# Patient Record
Sex: FEMALE | Race: BLACK OR AFRICAN AMERICAN | Employment: PART TIME | URBAN - METROPOLITAN AREA
[De-identification: names, ages, dates, MRNs, and addresses within clinical notes are randomized per-mention and may not be internally consistent; named-entity substitution may affect disease eponyms.]

---

## 2017-01-16 DIAGNOSIS — K21.9 GASTROESOPHAGEAL REFLUX DISEASE, ESOPHAGITIS PRESENCE NOT SPECIFIED: ICD-10-CM

## 2017-01-16 RX ORDER — OMEPRAZOLE 40 MG/1
40 CAPSULE, DELAYED RELEASE ORAL DAILY
Qty: 90 CAP | Refills: 1 | Status: SHIPPED | OUTPATIENT
Start: 2017-01-16 | End: 2017-07-10 | Stop reason: SDUPTHER

## 2017-01-16 NOTE — TELEPHONE ENCOUNTER
[facility] [id number] [facility] for omeprazole 40mg for pt. Refill generated and sent to provider for review.

## 2017-01-20 DIAGNOSIS — I10 BENIGN ESSENTIAL HYPERTENSION: ICD-10-CM

## 2017-01-21 RX ORDER — RAMIPRIL 10 MG/1
20 CAPSULE ORAL DAILY
Qty: 180 CAP | Refills: 1 | Status: SHIPPED | OUTPATIENT
Start: 2017-01-21 | End: 2017-07-10 | Stop reason: SDUPTHER

## 2017-01-22 RX ORDER — PREDNISONE 5 MG/1
TABLET ORAL
Qty: 120 TAB | Refills: 5 | Status: SHIPPED | OUTPATIENT
Start: 2017-01-22 | End: 2017-05-16

## 2017-03-07 ENCOUNTER — OFFICE VISIT (OUTPATIENT)
Dept: RHEUMATOLOGY | Age: 62
End: 2017-03-07

## 2017-03-07 VITALS
DIASTOLIC BLOOD PRESSURE: 70 MMHG | HEIGHT: 66 IN | OXYGEN SATURATION: 98 % | TEMPERATURE: 98.1 F | SYSTOLIC BLOOD PRESSURE: 138 MMHG | RESPIRATION RATE: 16 BRPM | BODY MASS INDEX: 29.02 KG/M2 | WEIGHT: 180.6 LBS | HEART RATE: 82 BPM

## 2017-03-07 DIAGNOSIS — Z79.60 LONG-TERM USE OF IMMUNOSUPPRESSANT MEDICATION: ICD-10-CM

## 2017-03-07 DIAGNOSIS — R20.2 NUMBNESS AND TINGLING IN BOTH HANDS: ICD-10-CM

## 2017-03-07 DIAGNOSIS — M85.80 OSTEOPENIA: ICD-10-CM

## 2017-03-07 DIAGNOSIS — E11.9 TYPE 2 DIABETES MELLITUS WITHOUT COMPLICATION, WITHOUT LONG-TERM CURRENT USE OF INSULIN (HCC): ICD-10-CM

## 2017-03-07 DIAGNOSIS — M31.6 TEMPORAL ARTERITIS (HCC): Primary | ICD-10-CM

## 2017-03-07 DIAGNOSIS — Z79.52 LONG TERM (CURRENT) USE OF SYSTEMIC STEROIDS: ICD-10-CM

## 2017-03-07 DIAGNOSIS — R20.0 NUMBNESS AND TINGLING IN BOTH HANDS: ICD-10-CM

## 2017-03-07 DIAGNOSIS — M06.4 INFLAMMATORY POLYARTHRITIS (HCC): ICD-10-CM

## 2017-03-07 RX ORDER — METHOTREXATE 2.5 MG/1
TABLET ORAL
Qty: 24 TAB | Refills: 2 | Status: SHIPPED | OUTPATIENT
Start: 2017-03-07 | End: 2017-05-16

## 2017-03-07 RX ORDER — IBUPROFEN 400 MG/1
TABLET ORAL
COMMUNITY
End: 2017-05-16

## 2017-03-07 RX ORDER — FOLIC ACID 1 MG/1
1 TABLET ORAL DAILY
Qty: 90 TAB | Refills: 3 | Status: SHIPPED | OUTPATIENT
Start: 2017-03-07 | End: 2017-10-09 | Stop reason: SDUPTHER

## 2017-03-07 NOTE — PATIENT INSTRUCTIONS
Labs today and in 4 weeks    Methotrexate 4 tabs weekly for 2 weeks, then 6 tabs once weekly if tolerated    Folic acid 1 mg daily    In 2 weeks, prednisone to 17.5 mg daily if tolerated (call 3 weeks after for instructions)       Patient Education  METHOTREXATE - ORAL   IMPORTANT NOTE: The following information is intended to supplement, not substitute for, the expertise and judgment of your physician, pharmacist or other healthcare professional. It should not be construed to indicate that use of the drug is safe, appropriate, or effective for you. Consult your healthcare professional before using this drug. METHOTREXATE - ORAL  (meth-oh-TREX-ate)   COMMON BRAND NAME(S): Rheumatrex   WARNING: Methotrexate has rarely caused serious (sometimes fatal) side effects. Therefore, this medication should be used only to treat cancer or severe cases of psoriasis or rheumatoid arthritis. Methotrexate has caused birth defects and fetal death. Women must avoid becoming pregnant while taking this medication. Pregnant women who have psoriasis or rheumatoid arthritis must not use methotrexate. (See also Precautions)   If you have kidney problems or excess body water (ascites, pleural effusion), you must be closely monitored and your dose may be adjusted or stopped by your doctor. Methotrexate (usually at high dosages) has rarely caused severe (sometimes fatal) bone marrow suppression (decreasing your body's ability to fight infections) and stomach/intestinal disease (e.g., bleeding) when used at the same time as non-steroidal anti-inflammatory drugs (NSAIDs such as indomethacin, ketoprofen). Therefore, NSAIDs should not be used with high-dose methotrexate. Caution is advised if you also take aspirin. NSAIDs/aspirin may be used with low-dose methotrexate such as for the treatment of rheumatoid arthritis if directed by your doctor.  If you are using low-dose aspirin ( mg per day) for heart attack or stroke prevention, continue to take it unless directed otherwise. Consult your doctor regarding safe use of these drugs (e.g., close monitoring by your doctor, maintaining stable doses of NSAIDs). In rare instances, this drug may also cause liver problems when it is used for long periods of time. If you are using methotrexate long-term, a liver biopsy is usually recommended. Methotrexate use has rarely resulted in serious (sometimes fatal) lung problems, lung infections (Pneumocystis carinii pneumonia), skin reactions, diarrhea and mouth sores (ulcerative stomatitis). (See also Side Effects.)   Lumps (tumors/abnormal growths) may very infrequently appear during methotrexate use. If this occurs, the drug must be stopped and treatment may be needed. Consult your doctor immediately if new lumps/growths occur. When used to treat tumors, methotrexate sometimes causes side effects due to the rapid destruction of cancer cells (tumor lysis syndrome). Tell your doctor immediately if you experience symptoms such as irregular heartbeat or muscle weakness. Although rare, this medication when used with radiation treatment may increase the risk of tissue and bone damage. Discuss the risks and benefits of your treatment plan with your doctor. USES:  Methotrexate is used to treat certain types of cancer or to control severe psoriasis or rheumatoid arthritis. This medication works by interfering with cell growth and by suppressing the immune system. Early treatment of rheumatoid arthritis with more aggressive therapy such as methotrexate helps to reduce further joint damage and to preserve joint function. OTHER USES:  This medication has also been used to treat other disorders such as lupus and psoriatic arthritis. HOW TO USE:  This is a potent medication. The dose and how often you take this drug is based on your medical condition and response to therapy.  There are many different dosing schedules for this medicine (especially for cancer treatment). Therefore, it is very important that you follow your doctor's instructions carefully. Take it by mouth exactly as directed. For managing psoriasis or rheumatoid arthritis, take this medication exactly as directed, usually once a week. It may take up to several months of continued use before the full benefit of this drug takes effect. Do not increase your dose or take this medication more often without your doctor's approval. Your condition will not improve any faster and the risk of serious side effects may be increased. Unless your doctor instructs you otherwise, drink plenty of fluids while taking this medication. This helps your kidneys to remove the drug from your body and minimize some of the side effects. This medication may come with a Patient Information Leaflet. Read it carefully. Ask your doctor or pharmacist any questions that you may have about this medicine. SIDE EFFECTS:  See also Warning section. Nausea, vomiting, stomach pain, drowsiness or dizziness may occur. If any of these effects persist or worsen, notify your doctor or pharmacist promptly. Tell your doctor immediately if any of these serious side effects occur: mouth sores, diarrhea, fever, fatigue, persistent sore throat, unusual bleeding or bruising, black stools, skin rash, enlarged glands/lymph nodes, dark urine, bone pain, unusual pain and discoloration of the skin. Tell your doctor immediately if any of these unlikely but serious side effects occur: yellowing eyes/skin, change in the amount of urine, chest pain, dry cough, trouble breathing, calf pain/swelling, vision changes, irregular heartbeat, muscle weakness, mental/mood changes, seizures. Temporary hair loss may occur. Normal hair growth should return after treatment has ended. A serious allergic reaction to this drug is unlikely, but seek immediate medical attention if it occurs.  Symptoms of a serious allergic reaction include: rash, itching, swelling, severe dizziness, trouble breathing. If you notice other effects not listed above, contact your doctor or pharmacist.   Dominic Jamison:  Before taking methotrexate, tell your doctor or pharmacist if you are allergic to it; or if you have any other allergies. This medication should not be used if you have certain medical conditions. Before using this medicine, consult your doctor or pharmacist if you have: liver disease, severe kidney disease, severe lung disease (e.g., pulmonary fibrosis), alcohol use, suppressed immune system, blood cell/bone marrow disorders. Before using this medication, tell your doctor or pharmacist your medical history, especially of: stomach/intestinal diseases (e.g., peptic ulcer, ulcerative colitis), kidney disease, any active infection (including chickenpox or recent exposure to it), folic acid deficiency. This drug may make you dizzy or drowsy; use caution engaging in activities requiring alertness such as driving or using machinery. Avoid alcoholic beverages. This medication may make you more sensitive to the sun. Avoid prolonged sun exposure, tanning booths or sunlamps. Use a sunscreen and wear protective clothing when outdoors. Do not have immunizations/vaccinations without the consent of your doctor and avoid contact with people who have recently received oral polio vaccine. Use caution with sharp objects like safety razors or nail cutters and avoid activities such as contact sports to lower the chance of getting cut, bruised, or injured. Wash your hands well to prevent the spread of infections. Methotrexate must not be used during pregnancy. It may cause fetal harm. If you become pregnant or think you may be pregnant, inform your doctor immediately. Pregnancy must be avoided, therefore males and females must use reliable form(s) of birth control during and for at least 3 months following the end of methotrexate treatment.    Methotrexate passes into breast milk. Do not breast-feed while using this drug due to the risk for fetal harm. DRUG INTERACTIONS:  See also Warnings about non-steroidal anti-inflammatory drugs (NSAIDs) and aspirin use. This drug should not be used with the following medications because very serious interactions may occur: acitretin, asparaginase, live vaccines, pyrimethamine. If you are currently using any of these medications, tell your doctor or pharmacist before starting methotrexate. Before using this medication, tell your doctor or pharmacist of all prescription and nonprescription products you may use, especially of: other cancer treatments potentially toxic to the kidney (e.g., cisplatin), digoxin, leflunomide, other drugs potentially toxic to the liver (e.g., azathioprine, sulfasalazine, retinoids such as isotretinoin), penicillins, phenytoin, probenecid, procarbazine, sulfa medications, theophylline. Certain antibiotics (e.g., chloramphenicol, sulfa, tetracyclines) may interfere with methotrexate blood tests (increase methotrexate blood levels due to protein-binding displacement). Make sure laboratory personnel and your doctors know you are using any of these types of antibiotics. Do not start or stop any medicine without doctor or pharmacist approval.   OVERDOSE:  If overdose is suspected, contact your local poison control center or emergency room immediately.  residents can call the [place] 1997 hotline at 1-447.593.9703. [place] residents should call their local poison control center directly. Symptoms of overdose may include severe nausea and vomiting, and bloody stools. NOTES:  Do not share this medication with others. Laboratory and/or medical tests (e.g., CBC, liver and kidney function tests) should be performed periodically to monitor your progress or check for side effects. Consult your doctor for more details. MISSED DOSE:  It is important to use each dose at the scheduled time.  If you miss a dose, contact your doctor or pharmacist immediately to establish a new dosing schedule. Do not double the dose to catch up. STORAGE:  Store this medication at room temperature between 59-86 degrees F (15-30 degrees C) away from light and moisture. Do not store in the bathroom. Keep all medicines away from children and pets. MEDICAL ALERT: Your condition can cause complications in a medical emergency. For enrollment information call MedicAlert at [phone] [facility], [facility].), or [phone] [facility]).

## 2017-03-07 NOTE — PROGRESS NOTES
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 HPI Patient presents for follow up of temporal arteritis. \"I've not been feeling very well. \" A week after lowering to 15 mg daily prednisone, she felt poorly (pain in the shoulders, feet, and knees wth parasthesias of the hands). She increased prednisone to 20 mg daily with improvement. She has some pain in the right shoulder and right foot. She had left temporal headaches (perhaps every other day, improved on prednisone 20 mg). She has no current tongue or jaw claudication (noted during flare but improved). Vision is fine. She has mild parasthesias of the right hand currently. She has AM stiffness of 20-30 minutes. She has no current joint swelling. She has been taking Actonel 150 mg once monthly. She is taking vitamin D3 2000 units daily. She is getting calcium through healthy diet. Blood sugars were \"wacky\" during the flare but are improved recently (fasting 110 range). Current Outpatient Prescriptions   Medication Sig Dispense Refill    ibuprofen (MOTRIN) 400 mg tablet Take  by mouth every six (6) hours as needed for Pain.  predniSONE (DELTASONE) 5 mg tablet 17.5 mg once daily or as directed. (Patient taking differently: 20 mg daily. 17.5 mg once daily or as directed.) 120 Tab 5    ramipril (ALTACE) 10 mg capsule Take 2 Caps by mouth daily. 180 Cap 1    omeprazole (PRILOSEC) 40 mg capsule Take 1 Cap by mouth daily. 90 Cap 1    SITagliptin (JANUVIA) 100 mg tablet Take 1 Tab by mouth daily. 90 Tab 1    atorvastatin (LIPITOR) 20 mg tablet Take 1 Tab by mouth daily. 90 Tab 3    DULoxetine (CYMBALTA) 60 mg capsule Take 1 Cap by mouth daily. 90 Cap 1    hydroCHLOROthiazide (HYDRODIURIL) 25 mg tablet TAKE 1 TABLET BY MOUTH EVERY DAY 90 Tab 2    risedronate (ACTONEL) 150 mg tablet Take 1 Tab by mouth every thirty (30) days. 1 Tab 11    metFORMIN ER (GLUCOPHAGE XR) 500 mg tablet Take 2 Tabs by mouth two (2) times a day.   11    estradiol (ESTRACE) 1 mg tablet Take 1 Tab by mouth daily. 90 Tab 3    omega-3 fatty acids-vitamin e (FISH OIL) 1,000 mg cap Take 1 Cap by mouth two (2) times a day.  Cholecalciferol, Vitamin D3, (VITAMIN D3) 2,000 unit cap Take 1 Cap by mouth daily.  multivitamin (ONE A DAY) tablet Take 1 Tab by mouth daily.  acetaminophen (TYLENOL) 325 mg tablet Take 2 Tabs by mouth every four (4) hours as needed for Fever (Temperature greater than 100.4 degrees Fahrenheit if taking PO). 15 Tab 0     Allergies   Allergen Reactions    Elfego Flavor Hives    Alendronate Nausea and Vomiting    Morphine Other (comments)     Extreme epigastric pain    Valium [Diazepam] Nausea and Vomiting    Versed [Midazolam] Nausea and Vomiting          Review of Systems   Constitutional: Negative for fever and weight loss. Eyes: Negative for blurred vision. Respiratory: Negative for cough. Gastrointestinal: Negative for abdominal pain. Skin: Negative for rash. Physical Exam   Vitals reviewed. Constitutional: She is oriented to person, place, and time. She appears well-developed and well-nourished. No distress.    HENT:    Mouth/Throat: Oropharynx is clear and moist. No oropharyngeal exudate. Dentition unremarkable; no areas exposed jaw bone    Eyes: Conjunctivae are normal.    Neck: Neck supple. Mildly reduced and painful left lateral rotation  Cardiovascular: Regular rhythm.     -right temporal artery non tender and with normal pulsation. Left temporal artery s/p biopsy without tenderness in the region  -no edema  Pulmonary/Chest: Effort normal and breath sounds normal. No respiratory distress.    Abdominal: Soft. She exhibits no distension. There is no tenderness. No organomegaly. Musculoskeletal:   -Peripheral joints FROM aside from right knee flexion to 100 degrees. Painful ROM right shoulder  -Full peripheral joint examination reveals synovitis right 1-3 MTPS and left 1-2 mtps. Tenderness right shoulder.  Small and cool effusion right knee. Lymphadenopathy:     She has no cervical adenopathy. Neurological: She is alert and oriented to person, place, and time. She exhibits normal muscle tone.   -gait normal  Skin: Skin is warm and dry. No acute rash noted. Psychiatric: She has a normal mood and affect. Judgment normal.    Lab Results   Component Value Date/Time    WBC 8.7 12/06/2016 01:43 PM    HGB 11.6 12/06/2016 01:43 PM    HCT 35.7 12/06/2016 01:43 PM    PLATELET 087 11/02/1268 01:43 PM    MCV 87 12/06/2016 01:43 PM     Lab Results   Component Value Date/Time    Sodium 140 12/06/2016 01:43 PM    Potassium 4.2 12/06/2016 01:43 PM    Chloride 96 12/06/2016 01:43 PM    CO2 27 12/06/2016 01:43 PM    Anion gap 10 04/30/2016 03:25 AM    Glucose 123 12/06/2016 01:43 PM    BUN 23 12/06/2016 01:43 PM    Creatinine 0.76 12/06/2016 01:43 PM    BUN/Creatinine ratio 30 12/06/2016 01:43 PM    GFR est AA 97 12/06/2016 01:43 PM    GFR est non-AA 84 12/06/2016 01:43 PM    Calcium 9.7 12/06/2016 01:43 PM    Bilirubin, total 0.2 12/06/2016 01:43 PM    AST (SGOT) 19 12/06/2016 01:43 PM    Alk. phosphatase 47 12/06/2016 01:43 PM    Protein, total 7.0 12/06/2016 01:43 PM    Albumin 4.8 12/06/2016 01:43 PM    Globulin 4.1 04/29/2016 08:45 AM    A-G Ratio 2.2 12/06/2016 01:43 PM    ALT (SGPT) 17 12/06/2016 01:43 PM     Lab Results   Component Value Date/Time    Sed rate (ESR) 6 12/06/2016 01:43 PM     CRP 0.46 mg/dL    25-D 72    ASSESSMENT and PLAN    ICD-10-CM ICD-9-CM    1. Temporal arteritis [facility]): shoulder girdle pain and fatigue initially. Temporal headaches. Sudden left sided visual loss in late April 2016. ESR was 116. Temporal artery biopsy negative. Symptoms improved with prednisone 55-60 mg daily. Flare with taper to 15 mg daily but better at 20 mg daily. M31.6 446. 5 C REACTIVE PROTEIN, QT  Prednisone 20 mg daily, tapering to 17.5 mg daily in 2-3 weeks if tolerated  -off label methotrexate 10 mg once weekly.  If tolerated, increase to 15 mg weekly in 2 weeks      SED RATE (ESR)   2. Inflammatory polyarthritis ([address]): flare of GCA recently with peripheral symptoms (may e related but could also portend an additional inflammatory arthritis). M06.4 714.9 ANTINUCLEAR ANTIBODIES, IFA  See above      CYCLIC CITRUL PEPTIDE AB, IGG      RHEUMATOID FACTOR, QL      C REACTIVE PROTEIN, QT      SED RATE (ESR)   3. Numbness and tingling in both hands: clinical carpal tunnel symptoms noted since last visit (improved with prednisone dose increase but still present). R20.2 782.0 Carpal tunnel braces at bedtime. Call if not improving   4. Long term (current) use of systemic steroids Z79.52 V58.65 See above and below  She notes a colonoscopy is likely in the near future. Review prednisone dosing with anesthesia team prior to procedure. 5. Long-term use of immunosuppressant medication Z79.899 V58.69 HEP B SURFACE AG      HEPATITIS B CORE AB, TOTAL      HEPATITIS B SURF AB QUANT      HEPATITIS C AB      METABOLIC PANEL, COMPREHENSIVE      CBC WITH AUTOMATED DIFF  We discussed the rationale for using methotrexate, as well as the potential adverse effects associated with methotrexate (hematologic, pulmonary, infectious, and hepatic, e.g.). An informational handout was provided. We discussed that the time to clinical improvement from methorexate will likely be between 2-6 weeks. We will adjust the weekly dose depending upon the clinical response during that time frame. I have prescribed folic acid 1 mg p.o. once daily. CBC and CMP will be monitored at least every 12 weeks (next in 4 weeks). I have recommended a yearly flu vaccine and a pneumovax (if not previously administered). I stressed the importance of abstinence from alcohol. 6. Type 2 diabetes mellitus without complication, without long-term current use of insulin (HCC) E11.9 250.00 HEMOGLOBIN A1C W/O EAG   7. Osteopenia: DXA femoral neck T-score -1.3. She is tolerating Actonel 150 mg monthly. M85.80 733.90 Risedronate 150 mg monthly  Calcium 1200 mg daily total  -Vitamin D3 2000 units daily

## 2017-03-09 LAB
ALBUMIN SERPL-MCNC: 4.3 G/DL (ref 3.6–4.8)
ALBUMIN/GLOB SERPL: 1.7 {RATIO} (ref 1.1–2.5)
ALP SERPL-CCNC: 46 IU/L (ref 39–117)
ALT SERPL-CCNC: 19 IU/L (ref 0–32)
ANA HOMOGEN TITR SER: NORMAL {TITER}
ANA SPECKLED TITR SER: NORMAL {TITER}
ANA TITR SER IF: POSITIVE {TITER}
AST SERPL-CCNC: 17 IU/L (ref 0–40)
BASOPHILS # BLD AUTO: 0 X10E3/UL (ref 0–0.2)
BASOPHILS NFR BLD AUTO: 0 %
BILIRUB SERPL-MCNC: 0.2 MG/DL (ref 0–1.2)
BUN SERPL-MCNC: 26 MG/DL (ref 8–27)
BUN/CREAT SERPL: 32 (ref 11–26)
CALCIUM SERPL-MCNC: 9.5 MG/DL (ref 8.7–10.3)
CCP IGA+IGG SERPL IA-ACNC: 5 UNITS (ref 0–19)
CHLORIDE SERPL-SCNC: 98 MMOL/L (ref 96–106)
CO2 SERPL-SCNC: 23 MMOL/L (ref 18–29)
CREAT SERPL-MCNC: 0.81 MG/DL (ref 0.57–1)
CRP SERPL-MCNC: 8.3 MG/L (ref 0–4.9)
EOSINOPHIL # BLD AUTO: 0 X10E3/UL (ref 0–0.4)
EOSINOPHIL NFR BLD AUTO: 0 %
ERYTHROCYTE [DISTWIDTH] IN BLOOD BY AUTOMATED COUNT: 15.7 % (ref 12.3–15.4)
ERYTHROCYTE [SEDIMENTATION RATE] IN BLOOD BY WESTERGREN METHOD: 8 MM/HR (ref 0–40)
GLOBULIN SER CALC-MCNC: 2.6 G/DL (ref 1.5–4.5)
GLUCOSE SERPL-MCNC: 141 MG/DL (ref 65–99)
HBA1C MFR BLD: 6.5 % (ref 4.8–5.6)
HBV CORE AB SERPL QL IA: NEGATIVE
HBV SURFACE AB SER-ACNC: 5 MIU/ML
HBV SURFACE AG SERPL QL IA: NEGATIVE
HCT VFR BLD AUTO: 33.9 % (ref 34–46.6)
HCV AB S/CO SERPL IA: 0.1 S/CO RATIO (ref 0–0.9)
HGB BLD-MCNC: 11 G/DL (ref 11.1–15.9)
IMM GRANULOCYTES # BLD: 0 X10E3/UL (ref 0–0.1)
IMM GRANULOCYTES NFR BLD: 0 %
LYMPHOCYTES # BLD AUTO: 1.3 X10E3/UL (ref 0.7–3.1)
LYMPHOCYTES NFR BLD AUTO: 19 %
Lab: NORMAL
MCH RBC QN AUTO: 27 PG (ref 26.6–33)
MCHC RBC AUTO-ENTMCNC: 32.4 G/DL (ref 31.5–35.7)
MCV RBC AUTO: 83 FL (ref 79–97)
MONOCYTES # BLD AUTO: 0.2 X10E3/UL (ref 0.1–0.9)
MONOCYTES NFR BLD AUTO: 4 %
NEUTROPHILS # BLD AUTO: 5.4 X10E3/UL (ref 1.4–7)
NEUTROPHILS NFR BLD AUTO: 77 %
PLATELET # BLD AUTO: 476 X10E3/UL (ref 150–379)
POTASSIUM SERPL-SCNC: 4.3 MMOL/L (ref 3.5–5.2)
PROT SERPL-MCNC: 6.9 G/DL (ref 6–8.5)
RBC # BLD AUTO: 4.08 X10E6/UL (ref 3.77–5.28)
RHEUMATOID FACT SERPL-ACNC: <10 IU/ML (ref 0–13.9)
SODIUM SERPL-SCNC: 142 MMOL/L (ref 134–144)
WBC # BLD AUTO: 6.9 X10E3/UL (ref 3.4–10.8)

## 2017-03-10 ENCOUNTER — OFFICE VISIT (OUTPATIENT)
Dept: INTERNAL MEDICINE CLINIC | Age: 62
End: 2017-03-10

## 2017-03-10 VITALS
HEIGHT: 66 IN | OXYGEN SATURATION: 98 % | SYSTOLIC BLOOD PRESSURE: 136 MMHG | WEIGHT: 179.2 LBS | RESPIRATION RATE: 20 BRPM | DIASTOLIC BLOOD PRESSURE: 74 MMHG | HEART RATE: 72 BPM | BODY MASS INDEX: 28.8 KG/M2 | TEMPERATURE: 97.8 F

## 2017-03-10 DIAGNOSIS — M35.3 PMR (POLYMYALGIA RHEUMATICA) (HCC): ICD-10-CM

## 2017-03-10 DIAGNOSIS — E11.9 TYPE 2 DIABETES MELLITUS WITHOUT COMPLICATION, WITHOUT LONG-TERM CURRENT USE OF INSULIN (HCC): Primary | ICD-10-CM

## 2017-03-10 DIAGNOSIS — M31.6 TEMPORAL ARTERITIS (HCC): ICD-10-CM

## 2017-03-10 NOTE — PROGRESS NOTES
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 She had lab work done this week by Dr. Eusebio Reis (see below). C/o worsening polyarthralgia, paresthesias in hands and feet, edema in knee and feet since in mid February. Recent labs showed +BLANCA. Diagnosed with PMR. MTX and folic acid started 4/8/55. Prednisone increased to 20 mg daily. Arthralgias gradually improving. She retired 12/31/16. She had been working out that the gym regularly until her joint pain started. She has been very conscious of her diet. Fasting glucose 100-110. Two 2 hr post-prandial glucose 140-150. Her BP has been running 120-130/70's. Reports adherence with all medications. Denies hypoglycemia, polyuria, nausea/vomiting, bowel changes, chest pain, dyspnea, lightheadedness. She will get mammogram in April. She plans to have a colonoscopy this year - consultation scheduled at the end of this month. Current Outpatient Prescriptions   Medication Sig Dispense Refill    ibuprofen (MOTRIN) 400 mg tablet Take  by mouth every six (6) hours as needed for Pain.  methotrexate (RHEUMATREX) 2.5 mg tablet 10 mg once weekly. If tolerated, increase to 15 mg weekly in 2 weeks 24 Tab 2    folic acid (FOLVITE) 1 mg tablet Take 1 Tab by mouth daily. 90 Tab 3    predniSONE (DELTASONE) 5 mg tablet 17.5 mg once daily or as directed. (Patient taking differently: 20 mg daily. 17.5 mg once daily or as directed.) 120 Tab 5    ramipril (ALTACE) 10 mg capsule Take 2 Caps by mouth daily. 180 Cap 1    omeprazole (PRILOSEC) 40 mg capsule Take 1 Cap by mouth daily. 90 Cap 1    SITagliptin (JANUVIA) 100 mg tablet Take 1 Tab by mouth daily. 90 Tab 1    atorvastatin (LIPITOR) 20 mg tablet Take 1 Tab by mouth daily. 90 Tab 3    DULoxetine (CYMBALTA) 60 mg capsule Take 1 Cap by mouth daily.  90 Cap 1    hydroCHLOROthiazide (HYDRODIURIL) 25 mg tablet TAKE 1 TABLET BY MOUTH EVERY DAY 90 Tab 2    risedronate (ACTONEL) 150 mg tablet Take 1 Tab by mouth every thirty (30) days. 1 Tab 11    acetaminophen (TYLENOL) 325 mg tablet Take 2 Tabs by mouth every four (4) hours as needed for Fever (Temperature greater than 100.4 degrees Fahrenheit if taking PO). 15 Tab 0    metFORMIN ER (GLUCOPHAGE XR) 500 mg tablet Take 2 Tabs by mouth two (2) times a day. 11    estradiol (ESTRACE) 1 mg tablet Take 1 Tab by mouth daily. 90 Tab 3    omega-3 fatty acids-vitamin e (FISH OIL) 1,000 mg cap Take 1 Cap by mouth two (2) times a day.  Cholecalciferol, Vitamin D3, (VITAMIN D3) 2,000 unit cap Take 1 Cap by mouth daily.  multivitamin (ONE A DAY) tablet Take 1 Tab by mouth daily. Allergies   Allergen Reactions    Elfego Flavor Hives    Alendronate Nausea and Vomiting    Morphine Other (comments)     Extreme epigastric pain    Valium [Diazepam] Nausea and Vomiting    Versed [Midazolam] Nausea and Vomiting     Past Medical History:   Diagnosis Date    DDD (degenerative disc disease), cervical     Degenerative arthritis of right knee 2/2014    Dr. Sanabria Old    Diabetes mellitus, type 2 ([facility] [address] 12/2014    Fibromyalgia     Gastric nodule     gastric nodules on endoscopy 3/26/12    GERD (gastroesophageal reflux disease)     Hyperlipidemia LDL goal < 100     Hypertension     Osteopenia 05/2016    Actonel started    PUD (peptic ulcer disease)     Pulmonary emboli ([facility] [address] 2006    s/p hysterectomy    Temporal arteritis ([facility] [address] 4/29/16    Dr. Tisha Mccann Thoracic outlet syndrome     left, Dr. Sabas Price Thrombocytopenia [facility]) 10/2015    mild    Vitamin D deficiency         Review of Systems    Pertinent items are noted in HPI.      Objective:     Visit Vitals    /74 (BP 1 Location: Left arm, BP Patient Position: Sitting)    Pulse 72    Temp 97.8 °F (36.6 °C) (Oral)    Resp 20    Ht 5' 6\" (1.676 m)    Wt 179 lb 3.2 oz (81.3 kg)    SpO2 98%    BMI 28.92 kg/m2     General appearance: alert, cooperative, no distress, appears stated age, very pleasant overweight female. Neck: supple, symmetrical, trachea midline, no adenopathy, thyroid: not enlarged, symmetric, no tenderness/mass/nodules and no carotid bruit  Lungs: clear to auscultation bilaterally  Heart: regular rate and rhythm, S1, S2 normal, no murmur, click, rub or gallop  Abdomen: soft, non-tender. No masses,  no organomegaly  Extremities: synovitis and mild tenderness of 1-2 MTP joints bilat, pedal pulses 2+ bilat  Neurologic: Grossly normal  Psych: appropriate mood and affect. Behavior is normal. Judgment and thought content normal.    Results for orders placed or performed in visit on 03/07/17   ANTINUCLEAR ANTIBODIES, IFA   Result Value Ref Range    Antinuclear Abs, IFA Positive (A)    CYCLIC CITRUL PEPTIDE AB, IGG   Result Value Ref Range    CCP Antibodies IgG/IgA 5 0 - 19 units   RHEUMATOID FACTOR, QL   Result Value Ref Range    Rheumatoid factor <10.0 0.0 - 13.9 IU/mL   HEP B SURFACE AG   Result Value Ref Range    Hep B surface Ag screen Negative Negative   HEPATITIS B CORE AB, TOTAL   Result Value Ref Range    Hep B Core Ab, total Negative Negative   HEPATITIS B SURF AB QUANT   Result Value Ref Range    Hepatitis B surf Ab, QT 5.0 (L) Immunity>9.9 mIU/mL   HEPATITIS C AB   Result Value Ref Range    Hep C Virus Ab 0.1 0.0 - 0.9 s/co ratio   METABOLIC PANEL, COMPREHENSIVE   Result Value Ref Range    Glucose 141 (H) 65 - 99 mg/dL    BUN 26 8 - 27 mg/dL    Creatinine 0.81 0.57 - 1.00 mg/dL    GFR est non-AA 78 >59 mL/min/1.73    GFR est AA 89 >59 mL/min/1.73    BUN/Creatinine ratio 32 (H) 11 - 26    Sodium 142 134 - 144 mmol/L    Potassium 4.3 3.5 - 5.2 mmol/L    Chloride 98 96 - 106 mmol/L    CO2 23 18 - 29 mmol/L    Calcium 9.5 8.7 - 10.3 mg/dL    Protein, total 6.9 6.0 - 8.5 g/dL    Albumin 4.3 3.6 - 4.8 g/dL    GLOBULIN, TOTAL 2.6 1.5 - 4.5 g/dL    A-G Ratio 1.7 1.1 - 2.5    Bilirubin, total 0.2 0.0 - 1.2 mg/dL    Alk.  phosphatase 46 39 - 117 IU/L    AST (SGOT) 17 0 - 40 IU/L    ALT (SGPT) 19 0 - 32 IU/L   CBC WITH AUTOMATED DIFF   Result Value Ref Range    WBC 6.9 3.4 - 10.8 x10E3/uL    RBC 4.08 3.77 - 5.28 x10E6/uL    HGB 11.0 (L) 11.1 - 15.9 g/dL    HCT 33.9 (L) 34.0 - 46.6 %    MCV 83 79 - 97 fL    MCH 27.0 26.6 - 33.0 pg    MCHC 32.4 31.5 - 35.7 g/dL    RDW 15.7 (H) 12.3 - 15.4 %    PLATELET 007 (H) 096 - 379 x10E3/uL    NEUTROPHILS 77 %    Lymphocytes 19 %    MONOCYTES 4 %    EOSINOPHILS 0 %    BASOPHILS 0 %    ABS. NEUTROPHILS 5.4 1.4 - 7.0 x10E3/uL    Abs Lymphocytes 1.3 0.7 - 3.1 x10E3/uL    ABS. MONOCYTES 0.2 0.1 - 0.9 x10E3/uL    ABS. EOSINOPHILS 0.0 0.0 - 0.4 x10E3/uL    ABS. BASOPHILS 0.0 0.0 - 0.2 x10E3/uL    IMMATURE GRANULOCYTES 0 %    ABS. IMM. GRANS. 0.0 0.0 - 0.1 x10E3/uL   C REACTIVE PROTEIN, QT   Result Value Ref Range    C-Reactive Protein, Qt 8.3 (H) 0.0 - 4.9 mg/L   SED RATE (ESR)   Result Value Ref Range    Sed rate (ESR) 8 0 - 40 mm/hr   HEMOGLOBIN A1C W/O EAG   Result Value Ref Range    Hemoglobin A1c 6.5 (H) 4.8 - 5.6 %   ANTINUCLEAR AB PATTERNS   Result Value Ref Range    Homogeneous pattern 1:80     Speckled Pattern 1:80     NOTE Comment      Assessment/Plan:   Type 2 diabetes mellitus without complication, without long-term current use of insulin ([facility]) - well controlled. Continue close monitoring diet and glucose. Restart exercise as tolerated. Temporal arteritis ([facility] [address] - Reviewed and summarized Dr. Anahi Hook records. PMR (polymyalgia rheumatica) ([facility])    Follow-up Disposition:  Return in about 4 months (around 7/10/2017) for re-establish care with Dr. Lazarus Estrada. Advised her to call back or return to office if symptoms worsen/change/persist.  Discussed expected course/resolution/complications of diagnosis in detail with patient. Medication risks/benefits/costs/interactions/alternatives discussed with patient. She was given an after visit summary which includes diagnoses, current medications, & vitals.   She expressed understanding with the diagnosis and plan.     Moon Lutz PA-C

## 2017-03-10 NOTE — PROGRESS NOTES
Chief Complaint   Patient presents with    Hypertension     pt here for follow up    Diabetes     pt here for follow up    Shoulder Pain     pt c/o bilateral shoulder pain    Foot Pain     pt c/o bilateral foot pain    Knee Pain     pt c/o right knee pain

## 2017-03-10 NOTE — MR AVS SNAPSHOT
Visit Information Date & Time Provider Department Dept. Phone Encounter #  
 3/10/2017 12:20 PM MELANIE [facility] [phone] Follow-up Instructions Return in about 4 months (around 7/10/2017) for re-establish care with Dr. Shruti Escamilla. Your Appointments 4/18/2017  2:30 PM  
ESTABLISHED PATIENT with Crissy Gray MD  
Arthritis and [address] [place] [address] Road) Appt Note: 6 week fu  
 [address] [facility] [address]  
[phone]  
  
   
 [address] [phone] Upcoming Health Maintenance Date Due  
 BREAST CANCER SCRN MAMMOGRAM 4/3/2017 COLONOSCOPY 6/1/2017* HEMOGLOBIN A1C Q6M 9/7/2017 FOOT EXAM Q1 11/11/2017 MICROALBUMIN Q1 12/6/2017 LIPID PANEL Q1 12/6/2017 EYE EXAM RETINAL OR DILATED Q1 12/16/2017 PAP AKA CERVICAL CYTOLOGY 12/20/2019 DTaP/Tdap/Td series (2 - Td) 1/1/2021 *Topic was postponed. The date shown is not the original due date. Allergies as of 3/10/2017  Review Complete On: 3/10/2017 By: Vishal Jackson LPN Severity Noted Reaction Type Reactions Elfego Flavor Medium 07/22/2012   Systemic Hives Alendronate  07/06/2016    Nausea and Vomiting Morphine  07/22/2012    Other (comments) Extreme epigastric pain Valium [Diazepam]  03/07/2017    Nausea and Vomiting Versed [Midazolam]  10/16/2013    Nausea and Vomiting Current Immunizations  Reviewed on 3/7/2017 Name Date Influenza Vaccine 10/5/2016, 9/28/2015, 10/1/2013 Pneumococcal Conjugate (PCV-13) 10/6/2016 Tdap 1/1/2011 Zoster Vaccine, Live 11/1/2015 Not reviewed this visit You Were Diagnosed With   
  
 Codes Comments Type 2 diabetes mellitus without complication, without long-term current use of insulin (HCC)    -  Primary ICD-10-CM: E11.9 ICD-9-CM: 250.00 Temporal arteritis (HCC)     ICD-10-CM: M31.6 ICD-9-CM: 446.5 PMR (polymyalgia rheumatica) ([facility])     ICD-10-CM: M35.3 ICD-9-CM: 705 Vitals BP Pulse Temp Resp Height(growth percentile) Weight(growth percentile) 136/74 (BP 1 Location: Left arm, BP Patient Position: Sitting) 72 97.8 °F (36.6 °C) (Oral) 20 5' 6\" (1.676 m) 179 lb 3.2 oz (81.3 kg) SpO2 BMI OB Status Smoking Status 98% 28.92 kg/m2 Hysterectomy Never Smoker Vitals History BMI and BSA Data Body Mass Index Body Surface Area  
 28.92 kg/m 2 1.95 m 2 Preferred Pharmacy Pharmacy Name Phone [id number] [place], [address] [place] [address]) [phone] Your Updated Medication List  
  
   
This list is accurate as of: 3/10/17  1:15 PM.  Always use your most recent med list.  
  
  
  
  
 acetaminophen 325 mg tablet Commonly known as:  TYLENOL Take 2 Tabs by mouth every four (4) hours as needed for Fever (Temperature greater than 100.4 degrees Fahrenheit if taking PO). atorvastatin 20 mg tablet Commonly known as:  LIPITOR Take 1 Tab by mouth daily. DULoxetine 60 mg capsule Commonly known as:  CYMBALTA Take 1 Cap by mouth daily. estradiol 1 mg tablet Commonly known as:  ESTRACE Take 1 Tab by mouth daily. FISH OIL 1,000 mg Cap Generic drug:  omega-3 fatty acids-vitamin e Take 1 Cap by mouth two (2) times a day. folic acid 1 mg tablet Commonly known as:  Google Take 1 Tab by mouth daily. hydroCHLOROthiazide 25 mg tablet Commonly known as:  HYDRODIURIL  
TAKE 1 TABLET BY MOUTH EVERY DAY  
  
 ibuprofen 400 mg tablet Commonly known as:  MOTRIN Take  by mouth every six (6) hours as needed for Pain.  
  
 metFORMIN  mg tablet Commonly known as:  GLUCOPHAGE XR Take 2 Tabs by mouth two (2) times a day. methotrexate 2.5 mg tablet Commonly known as:  Dinesh James 10 mg once weekly. If tolerated, increase to 15 mg weekly in 2 weeks multivitamin tablet Commonly known as:  ONE A DAY Take 1 Tab by mouth daily. omeprazole 40 mg capsule Commonly known as:  PRILOSEC Take 1 Cap by mouth daily. predniSONE 5 mg tablet Commonly known as:  DELTASONE  
17.5 mg once daily or as directed. ramipril 10 mg capsule Commonly known as:  ALTACE Take 2 Caps by mouth daily. risedronate 150 mg tablet Commonly known as:  ACTONEL Take 1 Tab by mouth every thirty (30) days. SITagliptin 100 mg tablet Commonly known as:  [place] Take 1 Tab by mouth daily. VITAMIN D3 2,000 unit Cap capsule Generic drug:  Cholecalciferol (Vitamin D3) Take 1 Cap by mouth daily. Follow-up Instructions Return in about 4 months (around 7/10/2017) for re-establish care with Dr. Lazarus Estrada. Introducing [facility] & HEALTH SERVICES! Dear Ebony Salomon: Thank you for requesting a [employer] account. Our records indicate that you already have an active [employer] account. You can access your account anytime at [email or web address] Did you know that you can access your hospital and ER discharge instructions at any time in [employer]? You can also review all of your test results from your hospital stay or ER visit. Additional Information If you have questions, please visit the Frequently Asked Questions section of the [employer] website at [email or web address] Remember, [employer] is NOT to be used for urgent needs. For medical emergencies, dial 911. Now available from your iPhone and Android! Please provide this summary of care documentation to your next provider. Your primary care clinician is listed as Liberty Sanchez. If you have any questions after today's visit, please call [phone].

## 2017-04-06 ENCOUNTER — DOCUMENTATION ONLY (OUTPATIENT)
Dept: RHEUMATOLOGY | Age: 62
End: 2017-04-06

## 2017-04-06 DIAGNOSIS — Z79.899 ENCOUNTER FOR LONG-TERM (CURRENT) USE OF OTHER MEDICATIONS: Primary | ICD-10-CM

## 2017-04-07 LAB
ALBUMIN SERPL-MCNC: 4.3 G/DL (ref 3.6–4.8)
ALBUMIN/GLOB SERPL: 1.8 {RATIO} (ref 1.2–2.2)
ALP SERPL-CCNC: 48 IU/L (ref 39–117)
ALT SERPL-CCNC: 26 IU/L (ref 0–32)
AST SERPL-CCNC: 23 IU/L (ref 0–40)
BASOPHILS # BLD AUTO: 0 X10E3/UL (ref 0–0.2)
BASOPHILS NFR BLD AUTO: 0 %
BILIRUB SERPL-MCNC: <0.2 MG/DL (ref 0–1.2)
BUN SERPL-MCNC: 23 MG/DL (ref 8–27)
BUN/CREAT SERPL: 30 (ref 12–28)
CALCIUM SERPL-MCNC: 9.6 MG/DL (ref 8.7–10.3)
CHLORIDE SERPL-SCNC: 97 MMOL/L (ref 96–106)
CO2 SERPL-SCNC: 27 MMOL/L (ref 18–29)
CREAT SERPL-MCNC: 0.77 MG/DL (ref 0.57–1)
EOSINOPHIL # BLD AUTO: 0.1 X10E3/UL (ref 0–0.4)
EOSINOPHIL NFR BLD AUTO: 1 %
ERYTHROCYTE [DISTWIDTH] IN BLOOD BY AUTOMATED COUNT: 16.5 % (ref 12.3–15.4)
GLOBULIN SER CALC-MCNC: 2.4 G/DL (ref 1.5–4.5)
GLUCOSE SERPL-MCNC: 105 MG/DL (ref 65–99)
HCT VFR BLD AUTO: 34.9 % (ref 34–46.6)
HGB BLD-MCNC: 11.1 G/DL (ref 11.1–15.9)
IMM GRANULOCYTES # BLD: 0.1 X10E3/UL (ref 0–0.1)
IMM GRANULOCYTES NFR BLD: 1 %
LYMPHOCYTES # BLD AUTO: 3.7 X10E3/UL (ref 0.7–3.1)
LYMPHOCYTES NFR BLD AUTO: 46 %
MCH RBC QN AUTO: 26.7 PG (ref 26.6–33)
MCHC RBC AUTO-ENTMCNC: 31.8 G/DL (ref 31.5–35.7)
MCV RBC AUTO: 84 FL (ref 79–97)
MONOCYTES # BLD AUTO: 0.6 X10E3/UL (ref 0.1–0.9)
MONOCYTES NFR BLD AUTO: 7 %
NEUTROPHILS # BLD AUTO: 3.7 X10E3/UL (ref 1.4–7)
NEUTROPHILS NFR BLD AUTO: 45 %
PLATELET # BLD AUTO: 466 X10E3/UL (ref 150–379)
POTASSIUM SERPL-SCNC: 3.9 MMOL/L (ref 3.5–5.2)
PROT SERPL-MCNC: 6.7 G/DL (ref 6–8.5)
RBC # BLD AUTO: 4.15 X10E6/UL (ref 3.77–5.28)
SODIUM SERPL-SCNC: 142 MMOL/L (ref 134–144)
WBC # BLD AUTO: 8.1 X10E3/UL (ref 3.4–10.8)

## 2017-04-08 LAB
CRP SERPL-MCNC: 4.3 MG/L (ref 0–4.9)
SPECIMEN STATUS REPORT, ROLRST: NORMAL

## 2017-04-18 ENCOUNTER — OFFICE VISIT (OUTPATIENT)
Dept: RHEUMATOLOGY | Age: 62
End: 2017-04-18

## 2017-04-18 VITALS
OXYGEN SATURATION: 98 % | HEART RATE: 95 BPM | DIASTOLIC BLOOD PRESSURE: 82 MMHG | RESPIRATION RATE: 16 BRPM | TEMPERATURE: 98.5 F | HEIGHT: 66 IN | SYSTOLIC BLOOD PRESSURE: 142 MMHG | WEIGHT: 183 LBS | BODY MASS INDEX: 29.41 KG/M2

## 2017-04-18 DIAGNOSIS — Z79.52 CURRENT CHRONIC USE OF SYSTEMIC STEROIDS: ICD-10-CM

## 2017-04-18 DIAGNOSIS — Z79.899 LONG-TERM USE OF HIGH-RISK MEDICATION: ICD-10-CM

## 2017-04-18 DIAGNOSIS — M06.4 INFLAMMATORY POLYARTHRITIS (HCC): ICD-10-CM

## 2017-04-18 DIAGNOSIS — M31.6 TEMPORAL ARTERITIS (HCC): Primary | ICD-10-CM

## 2017-04-18 DIAGNOSIS — M85.80 OSTEOPENIA, UNSPECIFIED LOCATION: ICD-10-CM

## 2017-04-18 DIAGNOSIS — R76.8 ANA POSITIVE: ICD-10-CM

## 2017-04-18 RX ORDER — ONDANSETRON 4 MG/1
TABLET, FILM COATED ORAL
Refills: 0 | COMMUNITY
Start: 2017-03-20 | End: 2017-09-14 | Stop reason: SDUPTHER

## 2017-04-18 NOTE — PATIENT INSTRUCTIONS
Labs in about 4 weeks    Prednisone, lower by 2.5 mg alternating day's dosing every 2-4 weeks.  Stop at 10 mg daily dose

## 2017-04-18 NOTE — MR AVS SNAPSHOT
Visit Information Date & Time Provider Department Dept. Phone Encounter #  
 4/18/2017  2:30 PM Jeff Segura MD [facility] [phone] Follow-up Instructions Return in about 2 months (around 6/18/2017). Your Appointments 7/10/2017  2:15 PM  
ROUTINE CARE with MD Susan [facility] [address] [facility]) Appt Note: 4m fu for re-establish care with Dr. Pretty Castle, [address] [place] [address]  
[address], [address] [phone] Upcoming Health Maintenance Date Due COLONOSCOPY 6/1/2017* BREAST CANCER SCRN MAMMOGRAM 3/1/2018* HEMOGLOBIN A1C Q6M 9/7/2017 FOOT EXAM Q1 11/11/2017 MICROALBUMIN Q1 12/6/2017 LIPID PANEL Q1 12/6/2017 EYE EXAM RETINAL OR DILATED Q1 12/16/2017 PAP AKA CERVICAL CYTOLOGY 12/20/2019 DTaP/Tdap/Td series (2 - Td) 1/1/2021 *Topic was postponed. The date shown is not the original due date. Allergies as of 4/18/2017  Review Complete On: 4/18/2017 By: Jeff Segura MD  
  
 Severity Noted Reaction Type Reactions Elfego Flavor Medium 07/22/2012   Systemic Hives Alendronate  07/06/2016    Nausea and Vomiting Morphine  07/22/2012    Other (comments) Extreme epigastric pain Valium [Diazepam]  03/07/2017    Nausea and Vomiting Versed [Midazolam]  10/16/2013    Nausea and Vomiting Current Immunizations  Reviewed on 4/18/2017 Name Date Influenza Vaccine 10/5/2016, 9/28/2015, 10/1/2013 Pneumococcal Conjugate (PCV-13) 10/6/2016 Tdap 1/1/2011 Zoster Vaccine, Live 11/1/2015 Reviewed by Analy Jimenez LPN on 8/95/9176 at  2:19 PM  
 Reviewed by Analy Jimenez LPN on 2/14/5029 at  2:19 PM  
 Reviewed by Analy Jimenez LPN on 8/83/6282 at  2:23 PM  
 Reviewed by Analy Jimenez LPN on 6/62/8745 at  2:27 PM  
You Were Diagnosed With   
  
 Codes Comments Temporal arteritis (HCC)    -  Primary ICD-10-CM: M31.6 ICD-9-CM: 446.5 Current chronic use of systemic steroids     ICD-10-CM: Z79.52 
ICD-9-CM: V58.65 Long-term use of high-risk medication     ICD-10-CM: Z79.899 ICD-9-CM: V58.69 Inflammatory polyarthritis ([address]     ICD-10-CM: M06.4 ICD-9-CM: 714.9 Osteopenia, unspecified location     ICD-10-CM: M85.80 ICD-9-CM: 733.90 BLANCA positive     ICD-10-CM: R76.8 ICD-9-CM: 795.79 Vitals BP Pulse Temp Resp Height(growth percentile) Weight(growth percentile) 142/82 (BP 1 Location: Right arm, BP Patient Position: Sitting) 95 98.5 °F (36.9 °C) (Oral) 16 5' 6\" (1.676 m) 183 lb (83 kg) SpO2 BMI OB Status Smoking Status 98% 29.54 kg/m2 Hysterectomy Never Smoker Vitals History BMI and BSA Data Body Mass Index Body Surface Area  
 29.54 kg/m 2 1.97 m 2 Preferred Pharmacy Pharmacy Name Phone [id number] [address] [place] [address]) [phone] Your Updated Medication List  
  
   
This list is accurate as of: 4/18/17  3:08 PM.  Always use your most recent med list.  
  
  
  
  
 acetaminophen 325 mg tablet Commonly known as:  TYLENOL Take 2 Tabs by mouth every four (4) hours as needed for Fever (Temperature greater than 100.4 degrees Fahrenheit if taking PO). atorvastatin 20 mg tablet Commonly known as:  LIPITOR Take 1 Tab by mouth daily. DULoxetine 60 mg capsule Commonly known as:  CYMBALTA Take 1 Cap by mouth daily. estradiol 1 mg tablet Commonly known as:  ESTRACE Take 1 Tab by mouth daily. FISH OIL 1,000 mg Cap Generic drug:  omega-3 fatty acids-vitamin e Take 1 Cap by mouth two (2) times a day. folic acid 1 mg tablet Commonly known as:  Google Take 1 Tab by mouth daily. hydroCHLOROthiazide 25 mg tablet Commonly known as:  HYDRODIURIL  
TAKE 1 TABLET BY MOUTH EVERY DAY  
  
 ibuprofen 400 mg tablet Commonly known as:  MOTRIN Take  by mouth every six (6) hours as needed for Pain.  
  
 metFORMIN  mg tablet Commonly known as:  GLUCOPHAGE XR Take 2 Tabs by mouth two (2) times a day. methotrexate 2.5 mg tablet Commonly known as:  [place] Posey 10 mg once weekly. If tolerated, increase to 15 mg weekly in 2 weeks  
  
 multivitamin tablet Commonly known as:  ONE A DAY Take 1 Tab by mouth daily. omeprazole 40 mg capsule Commonly known as:  PRILOSEC Take 1 Cap by mouth daily. ondansetron hcl 4 mg tablet Commonly known as:  ZOFRAN  
TAKE 1 TABLET BY MOUTH AS DIRECTED BEFORE COLONOSCOPY predniSONE 5 mg tablet Commonly known as:  DELTASONE  
17.5 mg once daily or as directed. ramipril 10 mg capsule Commonly known as:  ALTACE Take 2 Caps by mouth daily. risedronate 150 mg tablet Commonly known as:  ACTONEL Take 1 Tab by mouth every thirty (30) days. SITagliptin 100 mg tablet Commonly known as:  [employer] [place] Take 1 Tab by mouth daily. VITAMIN D3 2,000 unit Cap capsule Generic drug:  Cholecalciferol (Vitamin D3) Take 1 Cap by mouth daily. Follow-up Instructions Return in about 2 months (around 6/18/2017). To-Do List   
 05/15/2017 Lab:  ANTI-DSDNA ANTIBODY BY Mac Bland   
  
 05/15/2017 Lab:  C REACTIVE PROTEIN, QT   
  
 05/15/2017 Lab:  CBC WITH AUTOMATED DIFF   
  
 05/15/2017 Lab:  COMPLEMENT, C3   
  
 05/15/2017 Lab:  COMPLEMENT, C4   
  
 05/15/2017 Lab:  METABOLIC PANEL, COMPREHENSIVE   
  
 05/15/2017 Lab:  SED RATE (ESR)   
  
 05/15/2017 Lab:  [facility] ANTIBODIES   
  
 05/15/2017 Lab:  URINALYSIS W/ RFLX MICROSCOPIC Patient Instructions Labs in about 4 weeks Prednisone, lower by 2.5 mg alternating day's dosing every 2-4 weeks. Stop at 10 mg daily dose Introducing [facility] & HEALTH SERVICES! Dear Hamzah Mcgrath: Thank you for requesting a [employer] account. Our records indicate that you already have an active [employer] account. You can access your account anytime at [email or web address] Did you know that you can access your hospital and ER discharge instructions at any time in [employer]? You can also review all of your test results from your hospital stay or ER visit. Additional Information If you have questions, please visit the Frequently Asked Questions section of the [employer] website at [email or web address] Remember, [employer] is NOT to be used for urgent needs. For medical emergencies, dial 911. Now available from your iPhone and Android! Please provide this summary of care documentation to your next provider. Your primary care clinician is listed as Vanetta Dakins. If you have any questions after today's visit, please call [phone].

## 2017-04-18 NOTE — PROGRESS NOTES
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 HPI Patient presents for follow up of temporal arteritis. Since last visit, she has been taking methotrexate 15 mg weekly and tolerating this. Symptoms have been much improved over the past week or so. She has mild stiffness of the right knee. She has not had temporal headaches or tongue or jaw claudication over the past couple of weeks. Vision is fine. She has minimal parasthesias of the right hand. She has AM stiffness of 30-45 minutes. She has no current joint swelling. She has noted some facial erythema recently, perhaps worse in the sun. Prednisone dose is now 17.5 mg daily, alternating with 15 mg daily (20 mg daily at last visit). She has been taking Actonel 150 mg once monthly. She is taking vitamin D3 2000 units daily. She is getting calcium through healthy diet. Current Outpatient Prescriptions   Medication Sig Dispense Refill    methotrexate (RHEUMATREX) 2.5 mg tablet 10 mg once weekly. If tolerated, increase to 15 mg weekly in 2 weeks 24 Tab 2    folic acid (FOLVITE) 1 mg tablet Take 1 Tab by mouth daily. 90 Tab 3    predniSONE (DELTASONE) 5 mg tablet 17.5 mg once daily or as directed. (Patient taking differently: 15 mg daily. 17.5 mg once daily or as directed.) 120 Tab 5    ramipril (ALTACE) 10 mg capsule Take 2 Caps by mouth daily. 180 Cap 1    omeprazole (PRILOSEC) 40 mg capsule Take 1 Cap by mouth daily. 90 Cap 1    SITagliptin (JANUVIA) 100 mg tablet Take 1 Tab by mouth daily. 90 Tab 1    atorvastatin (LIPITOR) 20 mg tablet Take 1 Tab by mouth daily. 90 Tab 3    DULoxetine (CYMBALTA) 60 mg capsule Take 1 Cap by mouth daily. 90 Cap 1    hydroCHLOROthiazide (HYDRODIURIL) 25 mg tablet TAKE 1 TABLET BY MOUTH EVERY DAY 90 Tab 2    risedronate (ACTONEL) 150 mg tablet Take 1 Tab by mouth every thirty (30) days.  1 Tab 11    acetaminophen (TYLENOL) 325 mg tablet Take 2 Tabs by mouth every four (4) hours as needed for Fever (Temperature greater than 100.4 degrees Fahrenheit if taking PO). 15 Tab 0    metFORMIN ER (GLUCOPHAGE XR) 500 mg tablet Take 2 Tabs by mouth two (2) times a day. 11    estradiol (ESTRACE) 1 mg tablet Take 1 Tab by mouth daily. 90 Tab 3    omega-3 fatty acids-vitamin e (FISH OIL) 1,000 mg cap Take 1 Cap by mouth two (2) times a day.  Cholecalciferol, Vitamin D3, (VITAMIN D3) 2,000 unit cap Take 1 Cap by mouth daily.  multivitamin (ONE A DAY) tablet Take 1 Tab by mouth daily.  ondansetron hcl (ZOFRAN) 4 mg tablet TAKE 1 TABLET BY MOUTH AS DIRECTED BEFORE COLONOSCOPY  0    ibuprofen (MOTRIN) 400 mg tablet Take  by mouth every six (6) hours as needed for Pain. Allergies   Allergen Reactions    [place] Flavor Hives    Alendronate Nausea and Vomiting    Morphine Other (comments)     Extreme epigastric pain    Valium [Diazepam] Nausea and Vomiting    Versed [Midazolam] Nausea and Vomiting       Review of Systems   Constitutional: Negative for fever. Eyes: Negative for blurred vision. Respiratory: Negative for cough. Cardiovascular: Negative for leg swelling. Gastrointestinal: Negative for abdominal pain. Musculoskeletal: Positive for joint pain. Skin: Negative for rash. Physical Exam   Vitals reviewed. Constitutional: She is oriented to person, place, and time. She appears well-developed and well-nourished. No distress.    HENT:    Mouth/Throat: Oropharynx is clear and moist. No oropharyngeal exudate. Dentition unremarkable; no areas exposed jaw bone    Eyes: Conjunctivae are normal.    Neck: Neck supple. FROM  Cardiovascular: Regular rhythm.     -right temporal artery non tender and with normal pulsation. Left temporal artery s/p biopsy without tenderness in the region  -no edema  Pulmonary/Chest: Effort normal and breath sounds normal. No respiratory distress.    Abdominal: Soft. She exhibits no distension. There is no tenderness. No organomegaly.    Musculoskeletal: -Peripheral joints FROM aside from right knee flexion to 90 degrees. -Full peripheral joint examination reveals no synovitis or joint tenderness Small and cool effusion right knee. Lymphadenopathy:     She has no cervical adenopathy. Neurological: She is alert and oriented to person, place, and time. She exhibits normal muscle tone.   -gait normal  Skin: Skin is warm and dry. No acute rash noted. Mild frontal alopecia  Psychiatric: She has a normal mood and affect. Judgment normal.    Lab Results   Component Value Date/Time    Hep B surface Ag screen Negative 03/07/2017 01:56 PM    Hep B Core Ab, total Negative 03/07/2017 01:56 PM    HCV Ab <0.1 10/05/2015 11:28 AM    Hep C Virus Ab 0.1 03/07/2017 01:56 PM     Lab Results   Component Value Date/Time    WBC 8.1 04/06/2017 08:45 AM    Hemoglobin (POC) 13.9 07/25/2014 11:52 PM    HGB 11.1 04/06/2017 08:45 AM    Hematocrit (POC) 41 07/25/2014 11:52 PM    HCT 34.9 04/06/2017 08:45 AM    PLATELET 164 32/07/0797 08:45 AM    MCV 84 04/06/2017 08:45 AM     Lab Results   Component Value Date/Time    Sodium 142 04/06/2017 08:45 AM    Potassium 3.9 04/06/2017 08:45 AM    Chloride 97 04/06/2017 08:45 AM    CO2 27 04/06/2017 08:45 AM    Anion gap 10 04/30/2016 03:25 AM    Glucose 105 04/06/2017 08:45 AM    BUN 23 04/06/2017 08:45 AM    Creatinine 0.77 04/06/2017 08:45 AM    BUN/Creatinine ratio 30 04/06/2017 08:45 AM    GFR est AA 95 04/06/2017 08:45 AM    GFR est non-AA 82 04/06/2017 08:45 AM    Calcium 9.6 04/06/2017 08:45 AM    Bilirubin, total <0.2 04/06/2017 08:45 AM    AST (SGOT) 23 04/06/2017 08:45 AM    Alk.  phosphatase 48 04/06/2017 08:45 AM    Protein, total 6.7 04/06/2017 08:45 AM    Albumin 4.3 04/06/2017 08:45 AM    Globulin 4.1 04/29/2016 08:45 AM    A-G Ratio 1.8 04/06/2017 08:45 AM    ALT (SGPT) 26 04/06/2017 08:45 AM     Lab Results   Component Value Date/Time    Sed rate (ESR) 8 03/07/2017 01:56 PM     CRP 0.83 mg/dL    BLANCA 1:80 speckled; RF and CCP negative  MHAQ  ZERO  ASSESSMENT and PLAN    ICD-10-CM ICD-9-CM    1. Temporal arteritis [facility]): shoulder girdle pain and fatigue initially. Temporal headaches. Sudden left sided visual loss in late April 2016. ESR was 116. Temporal artery biopsy negative. Symptoms improved with prednisone 55-60 mg daily. She has been taking off-label methotrexate 15 mg weekly to assist with prednisone taper (difficult after approximately 20 mg daily). She has been able to taper to 15 mg daily, alternating with 17.5 mg daily. M31.6 446. 5 C REACTIVE PROTEIN, QT  Taper prednisone by 2.5 mg alternating days dosing every 2-4 weeks if tolerated. At 10 mg dose, lower in 1 mg increments.   -continue methotrexate 15 mg weekly      SED RATE (ESR)   2. Current chronic use of systemic steroids Z79.52 V58.65 See below  Monitor blood sugars with PCP   3. Long-term use of high-risk medication C37.730 T94.87 METABOLIC PANEL, COMPREHENSIVE      CBC WITH AUTOMATED DIFF next month   4. Inflammatory polyarthritis ([address]): some peripheral synovitis noted at last visit, now improved. RF and CCP negative. BLANCA weakly positive (see below). M06.4 714.9 -treatment as noted above   5. Osteopenia: DXA femoral neck T-score -1.3. She is overall tolerating Actonel 150 mg monthly. M85.80 733.90 -Actonel 150 mg monthly  -calcium 1200 mg daily total  -vitamin D3 0422-9740 units daily  -DXA later in the year   6. 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 Some facial erythema (perhaps sun sensitive) recently. Would not diagnosis with SLE as yet.   R76.8 795.79 COMPLEMENT, C3  -caution in the sun discussed      COMPLEMENT, C4      SMITH ANTIBODIES      ANTI-DSDNA ANTIBODY BY EMELYN      URINALYSIS W/ RFLX MICROSCOPIC

## 2017-05-16 RX ORDER — METHOTREXATE 2.5 MG/1
15 TABLET ORAL
COMMUNITY
End: 2017-05-22 | Stop reason: SDUPTHER

## 2017-05-16 RX ORDER — PREDNISONE 5 MG/1
12.5 TABLET ORAL DAILY
COMMUNITY
End: 2017-07-05 | Stop reason: SDUPTHER

## 2017-05-17 ENCOUNTER — ANESTHESIA EVENT (OUTPATIENT)
Dept: ENDOSCOPY | Age: 62
End: 2017-05-17
Payer: COMMERCIAL

## 2017-05-17 ENCOUNTER — HOSPITAL ENCOUNTER (OUTPATIENT)
Age: 62
Setting detail: OUTPATIENT SURGERY
Discharge: HOME | End: 2017-05-17
Attending: INTERNAL MEDICINE | Admitting: INTERNAL MEDICINE
Payer: COMMERCIAL

## 2017-05-17 ENCOUNTER — ANESTHESIA (OUTPATIENT)
Dept: ENDOSCOPY | Age: 62
End: 2017-05-17
Payer: COMMERCIAL

## 2017-05-17 VITALS
OXYGEN SATURATION: 99 % | DIASTOLIC BLOOD PRESSURE: 72 MMHG | TEMPERATURE: 98.1 F | RESPIRATION RATE: 16 BRPM | HEART RATE: 78 BPM | SYSTOLIC BLOOD PRESSURE: 136 MMHG

## 2017-05-17 LAB
GLUCOSE BLD STRIP.AUTO-MCNC: 135 MG/DL (ref 65–100)
SERVICE CMNT-IMP: ABNORMAL

## 2017-05-17 PROCEDURE — 74011000250 HC RX REV CODE- 250

## 2017-05-17 PROCEDURE — 74011250636 HC RX REV CODE- 250/636

## 2017-05-17 PROCEDURE — 77030027957 HC TBNG IRR ENDOGTR BUSS -B: Performed by: INTERNAL MEDICINE

## 2017-05-17 PROCEDURE — 76060000031 HC ANESTHESIA FIRST 0.5 HR: Performed by: INTERNAL MEDICINE

## 2017-05-17 PROCEDURE — 76040000019: Performed by: INTERNAL MEDICINE

## 2017-05-17 PROCEDURE — 88305 TISSUE EXAM BY PATHOLOGIST: CPT | Performed by: INTERNAL MEDICINE

## 2017-05-17 PROCEDURE — 77030009426 HC FCPS BIOP ENDOSC BSC -B: Performed by: INTERNAL MEDICINE

## 2017-05-17 PROCEDURE — 82962 GLUCOSE BLOOD TEST: CPT

## 2017-05-17 RX ORDER — SODIUM CHLORIDE 9 MG/ML
150 INJECTION, SOLUTION INTRAVENOUS CONTINUOUS
Status: DISPENSED | OUTPATIENT
Start: 2017-05-17 | End: 2017-05-17

## 2017-05-17 RX ORDER — PROPOFOL 10 MG/ML
INJECTION, EMULSION INTRAVENOUS AS NEEDED
Status: DISCONTINUED | OUTPATIENT
Start: 2017-05-17 | End: 2017-05-17 | Stop reason: HOSPADM

## 2017-05-17 RX ORDER — ATROPINE SULFATE 0.1 MG/ML
0.5 INJECTION INTRAVENOUS
Status: ACTIVE | OUTPATIENT
Start: 2017-05-17 | End: 2017-05-17

## 2017-05-17 RX ORDER — DEXTROMETHORPHAN/PSEUDOEPHED 2.5-7.5/.8
1.2 DROPS ORAL
Status: DISCONTINUED | OUTPATIENT
Start: 2017-05-17 | End: 2017-05-18 | Stop reason: HOSPADM

## 2017-05-17 RX ORDER — SODIUM CHLORIDE 9 MG/ML
INJECTION, SOLUTION INTRAVENOUS
Status: DISCONTINUED | OUTPATIENT
Start: 2017-05-17 | End: 2017-05-17 | Stop reason: HOSPADM

## 2017-05-17 RX ORDER — EPINEPHRINE 0.1 MG/ML
1 INJECTION INTRACARDIAC; INTRAVENOUS
Status: ACTIVE | OUTPATIENT
Start: 2017-05-17 | End: 2017-05-17

## 2017-05-17 RX ORDER — SODIUM CHLORIDE 0.9 % (FLUSH) 0.9 %
5-10 SYRINGE (ML) INJECTION EVERY 8 HOURS
Status: ACTIVE | OUTPATIENT
Start: 2017-05-17 | End: 2017-05-17

## 2017-05-17 RX ORDER — SODIUM CHLORIDE 0.9 % (FLUSH) 0.9 %
5-10 SYRINGE (ML) INJECTION AS NEEDED
Status: ACTIVE | OUTPATIENT
Start: 2017-05-17 | End: 2017-05-17

## 2017-05-17 RX ORDER — LIDOCAINE HYDROCHLORIDE 20 MG/ML
INJECTION, SOLUTION EPIDURAL; INFILTRATION; INTRACAUDAL; PERINEURAL AS NEEDED
Status: DISCONTINUED | OUTPATIENT
Start: 2017-05-17 | End: 2017-05-17 | Stop reason: HOSPADM

## 2017-05-17 RX ORDER — MIDAZOLAM HYDROCHLORIDE 1 MG/ML
.25-5 INJECTION, SOLUTION INTRAMUSCULAR; INTRAVENOUS
Status: ACTIVE | OUTPATIENT
Start: 2017-05-17 | End: 2017-05-17

## 2017-05-17 RX ADMIN — PROPOFOL 50 MG: 10 INJECTION, EMULSION INTRAVENOUS at 12:41

## 2017-05-17 RX ADMIN — PROPOFOL 50 MG: 10 INJECTION, EMULSION INTRAVENOUS at 12:51

## 2017-05-17 RX ADMIN — LIDOCAINE HYDROCHLORIDE 60 MG: 20 INJECTION, SOLUTION EPIDURAL; INFILTRATION; INTRACAUDAL; PERINEURAL at 12:39

## 2017-05-17 RX ADMIN — PROPOFOL 50 MG: 10 INJECTION, EMULSION INTRAVENOUS at 12:40

## 2017-05-17 RX ADMIN — PROPOFOL 50 MG: 10 INJECTION, EMULSION INTRAVENOUS at 12:49

## 2017-05-17 RX ADMIN — PROPOFOL 50 MG: 10 INJECTION, EMULSION INTRAVENOUS at 12:45

## 2017-05-17 RX ADMIN — LIDOCAINE HYDROCHLORIDE 20 MG: 20 INJECTION, SOLUTION EPIDURAL; INFILTRATION; INTRACAUDAL; PERINEURAL at 12:46

## 2017-05-17 RX ADMIN — LIDOCAINE HYDROCHLORIDE 20 MG: 20 INJECTION, SOLUTION EPIDURAL; INFILTRATION; INTRACAUDAL; PERINEURAL at 12:48

## 2017-05-17 RX ADMIN — PROPOFOL 50 MG: 10 INJECTION, EMULSION INTRAVENOUS at 12:43

## 2017-05-17 RX ADMIN — SODIUM CHLORIDE: 9 INJECTION, SOLUTION INTRAVENOUS at 12:29

## 2017-05-17 NOTE — ROUTINE PROCESS
Ashley Sahni MD  1955  [id number]    Situation:  Verbal report received from: Omega Carbajal RN  Procedure: Procedure(s):  COLONOSCOPY  ENDOSCOPIC POLYPECTOMY    Background:    Preoperative diagnosis: SCREENING FOR COLON  Postoperative diagnosis: 1. colon polyps  2. diverticulosis  3. internal hemorrhoids    :  Dr. Jazzy Saldivar  Assistant(s): Endoscopy Technician-1: Sree Haque  Endoscopy RN-1: Yuridia Bragg RN    Specimens:   ID Type Source Tests Collected by Time Destination   1 : descending colon polyp Preservative   Shasta Balbuena MD 5/17/2017 1251 Pathology   2 : sigmoid colon polyp Preservative   Shasta Balbuena MD 5/17/2017 1253 Pathology   3 : rectal polyp Preservative   Shasta Balbuena MD 5/17/2017 1254 Pathology     H. Pylori  no    Assessment:  Intra-procedure medications   Anesthesia gave intra-procedure sedation and medications, see anesthesia flow sheet yes    Intravenous fluids: NS@ KVO     Vital signs stable     Abdominal assessment: round and soft     Recommendation:  Discharge patient per MD order.   Family  Permission to share finding with family or friend yes

## 2017-05-17 NOTE — ANESTHESIA PREPROCEDURE EVALUATION
Anesthetic History   No history of anesthetic complications            Review of Systems / Medical History  Patient summary reviewed, nursing notes reviewed and pertinent labs reviewed    Pulmonary  Within defined limits          Asthma        Neuro/Psych   Within defined limits           Cardiovascular  Within defined limits  Hypertension                   GI/Hepatic/Renal  Within defined limits              Endo/Other  Within defined limits  Diabetes         Other Findings              Physical Exam    Airway  Mallampati: II  TM Distance: > 6 cm  Neck ROM: normal range of motion   Mouth opening: Normal     Cardiovascular  Regular rate and rhythm,  S1 and S2 normal,  no murmur, click, rub, or gallop             Dental  No notable dental hx       Pulmonary  Breath sounds clear to auscultation               Abdominal  GI exam deferred       Other Findings            Anesthetic Plan    ASA: 2  Anesthesia type: MAC          Induction: Intravenous  Anesthetic plan and risks discussed with: Patient

## 2017-05-17 NOTE — PROCEDURES
Lola Addison [address] M.D.  Deb Adhikari, [address] [facility]  [phone]               Colonoscopy Procedure Note    NAME: Shakeel Vazquez MD  :  1955  MRN:  [id number]    Indications:   Screening colonoscopy     : Belinda Paula MD    Referring Provider:  MELANIE Cruz    Medicines:  MAC anesthesia      Procedure Details:  After informed consent was obtained with all risks and benefits of the procedure explained and preprocedure exam completed, the patient was placed in the left lateral decubitus position. Universal protocol for patient identification was performed and documented in the nursing notes. Throughout the procedure, the patient's blood pressure was monitored at least every five minutes; pulse, and oxygen saturations were monitored continuously. All vital signs were documented in the nursing notes. A digital rectal exam was performed and was normal.  The Olympus videocolonoscope  was inserted in the rectum and carefully advanced to the cecum, which was identified by the ileocecal valve and appendiceal orifice. The colonoscope was slowly withdrawn with careful evaluation between folds. Retroflexion in the rectum was performed; findings and interventions are described below. Procedure start time, extent reached time/cecum time, and procedure end time are documented in the nursing notes. The quality of preparation was excellent.        Findings:   Rectum: Grade small internal hemorrhoid(s); diminutive polyp s/p jumbo cold forceps polypectomy  Sigmoid:     - Diverticulum; diminutive polyp s/p jumbo cold forceps polypectomy  Descending Colon: diminutive polyp s/p jumbo cold forceps polypectomy  Transverse Colon: normal  Ascending Colon: normal  Cecum: normal    Interventions:    3 complete polypectomy were performed using cold biopsy forceps and the polyps were  retrieved    Specimens:     ID Type Source Tests Collected by Time Destination   1 S[place] Peterson descending colon polyp Preservative   Lauren Kaur MD 5/17/2017 1251 Pathology   2 : sigmoid colon polyp Preservative   Lauren Kuar MD 5/17/2017 1253 Pathology   3 : rectal polyp Preservative   Lauren Kaur MD 5/17/2017 1254 Pathology       EBL:  None. Complications:   No immediate complications     Impression:  -See post-procedure diagnoses. Recommendations:   -Repeat colonoscopy in 3-5 years based on pathology. If < 10 years, reason:  above average risk patient    Resume normal medication(s). Signed by:  Lauren Kaur MD          5/17/2017  12:58 PM

## 2017-05-17 NOTE — IP AVS SNAPSHOT
[address] 
[phone] Patient: Shakeel Vazquez MD 
MRN: [id number] VUN:4/23/3374 You are allergic to the following Allergen Reactions [place] Flavor Hives Alendronate Nausea and Vomiting Morphine Other (comments) Extreme epigastric pain Valium (Diazepam) Nausea and Vomiting Versed (Midazolam) Nausea and Vomiting Recent Documentation OB Status Smoking Status Hysterectomy Never Smoker Emergency Contacts Name Discharge Info Relation Home Work Mobile Neymar Walker DISCHARGE CAREGIVER [3] Spouse [3] [phone] About your hospitalization You were admitted on:  May 17, 2017 You last received care in the:  [facility] ENDOSCOPY You were discharged on:  May 17, 2017 Unit phone number:  [phone] Why you were hospitalized Your primary diagnosis was:  Not on File Providers Seen During Your Hospitalizations Provider Role Specialty Primary office phone Belinda Paula MD Attending Provider Gastroenterology [phone] Your Primary Care Physician (PCP) Primary Care Physician Office Phone Office Fax AVINASH KEARNEY ** None ** ** None ** Follow-up Information None Current Discharge Medication List  
  
CONTINUE these medications which have NOT CHANGED Dose & Instructions Dispensing Information Comments Morning Noon Evening Bedtime  
 atorvastatin 20 mg tablet Commonly known as:  LIPITOR Your last dose was: Your next dose is:    
   
   
 Dose:  20 mg Take 1 Tab by mouth daily. Quantity:  90 Tab Refills:  3 DULoxetine 60 mg capsule Commonly known as:  CYMBALTA Your last dose was: Your next dose is:    
   
   
 Dose:  60 mg Take 1 Cap by mouth daily. Quantity:  90 Cap Refills:  1 estradiol 1 mg tablet Commonly known as:  ESTRACE Your last dose was: Your next dose is:    
   
   
 Dose:  1 mg Take 1 Tab by mouth daily. Quantity:  90 Tab Refills:  3 FISH OIL 1,000 mg Cap Generic drug:  omega-3 fatty acids-vitamin e Your last dose was: Your next dose is:    
   
   
 Dose:  1 Cap Take 1 Cap by mouth two (2) times a day. Refills:  0  
     
   
   
   
  
 folic acid 1 mg tablet Commonly known as:  Google Your last dose was: Your next dose is:    
   
   
 Dose:  1 mg Take 1 Tab by mouth daily. Quantity:  90 Tab Refills:  3  
     
   
   
   
  
 hydroCHLOROthiazide 25 mg tablet Commonly known as:  HYDRODIURIL Your last dose was: Your next dose is: TAKE 1 TABLET BY MOUTH EVERY DAY Quantity:  90 Tab Refills:  2  
     
   
   
   
  
 metFORMIN  mg tablet Commonly known as:  GLUCOPHAGE XR Your last dose was: Your next dose is:    
   
   
 Dose:  2 Tab Take 2 Tabs by mouth two (2) times a day. Refills:  11  
     
   
   
   
  
 methotrexate 2.5 mg tablet Commonly known as:  Shellia Millet Your last dose was: Your next dose is:    
   
   
 Dose:  15 mg Take 15 mg by mouth every Tuesday. Refills:  0  
     
   
   
   
  
 multivitamin tablet Commonly known as:  ONE A DAY Your last dose was: Your next dose is:    
   
   
 Dose:  1 Tab Take 1 Tab by mouth daily. Refills:  0  
     
   
   
   
  
 omeprazole 40 mg capsule Commonly known as:  PRILOSEC Your last dose was: Your next dose is:    
   
   
 Dose:  40 mg Take 1 Cap by mouth daily. Quantity:  90 Cap Refills:  1  
     
   
   
   
  
 ondansetron hcl 4 mg tablet Commonly known as:  Reny Roulette Your last dose was: Your next dose is: TAKE 1 TABLET BY MOUTH AS DIRECTED BEFORE COLONOSCOPY Refills:  0 predniSONE 5 mg tablet Commonly known as:  Bin Grief Your last dose was: Your next dose is:    
   
   
 Dose:  12.5 mg Take 12.5 mg by mouth daily. Refills:  0  
     
   
   
   
  
 ramipril 10 mg capsule Commonly known as:  ALTACE Your last dose was: Your next dose is:    
   
   
 Dose:  20 mg Take 2 Caps by mouth daily. Quantity:  180 Cap Refills:  1  
     
   
   
   
  
 risedronate 150 mg tablet Commonly known as:  ACTONEL Your last dose was: Your next dose is:    
   
   
 Dose:  150 mg Take 1 Tab by mouth every thirty (30) days. Quantity:  1 Tab Refills:  11 SITagliptin 100 mg tablet Commonly known as:  Laverna Pap Your last dose was: Your next dose is:    
   
   
 Dose:  100 mg Take 1 Tab by mouth daily. Quantity:  90 Tab Refills:  1 VITAMIN D3 2,000 unit Cap capsule Generic drug:  Cholecalciferol (Vitamin D3) Your last dose was: Your next dose is:    
   
   
 Dose:  1 Cap Take 1 Cap by mouth daily. Refills:  0 Discharge Instructions [address] Renate Mazariegos. Martinez Sams M.D. 
[address] [place] [address], [address] [place] [address] 
[phone] COLONOSCOPY DISCHARGE INSTRUCTIONS Luis Billy MD 
[id number] 
1955 DISCOMFORT: 
Redness at IV site- apply warm compress to area; if redness or soreness persist- contact your physician There may be a slight amount of blood passed from the rectum Gaseous discomfort- walking, belching will help relieve any discomfort You may not operate a vehicle for 12 hours You may not engage in an occupation involving machinery or appliances for the  rest of today You may not drink alcoholic beverages for at least 12 hours Avoid making any critical decisions for at least 24 hours DIET: 
 You may resume your normal diet, but some patients find that heavy or large  meals may lead to indigestion or vomiting. I suggest a light meal as first food  intake. ACTIVITY: 
You may resume your normal daily activities. It is recommended that you spend the remainder of the day resting - avoid any strenuous activity. CALL JENN Brown ANY SIGN OF: Increasing pain, nausea, vomiting Abdominal distension (swelling) Significant bleeding (oral or rectal) Fever Pain in chest area Shortness of breath Additional Instructions: 
 Call Dr. Chace Parekh if any questions or problems at [phone] You should receive the biopsy results by phone or mail within 3 weeks, if not, call  my office for the results Should have a repeat colonoscopy in 3-5 years based on pathology. Colon with 3 small polyps removed, sigmoid diverticulum, and small internal hemorrhoids. Discharge Orders None [facility] [place] [facility] SERVICES! Dear Milla Jean: Thank you for requesting a [employer] account. Our records indicate that you already have an active [employer] account. You can access your account anytime at [email or web address] Did you know that you can access your hospital and ER discharge instructions at any time in [employer]? You can also review all of your test results from your hospital stay or ER visit. Additional Information If you have questions, please visit the Frequently Asked Questions section of the [employer] website at [email or web address] Remember, [employer] is NOT to be used for urgent needs. For medical emergencies, dial 911. Now available from your iPhone and Android! General Information Please provide this summary of care documentation to your next provider. Patient Signature:  ____________________________________________________________  Date:  ____________________________________________________________  
  
Lauro Has    
    
 Provider Signature:  ____________________________________________________________ Date:  ____________________________________________________________

## 2017-05-17 NOTE — ANESTHESIA POSTPROCEDURE EVALUATION
Post-Anesthesia Evaluation and Assessment    Patient: Elsa Wyman MD MRN: [id number]  SSN: [id number]    YOB: 1955  Age: 59 y.o. Sex: female       Cardiovascular Function/Vital Signs  Visit Vitals    /79    Pulse 80    Temp 36.7 °C (98.1 °F)    Resp 16    SpO2 97%       Patient is status post MAC anesthesia for Procedure(s):  COLONOSCOPY  ENDOSCOPIC POLYPECTOMY. Nausea/Vomiting: None    Postoperative hydration reviewed and adequate. Pain:  Pain Scale 1: Numeric (0 - 10) (05/17/17 1320)  Pain Intensity 1: 0 (05/17/17 1320)   Managed    Neurological Status: At baseline    Mental Status and Level of Consciousness: Arousable    Pulmonary Status:   O2 Device: Room air (05/17/17 1320)   Adequate oxygenation and airway patent    Complications related to anesthesia: None    Post-anesthesia assessment completed.  No concerns    Signed By: Toya Herrmann MD     May 17, 2017

## 2017-05-17 NOTE — DISCHARGE INSTRUCTIONS
Lola Ordoñez [address] Jazzy Saldivar M.D.  [address]  [phone]          . Huong Bauer MD  [id number]  1955    DISCOMFORT:  Redness at IV site- apply warm compress to area; if redness or soreness persist- contact your physician  There may be a slight amount of blood passed from the rectum  Gaseous discomfort- walking, belching will help relieve any discomfort  You may not operate a vehicle for 12 hours  You may not engage in an occupation involving machinery or appliances for the  rest of today  You may not drink alcoholic beverages for at least 12 hours  Avoid making any critical decisions for at least 24 hours    DIET:   You may resume your normal diet, but some patients find that heavy or large  meals may lead to indigestion or vomiting. I suggest a light meal as first food  intake. ACTIVITY:  You may resume your normal daily activities. It is recommended that you spend the remainder of the day resting - avoid any strenuous activity. CALL M.D. IF ANY SIGN OF:   Increasing pain, nausea, vomiting  Abdominal distension (swelling)  Significant bleeding (oral or rectal)  Fever   Pain in chest area  Shortness of breath    Additional Instructions:   Call Dr. Jazzy Saldivar if any questions or problems at [phone]   You should receive the biopsy results by phone or mail within 3 weeks, if not, call  my office for the results      Should have a repeat colonoscopy in 3-5 years based on pathology. Colon with 3 small polyps removed, sigmoid diverticulum, and small internal hemorrhoids.

## 2017-05-17 NOTE — H&P
[address] [place] [address], [address] [place] [address]          Pre-procedure History and Physical       NAME:  Katya Hardin MD   :   1955   MRN:   [id number]     CHIEF COMPLAINT/HPI: See procedure note    PMH:  Past Medical History:   Diagnosis Date    Adverse effect of anesthesia     severe N/V with versed and narcotics/severe abdominal pain with morphine    Asthma     childhood    Autoimmune disease ([facility]     temporal arteritis/polyarthritis rheumatica    Chronic pain     shoulders/knees    DDD (degenerative disc disease), cervical     Degenerative arthritis of right knee 2014    Dr. Evita Masterson    Diabetes mellitus, type 2 ([facility] [address] 2014    Fibromyalgia     Gastric nodule     gastric nodules on endoscopy 3/26/12    GERD (gastroesophageal reflux disease)     Hyperlipidemia LDL goal < 100     Hypertension     Osteopenia 2016    Actonel started    PUD (peptic ulcer disease)     Pulmonary emboli ([facility] [address] 2006    s/p hysterectomy    Temporal arteritis ([facility] [address] 16    Dr. Sonny Dance Thoracic outlet syndrome     left, Dr. Shameka Canales Thrombocytopenia ([facility] [address] 10/2015    mild    Vitamin D deficiency        PSH:  Past Surgical History:   Procedure Laterality Date    HX BREAST BIOPSY      several breast biopsies (benign)    HX BUNIONECTOMY      bilateral    HX  SECTION      x3    HX COLONOSCOPY  2003    normal, f/u in 10 yrs    HX DILATION AND CURETTAGE      [id number], several D&C's    HX ENDOSCOPY      x3, h/o gastric polyp removal 3/2012    HX GI      cholecystectomy    HX HEART CATHETERIZATION      x2, most recent 2012 was normal    HX KNEE ARTHROSCOPY Right     HX OTHER SURGICAL      lipoma removal    HX OTHER SURGICAL  16    L temporal artery bx (Dr. Gina Banuelos)    HX AZAEL AND BSO  2006    secondary to endometrial hyperplasia with atypical changes    HX TONSILLECTOMY      T & A       Allergies:   Allergies   Allergen Reactions    Elfego Flavor Hives    Alendronate Nausea and Vomiting    Morphine Other (comments)     Extreme epigastric pain    Valium [Diazepam] Nausea and Vomiting    Versed [Midazolam] Nausea and Vomiting       Home Medications:  Prior to Admission Medications   Prescriptions Last Dose Informant Patient Reported? Taking? Cholecalciferol, Vitamin D3, (VITAMIN D3) 2,000 unit cap 5/16/2017 at Unknown time  Yes Yes   Sig: Take 1 Cap by mouth daily. DULoxetine (CYMBALTA) 60 mg capsule 5/16/2017 at Unknown time  No Yes   Sig: Take 1 Cap by mouth daily. SITagliptin (JANUVIA) 100 mg tablet 5/16/2017 at Unknown time  No Yes   Sig: Take 1 Tab by mouth daily. atorvastatin (LIPITOR) 20 mg tablet 5/16/2017 at Unknown time  No Yes   Sig: Take 1 Tab by mouth daily. estradiol (ESTRACE) 1 mg tablet 5/16/2017 at Unknown time  No Yes   Sig: Take 1 Tab by mouth daily. folic acid (FOLVITE) 1 mg tablet   No Yes   Sig: Take 1 Tab by mouth daily. hydroCHLOROthiazide (HYDRODIURIL) 25 mg tablet 5/17/2017 at Unknown time  No Yes   Sig: TAKE 1 TABLET BY MOUTH EVERY DAY   metFORMIN ER (GLUCOPHAGE XR) 500 mg tablet 5/16/2017 at Unknown time  Yes Yes   Sig: Take 2 Tabs by mouth two (2) times a day. methotrexate (RHEUMATREX) 2.5 mg tablet   Yes Yes   Sig: Take 15 mg by mouth every Tuesday. multivitamin (ONE A DAY) tablet 5/16/2017 at Unknown time  Yes Yes   Sig: Take 1 Tab by mouth daily. omega-3 fatty acids-vitamin e (FISH OIL) 1,000 mg cap 5/16/2017 at Unknown time  Yes Yes   Sig: Take 1 Cap by mouth two (2) times a day. omeprazole (PRILOSEC) 40 mg capsule 5/16/2017 at Unknown time  No Yes   Sig: Take 1 Cap by mouth daily. ondansetron hcl (ZOFRAN) 4 mg tablet Not Taking at Unknown time  Yes No   Sig: TAKE 1 TABLET BY MOUTH AS DIRECTED BEFORE COLONOSCOPY   predniSONE (DELTASONE) 5 mg tablet 5/16/2017 at Unknown time  Yes Yes   Sig: Take 12.5 mg by mouth daily.    ramipril (ALTACE) 10 mg capsule 5/17/2017 at Unknown time  No Yes   Sig: Take 2 Caps by mouth daily. risedronate (ACTONEL) 150 mg tablet 2017 at Unknown time  No Yes   Sig: Take 1 Tab by mouth every thirty (30) days.       Facility-Administered Medications: None       Hospital Medications:  Current Facility-Administered Medications   Medication Dose Route Frequency    0.9% sodium chloride infusion  150 mL/hr IntraVENous CONTINUOUS    sodium chloride (NS) flush 5-10 mL  5-10 mL IntraVENous Q8H    sodium chloride (NS) flush 5-10 mL  5-10 mL IntraVENous PRN    midazolam (VERSED) injection 0.25-5 mg  0.25-5 mg IntraVENous Multiple    simethicone (MYLICON) 25XU/4.9XO oral drops 80 mg  1.2 mL Oral Multiple    atropine injection 0.5 mg  0.5 mg IntraVENous ONCE PRN    EPINEPHrine (ADRENALIN) 0.1 mg/mL syringe 1 mg  1 mg Endoscopically ONCE PRN       Family History:  Family History   Problem Relation Age of Onset    Heart Disease Mother     Diabetes Mother     Cancer Mother      breast    Thyroid Disease Mother      hashimotos    Hypertension Mother     Breast Cancer Mother 39    Osteoporosis Mother     Heart Surgery Mother      aortic valve replacement    Cancer Father      gastric    Heart Disease Father     Hypertension Father     Diabetes Paternal Grandmother     Heart Disease Brother     Thyroid Disease Sister      hashimotos    Other Sister      bipolar    Other Sister      depression    Other Brother      mental illness    Breast Cancer Maternal Aunt       from breast cancer    Breast Cancer Maternal Aunt 39     and recurred at 80    Osteoporosis Other      maternal aunts    Psychiatric Disorder Son      bipolar    Psychiatric Disorder Daughter      depression    Psychiatric Disorder Daughter      depression       Social History:  Social History   Substance Use Topics    Smoking status: Never Smoker    Smokeless tobacco: Never Used    Alcohol use No         PHYSICAL EXAM PRIOR TO SEDATION:  General: Alert, in no acute distress    Lungs: CTA bilaterally  Heart:  Normal S1, S2    Abdomen: Soft, Non distended, Non tender. Normoactive bowel sounds. Assessment:   Stable for sedation administration.   Date of last colonoscopy: 10 yrs, Polyps  No    Plan:   · Endoscopic procedure with sedation     Signed By: Suellen Barbosa MD     5/17/2017  12:28 PM

## 2017-05-22 NOTE — TELEPHONE ENCOUNTER
Left message for patient to return phone call. Reason for call:  R'cvd auto fax refill for her Methotrexate. Need to confirm that she needs refill. Documentation in [facility] stated that another DrHéctor ordered her Methotrexate on 5/16/17.

## 2017-05-23 RX ORDER — METHOTREXATE 2.5 MG/1
15 TABLET ORAL
Qty: 30 TAB | Refills: 3 | Status: SHIPPED | OUTPATIENT
Start: 2017-05-23 | End: 2017-10-16 | Stop reason: SDUPTHER

## 2017-06-12 RX ORDER — PREDNISONE 1 MG/1
TABLET ORAL
Qty: 120 TAB | Refills: 5 | Status: SHIPPED | OUTPATIENT
Start: 2017-06-12 | End: 2017-07-05 | Stop reason: SDUPTHER

## 2017-06-14 ENCOUNTER — TELEPHONE (OUTPATIENT)
Dept: RHEUMATOLOGY | Age: 62
End: 2017-06-14

## 2017-06-14 NOTE — TELEPHONE ENCOUNTER
Called and spoke with Angely Gonzalez has been resulted. However, it has not shown up on [facility] system yet. The secondary lab will be faxing results over momentarily.

## 2017-06-15 ENCOUNTER — TELEPHONE (OUTPATIENT)
Dept: RHEUMATOLOGY | Age: 62
End: 2017-06-15

## 2017-06-15 LAB
ALBUMIN SERPL-MCNC: 4.2 G/DL (ref 3.6–4.8)
ALBUMIN/GLOB SERPL: 1.6 {RATIO} (ref 1.2–2.2)
ALP SERPL-CCNC: 48 IU/L (ref 39–117)
ALT SERPL-CCNC: 16 IU/L (ref 0–32)
APPEARANCE UR: ABNORMAL
AST SERPL-CCNC: 21 IU/L (ref 0–40)
BASOPHILS # BLD AUTO: 0 X10E3/UL (ref 0–0.2)
BASOPHILS NFR BLD AUTO: 0 %
BILIRUB SERPL-MCNC: <0.2 MG/DL (ref 0–1.2)
BILIRUB UR QL STRIP: NEGATIVE
BUN SERPL-MCNC: 18 MG/DL (ref 8–27)
BUN/CREAT SERPL: 23 (ref 12–28)
C3 SERPL-MCNC: 161 MG/DL (ref 82–167)
C4 SERPL-MCNC: 45 MG/DL (ref 14–44)
CALCIUM SERPL-MCNC: 9.5 MG/DL (ref 8.7–10.3)
CHLORIDE SERPL-SCNC: 95 MMOL/L (ref 96–106)
CO2 SERPL-SCNC: 21 MMOL/L (ref 18–29)
COLOR UR: YELLOW
CREAT SERPL-MCNC: 0.79 MG/DL (ref 0.57–1)
CRP SERPL-MCNC: 5.4 MG/L (ref 0–4.9)
DSDNA AB SER FARR-ACNC: 4.7 IU/ML
ENA SM AB SER-ACNC: <0.2 AI (ref 0–0.9)
EOSINOPHIL # BLD AUTO: 0 X10E3/UL (ref 0–0.4)
EOSINOPHIL NFR BLD AUTO: 0 %
ERYTHROCYTE [DISTWIDTH] IN BLOOD BY AUTOMATED COUNT: 16.9 % (ref 12.3–15.4)
ERYTHROCYTE [SEDIMENTATION RATE] IN BLOOD BY WESTERGREN METHOD: 18 MM/HR (ref 0–40)
GLOBULIN SER CALC-MCNC: 2.6 G/DL (ref 1.5–4.5)
GLUCOSE SERPL-MCNC: 168 MG/DL (ref 65–99)
GLUCOSE UR QL: NEGATIVE
HCT VFR BLD AUTO: 33 % (ref 34–46.6)
HGB BLD-MCNC: 10.4 G/DL (ref 11.1–15.9)
HGB UR QL STRIP: NEGATIVE
IMM GRANULOCYTES # BLD: 0.1 X10E3/UL (ref 0–0.1)
IMM GRANULOCYTES NFR BLD: 1 %
KETONES UR QL STRIP: NEGATIVE
LEUKOCYTE ESTERASE UR QL STRIP: NEGATIVE
LYMPHOCYTES # BLD AUTO: 1.3 X10E3/UL (ref 0.7–3.1)
LYMPHOCYTES NFR BLD AUTO: 17 %
MCH RBC QN AUTO: 26.2 PG (ref 26.6–33)
MCHC RBC AUTO-ENTMCNC: 31.5 G/DL (ref 31.5–35.7)
MCV RBC AUTO: 83 FL (ref 79–97)
MICRO URNS: ABNORMAL
MONOCYTES # BLD AUTO: 0.4 X10E3/UL (ref 0.1–0.9)
MONOCYTES NFR BLD AUTO: 5 %
NEUTROPHILS # BLD AUTO: 5.9 X10E3/UL (ref 1.4–7)
NEUTROPHILS NFR BLD AUTO: 77 %
NITRITE UR QL STRIP: NEGATIVE
PH UR STRIP: 6 [PH] (ref 5–7.5)
PLATELET # BLD AUTO: 500 X10E3/UL (ref 150–379)
POTASSIUM SERPL-SCNC: 4.3 MMOL/L (ref 3.5–5.2)
PROT SERPL-MCNC: 6.8 G/DL (ref 6–8.5)
PROT UR QL STRIP: NEGATIVE
RBC # BLD AUTO: 3.97 X10E6/UL (ref 3.77–5.28)
SODIUM SERPL-SCNC: 138 MMOL/L (ref 134–144)
SP GR UR: 1.01 (ref 1–1.03)
UROBILINOGEN UR STRIP-MCNC: 0.2 MG/DL (ref 0.2–1)
WBC # BLD AUTO: 7.6 X10E3/UL (ref 3.4–10.8)

## 2017-06-15 NOTE — TELEPHONE ENCOUNTER
Called and spoke with [facility] . Stated they are attempting to expedite Lab results for pt's ANTI-DSDNA ANTIBODY. They are hoping to have said results faxed over by tomorrow 6/15/17.

## 2017-06-30 DIAGNOSIS — Z79.899 ENCOUNTER FOR LONG-TERM (CURRENT) USE OF MEDICATIONS: ICD-10-CM

## 2017-07-02 RX ORDER — DULOXETIN HYDROCHLORIDE 60 MG/1
60 CAPSULE, DELAYED RELEASE ORAL DAILY
Qty: 90 CAP | Refills: 1 | Status: SHIPPED | OUTPATIENT
Start: 2017-07-02 | End: 2017-12-26 | Stop reason: SDUPTHER

## 2017-07-05 RX ORDER — PREDNISONE 5 MG/1
10 TABLET ORAL DAILY
Qty: 60 TAB | Refills: 0 | Status: SHIPPED | OUTPATIENT
Start: 2017-07-05 | End: 2020-03-12

## 2017-07-06 NOTE — TELEPHONE ENCOUNTER
Called and informed the pt (Dr. Patrica Hernandez) that her Medication has been refilled. Pt has appt on 7/13 with Dr. Sheri Araiza.

## 2017-07-10 ENCOUNTER — OFFICE VISIT (OUTPATIENT)
Dept: INTERNAL MEDICINE CLINIC | Age: 62
End: 2017-07-10

## 2017-07-10 VITALS
BODY MASS INDEX: 28.72 KG/M2 | DIASTOLIC BLOOD PRESSURE: 56 MMHG | SYSTOLIC BLOOD PRESSURE: 104 MMHG | OXYGEN SATURATION: 98 % | WEIGHT: 183 LBS | HEIGHT: 67 IN | HEART RATE: 71 BPM | RESPIRATION RATE: 18 BRPM | TEMPERATURE: 98.8 F

## 2017-07-10 DIAGNOSIS — D64.9 ANEMIA, UNSPECIFIED TYPE: Primary | ICD-10-CM

## 2017-07-10 DIAGNOSIS — E11.9 TYPE 2 DIABETES MELLITUS WITHOUT COMPLICATION, WITHOUT LONG-TERM CURRENT USE OF INSULIN (HCC): ICD-10-CM

## 2017-07-10 DIAGNOSIS — K21.9 GASTROESOPHAGEAL REFLUX DISEASE, ESOPHAGITIS PRESENCE NOT SPECIFIED: ICD-10-CM

## 2017-07-10 DIAGNOSIS — I10 BENIGN ESSENTIAL HYPERTENSION: ICD-10-CM

## 2017-07-10 RX ORDER — OMEPRAZOLE 40 MG/1
40 CAPSULE, DELAYED RELEASE ORAL DAILY
Qty: 90 CAP | Refills: 3 | Status: SHIPPED | OUTPATIENT
Start: 2017-07-10 | End: 2017-07-14

## 2017-07-10 RX ORDER — HYDROCHLOROTHIAZIDE 25 MG/1
TABLET ORAL
Qty: 90 TAB | Refills: 3 | Status: SHIPPED | OUTPATIENT
Start: 2017-07-10 | End: 2018-06-07 | Stop reason: SDUPTHER

## 2017-07-10 RX ORDER — RAMIPRIL 10 MG/1
20 CAPSULE ORAL DAILY
Qty: 180 CAP | Refills: 3 | Status: SHIPPED | OUTPATIENT
Start: 2017-07-10 | End: 2018-09-17 | Stop reason: SDUPTHER

## 2017-07-10 NOTE — PROGRESS NOTES
Chief Complaint   Patient presents with   Jesenia Rojas [facility] Care     1. Have you been to the ER, urgent care clinic since your last visit? No  Hospitalized since your last visit? No    2. Have you seen or consulted any other health care providers outside of the [address] [place] [address] since your last visit? Yes, colonoscopy  Include any pap smears or colon screening.  No

## 2017-07-10 NOTE — PROGRESS NOTES
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 She  had her medical care:    ADM    She reports the following history and medical concerns:      MTX- temporal arteritis and PMR- flare up in January. 14 months. Dr. Susan Castro. Sudden vision left eye. Started this past March. Recovered in 48 hrs. Thoracic outlet syndrome- obstruction left subclavian. BLANCA positive. Not lupus. Intermittent pain. DM type 2.  2-3 years. Last a1c was 6.5. Blood sugars improving. Prefers [place] Venkat and [place]. Cymbalta- for chronic pain. 60 mg. HTN- no cough with that.  hctz. prilosec- takes it daily. Gastric ulcers-   Colonoscopy May 2017. cholesterol- lipitor 20 mg. No heart issues. Has had 2 cardiac caths- 3-4 years ago. Actonel- OP on DXA. Mother and all of her 6 sisters have severe OP. HRT- GYN estradiol once a day. Low Vitamin d  - take 2000 D3 once a day        Assessment and Plan        1. Anemia, unspecified type  Likely ACD. Will check for iron deficiency. - CBC WITH AUTOMATED DIFF  - METABOLIC PANEL, COMPREHENSIVE  - TSH REFLEX TO T4  - FERRITIN  - IRON PROFILE  - RETICULOCYTE COUNT  - C REACTIVE PROTEIN, QT    2. Type 2 diabetes mellitus without complication, without long-term current use of insulin (HCC)  Recheck a1c. Should improve on lower dose of prednisone. Ok to stop metformin ( less liver stress- already on MTX). Stay on januvia  - HEMOGLOBIN A1C WITH EAG    3. Gastroesophageal reflux disease, esophagitis presence not specified  Pt aware of long term effects of PPI. Possible risk for more infections. - omeprazole (PRILOSEC) 40 mg capsule; Take 1 Cap by mouth daily. Dispense: 90 Cap; Refill: 3    4. Benign essential hypertension  Tolerating medication. Denies dizziness that is positional, SOB, or chest pain. Understands the importance of compliance to reduce risk of future heart failure.    Agreed to call if any of above symptoms develop and  stay on current regimen of    - ramipril (ALTACE) 10 mg capsule; Take 2 Caps by mouth daily. Dispense: 180 Cap;  Refill: 3                Visit Vitals    /56 (BP 1 Location: Left arm, BP Patient Position: Sitting)    Pulse 71    Temp 98.8 °F (37.1 °C) (Oral)    Resp 18    Ht 5' 6.5\" (1.689 m)    Wt 183 lb (83 kg)    SpO2 98%    BMI 29.09 kg/m2       Historical Data    Past Medical History:   Diagnosis Date    Adverse effect of anesthesia     severe N/V with versed and narcotics/severe abdominal pain with morphine    Asthma     childhood    Autoimmune disease ([facility] [address]     temporal arteritis/polyarthritis rheumatica    Chronic pain     shoulders/knees    DDD (degenerative disc disease), cervical     Degenerative arthritis of right knee 2014    Dr. Ruby Madison    Diabetes mellitus, type 2 ([facility] [address] 2014    Fibromyalgia     Gastric nodule     gastric nodules on endoscopy 3/26/12    GERD (gastroesophageal reflux disease)     Hyperlipidemia LDL goal < 100     Hypertension     Osteopenia 2016    Actonel started    PUD (peptic ulcer disease)     Pulmonary emboli ([address]     s/p hysterectomy    Temporal arteritis ([facility] [address] 16    Dr. Cathy Kaminski Thoracic outlet syndrome     left, Dr. Nina Montiel Thrombocytopenia ([facility] [address] 10/2015    mild    Vitamin D deficiency        Past Surgical History:   Procedure Laterality Date    COLONOSCOPY N/A 2017    COL[facility] ENDOSCOPY    HX BREAST BIOPSY      several breast biopsies (benign)    HX BUNIONECTOMY      bilateral    HX  SECTION      x3    HX COLONOSCOPY      normal, f/u in 10 yrs    HX DILATION AND CURETTAGE      [id number], several D&C's    HX ENDOSCOPY      x3, h/o gastric polyp removal 3/2012    HX GI      cholecystectomy    HX HEART CATHETERIZATION      x2, most recent 2012 was normal    HX KNEE ARTHROSCOPY Right     HX OTHER SURGICAL      lipoma removal    HX OTHER SURGICAL  5/11/16    L temporal artery bx (Dr. Demetrius España)    HX AZAEL AND BSO  2006    secondary to endometrial hyperplasia with atypical changes    HX TONSILLECTOMY      T & A       Outpatient Encounter Prescriptions as of 7/10/2017   Medication Sig Dispense Refill    hydroCHLOROthiazide (HYDRODIURIL) 25 mg tablet TAKE 1 TABLET BY MOUTH EVERY DAY 90 Tab 3    omeprazole (PRILOSEC) 40 mg capsule Take 1 Cap by mouth daily. 90 Cap 3    ramipril (ALTACE) 10 mg capsule Take 2 Caps by mouth daily. 180 Cap 3    predniSONE (DELTASONE) 5 mg tablet Take 2 Tabs by mouth daily for 30 days. 60 Tab 0    DULoxetine (CYMBALTA) 60 mg capsule Take 1 Cap by mouth daily. 90 Cap 1    methotrexate (RHEUMATREX) 2.5 mg tablet Take 6 Tabs by mouth every Tuesday. 30 Tab 3    SITagliptin (JANUVIA) 100 mg tablet Take 1 Tab by mouth daily. 90 Tab 1    ondansetron hcl (ZOFRAN) 4 mg tablet TAKE 1 TABLET BY MOUTH AS DIRECTED BEFORE COLONOSCOPY  0    folic acid (FOLVITE) 1 mg tablet Take 1 Tab by mouth daily. 90 Tab 3    atorvastatin (LIPITOR) 20 mg tablet Take 1 Tab by mouth daily. 90 Tab 3    risedronate (ACTONEL) 150 mg tablet Take 1 Tab by mouth every thirty (30) days. 1 Tab 11    metFORMIN ER (GLUCOPHAGE XR) 500 mg tablet Take 2 Tabs by mouth two (2) times a day. 11    estradiol (ESTRACE) 1 mg tablet Take 1 Tab by mouth daily. 90 Tab 3    omega-3 fatty acids-vitamin e (FISH OIL) 1,000 mg cap Take 1 Cap by mouth two (2) times a day.  Cholecalciferol, Vitamin D3, (VITAMIN D3) 2,000 unit cap Take 1 Cap by mouth daily.  multivitamin (ONE A DAY) tablet Take 1 Tab by mouth daily.  [DISCONTINUED] ramipril (ALTACE) 10 mg capsule Take 2 Caps by mouth daily. 180 Cap 1    [DISCONTINUED] omeprazole (PRILOSEC) 40 mg capsule Take 1 Cap by mouth daily.  90 Cap 1    [DISCONTINUED] hydroCHLOROthiazide (HYDRODIURIL) 25 mg tablet TAKE 1 TABLET BY MOUTH EVERY DAY 90 Tab 2     No facility-administered encounter medications on file as of 7/10/2017. Allergies   Allergen Reactions    [place] Flavor Hives    Alendronate Nausea and Vomiting    Morphine Other (comments)     Extreme epigastric pain    Valium [Diazepam] Nausea and Vomiting    Versed [Midazolam] Nausea and Vomiting        Social History     Social History    Marital status:      Spouse name: N/A    Number of children: N/A    Years of education: N/A     Occupational History    Not on file. Social History Main Topics    Smoking status: Never Smoker    Smokeless tobacco: Never Used    Alcohol use No    Drug use: No    Sexual activity: Yes     Partners: Male     Other Topics Concern    Not on file     Social History Narrative        family history includes Breast Cancer in her maternal aunt; Breast Cancer (age of onset: 39) in her maternal aunt and mother; Cancer in her father and mother; Diabetes in her mother and paternal grandmother; Heart Disease in her brother, father, and mother; Heart Surgery in her mother; Hypertension in her father and mother; Osteoporosis in her mother and another family member; Other in her brother, sister, and sister; Psychiatric Disorder in her daughter, daughter, and son; Thyroid Disease in her mother and sister. Review of Systems   Constitutional: Negative for weight loss. Eyes: Negative for blurred vision. Respiratory: Negative for shortness of breath. Cardiovascular: Negative for chest pain. Gastrointestinal: Negative for abdominal pain. Genitourinary: Negative for dysuria and frequency. Skin: Negative for rash. Neurological: Negative for dizziness, focal weakness, weakness and headaches. Endo/Heme/Allergies: Negative for environmental allergies. Does not bruise/bleed easily. Physical Exam   Constitutional: She appears well-developed and well-nourished. She is active. Non-toxic appearance. She does not have a sickly appearance. She does not appear ill. No distress.    Eyes: Conjunctivae are normal. Right eye exhibits no discharge. Cardiovascular: Normal rate, regular rhythm, S1 normal, S2 normal, normal heart sounds and normal pulses. Exam reveals no gallop and no friction rub. Pulmonary/Chest: Effort normal and breath sounds normal. No respiratory distress. Abdominal: Soft. Bowel sounds are normal.   Musculoskeletal: She exhibits no edema or deformity. Neurological: She is alert. Skin: Skin is warm and dry. No rash noted. No pallor. Psychiatric: She has a normal mood and affect. Her behavior is normal.   Vitals reviewed. Ortho Exam       Orders Placed This Encounter    CBC WITH AUTOMATED DIFF    METABOLIC PANEL, COMPREHENSIVE    TSH REFLEX TO T4    FERRITIN    IRON PROFILE    HEMOGLOBIN A1C WITH EAG    RETICULOCYTE COUNT    C REACTIVE PROTEIN, QT    hydroCHLOROthiazide (HYDRODIURIL) 25 mg tablet     Sig: TAKE 1 TABLET BY MOUTH EVERY DAY     Dispense:  90 Tab     Refill:  3    omeprazole (PRILOSEC) 40 mg capsule     Sig: Take 1 Cap by mouth daily. Dispense:  90 Cap     Refill:  3    ramipril (ALTACE) 10 mg capsule     Sig: Take 2 Caps by mouth daily. Dispense:  180 Cap     Refill:  3        I have reviewed the patient's medical history in detail and updated the computerized patient record. We had a prolonged discussion about these complex clinical issues and went over the various important aspects to consider. All questions were answered. Advised her to call back or return to office if symptoms do not improve, change in nature, or persist.    She was given an after visit summary or informed of [employer] [facility] which includes patient instructions, diagnoses, current medications, & vitals. She expressed understanding with the diagnosis and plan.

## 2017-07-11 LAB
ALBUMIN SERPL-MCNC: 4.4 G/DL (ref 3.6–4.8)
ALBUMIN/GLOB SERPL: 1.7 {RATIO} (ref 1.2–2.2)
ALP SERPL-CCNC: 61 IU/L (ref 39–117)
ALT SERPL-CCNC: 17 IU/L (ref 0–32)
AST SERPL-CCNC: 21 IU/L (ref 0–40)
BASOPHILS # BLD AUTO: 0 X10E3/UL (ref 0–0.2)
BASOPHILS NFR BLD AUTO: 0 %
BILIRUB SERPL-MCNC: <0.2 MG/DL (ref 0–1.2)
BUN SERPL-MCNC: 18 MG/DL (ref 8–27)
BUN/CREAT SERPL: 25 (ref 12–28)
CALCIUM SERPL-MCNC: 10 MG/DL (ref 8.7–10.3)
CHLORIDE SERPL-SCNC: 97 MMOL/L (ref 96–106)
CO2 SERPL-SCNC: 28 MMOL/L (ref 18–29)
CREAT SERPL-MCNC: 0.71 MG/DL (ref 0.57–1)
CRP SERPL-MCNC: 7 MG/L (ref 0–4.9)
EOSINOPHIL # BLD AUTO: 0 X10E3/UL (ref 0–0.4)
EOSINOPHIL NFR BLD AUTO: 0 %
ERYTHROCYTE [DISTWIDTH] IN BLOOD BY AUTOMATED COUNT: 17.8 % (ref 12.3–15.4)
EST. AVERAGE GLUCOSE BLD GHB EST-MCNC: 140 MG/DL
FERRITIN SERPL-MCNC: 10 NG/ML (ref 15–150)
GLOBULIN SER CALC-MCNC: 2.6 G/DL (ref 1.5–4.5)
GLUCOSE SERPL-MCNC: 95 MG/DL (ref 65–99)
HBA1C MFR BLD: 6.5 % (ref 4.8–5.6)
HCT VFR BLD AUTO: 32.7 % (ref 34–46.6)
HGB BLD-MCNC: 10.2 G/DL (ref 11.1–15.9)
IMM GRANULOCYTES # BLD: 0 X10E3/UL (ref 0–0.1)
IMM GRANULOCYTES NFR BLD: 0 %
IRON SATN MFR SERPL: 8 % (ref 15–55)
IRON SERPL-MCNC: 34 UG/DL (ref 27–139)
LYMPHOCYTES # BLD AUTO: 1.7 X10E3/UL (ref 0.7–3.1)
LYMPHOCYTES NFR BLD AUTO: 25 %
MCH RBC QN AUTO: 25.8 PG (ref 26.6–33)
MCHC RBC AUTO-ENTMCNC: 31.2 G/DL (ref 31.5–35.7)
MCV RBC AUTO: 83 FL (ref 79–97)
MONOCYTES # BLD AUTO: 0.6 X10E3/UL (ref 0.1–0.9)
MONOCYTES NFR BLD AUTO: 9 %
NEUTROPHILS # BLD AUTO: 4.7 X10E3/UL (ref 1.4–7)
NEUTROPHILS NFR BLD AUTO: 66 %
PLATELET # BLD AUTO: 523 X10E3/UL (ref 150–379)
POTASSIUM SERPL-SCNC: 4.7 MMOL/L (ref 3.5–5.2)
PROT SERPL-MCNC: 7 G/DL (ref 6–8.5)
RBC # BLD AUTO: 3.96 X10E6/UL (ref 3.77–5.28)
RETICS/RBC NFR AUTO: 2.1 % (ref 0.6–2.6)
SODIUM SERPL-SCNC: 140 MMOL/L (ref 134–144)
TIBC SERPL-MCNC: 452 UG/DL (ref 250–450)
TSH SERPL DL<=0.005 MIU/L-ACNC: 1.23 UIU/ML (ref 0.45–4.5)
UIBC SERPL-MCNC: 418 UG/DL (ref 118–369)
WBC # BLD AUTO: 7.1 X10E3/UL (ref 3.4–10.8)

## 2017-07-11 NOTE — PROGRESS NOTES
Your iron levels were indeed very low. Have you noticed any blood in your stools? When was your last colonoscopy? Your a1c was good at 6.5 (same) but I think we can still just try the [place] and [place] on its own. We need to have you take iron (I like the Fusion plus iron because it can be taken with food) but also figure out why you have low iron (? Hidden blood loss). Perhaps we should send you some stool cards?   Message sent to patient via [employer]:  July 11, 2017

## 2017-07-13 ENCOUNTER — OFFICE VISIT (OUTPATIENT)
Dept: RHEUMATOLOGY | Age: 62
End: 2017-07-13

## 2017-07-13 VITALS
WEIGHT: 184 LBS | SYSTOLIC BLOOD PRESSURE: 125 MMHG | RESPIRATION RATE: 16 BRPM | TEMPERATURE: 99 F | DIASTOLIC BLOOD PRESSURE: 68 MMHG | HEART RATE: 94 BPM | OXYGEN SATURATION: 99 % | BODY MASS INDEX: 28.88 KG/M2 | HEIGHT: 67 IN

## 2017-07-13 DIAGNOSIS — Z79.899 LONG-TERM USE OF HIGH-RISK MEDICATION: ICD-10-CM

## 2017-07-13 DIAGNOSIS — R10.13 EPIGASTRIC PAIN: ICD-10-CM

## 2017-07-13 DIAGNOSIS — M85.80 OSTEOPENIA, UNSPECIFIED LOCATION: ICD-10-CM

## 2017-07-13 DIAGNOSIS — M19.90 INFLAMMATORY ARTHRITIS: ICD-10-CM

## 2017-07-13 DIAGNOSIS — Z79.52 CURRENT CHRONIC USE OF SYSTEMIC STEROIDS: ICD-10-CM

## 2017-07-13 DIAGNOSIS — D50.9 IRON DEFICIENCY ANEMIA, UNSPECIFIED IRON DEFICIENCY ANEMIA TYPE: ICD-10-CM

## 2017-07-13 DIAGNOSIS — M31.6 TEMPORAL ARTERITIS (HCC): Primary | ICD-10-CM

## 2017-07-13 DIAGNOSIS — R76.8 ANA POSITIVE: ICD-10-CM

## 2017-07-13 NOTE — PROGRESS NOTES
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 HPI Patient presents for follow up of temporal arteritis. She has noted increased fatigue over the past 1-2 months. She feels some dyspnea with exertion (no chest pain). Fatigue increases further the 1-2 days after taking methotrexate. She is taking folic acid 1 mg daily. Of note, she just began oral iron for anemia. She had a recent colonoscopy. Of note, she is having more epigastric discomfort recently (no melena or BRBPR). She has not had temporal headaches or tongue or jaw claudication. Vision is fine (\"exam in May was perfect\"). She has some pain in the left shoulder and right knee, mainly noted in the mornings. She has AM stiffness of 30-40 minutes. She has right knee swelling. She has not had a recent facial rash. Prednisone dose is now 8 mg daily (17.5 mg daily, alternating with 15 mg daily at last visit). She continues to take methotrexate 15 mg weekly. She has been taking Actonel 150 mg once monthly. She is taking vitamin D3 2000 units daily. She is getting calcium through healthy diet. Current Outpatient Prescriptions   Medication Sig Dispense Refill    iron fum,ps-FA-vit B,C#18-Lact (FUSION PLUS) 130 mg iron -1,250 mcg cap Take 130 mg by mouth daily. 30 Cap 6    hydroCHLOROthiazide (HYDRODIURIL) 25 mg tablet TAKE 1 TABLET BY MOUTH EVERY DAY 90 Tab 3    omeprazole (PRILOSEC) 40 mg capsule Take 1 Cap by mouth daily. 90 Cap 3    ramipril (ALTACE) 10 mg capsule Take 2 Caps by mouth daily. 180 Cap 3    predniSONE (DELTASONE) 5 mg tablet Take 2 Tabs by mouth daily for 30 days. 60 Tab 0    DULoxetine (CYMBALTA) 60 mg capsule Take 1 Cap by mouth daily. 90 Cap 1    methotrexate (RHEUMATREX) 2.5 mg tablet Take 6 Tabs by mouth every Tuesday. 30 Tab 3    SITagliptin (JANUVIA) 100 mg tablet Take 1 Tab by mouth daily.  90 Tab 1    ondansetron hcl (ZOFRAN) 4 mg tablet TAKE 1 TABLET BY MOUTH AS DIRECTED BEFORE COLONOSCOPY  0    folic acid (FOLVITE) 1 mg tablet Take 1 Tab by mouth daily. 90 Tab 3    atorvastatin (LIPITOR) 20 mg tablet Take 1 Tab by mouth daily. 90 Tab 3    risedronate (ACTONEL) 150 mg tablet Take 1 Tab by mouth every thirty (30) days. 1 Tab 11    estradiol (ESTRACE) 1 mg tablet Take 1 Tab by mouth daily. 90 Tab 3    omega-3 fatty acids-vitamin e (FISH OIL) 1,000 mg cap Take 1 Cap by mouth two (2) times a day.  Cholecalciferol, Vitamin D3, (VITAMIN D3) 2,000 unit cap Take 1 Cap by mouth daily.  multivitamin (ONE A DAY) tablet Take 1 Tab by mouth daily.  metFORMIN ER (GLUCOPHAGE XR) 500 mg tablet Take 2 Tabs by mouth two (2) times a day. 11     Allergies   Allergen Reactions    Elfego Flavor Hives    Alendronate Nausea and Vomiting    Morphine Other (comments)     Extreme epigastric pain    Valium [Diazepam] Nausea and Vomiting    Versed [Midazolam] Nausea and Vomiting       Review of Systems   Constitutional: Negative for fever and weight loss. Eyes: Negative for blurred vision. Respiratory: Negative for cough. Cardiovascular: Negative for leg swelling. Gastrointestinal: Positive for abdominal pain. Negative for nausea. Skin: Negative for rash. Physical Exam   Vitals reviewed. Constitutional: She is oriented to person, place, and time. She appears well-developed and well-nourished. No distress.    HENT:    Mouth/Throat: Oropharynx is clear and moist. No oropharyngeal exudate. Dentition unremarkable; no areas exposed jaw bone    Eyes: Conjunctivae are normal.    Neck: Neck supple. FROM  Cardiovascular: Regular rhythm.     -no edema  Pulmonary/Chest: Effort normal and breath sounds normal. No respiratory distress.    Abdominal: Soft. She exhibits no distension. There is epigastric tenderness without rebound. No organomegaly. Musculoskeletal:   -Peripheral joints FROM aside from right knee flexion to 100 degrees.     -Full peripheral joint examination reveals no synovitis or joint tenderness Small and cool effusion right knee. Lymphadenopathy:     She has no cervical adenopathy. Neurological: She is alert and oriented to person, place, and time. She exhibits normal muscle tone.   -gait normal  Skin: Skin is warm and dry. No acute rash noted. Mild frontal alopecia  Psychiatric: She has a normal mood and affect. Judgment normal.    Lab Results   Component Value Date/Time    WBC 7.1 07/10/2017 03:23 PM    Hemoglobin (POC) 13.9 07/25/2014 11:52 PM    HGB 10.2 07/10/2017 03:23 PM    Hematocrit (POC) 41 07/25/2014 11:52 PM    HCT 32.7 07/10/2017 03:23 PM    PLATELET 191 80/30/7304 03:23 PM    MCV 83 07/10/2017 03:23 PM     Lab Results   Component Value Date/Time    Iron 34 07/10/2017 03:23 PM    TIBC 452 07/10/2017 03:23 PM    Iron % saturation 8 07/10/2017 03:23 PM    Ferritin 10 07/10/2017 03:23 PM     Lab Results   Component Value Date/Time    Sodium 140 07/10/2017 03:23 PM    Potassium 4.7 07/10/2017 03:23 PM    Chloride 97 07/10/2017 03:23 PM    CO2 28 07/10/2017 03:23 PM    Anion gap 10 04/30/2016 03:25 AM    Glucose 95 07/10/2017 03:23 PM    BUN 18 07/10/2017 03:23 PM    Creatinine 0.71 07/10/2017 03:23 PM    BUN/Creatinine ratio 25 07/10/2017 03:23 PM    GFR est  07/10/2017 03:23 PM    GFR est non-AA 91 07/10/2017 03:23 PM    Calcium 10.0 07/10/2017 03:23 PM    Bilirubin, total <0.2 07/10/2017 03:23 PM    AST (SGOT) 21 07/10/2017 03:23 PM    Alk. phosphatase 61 07/10/2017 03:23 PM    Protein, total 7.0 07/10/2017 03:23 PM    Albumin 4.4 07/10/2017 03:23 PM    Globulin 4.1 04/29/2016 08:45 AM    A-G Ratio 1.7 07/10/2017 03:23 PM    ALT (SGPT) 17 07/10/2017 03:23 PM     Lab Results   Component Value Date/Time    Sed rate (ESR) 18 05/16/2017 01:34 PM     CRP 0.7 mg/dL    Anti-Smith and DsDNA negative    ASSESSMENT and PLAN    ICD-10-CM ICD-9-CM    1. Temporal arteritis [facility]): shoulder girdle pain and fatigue initially. Temporal headaches. Sudden left sided visual loss in late April 2016.  ESR was 116. Temporal artery biopsy negative. Symptoms improved with prednisone 55-60 mg daily. She has been taking off-label methotrexate 15 mg weekly to assist with prednisone taper (difficult after approximately 20 mg daily). She has been able to taper to 8 mg daily. M31.6 446. 5 -taper prednisone by 1 mg every 3 weeks if tolerated  -methotrexate 15 mg weekly   2. Osteopenia, unspecified location: DXA femoral neck T-score -1.3. She is overall tolerating Actonel 150 mg monthly. M85.80 733.90 -Actonel 150 mg monthly (stop if any dyspepsia with this or dysphagia)  -calcium 1200 mg daily total  -vitamin D3 5933-5146 units daily   3. Current chronic use of systemic steroids Z79.52 V58.65 See above  -manage diabetes with Dr. Ira Menendez   4. Iron deficiency anemia, unspecified iron deficiency anemia type: declining over the past year. Just began iron therapy. Had colonoscopy. Worsening epigastric pain with history of PUD (on PPI). Suspect fatigue and dyspnea related to this. D50.9 280.9 Iron therapy per Dr. Ira Menendez  Discuss dyspnea with Dr. Ira Menendez (ER if increasing dyspnea or any chest pain)  GI as noted below   5. 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 Delgado and DsDNA negative. No sign of SLE at present R76.8 795.79    6. Inflammatory arthritis: some peripheral synovitis noted earlier in the year, now improved. RF and CCP negative. BLANCA weakly positive (specific serology negative). M19.90 714.9 Care as noted above   7. Long-term use of high-risk medication Z79.899 V58.69 CBC and CMP every 2 months  As fatigue is noted to increase after methotrexate, take folic acid 2 mg daily on Mondays-Wednesdays (1 mg daily otherwise)   8.  Epigastric pain: see above R10.13 789.06 She will call Dr. Cat Sales given worsening anemia

## 2017-07-13 NOTE — MR AVS SNAPSHOT
Visit Information Date & Time Provider Department Dept. Phone Encounter #  
 7/13/2017  4:00 PM Ronne Nyhan, MD [facility] [id number] Follow-up Instructions Return in about 2 months (around 9/13/2017). Your Appointments 10/12/2017  1:45 PM  
Any with MD Magali Monsivais [facility]) Appt Note: 3m fu  
 [address], [place] [address] [address] [place] [address]  
[address], [place] [address] [phone] Upcoming Health Maintenance Date Due  
 BREAST CANCER SCRN MAMMOGRAM 3/1/2018* INFLUENZA AGE 9 TO ADULT 8/1/2017 FOOT EXAM Q1 11/11/2017 MICROALBUMIN Q1 12/6/2017 LIPID PANEL Q1 12/6/2017 HEMOGLOBIN A1C Q6M 1/10/2018 EYE EXAM RETINAL OR DILATED Q1 5/26/2018 PAP AKA CERVICAL CYTOLOGY 12/20/2019 DTaP/Tdap/Td series (2 - Td) 1/1/2021 COLONOSCOPY 5/17/2027 *Topic was postponed. The date shown is not the original due date. Allergies as of 7/13/2017  Review Complete On: 7/13/2017 By: Ronne Nyhan, MD  
  
 Severity Noted Reaction Type Reactions Elfego Flavor Medium 07/22/2012   Systemic Hives Alendronate  07/06/2016    Nausea and Vomiting Morphine  07/22/2012    Other (comments) Extreme epigastric pain Valium [Diazepam]  03/07/2017    Nausea and Vomiting Versed [Midazolam]  10/16/2013    Nausea and Vomiting Current Immunizations  Reviewed on 4/18/2017 Name Date Influenza Vaccine 10/5/2016, 9/28/2015, 10/1/2013 Pneumococcal Conjugate (PCV-13) 10/6/2016 Tdap 1/1/2011 Zoster Vaccine, Live 11/1/2015 Not reviewed this visit You Were Diagnosed With   
  
 Codes Comments Temporal arteritis (HCC)    -  Primary ICD-10-CM: M31.6 ICD-9-CM: 446.5 Osteopenia, unspecified location     ICD-10-CM: M85.80 ICD-9-CM: 733.90  Current chronic use of systemic steroids     ICD-10-CM: Z79.52 
 ICD-9-CM: V58.65 Iron deficiency anemia, unspecified iron deficiency anemia type     ICD-10-CM: D50.9 ICD-9-CM: 280.9 BLANCA positive     ICD-10-CM: R76.8 ICD-9-CM: 795.79 Inflammatory arthritis     ICD-10-CM: M19.90 ICD-9-CM: 714.9 Long-term use of high-risk medication     ICD-10-CM: Z79.899 ICD-9-CM: V58.69 Epigastric pain     ICD-10-CM: R10.13 ICD-9-CM: 789.06 Vitals BP Pulse Temp Resp Height(growth percentile) Weight(growth percentile) 125/68 (BP 1 Location: Right arm, BP Patient Position: Sitting) 94 99 °F (37.2 °C) (Oral) 16 5' 6.5\" (1.689 m) 184 lb (83.5 kg) SpO2 BMI OB Status Smoking Status 99% 29.25 kg/m2 Hysterectomy Never Smoker Vitals History BMI and BSA Data Body Mass Index Body Surface Area  
 29.25 kg/m 2 1.98 m 2 Preferred Pharmacy [facility] Phone [id number] [address] [place] [address]) [phone] Your Updated Medication List  
  
   
This list is accurate as of: 7/13/17  4:41 PM.  Always use your most recent med list.  
  
  
  
  
 atorvastatin 20 mg tablet Commonly known as:  LIPITOR Take 1 Tab by mouth daily. DULoxetine 60 mg capsule Commonly known as:  CYMBALTA Take 1 Cap by mouth daily. estradiol 1 mg tablet Commonly known as:  ESTRACE Take 1 Tab by mouth daily. FISH OIL 1,000 mg Cap Generic drug:  omega-3 fatty acids-vitamin e Take 1 Cap by mouth two (2) times a day. folic acid 1 mg tablet Commonly known as:  Google Take 1 Tab by mouth daily. hydroCHLOROthiazide 25 mg tablet Commonly known as:  HYDRODIURIL  
TAKE 1 TABLET BY MOUTH EVERY DAY  
  
 iron fum,ps-FA-vit B,C#18-Lact 130 mg iron -1,250 mcg Cap Commonly known as:  FUSION PLUS Take 130 mg by mouth daily. metFORMIN  mg tablet Commonly known as:  GLUCOPHAGE XR Take 2 Tabs by mouth two (2) times a day. methotrexate 2.5 mg tablet Commonly known as:  Davion Beaulieu Take 6 Tabs by mouth every Tuesday. multivitamin tablet Commonly known as:  ONE A DAY Take 1 Tab by mouth daily. omeprazole 40 mg capsule Commonly known as:  PRILOSEC Take 1 Cap by mouth daily. ondansetron hcl 4 mg tablet Commonly known as:  ZOFRAN  
TAKE 1 TABLET BY MOUTH AS DIRECTED BEFORE COLONOSCOPY predniSONE 5 mg tablet Commonly known as:  [place] Smaller Take 2 Tabs by mouth daily for 30 days. ramipril 10 mg capsule Commonly known as:  ALTACE Take 2 Caps by mouth daily. risedronate 150 mg tablet Commonly known as:  ACTONEL Take 1 Tab by mouth every thirty (30) days. SITagliptin 100 mg tablet Commonly known as:  Ky Peasant Take 1 Tab by mouth daily. VITAMIN D3 2,000 unit Cap capsule Generic drug:  Cholecalciferol (Vitamin D3) Take 1 Cap by mouth daily. Follow-up Instructions Return in about 2 months (around 9/13/2017). Patient Instructions Folic acid 2 mg daily Mondays through Wednesdays (1 mg daily otherwise) Call Dr. Elfego Atkins Lower prednisone 1 mg every 3 weeks if tolerated Review shortness of breath with Dr. Hasmukh Mckeon Introducing [facility] [place] [facility]! Dear Stefanie Granados: Thank you for requesting a [employer] account. Our records indicate that you already have an active [employer] account. You can access your account anytime at [email or web address] Did you know that you can access your hospital and ER discharge instructions at any time in [employer]? You can also review all of your test results from your hospital stay or ER visit. Additional Information If you have questions, please visit the Frequently Asked Questions section of the [employer] website at [email or web address] Remember, [employer] is NOT to be used for urgent needs. For medical emergencies, dial 911. Now available from your iPhone and Android! Please provide this summary of care documentation to your next provider. Your primary care clinician is listed as Cathleen Funes. If you have any questions after today's visit, please call [phone].

## 2017-07-13 NOTE — PROGRESS NOTES
Patient is here for f/u 2-3 months. She is having joint pain in shoulder and abdominal pain.     Visit Vitals    /68 (BP 1 Location: Right arm, BP Patient Position: Sitting)    Pulse 94    Temp 99 °F (37.2 °C) (Oral)    Resp 16    Ht 5' 6.5\" (1.689 m)    Wt 184 lb (83.5 kg)    SpO2 99%    BMI 29.25 kg/m2

## 2017-07-13 NOTE — PATIENT INSTRUCTIONS
Folic acid 2 mg daily Mondays through Wednesdays (1 mg daily otherwise)    Call Dr. Fleet Ahumada    Lower prednisone 1 mg every 3 weeks if tolerated    Review shortness of breath with Dr. Jaime Dorman

## 2017-07-14 ENCOUNTER — APPOINTMENT (OUTPATIENT)
Dept: CT IMAGING | Age: 62
End: 2017-07-14
Attending: EMERGENCY MEDICINE
Payer: COMMERCIAL

## 2017-07-14 ENCOUNTER — TELEPHONE (OUTPATIENT)
Dept: INTERNAL MEDICINE CLINIC | Age: 62
End: 2017-07-14

## 2017-07-14 ENCOUNTER — HOSPITAL ENCOUNTER (EMERGENCY)
Age: 62
Discharge: HOME | End: 2017-07-14
Attending: EMERGENCY MEDICINE | Admitting: EMERGENCY MEDICINE
Payer: COMMERCIAL

## 2017-07-14 VITALS
DIASTOLIC BLOOD PRESSURE: 55 MMHG | BODY MASS INDEX: 28.9 KG/M2 | HEIGHT: 67 IN | WEIGHT: 184.13 LBS | TEMPERATURE: 98.4 F | HEART RATE: 73 BPM | OXYGEN SATURATION: 96 % | RESPIRATION RATE: 14 BRPM | SYSTOLIC BLOOD PRESSURE: 102 MMHG

## 2017-07-14 DIAGNOSIS — R10.13 ABDOMINAL PAIN, EPIGASTRIC: Primary | ICD-10-CM

## 2017-07-14 LAB
ALBUMIN SERPL BCP-MCNC: 3.4 G/DL (ref 3.5–5)
ALBUMIN/GLOB SERPL: 0.9 {RATIO} (ref 1.1–2.2)
ALP SERPL-CCNC: 67 U/L (ref 45–117)
ALT SERPL-CCNC: 31 U/L (ref 12–78)
ANION GAP BLD CALC-SCNC: 8 MMOL/L (ref 5–15)
APPEARANCE UR: CLEAR
AST SERPL W P-5'-P-CCNC: 22 U/L (ref 15–37)
ATRIAL RATE: 76 BPM
BACTERIA URNS QL MICRO: NEGATIVE /HPF
BASOPHILS # BLD AUTO: 0 K/UL (ref 0–0.1)
BASOPHILS # BLD: 0 % (ref 0–1)
BILIRUB SERPL-MCNC: 0.2 MG/DL (ref 0.2–1)
BILIRUB UR QL: NEGATIVE
BUN SERPL-MCNC: 19 MG/DL (ref 6–20)
BUN/CREAT SERPL: 22 (ref 12–20)
CALCIUM SERPL-MCNC: 8 MG/DL (ref 8.5–10.1)
CALCULATED P AXIS, ECG09: 39 DEGREES
CALCULATED R AXIS, ECG10: 38 DEGREES
CALCULATED T AXIS, ECG11: 22 DEGREES
CHLORIDE SERPL-SCNC: 105 MMOL/L (ref 97–108)
CO2 SERPL-SCNC: 28 MMOL/L (ref 21–32)
COLOR UR: ABNORMAL
CREAT SERPL-MCNC: 0.87 MG/DL (ref 0.55–1.02)
DIAGNOSIS, 93000: NORMAL
DIFFERENTIAL METHOD BLD: ABNORMAL
EOSINOPHIL # BLD: 0.1 K/UL (ref 0–0.4)
EOSINOPHIL NFR BLD: 1 % (ref 0–7)
EPITH CASTS URNS QL MICRO: ABNORMAL /LPF
ERYTHROCYTE [DISTWIDTH] IN BLOOD BY AUTOMATED COUNT: 18.1 % (ref 11.5–14.5)
GLOBULIN SER CALC-MCNC: 3.9 G/DL (ref 2–4)
GLUCOSE SERPL-MCNC: 119 MG/DL (ref 65–100)
GLUCOSE UR STRIP.AUTO-MCNC: NEGATIVE MG/DL
HCT VFR BLD AUTO: 31.3 % (ref 35–47)
HGB BLD-MCNC: 10 G/DL (ref 11.5–16)
HGB UR QL STRIP: NEGATIVE
HYALINE CASTS URNS QL MICRO: ABNORMAL /LPF (ref 0–5)
KETONES UR QL STRIP.AUTO: NEGATIVE MG/DL
LEUKOCYTE ESTERASE UR QL STRIP.AUTO: NEGATIVE
LIPASE SERPL-CCNC: 322 U/L (ref 73–393)
LYMPHOCYTES # BLD AUTO: 51 % (ref 12–49)
LYMPHOCYTES # BLD: 2.7 K/UL (ref 0.8–3.5)
MCH RBC QN AUTO: 25.5 PG (ref 26–34)
MCHC RBC AUTO-ENTMCNC: 31.9 G/DL (ref 30–36.5)
MCV RBC AUTO: 79.8 FL (ref 80–99)
MONOCYTES # BLD: 0.6 K/UL (ref 0–1)
MONOCYTES NFR BLD AUTO: 10 % (ref 5–13)
NEUTS SEG # BLD: 2.1 K/UL (ref 1.8–8)
NEUTS SEG NFR BLD AUTO: 38 % (ref 32–75)
NITRITE UR QL STRIP.AUTO: NEGATIVE
P-R INTERVAL, ECG05: 114 MS
PH UR STRIP: 7 [PH] (ref 5–8)
PLATELET # BLD AUTO: 488 K/UL (ref 150–400)
POTASSIUM SERPL-SCNC: 3.7 MMOL/L (ref 3.5–5.1)
PROT SERPL-MCNC: 7.3 G/DL (ref 6.4–8.2)
PROT UR STRIP-MCNC: NEGATIVE MG/DL
Q-T INTERVAL, ECG07: 390 MS
QRS DURATION, ECG06: 108 MS
QTC CALCULATION (BEZET), ECG08: 438 MS
RBC # BLD AUTO: 3.92 M/UL (ref 3.8–5.2)
RBC #/AREA URNS HPF: ABNORMAL /HPF (ref 0–5)
RBC MORPH BLD: ABNORMAL
SODIUM SERPL-SCNC: 141 MMOL/L (ref 136–145)
SP GR UR REFRACTOMETRY: 1.03 (ref 1–1.03)
TROPONIN I SERPL-MCNC: <0.04 NG/ML
UROBILINOGEN UR QL STRIP.AUTO: 0.2 EU/DL (ref 0.2–1)
VENTRICULAR RATE, ECG03: 76 BPM
WBC # BLD AUTO: 5.5 K/UL (ref 3.6–11)
WBC URNS QL MICRO: ABNORMAL /HPF (ref 0–4)

## 2017-07-14 PROCEDURE — 74011000250 HC RX REV CODE- 250: Performed by: EMERGENCY MEDICINE

## 2017-07-14 PROCEDURE — 81001 URINALYSIS AUTO W/SCOPE: CPT | Performed by: EMERGENCY MEDICINE

## 2017-07-14 PROCEDURE — 36415 COLL VENOUS BLD VENIPUNCTURE: CPT | Performed by: EMERGENCY MEDICINE

## 2017-07-14 PROCEDURE — 83690 ASSAY OF LIPASE: CPT | Performed by: EMERGENCY MEDICINE

## 2017-07-14 PROCEDURE — 96374 THER/PROPH/DIAG INJ IV PUSH: CPT

## 2017-07-14 PROCEDURE — 84484 ASSAY OF TROPONIN QUANT: CPT | Performed by: EMERGENCY MEDICINE

## 2017-07-14 PROCEDURE — 74177 CT ABD & PELVIS W/CONTRAST: CPT

## 2017-07-14 PROCEDURE — 74011250637 HC RX REV CODE- 250/637: Performed by: EMERGENCY MEDICINE

## 2017-07-14 PROCEDURE — 80053 COMPREHEN METABOLIC PANEL: CPT | Performed by: EMERGENCY MEDICINE

## 2017-07-14 PROCEDURE — 96361 HYDRATE IV INFUSION ADD-ON: CPT

## 2017-07-14 PROCEDURE — 74011636320 HC RX REV CODE- 636/320: Performed by: EMERGENCY MEDICINE

## 2017-07-14 PROCEDURE — 99285 EMERGENCY DEPT VISIT HI MDM: CPT

## 2017-07-14 PROCEDURE — 74011250636 HC RX REV CODE- 250/636: Performed by: EMERGENCY MEDICINE

## 2017-07-14 PROCEDURE — 85025 COMPLETE CBC W/AUTO DIFF WBC: CPT | Performed by: EMERGENCY MEDICINE

## 2017-07-14 PROCEDURE — 74011000258 HC RX REV CODE- 258: Performed by: EMERGENCY MEDICINE

## 2017-07-14 PROCEDURE — 96375 TX/PRO/DX INJ NEW DRUG ADDON: CPT

## 2017-07-14 PROCEDURE — 93005 ELECTROCARDIOGRAM TRACING: CPT

## 2017-07-14 RX ORDER — HYDROMORPHONE HYDROCHLORIDE 1 MG/ML
1 INJECTION, SOLUTION INTRAMUSCULAR; INTRAVENOUS; SUBCUTANEOUS
Status: COMPLETED | OUTPATIENT
Start: 2017-07-14 | End: 2017-07-14

## 2017-07-14 RX ORDER — SODIUM CHLORIDE 0.9 % (FLUSH) 0.9 %
10 SYRINGE (ML) INJECTION
Status: COMPLETED | OUTPATIENT
Start: 2017-07-14 | End: 2017-07-14

## 2017-07-14 RX ORDER — PANTOPRAZOLE SODIUM 40 MG/1
40 TABLET, DELAYED RELEASE ORAL 2 TIMES DAILY
Qty: 20 TAB | Refills: 0 | Status: SHIPPED | OUTPATIENT
Start: 2017-07-14 | End: 2017-07-24

## 2017-07-14 RX ORDER — ONDANSETRON 2 MG/ML
4 INJECTION INTRAMUSCULAR; INTRAVENOUS
Status: COMPLETED | OUTPATIENT
Start: 2017-07-14 | End: 2017-07-14

## 2017-07-14 RX ORDER — SUCRALFATE 1 G/10ML
1 SUSPENSION ORAL 4 TIMES DAILY
Qty: 400 ML | Refills: 0 | Status: SHIPPED | OUTPATIENT
Start: 2017-07-14 | End: 2017-07-24

## 2017-07-14 RX ORDER — ONDANSETRON 4 MG/1
4 TABLET, ORALLY DISINTEGRATING ORAL
Qty: 8 TAB | Refills: 0 | Status: SHIPPED | OUTPATIENT
Start: 2017-07-14 | End: 2018-07-19 | Stop reason: ALTCHOICE

## 2017-07-14 RX ADMIN — LIDOCAINE HYDROCHLORIDE 40 ML: 20 SOLUTION ORAL; TOPICAL at 14:30

## 2017-07-14 RX ADMIN — IOPAMIDOL 100 ML: 755 INJECTION, SOLUTION INTRAVENOUS at 14:14

## 2017-07-14 RX ADMIN — HYDROMORPHONE HYDROCHLORIDE 1 MG: 1 INJECTION, SOLUTION INTRAMUSCULAR; INTRAVENOUS; SUBCUTANEOUS at 12:50

## 2017-07-14 RX ADMIN — SODIUM CHLORIDE 1000 ML: 900 INJECTION, SOLUTION INTRAVENOUS at 12:50

## 2017-07-14 RX ADMIN — ONDANSETRON 4 MG: 2 INJECTION INTRAMUSCULAR; INTRAVENOUS at 12:50

## 2017-07-14 RX ADMIN — Medication 10 ML: at 14:14

## 2017-07-14 RX ADMIN — SODIUM CHLORIDE 100 ML: 900 INJECTION, SOLUTION INTRAVENOUS at 14:14

## 2017-07-14 NOTE — DISCHARGE INSTRUCTIONS
Abdominal Pain: Care Instructions  Your Care Instructions    Abdominal pain has many possible causes. Some aren't serious and get better on their own in a few days. Others need more testing and treatment. If your pain continues or gets worse, you need to be rechecked and may need more tests to find out what is wrong. You may need surgery to correct the problem. Don't ignore new symptoms, such as fever, nausea and vomiting, urination problems, pain that gets worse, and dizziness. These may be signs of a more serious problem. Your doctor may have recommended a follow-up visit in the next 8 to 12 hours. If you are not getting better, you may need more tests or treatment. The doctor has checked you carefully, but problems can develop later. If you notice any problems or new symptoms, get medical treatment right away. Follow-up care is a key part of your treatment and safety. Be sure to make and go to all appointments, and call your doctor if you are having problems. It's also a good idea to know your test results and keep a list of the medicines you take. How can you care for yourself at home? · Rest until you feel better. · To prevent dehydration, drink plenty of fluids, enough so that your urine is light yellow or clear like water. Choose water and other caffeine-free clear liquids until you feel better. If you have kidney, heart, or liver disease and have to limit fluids, talk with your doctor before you increase the amount of fluids you drink. · If your stomach is upset, eat mild foods, such as rice, dry toast or crackers, bananas, and applesauce. Try eating several small meals instead of two or three large ones. · Wait until 48 hours after all symptoms have gone away before you have spicy foods, alcohol, and drinks that contain caffeine. · Do not eat foods that are high in fat. · Avoid anti-inflammatory medicines such as aspirin, ibuprofen (Advil, Motrin), and naproxen (Aleve).  These can cause stomach upset. Talk to your doctor if you take daily aspirin for another health problem. When should you call for help? Call 911 anytime you think you may need emergency care. For example, call if:  · You passed out (lost consciousness). · You pass maroon or very bloody stools. · You vomit blood or what looks like coffee grounds. · You have new, severe belly pain. Call your doctor now or seek immediate medical care if:  · Your pain gets worse, especially if it becomes focused in one area of your belly. · You have a new or higher fever. · Your stools are black and look like tar, or they have streaks of blood. · You have unexpected vaginal bleeding. · You have symptoms of a urinary tract infection. These may include:  ¨ Pain when you urinate. ¨ Urinating more often than usual.  ¨ Blood in your urine. · You are dizzy or lightheaded, or you feel like you may faint. Watch closely for changes in your health, and be sure to contact your doctor if:  · You are not getting better after 1 day (24 hours). Where can you learn more? Go to http://erasmo-cristiane.info/. Enter J209 in the search box to learn more about \"Abdominal Pain: Care Instructions. \"  Current as of: March 20, 2017  Content Version: 11.3  © 8076-3580 [employer]. Care instructions adapted under license by [employer] (which disclaims liability or warranty for this information). If you have questions about a medical condition or this instruction, always ask your healthcare professional. [place] [address] any warranty or liability for your use of this information. We hope that we have addressed all of your medical concerns. The examination and treatment you received in the Emergency Department were for an emergent problem and were not intended as complete care. It is important that you follow up with your healthcare provider(s) for ongoing care.  If your symptoms worsen or do not improve as expected, and you are unable to reach your usual health care provider(s), you should return to the Emergency Department. Today's healthcare is undergoing tremendous change, and patient satisfaction surveys are one of the many tools to assess the quality of medical care. You may receive a survey from the [employer] organization regarding your experience in the Emergency Department. I hope that your experience has been completely positive, particularly the medical care that I provided. As such, please participate in the survey; anything less than excellent does not meet my expectations or intentions. [facility] and [address] [place] [address] participate in nationally recognized quality of care measures. If your blood pressure is greater than 120/80, as reported below, we urge that you seek medical care to address the potential of high blood pressure, commonly known as hypertension. Hypertension can be hereditary or can be caused by certain medical conditions, pain, stress, or \"white coat syndrome. \"       Please make an appointment with your health care provider(s) for follow up of your Emergency Department visit. VITALS:   Patient Vitals for the past 8 hrs:   Temp Pulse Resp BP SpO2   07/14/17 1445 98.4 °F (36.9 °C) - - - -   07/14/17 1345 - 74 15 122/56 91 %   07/14/17 1204 98.2 °F (36.8 °C) 81 20 149/75 99 %          Thank you for allowing us to provide you with medical care today. We realize that you have many choices for your emergency care needs. Please choose us in the future for any continued health care needs.       Francine Bangura MD    [address] [facility].   Office: [phone]            Recent Results (from the past 24 hour(s))   EKG, 12 LEAD, INITIAL    Collection Time: 07/14/17 12:13 PM   Result Value Ref Range    Ventricular Rate 76 BPM    Atrial Rate 76 BPM    P-R Interval 114 ms    QRS Duration 108 ms Q-T Interval 390 ms    QTC Calculation (Bezet) 438 ms    Calculated P Axis 39 degrees    Calculated R Axis 38 degrees    Calculated T Axis 22 degrees    Diagnosis       Normal sinus rhythm  When compared with ECG of 29-APR-2016 08:43,  No significant change was found     CBC WITH AUTOMATED DIFF    Collection Time: 07/14/17 12:16 PM   Result Value Ref Range    WBC 5.5 3.6 - 11.0 K/uL    RBC 3.92 3.80 - 5.20 M/uL    HGB 10.0 (L) 11.5 - 16.0 g/dL    HCT 31.3 (L) 35.0 - 47.0 %    MCV 79.8 (L) 80.0 - 99.0 FL    MCH 25.5 (L) 26.0 - 34.0 PG    MCHC 31.9 30.0 - 36.5 g/dL    RDW 18.1 (H) 11.5 - 14.5 %    PLATELET 398 (H) 476 - 400 K/uL    NEUTROPHILS 38 32 - 75 %    LYMPHOCYTES 51 (H) 12 - 49 %    MONOCYTES 10 5 - 13 %    EOSINOPHILS 1 0 - 7 %    BASOPHILS 0 0 - 1 %    ABS. NEUTROPHILS 2.1 1.8 - 8.0 K/UL    ABS. LYMPHOCYTES 2.7 0.8 - 3.5 K/UL    ABS. MONOCYTES 0.6 0.0 - 1.0 K/UL    ABS. EOSINOPHILS 0.1 0.0 - 0.4 K/UL    ABS. BASOPHILS 0.0 0.0 - 0.1 K/UL    DF SMEAR SCANNED      RBC COMMENTS ANISOCYTOSIS  1+        RBC COMMENTS MICROCYTOSIS  1+        RBC COMMENTS POLYCHROMASIA  PRESENT        RBC COMMENTS OVALOCYTES  PRESENT       METABOLIC PANEL, COMPREHENSIVE    Collection Time: 07/14/17 12:16 PM   Result Value Ref Range    Sodium 141 136 - 145 mmol/L    Potassium 3.7 3.5 - 5.1 mmol/L    Chloride 105 97 - 108 mmol/L    CO2 28 21 - 32 mmol/L    Anion gap 8 5 - 15 mmol/L    Glucose 119 (H) 65 - 100 mg/dL    BUN 19 6 - 20 MG/DL    Creatinine 0.87 0.55 - 1.02 MG/DL    BUN/Creatinine ratio 22 (H) 12 - 20      GFR est AA >60 >60 ml/min/1.73m2    GFR est non-AA >60 >60 ml/min/1.73m2    Calcium 8.0 (L) 8.5 - 10.1 MG/DL    Bilirubin, total 0.2 0.2 - 1.0 MG/DL    ALT (SGPT) 31 12 - 78 U/L    AST (SGOT) 22 15 - 37 U/L    Alk.  phosphatase 67 45 - 117 U/L    Protein, total 7.3 6.4 - 8.2 g/dL    Albumin 3.4 (L) 3.5 - 5.0 g/dL    Globulin 3.9 2.0 - 4.0 g/dL    A-G Ratio 0.9 (L) 1.1 - 2.2     TROPONIN I    Collection Time: 07/14/17 12:16 PM   Result Value Ref Range    Troponin-I, Qt. <0.04 <0.05 ng/mL   LIPASE    Collection Time: 07/14/17 12:16 PM   Result Value Ref Range    Lipase 322 73 - 393 U/L       Ct Abd Pelv W Cont    Result Date: 7/14/2017  EXAM:  CT ABD PELV W CONT INDICATION: Epigastric abdominal pain and back pain for 3 days. Cholecystectomy, hysterectomy. Normal white blood cell count. Normal liver enzymes and lipase. COMPARISON: CT abdomen/pelvis on 7/22/2012. CONTRAST:  100 mL of Isovue-370. TECHNIQUE: Following the uneventful intravenous administration of 100 cc Isovue-370, thin axial images were obtained through the abdomen and pelvis. Coronal and sagittal reconstructions were generated. Oral contrast was not administered. CT dose reduction was achieved through use of a standardized protocol tailored for this examination and automatic exposure control for dose modulation. FINDINGS: LUNG BASES: Mild dependent atelectasis. No pneumonia. INCIDENTALLY IMAGED HEART AND MEDIASTINUM: Normal cardiac size. No pericardial effusion. Concentric mural thickening of the esophagus is increased. LIVER: Smooth surface. No mass. Normal size. GALLBLADDER: Surgically resected. Biliary dilatation is unchanged and within normal limits. SPLEEN: No mass. PANCREAS: No mass or ductal dilatation. ADRENALS: Unremarkable. KIDNEYS: No mass, calculus, or hydronephrosis. STOMACH: Unremarkable. SMALL BOWEL: No dilatation or wall thickening. COLON: No dilatation or wall thickening. APPENDIX: Unremarkable. PERITONEUM: No ascites or pneumoperitoneum. RETROPERITONEUM: No lymphadenopathy or aortic aneurysm. REPRODUCTIVE ORGANS: Uterus is been resected. Vagina contains gas. URINARY BLADDER: No mass or calculus. BONES: No destructive bone lesion. ADDITIONAL COMMENTS: Broad-based umbilical hernia contains fat. IMPRESSION: 1. Evidence of esophagitis in the proper clinical setting. 2. Otherwise, no acute process on CT.

## 2017-07-14 NOTE — ED TRIAGE NOTES
Triage note: Patient is coming in with epigastric pain, chest pain and back pain for the past 3 days. + sob. Patient was sent in by PCP.

## 2017-07-14 NOTE — ED NOTES
Patient states that her pain is much better since getting the GI cocktail. Resting with family at the bedside.  Vital signs stable

## 2017-07-14 NOTE — TELEPHONE ENCOUNTER
Patient called the office to report that she has had mid abdominal tenderness and chest pain on the right side x 2 days. I attempted to get further detail but she reports that she bagin to moan loudly. She reports that the pain is extreme and she wants to go to the emergency room. I advised her that she needs to go to the emergency room if she is feeling that badly.  She is going to [address] [place] [address]

## 2017-07-14 NOTE — ED PROVIDER NOTES
HPI Comments: 59yo F with pmh sig for polymyalgia rheumatica, temporal arteritis, PUD, PE following hysterectomy who p/w epigastric pain for 3 days. Pain radiates from epigastrium to R side and occasionally up to R shoulder. Pain comes and goes. No exacerbating or allevaiting factors. Not related to eating. Pt had gallbladder removed several years ago.  + nausea with no vomiting. Denies fever, diarrhea. Took ibuprofen with some improvement in pain. Pt adds that she has known PUD, is currently on very slow prednisone taper and also routinely takes motrin for pain. Denies blood in stool or melena. Patient is a 59 y.o. female presenting with epigastric pain. The history is provided by the patient and a relative. Epigastric Pain    Pertinent negatives include no fever, no dysuria, no arthralgias and no chest pain.         Past Medical History:   Diagnosis Date    Adverse effect of anesthesia     severe N/V with versed and narcotics/severe abdominal pain with morphine    Asthma     childhood    Autoimmune disease ([facility]     temporal arteritis/polyarthritis rheumatica    Chronic pain     shoulders/knees    DDD (degenerative disc disease), cervical     Degenerative arthritis of right knee 2/2014    Dr. Triplett Median    Diabetes mellitus, type 2 ([address] 12/2014    Fibromyalgia     Gastric nodule     gastric nodules on endoscopy 3/26/12    GERD (gastroesophageal reflux disease)     Hyperlipidemia LDL goal < 100     Hypertension     Osteopenia 05/2016    Actonel started    PUD (peptic ulcer disease)     Pulmonary emboli ([address] 2006    s/p hysterectomy    Temporal arteritis ([address] 4/29/16    Dr. Clarence Shine Thoracic outlet syndrome     left, Dr. Aisha Ty Thrombocytopenia ([facility] 10/2015    mild    Vitamin D deficiency        Past Surgical History:   Procedure Laterality Date    COLONOSCOPY N/A 5/17/2017    COLONOSCOPY performed by Collin Collins MD at [place] [address]      several breast biopsies (benign)    HX BUNIONECTOMY      bilateral    HX  SECTION      x3    HX COLONOSCOPY      normal, f/u in 10 yrs    HX DILATION AND CURETTAGE      [id number], several D&C's    HX ENDOSCOPY      x3, h/o gastric polyp removal 3/2012    HX GI      cholecystectomy    HX HEART CATHETERIZATION      x2, most recent 2012 was normal    HX KNEE ARTHROSCOPY Right     HX OTHER SURGICAL      lipoma removal    HX OTHER SURGICAL  16    L temporal artery bx (Dr. Lili Louise)    HX AZAEL AND BSO      secondary to endometrial hyperplasia with atypical changes    HX TONSILLECTOMY      T & A         Family History:   Problem Relation Age of Onset    Heart Disease Mother     Diabetes Mother    Kinnie Mode Mother      breast    Thyroid Disease Mother      hashimotos    Hypertension Mother     Breast Cancer Mother 39    Osteoporosis Mother     Heart Surgery Mother      aortic valve replacement    Cancer Father      gastric    Heart Disease Father     Hypertension Father     Diabetes Paternal Grandmother     Heart Disease Brother     Thyroid Disease Sister      hashimotos    Other Sister      bipolar    Other Sister      depression    Other Brother      mental illness    Breast Cancer Maternal Aunt       from breast cancer    Breast Cancer Maternal Aunt 39     and recurred at 80    Osteoporosis Other      maternal aunts    Psychiatric Disorder Son      bipolar    Psychiatric Disorder Daughter      depression    Psychiatric Disorder Daughter      depression       Social History     Social History    Marital status:      Spouse name: N/A    Number of children: N/A    Years of education: N/A     Occupational History    Not on file.      Social History Main Topics    Smoking status: Never Smoker    Smokeless tobacco: Never Used    Alcohol use No    Drug use: No    Sexual activity: Yes     Partners: Male     Other Topics Concern    Not on file     Social History Narrative         ALLERGIES: [place] flavor; Alendronate; Morphine; Valium [diazepam]; and Versed [midazolam]    Review of Systems   Constitutional: Negative for fever. HENT: Negative for facial swelling. Eyes: Negative for visual disturbance. Respiratory: Negative for chest tightness. Cardiovascular: Negative for chest pain. Gastrointestinal: Positive for abdominal pain. Genitourinary: Negative for dysuria. Musculoskeletal: Negative for arthralgias. Skin: Negative for rash. Neurological: Negative for dizziness. Hematological: Negative for adenopathy. Psychiatric/Behavioral: Negative for suicidal ideas. Vitals:    07/14/17 1203 07/14/17 1204 07/14/17 1345   BP:  149/75 122/56   Pulse:  81 74   Resp:  20 15   Temp:  98.2 °F (36.8 °C)    SpO2:  99% 91%   Weight: 83.5 kg (184 lb 2 oz) 83.5 kg (184 lb 2 oz)    Height:  5' 7\" (1.702 m)             Physical Exam   Constitutional: She is oriented to person, place, and time. She appears well-developed and well-nourished. No distress. HENT:   Head: Normocephalic and atraumatic. Mouth/Throat: Oropharynx is clear and moist.   Eyes: Pupils are equal, round, and reactive to light. No scleral icterus. Neck: Normal range of motion. Neck supple. No thyromegaly present. Cardiovascular: Normal rate, regular rhythm, normal heart sounds and intact distal pulses. No murmur heard. Pulmonary/Chest: Effort normal and breath sounds normal. No respiratory distress. Abdominal: Soft. Bowel sounds are normal. She exhibits no distension. There is no tenderness (epigastric pain with palp. No guarding or rebound. ). Musculoskeletal: Normal range of motion. She exhibits no edema. Neurological: She is alert and oriented to person, place, and time. Skin: Skin is warm and dry. No rash noted. She is not diaphoretic. Nursing note and vitals reviewed.        MDM  Number of Diagnoses or Management Options  Diagnosis management comments: A:  34QF F with pmh sig for htn, polyarthritis rheumatica, temporal arteritis, fibromyalgia, PUD who p/w epigastric pain for 3 days. DDx:  PUD/gasritis, pancreatitis, bowel obstruction, pleural effusion, hiatal hernia    P:  Labs  ivf  Zofran/dilaudid  GI cocktail  Ct a/p        ED Course       Procedures    ED EKG interpretation:  Rhythm: normal sinus rhythm. Rate (approx.): 76. Axis: normal.  ST segment:  No concerning ST elevations or depressions. This EKG was interpreted by Cal Phelps MD,ED Provider. Lab Results Significant For:  CBC:  oat=702  CMP:  unremarkable  Trop:  unremarkable  Lipase:  322    CT Results (most recent):    Results from Hospital Encounter encounter on 07/14/17   CT ABD PELV W CONT   Narrative EXAM:  CT ABD PELV W CONT    INDICATION: Epigastric abdominal pain and back pain for 3 days. Cholecystectomy,  hysterectomy. Normal white blood cell count. Normal liver enzymes and lipase. COMPARISON: CT abdomen/pelvis on 7/22/2012. CONTRAST:  100 mL of Isovue-370. TECHNIQUE:   Following the uneventful intravenous administration of 100 cc Isovue-370, thin  axial images were obtained through the abdomen and pelvis. Coronal and sagittal  reconstructions were generated. Oral contrast was not administered. CT dose  reduction was achieved through use of a standardized protocol tailored for this  examination and automatic exposure control for dose modulation. FINDINGS:   LUNG BASES: Mild dependent atelectasis. No pneumonia. INCIDENTALLY IMAGED HEART AND MEDIASTINUM: Normal cardiac size. No pericardial  effusion. Concentric mural thickening of the esophagus is increased. LIVER: Smooth surface. No mass. Normal size. GALLBLADDER: Surgically resected. Biliary dilatation is unchanged and within  normal limits. SPLEEN: No mass. PANCREAS: No mass or ductal dilatation. ADRENALS: Unremarkable. KIDNEYS: No mass, calculus, or hydronephrosis. STOMACH: Unremarkable.   SMALL BOWEL: No dilatation or wall thickening. COLON: No dilatation or wall thickening. APPENDIX: Unremarkable. PERITONEUM: No ascites or pneumoperitoneum. RETROPERITONEUM: No lymphadenopathy or aortic aneurysm. REPRODUCTIVE ORGANS: Uterus is been resected. Vagina contains gas. URINARY BLADDER: No mass or calculus. BONES: No destructive bone lesion. ADDITIONAL COMMENTS: Broad-based umbilical hernia contains fat. Impression IMPRESSION:    1. Evidence of esophagitis in the proper clinical setting. 2. Otherwise, no acute process on CT. Symptoms much improved following GI cocktail. VS stable. I think pain most likely related to gastritis/PUD. Pt with multiple risk factors for this:  Long-term prednisone use, NSAIDs, methrotrexate. Will treat with protonix and carafate. F/u with GI. [employer] message sent to Dr. Elfego Atkins.

## 2017-07-17 ENCOUNTER — PATIENT OUTREACH (OUTPATIENT)
Dept: OTHER | Age: 62
End: 2017-07-17

## 2017-07-17 NOTE — PROGRESS NOTES
Patient on report as with discharge from ED visit 07/14 for abd pain. Initial attempt to contact patient for transitions of care. Left discreet message on voicemail with this CM contact information. Will attempt to contact again. Next attempt:  7/17       Prescriptions   Medication Sig Dispense Start Date End Date Auth. Provider   pantoprazole (PROTONIX) 40 mg tablet Take 1 Tab by mouth two (2) times a day for 10 days. 20 Tab 7/14/2017 7/24/2017 Anna Castle MD   sucralfate (CARAFATE) 100 mg/mL suspension Take 10 mL by mouth four (4) times daily for 10 days. 400 mL 7/14/2017 7/24/2017 Anna Castle MD   ondansetron (ZOFRAN ODT) 4 mg disintegrating tablet Take 1 Tab by mouth every eight (8) hours as needed for Nausea.  8 Tab 7/14/2017  Anna Castle MD   Follow-up Information   Follow up With Details Comments Mathew Salmeron MD Schedule an appointment as soon as possible for a visit  [address] [place] [address]      Leander Burleson MD Schedule an appointment as soon as possible for a visit  [address] [place] [address]    [address] [place] [address]

## 2017-07-18 ENCOUNTER — PATIENT OUTREACH (OUTPATIENT)
Dept: OTHER | Age: 62
End: 2017-07-18

## 2017-07-25 ENCOUNTER — ANESTHESIA EVENT (OUTPATIENT)
Dept: ENDOSCOPY | Age: 62
End: 2017-07-25
Payer: COMMERCIAL

## 2017-07-25 ENCOUNTER — ANESTHESIA (OUTPATIENT)
Dept: ENDOSCOPY | Age: 62
End: 2017-07-25
Payer: COMMERCIAL

## 2017-07-25 ENCOUNTER — HOSPITAL ENCOUNTER (OUTPATIENT)
Age: 62
Setting detail: OUTPATIENT SURGERY
Discharge: HOME | End: 2017-07-25
Attending: INTERNAL MEDICINE | Admitting: INTERNAL MEDICINE
Payer: COMMERCIAL

## 2017-07-25 VITALS
DIASTOLIC BLOOD PRESSURE: 72 MMHG | RESPIRATION RATE: 14 BRPM | BODY MASS INDEX: 28.25 KG/M2 | TEMPERATURE: 98.1 F | OXYGEN SATURATION: 96 % | HEART RATE: 94 BPM | HEIGHT: 67 IN | WEIGHT: 180 LBS | SYSTOLIC BLOOD PRESSURE: 142 MMHG

## 2017-07-25 PROCEDURE — 88342 IMHCHEM/IMCYTCHM 1ST ANTB: CPT | Performed by: INTERNAL MEDICINE

## 2017-07-25 PROCEDURE — 76040000019: Performed by: INTERNAL MEDICINE

## 2017-07-25 PROCEDURE — 74011000250 HC RX REV CODE- 250

## 2017-07-25 PROCEDURE — 77030009426 HC FCPS BIOP ENDOSC BSC -B: Performed by: INTERNAL MEDICINE

## 2017-07-25 PROCEDURE — 88305 TISSUE EXAM BY PATHOLOGIST: CPT | Performed by: INTERNAL MEDICINE

## 2017-07-25 PROCEDURE — 76060000031 HC ANESTHESIA FIRST 0.5 HR: Performed by: INTERNAL MEDICINE

## 2017-07-25 PROCEDURE — 74011250636 HC RX REV CODE- 250/636

## 2017-07-25 RX ORDER — SODIUM CHLORIDE 9 MG/ML
INJECTION, SOLUTION INTRAVENOUS
Status: DISCONTINUED | OUTPATIENT
Start: 2017-07-25 | End: 2017-07-25 | Stop reason: HOSPADM

## 2017-07-25 RX ORDER — PROPOFOL 10 MG/ML
INJECTION, EMULSION INTRAVENOUS AS NEEDED
Status: DISCONTINUED | OUTPATIENT
Start: 2017-07-25 | End: 2017-07-25 | Stop reason: HOSPADM

## 2017-07-25 RX ORDER — SODIUM CHLORIDE 9 MG/ML
150 INJECTION, SOLUTION INTRAVENOUS CONTINUOUS
Status: DISCONTINUED | OUTPATIENT
Start: 2017-07-25 | End: 2017-07-25 | Stop reason: HOSPADM

## 2017-07-25 RX ORDER — ATROPINE SULFATE 0.1 MG/ML
0.5 INJECTION INTRAVENOUS
Status: DISCONTINUED | OUTPATIENT
Start: 2017-07-25 | End: 2017-07-25 | Stop reason: HOSPADM

## 2017-07-25 RX ORDER — EPINEPHRINE 0.1 MG/ML
1 INJECTION INTRACARDIAC; INTRAVENOUS
Status: DISCONTINUED | OUTPATIENT
Start: 2017-07-25 | End: 2017-07-25 | Stop reason: HOSPADM

## 2017-07-25 RX ORDER — SODIUM CHLORIDE 0.9 % (FLUSH) 0.9 %
5-10 SYRINGE (ML) INJECTION EVERY 8 HOURS
Status: DISCONTINUED | OUTPATIENT
Start: 2017-07-25 | End: 2017-07-25 | Stop reason: HOSPADM

## 2017-07-25 RX ORDER — DEXTROMETHORPHAN/PSEUDOEPHED 2.5-7.5/.8
1.2 DROPS ORAL
Status: DISCONTINUED | OUTPATIENT
Start: 2017-07-25 | End: 2017-07-25 | Stop reason: HOSPADM

## 2017-07-25 RX ORDER — LIDOCAINE HYDROCHLORIDE 20 MG/ML
INJECTION, SOLUTION EPIDURAL; INFILTRATION; INTRACAUDAL; PERINEURAL AS NEEDED
Status: DISCONTINUED | OUTPATIENT
Start: 2017-07-25 | End: 2017-07-25 | Stop reason: HOSPADM

## 2017-07-25 RX ORDER — SODIUM CHLORIDE 0.9 % (FLUSH) 0.9 %
5-10 SYRINGE (ML) INJECTION AS NEEDED
Status: DISCONTINUED | OUTPATIENT
Start: 2017-07-25 | End: 2017-07-25 | Stop reason: HOSPADM

## 2017-07-25 RX ORDER — MIDAZOLAM HYDROCHLORIDE 1 MG/ML
.25-5 INJECTION, SOLUTION INTRAMUSCULAR; INTRAVENOUS
Status: DISCONTINUED | OUTPATIENT
Start: 2017-07-25 | End: 2017-07-25 | Stop reason: HOSPADM

## 2017-07-25 RX ADMIN — PROPOFOL 50 MG: 10 INJECTION, EMULSION INTRAVENOUS at 08:53

## 2017-07-25 RX ADMIN — PROPOFOL 50 MG: 10 INJECTION, EMULSION INTRAVENOUS at 08:48

## 2017-07-25 RX ADMIN — SODIUM CHLORIDE: 9 INJECTION, SOLUTION INTRAVENOUS at 08:45

## 2017-07-25 RX ADMIN — LIDOCAINE HYDROCHLORIDE 100 MG: 20 INJECTION, SOLUTION EPIDURAL; INFILTRATION; INTRACAUDAL; PERINEURAL at 08:48

## 2017-07-25 NOTE — DISCHARGE INSTRUCTIONS
[address] [place] [address] Michael Peter M.D.  [address] [facility]  [phone]          Hui Richards MD  [id number]  1955    DISCOMFORT:  Sore throat- throat lozenges or warm salt water gargle  Redness at IV site- apply warm compress to area; if redness or soreness persist- contact your physician  Gaseous discomfort- walking, belching will help relieve any discomfort  You may not operate a vehicle for 12 hours  You may not engage in an occupation involving machinery or appliances for the  rest of today  You may not drink alcoholic beverages for at least 12 hours  Avoid making any critical decisions for at least 24 hours    DIET:   You may resume your normal diet, but some patients find that heavy or large  meals may lead to indigestion or vomiting. I suggest a light meal as first food  intake. ACTIVITY:  You may resume your normal daily activities. It is recommended that you spend the remainder of the day resting - avoid any strenuous activity. CALL M.D. IF ANY SIGN OF:   Increasing pain, nausea, vomiting  Abdominal distension (swelling)  Significant bleeding (oral or rectal)  Fever   Pain in chest area  Shortness of breath    Additional Instructions:   Call Dr. Michael Peter if any questions or problems at [phone]  You should receive the biopsy results by phone or mail within 3 weeks, if not, call my office for the results. EGD with normal esophagus. Gastric polyps and gastric erythema. Biopsies taken throughout the procedure. If negative for celiac disease, will schedule for capsule study.

## 2017-07-25 NOTE — ANESTHESIA PREPROCEDURE EVALUATION
Anesthetic History   No history of anesthetic complications            Review of Systems / Medical History  Patient summary reviewed, nursing notes reviewed and pertinent labs reviewed    Pulmonary            Asthma        Neuro/Psych         Psychiatric history     Cardiovascular    Hypertension                   GI/Hepatic/Renal     GERD      PUD    Comments: C/o epigastric pains  Hx polyps Endo/Other    Diabetes    Arthritis     Other Findings            Physical Exam    Airway  Mallampati: I  TM Distance: > 6 cm  Neck ROM: normal range of motion   Mouth opening: Normal     Cardiovascular    Rhythm: regular  Rate: normal         Dental  No notable dental hx       Pulmonary  Breath sounds clear to auscultation               Abdominal         Other Findings            Anesthetic Plan    ASA: 2  Anesthesia type: MAC          Induction: Intravenous  Anesthetic plan and risks discussed with: Patient

## 2017-07-25 NOTE — IP AVS SNAPSHOT
[address] 
[phone] Patient: Ana María Park MD 
MRN: [id number] You are allergic to the following Allergen Reactions [place] Flavor Hives Alendronate Nausea and Vomiting Morphine Other (comments) Extreme epigastric pain Valium (Diazepam) Nausea and Vomiting Versed (Midazolam) Nausea and Vomiting Recent Documentation Height Weight Breastfeeding? BMI OB Status Smoking Status 1.702 m 81.6 kg No 28.19 kg/m2 Hysterectomy Never Smoker Emergency Contacts Name Discharge Info Relation Home Work Mobile Neymar Walker DISCHARGE CAREGIVER [3] Spouse [3] [phone] About your hospitalization You were admitted on:  2017 [facility] You were discharged on:  2017 Unit phone number:  [phone] Why you were hospitalized Your primary diagnosis was:  Not on File Providers Seen During Your Hospitalizations Provider Role Specialty Primary office phone Brando Peña MD Attending Provider Gastroenterology [phone] Your Primary Care Physician (PCP) Primary Care Physician Office Phone Office Fax Marcelline Lundborg [phone] Follow-up Information None Current Discharge Medication List  
  
CONTINUE these medications which have NOT CHANGED Dose & Instructions Dispensing Information Comments Morning Noon Evening Bedtime  
 atorvastatin 20 mg tablet Commonly known as:  LIPITOR Your last dose was: Your next dose is:    
   
   
 Dose:  20 mg Take 1 Tab by mouth daily. Quantity:  90 Tab Refills:  3 DULoxetine 60 mg capsule Commonly known as:  CYMBALTA Your last dose was: Your next dose is:    
   
   
 Dose:  60 mg Take 1 Cap by mouth daily. Quantity:  90 Cap Refills:  1  
     
   
   
   
  
 estradiol 1 mg tablet Commonly known as:  ESTRACE Your last dose was: Your next dose is:    
   
   
 Dose:  1 mg Take 1 Tab by mouth daily. Quantity:  90 Tab Refills:  3 FISH OIL 1,000 mg Cap Generic drug:  omega-3 fatty acids-vitamin e Your last dose was: Your next dose is:    
   
   
 Dose:  1 Cap Take 1 Cap by mouth two (2) times a day. Refills:  0  
     
   
   
   
  
 folic acid 1 mg tablet Commonly known as:  Lacy Tiffin Your last dose was: Your next dose is:    
   
   
 Dose:  1 mg Take 1 Tab by mouth daily. Quantity:  90 Tab Refills:  3  
     
   
   
   
  
 hydroCHLOROthiazide 25 mg tablet Commonly known as:  HYDRODIURIL Your last dose was: Your next dose is: TAKE 1 TABLET BY MOUTH EVERY DAY Quantity:  90 Tab Refills:  3  
     
   
   
   
  
 iron fum,ps-FA-vit B,C#18-Lact 130 mg iron -1,250 mcg Cap Commonly known as:  FUSION PLUS Your last dose was: Your next dose is:    
   
   
 Dose:  130 mg Take 130 mg by mouth daily. Quantity:  30 Cap Refills:  6 Substitution permissible  
    
   
   
   
  
 metFORMIN  mg tablet Commonly known as:  GLUCOPHAGE XR Your last dose was: Your next dose is:    
   
   
 Dose:  2 Tab Take 2 Tabs by mouth two (2) times a day. Refills:  11  
     
   
   
   
  
 methotrexate 2.5 mg tablet Commonly known as:  Estefania Nahum Your last dose was: Your next dose is:    
   
   
 Dose:  15 mg Take 6 Tabs by mouth every Tuesday. Quantity:  30 Tab Refills:  3  
     
   
   
   
  
 multivitamin tablet Commonly known as:  ONE A DAY Your last dose was: Your next dose is:    
   
   
 Dose:  1 Tab Take 1 Tab by mouth daily. Refills:  0  
     
   
   
   
  
 ondansetron 4 mg disintegrating tablet Commonly known as:  ZOFRAN ODT Your last dose was: Your next dose is:    
   
   
 Dose:  4 mg Take 1 Tab by mouth every eight (8) hours as needed for Nausea. Quantity:  8 Tab Refills:  0  
     
   
   
   
  
 ondansetron hcl 4 mg tablet Commonly known as:  Beronica Wallace Your last dose was: Your next dose is: TAKE 1 TABLET BY MOUTH AS DIRECTED BEFORE COLONOSCOPY Refills:  0  
     
   
   
   
  
 predniSONE 5 mg tablet Commonly known as:  Nadya Ledezma Your last dose was: Your next dose is:    
   
   
 Dose:  10 mg Take 2 Tabs by mouth daily for 30 days. Quantity:  60 Tab Refills:  0  
     
   
   
   
  
 * ramipril 10 mg capsule Commonly known as:  ALTACE Your last dose was: Your next dose is:    
   
   
 Dose:  20 mg Take 2 Caps by mouth daily. Quantity:  180 Cap Refills:  3  
     
   
   
   
  
 * ramipril 10 mg capsule Commonly known as:  ALTACE Your last dose was: Your next dose is: TAKE 2 CAPS BY MOUTH DAILY. Quantity:  180 Cap Refills:  12  
     
   
   
   
  
 risedronate 150 mg tablet Commonly known as:  ACTONEL Your last dose was: Your next dose is:    
   
   
 Dose:  150 mg Take 1 Tab by mouth every thirty (30) days. Quantity:  1 Tab Refills:  11 SITagliptin 100 mg tablet Commonly known as:  Cielo Rivera Your last dose was: Your next dose is:    
   
   
 Dose:  100 mg Take 1 Tab by mouth daily. Quantity:  90 Tab Refills:  1 VITAMIN D3 2,000 unit Cap capsule Generic drug:  Cholecalciferol (Vitamin D3) Your last dose was: Your next dose is:    
   
   
 Dose:  1 Cap Take 1 Cap by mouth daily. Refills:  0  
     
   
   
   
  
 * Notice: This list has 2 medication(s) that are the same as other medications prescribed for you.  Read the directions carefully, and ask your doctor or other care provider to review them with you. ASK your doctor about these medications Dose & Instructions Dispensing Information Comments Morning Noon Evening Bedtime  
 pantoprazole 40 mg tablet Commonly known as:  PROTONIX Ask about: Should I take this medication? Your last dose was: Your next dose is:    
   
   
 Dose:  40 mg Take 1 Tab by mouth two (2) times a day for 10 days. Quantity:  20 Tab Refills:  0  
     
   
   
   
  
 sucralfate 100 mg/mL suspension Commonly known as:  Samul Se Ask about: Should I take this medication? Your last dose was: Your next dose is:    
   
   
 Dose:  1 g Take 10 mL by mouth four (4) times daily for 10 days. Quantity:  400 mL Refills:  0 Discharge Instructions [address] Johanna Guard. Isabell Jimenez M.D. 
[address] [place] [address] [address] [facility] 
[phone] EGD DISCHARGE INSTRUCTIONS Rosas Michaud MD 
[id number] 
1955 DISCOMFORT: 
Sore throat- throat lozenges or warm salt water gargle Redness at IV site- apply warm compress to area; if redness or soreness persist- contact your physician Gaseous discomfort- walking, belching will help relieve any discomfort You may not operate a vehicle for 12 hours You may not engage in an occupation involving machinery or appliances for the  rest of today You may not drink alcoholic beverages for at least 12 hours Avoid making any critical decisions for at least 24 hours DIET: 
 You may resume your normal diet, but some patients find that heavy or large  meals may lead to indigestion or vomiting. I suggest a light meal as first food  intake. ACTIVITY: 
You may resume your normal daily activities. It is recommended that you spend the remainder of the day resting - avoid any strenuous activity. CALL JENN Ybarra ANY SIGN OF: Increasing pain, nausea, vomiting Abdominal distension (swelling) Significant bleeding (oral or rectal) Fever Pain in chest area Shortness of breath Additional Instructions: 
 Call Dr. Luiz Morley if any questions or problems at [phone] You should receive the biopsy results by phone or mail within 3 weeks, if not, call my office for the results. EGD with normal esophagus. Gastric polyps and gastric erythema. Biopsies taken throughout the procedure. If negative for celiac disease, will schedule for capsule study. Discharge Orders None Introducing [facility]! Dear Jose D Medina: Thank you for requesting a Boxer account. Our records indicate that you already have an active Boxer account. You can access your account anytime at [email or web address] Did you know that you can access your hospital and ER discharge instructions at any time in Boxer? You can also review all of your test results from your hospital stay or ER visit. Additional Information If you have questions, please visit the Frequently Asked Questions section of the Boxer website at [email or web address] Remember, Boxer is NOT to be used for urgent needs. For medical emergencies, dial 911. Now available from your iPhone and Android! General Information Please provide this summary of care documentation to your next provider. Patient Signature:  ____________________________________________________________ Date:  ____________________________________________________________  
  
Ronaldo Pelayo Provider Signature:  ____________________________________________________________ Date:  ____________________________________________________________

## 2017-07-25 NOTE — ANESTHESIA POSTPROCEDURE EVALUATION
Post-Anesthesia Evaluation and Assessment    Patient: Mahnaz Sands MD MRN: [id number]  SSN: [id number]    YOB: 1955  Age: 59 y.o. Sex: female       Cardiovascular Function/Vital Signs  Visit Vitals    /72    Pulse 94    Temp 36.7 °C (98.1 °F)    Resp 14    Ht 5' 7\" (1.702 m)    Wt 81.6 kg (180 lb)    SpO2 96%    Breastfeeding No    BMI 28.19 kg/m2       Patient is status post MAC anesthesia for Procedure(s):  ESOPHAGOGASTRODUODENOSCOPY (EGD)  ESOPHAGOGASTRODUODENAL (EGD) BIOPSY. Nausea/Vomiting: None    Postoperative hydration reviewed and adequate. Pain:  Pain Scale 1: Numeric (0 - 10) (07/25/17 0908)  Pain Intensity 1: 0 (07/25/17 0908)   Managed    Neurological Status: At baseline    Mental Status and Level of Consciousness: Arousable    Pulmonary Status:   O2 Device: Room air (07/25/17 0908)   Adequate oxygenation and airway patent    Complications related to anesthesia: None    Post-anesthesia assessment completed.  No concerns    Signed By: Alejandro Bronson MD     July 25, 2017

## 2017-07-25 NOTE — PROGRESS NOTES
Anesthesia reports 100mg Propofol, 0mg Lidocaine and 300mL NS given during procedure. Received report from anesthesia staff on vital signs and status of patient.

## 2017-07-25 NOTE — PROCEDURES
[facility] 912 Reid Olson M.D.  174 [facility], [address] [place] [address]  [phone]               Esophagogastroduodenoscopy (EGD) Procedure Note    NAME: Jody Bates MD  :  1955  MRN:  [id number]    Indications:  Abdominal pain, epigastric; GERD, abnormal CT scan of the esophagus     : Afsaneh Carey MD    Referring Provider:  Saida Eric MD    Medicine:  [facility] anesthesia      Procedure Details:  After informed consent was obtained with all risks and benefits of the procedure explained and preprocedure exam completed, the patient was placed in the left lateral decubitus position. Universal protocol for patient identification was performed and documented in the nursing notes. Throughout the procedure, the patient's blood pressure was monitored at least every five minutes; pulse, and oxygen saturations were monitored continuously. All vital signs were documented in the nursing notes. The endoscope was inserted into the mouth and advanced under direct vision to second portion of the duodenum. A careful inspection was made as the gastroscope was withdrawn, including a retroflexed view of the proximal stomach; findings and interventions are described below.       Findings:   Esophagus: normal  Stomach: small sliding hiatal hernia, multiple gastric polyps with greatest diameter of 9 mm in the body and fundus s/p biopsies, erythematous polypoid appearance in a linear pattern in the antrum s/p biopsies (not classical for GAVE)  Duodenum: normal s/p biopsies for celiac disease    Interventions:    biopsy of stomach and duodenum    Specimens:     ID Type Source Tests Collected by Time Destination   1 : duodenum bx Preservative Duodenum  Afsaneh Carey MD 2017 1633 Pathology   2 : antrum of the stomach bx Preservative   Afsaneh Carey MD 2017 9695 Pathology   3 : gastric polyps bx Preservative   Afsaneh Carey MD 2017 0265 Pathology        EBL: None Complications:     No immediate complications        Impression:  -As above. Recommendations:  -Await pathology. Continue PPI. If biopsies negative for celiac disease, will proceed with M2A capsule endoscopy for iron def anemia. Signed by:  Ruth Wisdom MD         7/25/2017 8:59 AM

## 2017-07-25 NOTE — ROUTINE PROCESS
Rosas Michaud MD  1955  [id number]    Situation:  Verbal report received from: Shannon Mukherjee  Procedure: Procedure(s):  ESOPHAGOGASTRODUODENOSCOPY (EGD)  ESOPHAGOGASTRODUODENAL (EGD) BIOPSY    Background:    Preoperative diagnosis: EPIGASTRIC PAIN/ABNORMAL CT SCAN  Postoperative diagnosis: Hiatal hernia; gastric polyps; gastritis    :  Dr. Isabell Jimenez  Assistant(s): Endoscopy Technician-1: Lanita Leyden Endoscopy RN-1: Niecy Rae RN    Specimens:   ID Type Source Tests Collected by Time Destination   1 : duodenum bx Preservative Duodenum  Sawyer Dunn MD 7/25/2017 9997 Pathology   2 : antrum of the stomach bx Catalinaative   Sawyer Dunn MD 7/25/2017 7986 Pathology   3 : gastric polyps bx Presnelsyative   Sawyer Dunn MD 7/25/2017 3815 Pathology     H. Pylori  no    Assessment:  Intra-procedure medications     Anesthesia gave intra-procedure sedation and medications, see anesthesia flow sheet yes    Intravenous fluids: NS@ KVO     Vital signs stable     Abdominal assessment: round and semi-soft    Recommendation:  Discharge patient per MD order.   Family or Friend Son  Permission to share finding with family or friend yes

## 2017-07-25 NOTE — H&P
[address], [address] [place] [address]          Pre-procedure History and Physical       NAME:  Pola Lucero MD   :   1955   MRN:   [id number]     CHIEF COMPLAINT/HPI: See procedure note    PMH:  Past Medical History:   Diagnosis Date    Adverse effect of anesthesia     severe N/V with versed and narcotics/severe abdominal pain with morphine    Asthma     childhood    Autoimmune disease ([facility]     temporal arteritis/polyarthritis rheumatica    Chronic pain     shoulders/knees    DDD (degenerative disc disease), cervical     Degenerative arthritis of right knee 2014    Dr. Chris Carey    Diabetes mellitus, type 2 ([facility] 2014    Fibromyalgia     Gastric nodule     gastric nodules on endoscopy 3/26/12    GERD (gastroesophageal reflux disease)     Hyperlipidemia LDL goal < 100     Hypertension     Osteopenia 2016    Actonel started    PUD (peptic ulcer disease)     Pulmonary emboli ([facility]     s/p hysterectomy    Temporal arteritis ([facility] 16    Dr. David Gonzalez Thoracic outlet syndrome     left, Dr. Vicki Coffey Thrombocytopenia ([facility] 10/2015    mild    Vitamin D deficiency        PSH:  Past Surgical History:   Procedure Laterality Date    COLONOSCOPY N/A 2017    COLONOSCOPY performed by Andrea [facility]no [facility] at [facility] END[facility]COPY    HX BREAST BIOPSY      several breast biopsies (benign)    HX BUNIONECTOMY      bilateral    HX  SECTION      x3    HX COLONOSCOPY      normal, f/u in 10 yrs    HX DILATION AND CURETTAGE      [id number], several D&C's    HX ENDOSCOPY      x3, h/o gastric polyp removal 3/2012    HX GI      cholecystectomy    HX HEART CATHETERIZATION      x2, most recent 2012 was normal    HX KNEE ARTHROSCOPY Right     HX OTHER SURGICAL      lipoma removal    HX OTHER SURGICAL  16    L temporal artery bx (Dr. Tricia Curiel)    HX AZAEL AND BSO      secondary to endometrial hyperplasia with atypical changes    HX TONSILLECTOMY      T & A       Allergies: Allergies   Allergen Reactions    [place] Flavor Hives    Alendronate Nausea and Vomiting    Morphine Other (comments)     Extreme epigastric pain    Valium [Diazepam] Nausea and Vomiting    Versed [Midazolam] Nausea and Vomiting       Home Medications:  Prior to Admission Medications   Prescriptions Last Dose Informant Patient Reported? Taking? Cholecalciferol, Vitamin D3, (VITAMIN D3) 2,000 unit cap   Yes No   Sig: Take 1 Cap by mouth daily. DULoxetine (CYMBALTA) 60 mg capsule 7/24/2017 at Unknown time  No Yes   Sig: Take 1 Cap by mouth daily. SITagliptin (JANUVIA) 100 mg tablet 7/24/2017 at Unknown time  No Yes   Sig: Take 1 Tab by mouth daily. atorvastatin (LIPITOR) 20 mg tablet 7/24/2017 at Unknown time  No Yes   Sig: Take 1 Tab by mouth daily. estradiol (ESTRACE) 1 mg tablet 7/24/2017 at Unknown time  No Yes   Sig: Take 1 Tab by mouth daily. folic acid (FOLVITE) 1 mg tablet 7/24/2017 at Unknown time  No Yes   Sig: Take 1 Tab by mouth daily. hydroCHLOROthiazide (HYDRODIURIL) 25 mg tablet 7/24/2017 at Unknown time  No Yes   Sig: TAKE 1 TABLET BY MOUTH EVERY DAY   iron fum,ps-FA-vit B,C#18-Lact (FUSION PLUS) 130 mg iron -1,250 mcg cap   No No   Sig: Take 130 mg by mouth daily. metFORMIN ER (GLUCOPHAGE XR) 500 mg tablet Unknown at Unknown time  Yes No   Sig: Take 2 Tabs by mouth two (2) times a day. methotrexate (RHEUMATREX) 2.5 mg tablet 7/18/2017  No No   Sig: Take 6 Tabs by mouth every Tuesday. multivitamin (ONE A DAY) tablet 7/24/2017 at Unknown time  Yes Yes   Sig: Take 1 Tab by mouth daily. omega-3 fatty acids-vitamin e (FISH OIL) 1,000 mg cap 7/24/2017 at Unknown time  Yes Yes   Sig: Take 1 Cap by mouth two (2) times a day. ondansetron (ZOFRAN ODT) 4 mg disintegrating tablet 7/18/2017 at Unknown time  No Yes   Sig: Take 1 Tab by mouth every eight (8) hours as needed for Nausea.    ondansetron hcl (ZOFRAN) 4 mg tablet 7/18/2017 at Unknown time  Yes Yes   Sig: TAKE 1 TABLET BY MOUTH AS DIRECTED BEFORE COLONOSCOPY   pantoprazole (PROTONIX) 40 mg tablet   No No   Sig: Take 1 Tab by mouth two (2) times a day for 10 days. predniSONE (DELTASONE) 5 mg tablet 7/24/2017 at Unknown time  No Yes   Sig: Take 2 Tabs by mouth daily for 30 days. ramipril (ALTACE) 10 mg capsule Unknown at Unknown time  No No   Sig: Take 2 Caps by mouth daily. ramipril (ALTACE) 10 mg capsule 6/24/2017 at Unknown time  No Yes   Sig: TAKE 2 CAPS BY MOUTH DAILY. risedronate (ACTONEL) 150 mg tablet 7/24/2017 at Unknown time  No Yes   Sig: Take 1 Tab by mouth every thirty (30) days. sucralfate (CARAFATE) 100 mg/mL suspension   No No   Sig: Take 10 mL by mouth four (4) times daily for 10 days.       Facility-Administered Medications: None       Hospital Medications:  Current Facility-Administered Medications   Medication Dose Route Frequency    0.9% sodium chloride infusion  150 mL/hr IntraVENous CONTINUOUS    sodium chloride (NS) flush 5-10 mL  5-10 mL IntraVENous Q8H    sodium chloride (NS) flush 5-10 mL  5-10 mL IntraVENous PRN    midazolam (VERSED) injection 0.25-5 mg  0.25-5 mg IntraVENous Multiple    simethicone (MYLICON) 48JV/9.7ZA oral drops 80 mg  1.2 mL Oral Multiple    atropine injection 0.5 mg  0.5 mg IntraVENous ONCE PRN    EPINEPHrine (ADRENALIN) 0.1 mg/mL syringe 1 mg  1 mg Endoscopically ONCE PRN       Family History:  Family History   Problem Relation Age of Onset    Heart Disease Mother     Diabetes Mother     Cancer Mother      breast    Thyroid Disease Mother      hashimotos    Hypertension Mother     Breast Cancer Mother 39    Osteoporosis Mother     Heart Surgery Mother      aortic valve replacement    Cancer Father      gastric    Heart Disease Father     Hypertension Father     Diabetes Paternal Grandmother     Heart Disease Brother     Thyroid Disease Sister      hashimotos    Other Sister      bipolar    Other Sister      depression  Other Brother      mental illness    Breast Cancer Maternal Aunt       from breast cancer    Breast Cancer Maternal Aunt 39     and recurred at 80    Osteoporosis Other      maternal aunts    Psychiatric Disorder Son      bipolar    Psychiatric Disorder Daughter      depression    Psychiatric Disorder Daughter      depression       Social History:  Social History   Substance Use Topics    Smoking status: Never Smoker    Smokeless tobacco: Never Used    Alcohol use No         PHYSICAL EXAM PRIOR TO SEDATION:  General: Alert, in no acute distress    Lungs:            CTA bilaterally  Heart:  Normal S1, S2    Abdomen: Soft, Non distended, Non tender. Normoactive bowel sounds. Assessment:   Stable for sedation administration.     Plan:   · Endoscopic procedure with sedation     Signed By: Paula Kwan MD     2017  8:48 AM

## 2017-07-26 RX ORDER — RISEDRONATE SODIUM 150 MG/1
150 TABLET, FILM COATED ORAL
Qty: 1 TAB | Refills: 11 | Status: SHIPPED | OUTPATIENT
Start: 2017-07-26 | End: 2017-12-20 | Stop reason: SDUPTHER

## 2017-08-03 ENCOUNTER — HOSPITAL ENCOUNTER (OUTPATIENT)
Dept: MAMMOGRAPHY | Age: 62
Discharge: HOME | End: 2017-08-03
Attending: INTERNAL MEDICINE
Payer: COMMERCIAL

## 2017-08-03 DIAGNOSIS — Z12.31 VISIT FOR SCREENING MAMMOGRAM: ICD-10-CM

## 2017-08-03 PROCEDURE — 77067 SCR MAMMO BI INCL CAD: CPT

## 2017-08-04 ENCOUNTER — TELEPHONE (OUTPATIENT)
Dept: RHEUMATOLOGY | Age: 62
End: 2017-08-04

## 2017-08-04 NOTE — TELEPHONE ENCOUNTER
----- Message from Saroj Arboleda sent at 8/2/2017 12:51 PM EDT -----  Regarding: Dr. Alejo Schumacher  Patient would like a call back regarding scheduling an appt.  Contact is [phone]

## 2017-08-25 ENCOUNTER — PATIENT OUTREACH (OUTPATIENT)
Dept: OTHER | Age: 62
End: 2017-08-25

## 2017-08-25 NOTE — PROGRESS NOTES
Resolving current episode (Transitions of care complete). No further ED/UC or hospital admissions within 30 days post discharge. Patient attended follow-up appointments as directed. No outreach from patient to [address] [place] [address]. Final contact patient for transitions of care. * Dr. Socrates Alston declined further CM services with previous call.             Chart Review:    EGD    * * *FINAL PATHOLOGIC DIAGNOSIS* * *     1.  Duodenum, biopsy       Mildly increased villous lymphocytes       See comment    2.  Stomach, antrum, biopsy       Mild chronic superficial gastritis       H. pylori stain pending    3.  Gastric polyps, biopsies       Fundic gland polyp, two fragments

## 2017-09-14 ENCOUNTER — OFFICE VISIT (OUTPATIENT)
Dept: RHEUMATOLOGY | Age: 62
End: 2017-09-14

## 2017-09-14 VITALS
WEIGHT: 187 LBS | DIASTOLIC BLOOD PRESSURE: 81 MMHG | RESPIRATION RATE: 18 BRPM | OXYGEN SATURATION: 96 % | BODY MASS INDEX: 29.35 KG/M2 | HEIGHT: 67 IN | TEMPERATURE: 98.2 F | HEART RATE: 72 BPM | SYSTOLIC BLOOD PRESSURE: 126 MMHG

## 2017-09-14 DIAGNOSIS — M85.88 OSTEOPENIA OF OTHER SITE: ICD-10-CM

## 2017-09-14 DIAGNOSIS — D50.9 IRON DEFICIENCY ANEMIA, UNSPECIFIED IRON DEFICIENCY ANEMIA TYPE: ICD-10-CM

## 2017-09-14 DIAGNOSIS — Z79.899 LONG-TERM USE OF HIGH-RISK MEDICATION: ICD-10-CM

## 2017-09-14 DIAGNOSIS — Z79.52 CURRENT CHRONIC USE OF SYSTEMIC STEROIDS: ICD-10-CM

## 2017-09-14 DIAGNOSIS — M06.4 INFLAMMATORY POLYARTHRITIS (HCC): ICD-10-CM

## 2017-09-14 DIAGNOSIS — R76.8 ANA POSITIVE: ICD-10-CM

## 2017-09-14 DIAGNOSIS — M31.6 TEMPORAL ARTERITIS (HCC): Primary | ICD-10-CM

## 2017-09-14 RX ORDER — PREDNISONE 1 MG/1
TABLET ORAL
Refills: 5 | COMMUNITY
Start: 2017-08-13 | End: 2017-09-14 | Stop reason: DRUGHIGH

## 2017-09-14 RX ORDER — OMEPRAZOLE 40 MG/1
CAPSULE, DELAYED RELEASE ORAL
Refills: 3 | COMMUNITY
Start: 2017-07-10 | End: 2019-04-15 | Stop reason: SDUPTHER

## 2017-09-14 RX ORDER — SUCRALFATE 1 G/10ML
SUSPENSION ORAL
Refills: 2 | COMMUNITY
Start: 2017-08-24 | End: 2018-07-19 | Stop reason: ALTCHOICE

## 2017-09-14 RX ORDER — PREDNISONE 1 MG/1
TABLET ORAL
Qty: 150 TAB | Refills: 3 | Status: SHIPPED | OUTPATIENT
Start: 2017-09-14 | End: 2018-01-22 | Stop reason: SDUPTHER

## 2017-09-14 NOTE — MR AVS SNAPSHOT
Visit Information Date & Time Provider Department Dept. Phone Encounter #  
 9/14/2017  1:30 PM Ronny Richmond MD [id number] [id number] Follow-up Instructions Return in about 3 months (around 12/14/2017). Your Appointments 10/12/2017  1:45 PM  
Any with MD Magali Barajas [facility]) Appt Note: 3m fu  
 [address], [address] [place] [address]  
[address], [address] [id number] Upcoming Health Maintenance Date Due INFLUENZA AGE 9 TO ADULT 8/1/2017 FOOT EXAM Q1 11/11/2017 MICROALBUMIN Q1 12/6/2017 LIPID PANEL Q1 12/6/2017 HEMOGLOBIN A1C Q6M 1/10/2018 EYE EXAM RETINAL OR DILATED Q1 5/26/2018 BREAST CANCER SCRN MAMMOGRAM 8/3/2019 PAP AKA CERVICAL CYTOLOGY 12/20/2019 DTaP/Tdap/Td series (2 - Td) 1/1/2021 COLONOSCOPY 5/17/2027 Allergies as of 9/14/2017  Review Complete On: 9/14/2017 By: David Ash RN Severity Noted Reaction Type Reactions [place] Flavor Medium 07/22/2012   Systemic Hives Alendronate  07/06/2016    Nausea and Vomiting Morphine  07/22/2012    Other (comments) Extreme epigastric pain Valium [Diazepam]  03/07/2017    Nausea and Vomiting Versed [Midazolam]  10/16/2013    Nausea and Vomiting Current Immunizations  Reviewed on 9/14/2017 Name Date Influenza Vaccine 10/5/2016, 9/28/2015, 10/1/2013 Pneumococcal Conjugate (PCV-13) 10/6/2016 Tdap 1/1/2011 Zoster Vaccine, Live 11/1/2015 Reviewed by Ronny Richmond MD on 9/14/2017 at  1:46 PM  
You Were Diagnosed With   
  
 Codes Comments Temporal arteritis (HCC)    -  Primary ICD-10-CM: M31.6 ICD-9-CM: 446.5 Iron deficiency anemia, unspecified iron deficiency anemia type     ICD-10-CM: D50.9 ICD-9-CM: 280.9  Current chronic use of systemic steroids     ICD-10-CM: Z79.52 
 ICD-9-CM: V58.65 BLANCA positive     ICD-10-CM: R76.8 ICD-9-CM: 795.79 Inflammatory polyarthritis ([facility]     ICD-10-CM: M06.4 ICD-9-CM: 714.9 Long-term use of high-risk medication     ICD-10-CM: Z79.899 ICD-9-CM: V58.69 Osteopenia of other site     ICD-10-CM: M85.88 Vitals BP Pulse Temp Resp Height(growth percentile) Weight(growth percentile) 126/81 (BP 1 Location: Left arm, BP Patient Position: Sitting) 72 98.2 °F (36.8 °C) (Oral) 18 5' 7\" (1.702 m) 187 lb (84.8 kg) SpO2 BMI OB Status Smoking Status 96% 29.29 kg/m2 Hysterectomy Never Smoker Vitals History BMI and BSA Data Body Mass Index Body Surface Area  
 29.29 kg/m 2 2 m 2 Preferred Pharmacy Pharmacy Name Phone [id number] [address] [place] [address]) [phone] Your Updated Medication List  
  
   
This list is accurate as of: 9/14/17  2:06 PM.  Always use your most recent med list.  
  
  
  
  
 atorvastatin 20 mg tablet Commonly known as:  LIPITOR Take 1 Tab by mouth daily. CARAFATE 100 mg/mL suspension Generic drug:  sucralfate TAKE 10 MLS 3 TIMES A DAY DULoxetine 60 mg capsule Commonly known as:  CYMBALTA Take 1 Cap by mouth daily. estradiol 1 mg tablet Commonly known as:  ESTRACE Take 1 Tab by mouth daily. FISH OIL 1,000 mg Cap Generic drug:  omega-3 fatty acids-vitamin e Take 1 Cap by mouth two (2) times a day. folic acid 1 mg tablet Commonly known as:  Google Take 1 Tab by mouth daily. hydroCHLOROthiazide 25 mg tablet Commonly known as:  HYDRODIURIL  
TAKE 1 TABLET BY MOUTH EVERY DAY  
  
 iron fum,ps-FA-vit B,C#18-Lact 130 mg iron -1,250 mcg Cap Commonly known as:  FUSION PLUS Take 130 mg by mouth daily. metFORMIN  mg tablet Commonly known as:  GLUCOPHAGE XR Take 2 Tabs by mouth two (2) times a day. methotrexate 2.5 mg tablet Commonly known as:  Misha Joymer Take 6 Tabs by mouth every Tuesday. multivitamin tablet Commonly known as:  ONE A DAY Take 1 Tab by mouth daily. omeprazole 40 mg capsule Commonly known as:  PRILOSEC  
TAKE 1 CAP BY MOUTH DAILY. ondansetron 4 mg disintegrating tablet Commonly known as:  ZOFRAN ODT Take 1 Tab by mouth every eight (8) hours as needed for Nausea. predniSONE 1 mg tablet Commonly known as:  DELTASONE  
5 mg once daily. Taper as directed  
  
 ramipril 10 mg capsule Commonly known as:  ALTACE Take 2 Caps by mouth daily. risedronate 150 mg tablet Commonly known as:  ACTONEL Take 1 Tab by mouth every thirty (30) days. SITagliptin 100 mg tablet Commonly known as:  Yuniel Anger Take 1 Tab by mouth daily. VITAMIN D3 2,000 unit Cap capsule Generic drug:  Cholecalciferol (Vitamin D3) Take 1 Cap by mouth daily. Prescriptions Sent to Pharmacy Refills  
 predniSONE (DELTASONE) 1 mg tablet 3 Si mg once daily. Taper as directed Class: Normal  
 Pharmacy: [facility]/pharmacy #[id number]- [address], [address] ([place] [address]) Ph #: [phone] We Performed the Following C REACTIVE PROTEIN, QT [14384 CPT(R)] CBC WITH AUTOMATED DIFF [53371 CPT(R)] METABOLIC PANEL, COMPREHENSIVE [48985 CPT(R)] SED RATE (ESR) [id number] CPT(R)] Follow-up Instructions Return in about 3 months (around 2017). To-Do List   
 2017 Imaging:  DEXA BONE DENSITY STUDY AXIAL Patient Instructions Prednisone: taper by 1 mg alternating's day's dosing every 2 weeks if tolerated Introducing [facility]! Dear Vicki Cuevas: Thank you for requesting a Loop App account. Our records indicate that you already have an active Loop App account. You can access your account anytime at [email or web address]://[email or web address] ([employer] Did you know that you can access your hospital and ER discharge instructions at any time in [employer]? You can also review all of your test results from your hospital stay or ER visit. Additional Information If you have questions, please visit the Frequently Asked Questions section of the [employer] website at [email or web address] Remember, [employer] is NOT to be used for urgent needs. For medical emergencies, dial 911. Now available from your iPhone and Android! Please provide this summary of care documentation to your next provider. Your primary care clinician is listed as Angella Aparicio. If you have any questions after today's visit, please call [phone].

## 2017-09-14 NOTE — PROGRESS NOTES
Chief Complaint   Patient presents with    Other     temporal arteritis     Osteopenia     Loi Figueroa RN

## 2017-09-14 NOTE — PROGRESS NOTES
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 HPI Patient presents for follow up of temporal arteritis. She notes doing well. She has not had temporal headaches or tongue or jaw claudication. Vision is \"good. \" She has \"mild\" pain in the left shoulder and right knee, mainly noted in the mornings or during the night time. She has AM stiffness of 30 minutes. She has no joint swelling. She has not had a recent facial rash. Prednisone dose is now 5 mg daily (8 mg daily at last visit). She continues to take methotrexate 15 mg weekly. She has been taking Actonel 150 mg once monthly. She is taking vitamin D3 2000 units daily. She is getting calcium through healthy diet (3-5 servings calcium rich food daily). She was seen in the ER after last visit for abdominal pain. She had a GI evaluation, which showed gastritis. She is taking oral iron daily. Current Outpatient Prescriptions   Medication Sig Dispense Refill    omeprazole (PRILOSEC) 40 mg capsule TAKE 1 CAP BY MOUTH DAILY. 3    CARAFATE 100 mg/mL suspension TAKE 10 MLS 3 TIMES A DAY  2    predniSONE (DELTASONE) 1 mg tablet 5 mg once daily. Taper as directed 150 Tab 3    risedronate (ACTONEL) 150 mg tablet Take 1 Tab by mouth every thirty (30) days. 1 Tab 11    iron fum,ps-FA-vit B,C#18-Lact (FUSION PLUS) 130 mg iron -1,250 mcg cap Take 130 mg by mouth daily. 30 Cap 6    hydroCHLOROthiazide (HYDRODIURIL) 25 mg tablet TAKE 1 TABLET BY MOUTH EVERY DAY 90 Tab 3    ramipril (ALTACE) 10 mg capsule Take 2 Caps by mouth daily. 180 Cap 3    DULoxetine (CYMBALTA) 60 mg capsule Take 1 Cap by mouth daily. 90 Cap 1    methotrexate (RHEUMATREX) 2.5 mg tablet Take 6 Tabs by mouth every Tuesday. 30 Tab 3    SITagliptin (JANUVIA) 100 mg tablet Take 1 Tab by mouth daily. 90 Tab 1    folic acid (FOLVITE) 1 mg tablet Take 1 Tab by mouth daily. 90 Tab 3    atorvastatin (LIPITOR) 20 mg tablet Take 1 Tab by mouth daily.  90 Tab 3    estradiol (ESTRACE) 1 mg tablet Take 1 Tab by mouth daily. 90 Tab 3    omega-3 fatty acids-vitamin e (FISH OIL) 1,000 mg cap Take 1 Cap by mouth two (2) times a day.  Cholecalciferol, Vitamin D3, (VITAMIN D3) 2,000 unit cap Take 1 Cap by mouth daily.  multivitamin (ONE A DAY) tablet Take 1 Tab by mouth daily.  ondansetron (ZOFRAN ODT) 4 mg disintegrating tablet Take 1 Tab by mouth every eight (8) hours as needed for Nausea. 8 Tab 0    metFORMIN ER (GLUCOPHAGE XR) 500 mg tablet Take 2 Tabs by mouth two (2) times a day. 11     Allergies   Allergen Reactions    [place] Flavor Hives    Alendronate Nausea and Vomiting    Morphine Other (comments)     Extreme epigastric pain    Valium [Diazepam] Nausea and Vomiting    Versed [Midazolam] Nausea and Vomiting       Review of Systems   Constitutional: Negative for fever. Eyes: Negative for blurred vision. Cardiovascular: Negative for leg swelling. Gastrointestinal: Positive for abdominal pain. Skin: Negative for rash. Physical Exam   Vitals reviewed. Constitutional: She is oriented to person, place, and time. She appears well-developed and well-nourished. No distress.    HENT:    Mouth/Throat: Oropharynx is clear and moist. No oropharyngeal exudate. Dentition unremarkable; no areas exposed jaw bone    Eyes: Conjunctivae are normal.    Neck: Neck supple. FROM  Cardiovascular: Regular rhythm.     -no edema  -no temporal artery region tenderness  Pulmonary/Chest: Effort normal and breath sounds normal. No respiratory distress.    Abdominal: Soft. She exhibits no distension. There is no tenderness    No organomegaly. Musculoskeletal:   -Peripheral joints FROM aside from right knee flexion to 90 degrees.    -Full peripheral joint examination reveals no synovitis or joint tenderness Small and cool effusion right knee. Lymphadenopathy:     She has no cervical adenopathy. Neurological: She is alert and oriented to person, place, and time.  She exhibits normal muscle tone.   -gait normal  Skin: Skin is warm and dry. No acute rash noted. Mild frontal alopecia  Psychiatric: She has a normal mood and affect. Judgment normal.      Lab Results   Component Value Date/Time    WBC 5.5 07/14/2017 12:16 PM    Hemoglobin (POC) 13.9 07/25/2014 11:52 PM    HGB 10.0 07/14/2017 12:16 PM    Hematocrit (POC) 41 07/25/2014 11:52 PM    HCT 31.3 07/14/2017 12:16 PM    PLATELET 497 43/07/1360 12:16 PM    MCV 79.8 07/14/2017 12:16 PM     Lab Results   Component Value Date/Time    Sodium 141 07/14/2017 12:16 PM    Potassium 3.7 07/14/2017 12:16 PM    Chloride 105 07/14/2017 12:16 PM    CO2 28 07/14/2017 12:16 PM    Anion gap 8 07/14/2017 12:16 PM    Glucose 119 07/14/2017 12:16 PM    BUN 19 07/14/2017 12:16 PM    Creatinine 0.87 07/14/2017 12:16 PM    BUN/Creatinine ratio 22 07/14/2017 12:16 PM    GFR est AA >60 07/14/2017 12:16 PM    GFR est non-AA >60 07/14/2017 12:16 PM    Calcium 8.0 07/14/2017 12:16 PM    Bilirubin, total 0.2 07/14/2017 12:16 PM    AST (SGOT) 22 07/14/2017 12:16 PM    Alk. phosphatase 67 07/14/2017 12:16 PM    Protein, total 7.3 07/14/2017 12:16 PM    Albumin 3.4 07/14/2017 12:16 PM    Globulin 3.9 07/14/2017 12:16 PM    A-G Ratio 0.9 07/14/2017 12:16 PM    ALT (SGPT) 31 07/14/2017 12:16 PM     Lab Results   Component Value Date/Time    Iron 34 07/10/2017 03:23 PM    TIBC 452 07/10/2017 03:23 PM    Iron % saturation 8 07/10/2017 03:23 PM    Ferritin 10 07/10/2017 03:23 PM     Lab Results   Component Value Date/Time    Sed rate (ESR) 18 05/16/2017 01:34 PM         ASSESSMENT and PLAN    ICD-10-CM ICD-9-CM    1. Temporal arteritis ([address]): fatigue initially. Temporal headaches. Sudden left sided visual loss in late April 2016. ESR was 116. Temporal artery biopsy negative. Symptoms improved with prednisone 55-60 mg daily. She has been taking off-label methotrexate 15 mg weekly to assist with prednisone taper (difficult after approximately 20 mg daily).  She has been able to taper to 5 mg daily. M31.6 446. 5 C REACTIVE PROTEIN, QT  -taper prednisone by 1 mg alternating day's dosing every 2 weeks if tolerated  -methotrexate 15 mg weekly      SED RATE (ESR)   2. Iron deficiency anemia, unspecified iron deficiency anemia type: recent GI evaluation with gastritis. On oral iron. D50.9 280.9 CBC WITH AUTOMATED DIFF   3. Current chronic use of systemic steroids Z79.52 V58.65 DEXA BONE DENSITY STUDY AXIAL  See below  Monitor blood sugars with Dr. Royal West  Cautious taper over next few months given prolonged use at high dosing. 4. BLANCA positive:  1:80 speckled/ homogenous. Delgado and DsDNA negative. No sign of SLE at present R76.8 795.79     5. Inflammatory polyarthritis ([address]): some peripheral synovitis noted earlier in the year, now improved. RF and CCP negative. BLANCA weakly positive (specific serology negative). M06.4 714.9 Plan as noted   6. Long-term use of high-risk medication A86.573 K85.84 METABOLIC PANEL, COMPREHENSIVE      CBC WITH AUTOMATED DIFF  Flu shot in Autumn   7. Osteopenia of other site: DXA femoral neck T-score -1.3.  She is overall tolerating Actonel 150 mg monthly (no dysphagia or current dyspepsia) M85.88  DEXA BONE DENSITY STUDY AXIAL  Calcium 1200 mg daily total  Vitamin D3 2000 units daily  Risedronate 150 mg monthly if tolerated (consider stopping if continuing prednisone taper and BMD stable to improved)

## 2017-09-15 ENCOUNTER — TELEPHONE (OUTPATIENT)
Dept: RHEUMATOLOGY | Age: 62
End: 2017-09-15

## 2017-09-15 LAB
ALBUMIN SERPL-MCNC: 4.4 G/DL (ref 3.6–4.8)
ALBUMIN/GLOB SERPL: 1.7 {RATIO} (ref 1.2–2.2)
ALP SERPL-CCNC: 66 IU/L (ref 39–117)
ALT SERPL-CCNC: 20 IU/L (ref 0–32)
AST SERPL-CCNC: 21 IU/L (ref 0–40)
BASOPHILS # BLD AUTO: 0 X10E3/UL (ref 0–0.2)
BASOPHILS NFR BLD AUTO: 0 %
BILIRUB SERPL-MCNC: <0.2 MG/DL (ref 0–1.2)
BUN SERPL-MCNC: 20 MG/DL (ref 8–27)
BUN/CREAT SERPL: 28 (ref 12–28)
CALCIUM SERPL-MCNC: 9.8 MG/DL (ref 8.7–10.3)
CHLORIDE SERPL-SCNC: 97 MMOL/L (ref 96–106)
CO2 SERPL-SCNC: 26 MMOL/L (ref 18–29)
CREAT SERPL-MCNC: 0.72 MG/DL (ref 0.57–1)
CRP SERPL-MCNC: 9.6 MG/L (ref 0–4.9)
EOSINOPHIL # BLD AUTO: 0 X10E3/UL (ref 0–0.4)
EOSINOPHIL NFR BLD AUTO: 0 %
ERYTHROCYTE [DISTWIDTH] IN BLOOD BY AUTOMATED COUNT: 21.6 % (ref 12.3–15.4)
ERYTHROCYTE [SEDIMENTATION RATE] IN BLOOD BY WESTERGREN METHOD: 7 MM/HR (ref 0–40)
GLOBULIN SER CALC-MCNC: 2.6 G/DL (ref 1.5–4.5)
GLUCOSE SERPL-MCNC: 116 MG/DL (ref 65–99)
HCT VFR BLD AUTO: 40.8 % (ref 34–46.6)
HGB BLD-MCNC: 12.9 G/DL (ref 11.1–15.9)
IMM GRANULOCYTES # BLD: 0 X10E3/UL (ref 0–0.1)
IMM GRANULOCYTES NFR BLD: 1 %
LYMPHOCYTES # BLD AUTO: 1.5 X10E3/UL (ref 0.7–3.1)
LYMPHOCYTES NFR BLD AUTO: 26 %
MCH RBC QN AUTO: 27.9 PG (ref 26.6–33)
MCHC RBC AUTO-ENTMCNC: 31.6 G/DL (ref 31.5–35.7)
MCV RBC AUTO: 88 FL (ref 79–97)
MONOCYTES # BLD AUTO: 0.5 X10E3/UL (ref 0.1–0.9)
MONOCYTES NFR BLD AUTO: 8 %
NEUTROPHILS # BLD AUTO: 3.7 X10E3/UL (ref 1.4–7)
NEUTROPHILS NFR BLD AUTO: 65 %
PLATELET # BLD AUTO: 414 X10E3/UL (ref 150–379)
POTASSIUM SERPL-SCNC: 4.8 MMOL/L (ref 3.5–5.2)
PROT SERPL-MCNC: 7 G/DL (ref 6–8.5)
RBC # BLD AUTO: 4.63 X10E6/UL (ref 3.77–5.28)
SODIUM SERPL-SCNC: 140 MMOL/L (ref 134–144)
WBC # BLD AUTO: 5.8 X10E3/UL (ref 3.4–10.8)

## 2017-09-15 NOTE — TELEPHONE ENCOUNTER
Left patient a VM to R/S her 12/15/2017 appointment at 11:00am due to Dr. Sheri Araiza will be out of the office on this day.

## 2017-09-21 ENCOUNTER — HOSPITAL ENCOUNTER (OUTPATIENT)
Dept: MAMMOGRAPHY | Age: 62
Discharge: HOME | End: 2017-09-21
Attending: INTERNAL MEDICINE
Payer: COMMERCIAL

## 2017-09-21 DIAGNOSIS — Z79.52 CURRENT CHRONIC USE OF SYSTEMIC STEROIDS: ICD-10-CM

## 2017-09-21 DIAGNOSIS — M85.88 OSTEOPENIA OF OTHER SITE: ICD-10-CM

## 2017-09-21 PROCEDURE — 77080 DXA BONE DENSITY AXIAL: CPT

## 2017-10-09 DIAGNOSIS — E53.8 FOLIC ACID DEFICIENCY: Primary | ICD-10-CM

## 2017-10-09 RX ORDER — FOLIC ACID 1 MG/1
1 TABLET ORAL DAILY
Qty: 90 TAB | Refills: 3 | Status: SHIPPED | OUTPATIENT
Start: 2017-10-09 | End: 2017-11-28 | Stop reason: SDUPTHER

## 2017-10-12 ENCOUNTER — OFFICE VISIT (OUTPATIENT)
Dept: INTERNAL MEDICINE CLINIC | Age: 62
End: 2017-10-12

## 2017-10-12 VITALS
HEART RATE: 86 BPM | WEIGHT: 189.4 LBS | HEIGHT: 67 IN | SYSTOLIC BLOOD PRESSURE: 124 MMHG | BODY MASS INDEX: 29.73 KG/M2 | TEMPERATURE: 99.1 F | OXYGEN SATURATION: 98 % | DIASTOLIC BLOOD PRESSURE: 78 MMHG

## 2017-10-12 DIAGNOSIS — I10 BENIGN ESSENTIAL HYPERTENSION: ICD-10-CM

## 2017-10-12 DIAGNOSIS — K58.9 SPASM OF BOWEL: Primary | ICD-10-CM

## 2017-10-12 DIAGNOSIS — E55.9 VITAMIN D DEFICIENCY: ICD-10-CM

## 2017-10-12 DIAGNOSIS — E78.00 HIGH CHOLESTEROL: ICD-10-CM

## 2017-10-12 DIAGNOSIS — R73.01 IFG (IMPAIRED FASTING GLUCOSE): ICD-10-CM

## 2017-10-12 LAB — HBA1C MFR BLD HPLC: 6 %

## 2017-10-12 RX ORDER — HYOSCYAMINE SULFATE 0.12 MG/1
0.12 TABLET, ORALLY DISINTEGRATING ORAL
Qty: 20 TAB | Refills: 0 | Status: SHIPPED | OUTPATIENT
Start: 2017-10-12 | End: 2018-07-19 | Stop reason: ALTCHOICE

## 2017-10-12 NOTE — PROGRESS NOTES
Chief Complaint   Patient presents with    Anemia     3 month follow up     Diabetes        1. Have you been to the ER, urgent care clinic since your last visit? Yes, [facility] [place]  Hospitalized since your last visit? No    2. Have you seen or consulted any other health care providers outside of the [address] [place] [address] since your last visit? Yes, GI specialist  Include any pap smears or colon screening.  No     Patient received flu shot at [facility] pharmacy

## 2017-10-12 NOTE — PROGRESS NOTES
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 She was last seen by me on 7/10/2017. She reports the following:    Had abdo pain. Pain is gone. Was on carafate. Gastritis EGD. Capsule negative. Dr. Nubia Roque. May be associated with food. Fingerstick today Hga1c (discussed its value with patient):  6.0  Doing well off metformin. Ok to stop iron. Hg stable and recheck Hg in 2 months    See if we can reduce lipitor 20 mg.  Recheck chol levels next visit but stay on 20 mg for now. Hypertension    Patient has a history of HTN. The patient is taking hypertensive medications compliantly without side effects. Denies chest pain, dyspnea, edema, or symptoms of TIA's. BP Readings from Last 3 Encounters:   10/12/17 124/78   09/14/17 126/81   07/25/17 099/05          Complications for HTN are : none         Assessment and Plan        1. Spasm of bowel  Use as needed for cramping like spasms.   - hyoscyamine sulfate (CYSTOSPAZ) 0.125 mg tablet; Take 1 Tab by mouth every four (4) hours as needed. Dispense: 20 Tab; Refill: 0    2. IFG (impaired fasting glucose)  a1c very good off metformin. She wants to keep off it for now. Stay on januvia. - AMB POC HEMOGLOBIN A1C    3. High cholesterol  The nature of cardiac risk has been fully discussed with this patient. I have made her aware of her LDL target goal given her cardiovascular risk analysis. I have discussed the appropriate diet. The need for lifelong compliance in order to reduce risk is stressed. A regular exercise program is recommended to help achieve and maintain normal body weight, fitness and improve lipid balance. The risks and benefits of medications were discussed. Advised to have Omega 3 Fish Oil 1000 mg as a supplement. Last cholesterol labs reviewed with patient. Patient made aware to get liver checked every 6 months.   Continue with  lipitor 20 mg.  - LIPID PANEL; Future    4. Vitamin D deficiency  Patient compliant with Vit D. Emphasized importance of taking with food and not too much because it can be toxic to our body. Therefore, it should be checked regularly. Levels ordered. - VITAMIN D, 25 HYDROXY; Future    5. Benign essential hypertension  Tolerating medication. Denies dizziness that is positional, SOB, or chest pain. Understands the importance of compliance to reduce risk of future heart failure.    Agreed to call if any of above symptoms develop and  stay on current regimen of  Hctz, ramipril            Visit Vitals    /78 (BP 1 Location: Left arm, BP Patient Position: Sitting)    Pulse 86    Temp 99.1 °F (37.3 °C) (Oral)    Ht 5' 7\" (1.702 m)    Wt 189 lb 6.4 oz (85.9 kg)    SpO2 98%    BMI 29.66 kg/m2       Historical Data    Past Medical History:   Diagnosis Date    Adverse effect of anesthesia     severe N/V with versed and narcotics/severe abdominal pain with morphine    Asthma     childhood    Autoimmune disease ([facility]     temporal arteritis/polyarthritis rheumatica    Chronic pain     shoulders/knees    DDD (degenerative disc disease), cervical     Degenerative arthritis of right knee 2/2014    Dr. Ta Clark    Diabetes mellitus, type 2 ([address] 12/2014    Fibromyalgia     Gastric nodule     gastric nodules on endoscopy 3/26/12    GERD (gastroesophageal reflux disease)     Hyperlipidemia LDL goal < 100     Hypertension     Osteopenia 05/2016    Actonel started    PUD (peptic ulcer disease)     Pulmonary emboli ([address] 2006    s/p hysterectomy    Temporal arteritis ([address] 4/29/16    Dr. Radha Cano Thoracic outlet syndrome     left, Dr. Monzon Elian Thrombocytopenia ([address] 10/2015    mild    Vitamin D deficiency        Past Surgical History:   Procedure Laterality Date    COLONOSCOPY N/A 5/17/2017    COLONOSCOPY performed by Guillermo Aviles MD at [facility]      several breast biopsies (benign)  HX BREAST BIOPSY Bilateral , , [id number]    6-7 on R, 2 on L+all benign    HX BUNIONECTOMY      bilateral    HX  SECTION      x3    HX COLONOSCOPY      normal, f/u in 10 yrs    HX DILATION AND CURETTAGE      [id number], several D&C's    HX ENDOSCOPY      x3, h/o gastric polyp removal 3/2012    HX GI      cholecystectomy    HX HEART CATHETERIZATION      x2, most recent 2012 was normal    HX KNEE ARTHROSCOPY Right     HX OTHER SURGICAL      lipoma removal    HX OTHER SURGICAL  16    L temporal artery bx (Dr. Elle Santiago)    HX AZAEL AND BSO      secondary to endometrial hyperplasia with atypical changes    HX TONSILLECTOMY      T & A       Outpatient Encounter Prescriptions as of 10/12/2017   Medication Sig Dispense Refill    hyoscyamine sulfate (CYSTOSPAZ) 0.125 mg tablet Take 1 Tab by mouth every four (4) hours as needed. 20 Tab 0    folic acid (FOLVITE) 1 mg tablet Take 1 Tab by mouth daily. 90 Tab 3    omeprazole (PRILOSEC) 40 mg capsule TAKE 1 CAP BY MOUTH DAILY. 3    CARAFATE 100 mg/mL suspension TAKE 10 MLS 3 TIMES A DAY  2    predniSONE (DELTASONE) 1 mg tablet 5 mg once daily. Taper as directed 150 Tab 3    risedronate (ACTONEL) 150 mg tablet Take 1 Tab by mouth every thirty (30) days. 1 Tab 11    ondansetron (ZOFRAN ODT) 4 mg disintegrating tablet Take 1 Tab by mouth every eight (8) hours as needed for Nausea. 8 Tab 0    iron fum,ps-FA-vit B,C#18-Lact (FUSION PLUS) 130 mg iron -1,250 mcg cap Take 130 mg by mouth daily. 30 Cap 6    hydroCHLOROthiazide (HYDRODIURIL) 25 mg tablet TAKE 1 TABLET BY MOUTH EVERY DAY 90 Tab 3    ramipril (ALTACE) 10 mg capsule Take 2 Caps by mouth daily. 180 Cap 3    DULoxetine (CYMBALTA) 60 mg capsule Take 1 Cap by mouth daily. 90 Cap 1    methotrexate (RHEUMATREX) 2.5 mg tablet Take 6 Tabs by mouth every Tuesday. 30 Tab 3    SITagliptin (JANUVIA) 100 mg tablet Take 1 Tab by mouth daily.  90 Tab 1    atorvastatin (LIPITOR) 20 mg tablet Take 1 Tab by mouth daily. 90 Tab 3    estradiol (ESTRACE) 1 mg tablet Take 1 Tab by mouth daily. 90 Tab 3    omega-3 fatty acids-vitamin e (FISH OIL) 1,000 mg cap Take 1 Cap by mouth two (2) times a day.  Cholecalciferol, Vitamin D3, (VITAMIN D3) 2,000 unit cap Take 1 Cap by mouth daily.  multivitamin (ONE A DAY) tablet Take 1 Tab by mouth daily.  [DISCONTINUED] metFORMIN ER (GLUCOPHAGE XR) 500 mg tablet Take 2 Tabs by mouth two (2) times a day. 11     No facility-administered encounter medications on file as of 10/12/2017. Allergies   Allergen Reactions    [place] Flavor Hives    Alendronate Nausea and Vomiting    Morphine Other (comments)     Extreme epigastric pain    Valium [Diazepam] Nausea and Vomiting    Versed [Midazolam] Nausea and Vomiting        Social History     Social History    Marital status:      Spouse name: N/A    Number of children: N/A    Years of education: N/A     Occupational History    Not on file. Social History Main Topics    Smoking status: Never Smoker    Smokeless tobacco: Never Used    Alcohol use No    Drug use: No    Sexual activity: Yes     Partners: Male     Other Topics Concern    Not on file     Social History Narrative        family history includes Breast Cancer in her maternal aunt; Breast Cancer (age of onset: 39) in her maternal aunt and mother; Cancer in her father and mother; Diabetes in her mother and paternal grandmother; Heart Disease in her brother, father, and mother; Heart Surgery in her mother; Hypertension in her father and mother; Osteoporosis in her mother and another family member; Other in her brother, sister, and sister; Psychiatric Disorder in her daughter, daughter, and son; Thyroid Disease in her mother and sister. Review of Systems   Constitutional: Negative for weight loss. Eyes: Negative for blurred vision. Respiratory: Negative for shortness of breath.     Cardiovascular: Negative for chest pain. Gastrointestinal: Positive for abdominal pain. Negative for blood in stool, constipation, diarrhea, melena, nausea and vomiting. Genitourinary: Negative for dysuria and frequency. Musculoskeletal: Negative for myalgias. Skin: Negative for rash. Neurological: Negative for dizziness, focal weakness, weakness and headaches. Endo/Heme/Allergies: Negative for environmental allergies. Does not bruise/bleed easily. Physical Exam   Constitutional: She is oriented to person, place, and time. She appears well-developed and well-nourished. She is active. Non-toxic appearance. She does not have a sickly appearance. She does not appear ill. No distress. Eyes: Conjunctivae are normal. Right eye exhibits no discharge. Cardiovascular: Normal rate, regular rhythm, S1 normal, S2 normal, normal heart sounds and normal pulses. Exam reveals no gallop and no friction rub. Pulmonary/Chest: Effort normal and breath sounds normal. No respiratory distress. She has no wheezes. She has no rales. Abdominal: Soft. Bowel sounds are normal.   Musculoskeletal: She exhibits no edema or deformity. Neurological: She is alert and oriented to person, place, and time. Skin: Skin is warm and dry. No rash noted. No pallor. Psychiatric: She has a normal mood and affect. Her behavior is normal.   Vitals reviewed. Ortho Exam      Orders Placed This Encounter    LIPID PANEL     Standing Status:   Future     Standing Expiration Date:   4/12/2018    VITAMIN D, 25 HYDROXY     Standing Status:   Future     Standing Expiration Date:   4/12/2018    AMB POC HEMOGLOBIN A1C    hyoscyamine sulfate (CYSTOSPAZ) 0.125 mg tablet     Sig: Take 1 Tab by mouth every four (4) hours as needed. Dispense:  20 Tab     Refill:  0        I have reviewed the patient's medical history in detail and updated the computerized patient record.      We had a prolonged discussion about these complex clinical issues and went over the various important aspects to consider. All questions were answered. Advised her to call back or return to office if symptoms do not improve, change in nature, or persist.    She was given an after visit summary or informed of [facility] which includes patient instructions, diagnoses, current medications, & vitals. She expressed understanding with the diagnosis and plan.

## 2017-10-16 DIAGNOSIS — M06.9 RHEUMATOID ARTHRITIS, INVOLVING UNSPECIFIED SITE, UNSPECIFIED RHEUMATOID FACTOR PRESENCE: Primary | ICD-10-CM

## 2017-10-16 RX ORDER — METHOTREXATE 2.5 MG/1
15 TABLET ORAL
Qty: 30 TAB | Refills: 3 | Status: SHIPPED | OUTPATIENT
Start: 2017-10-17 | End: 2017-12-20 | Stop reason: ALTCHOICE

## 2017-10-17 ENCOUNTER — TELEPHONE (OUTPATIENT)
Dept: INTERNAL MEDICINE CLINIC | Age: 62
End: 2017-10-17

## 2017-10-17 DIAGNOSIS — E11.9 TYPE 2 DIABETES MELLITUS WITHOUT COMPLICATION, WITHOUT LONG-TERM CURRENT USE OF INSULIN (HCC): Primary | ICD-10-CM

## 2017-10-17 NOTE — TELEPHONE ENCOUNTER
Lola Barroso MD 1955    Pt is due for a diabetic foot exam and would like a referral to a Podiatrist. Please send Physician name and information to patient via [facility] [phone].

## 2017-10-17 NOTE — TELEPHONE ENCOUNTER
Vicki larson send referral to podiatry     [phone] [facility] and [place] [address]            Phone:  Fax: Melanie Del Toro [phone]

## 2017-11-02 ENCOUNTER — TELEPHONE (OUTPATIENT)
Dept: RHEUMATOLOGY | Age: 62
End: 2017-11-02

## 2017-11-02 RX ORDER — PREDNISONE 5 MG/1
15 TABLET ORAL DAILY
Qty: 45 TAB | Refills: 1 | Status: SHIPPED | OUTPATIENT
Start: 2017-11-02 | End: 2017-12-12 | Stop reason: SDUPTHER

## 2017-11-02 NOTE — TELEPHONE ENCOUNTER
Spoke with patient states she is being treated for  Temporal Arteritis, and is on Prednisone 7 mg daily, and Methotrexate 6 tabs every Tuesday. Patient states her headache has been worse over the last 4-5 days bilaterally, but worse on the right side, and her right jaw hurts. She is also having bilateral shoulder pain. She states the pain in her head is causing her to not be able to sleep. Patient wants to know what to do now? She states she not having any vision problems at present. Patient states she will stay with our practice.

## 2017-11-24 NOTE — TELEPHONE ENCOUNTER
Requested Prescriptions     Pending Prescriptions Disp Refills    atorvastatin (LIPITOR) 20 mg tablet 90 Tab 3     Sig: Take 1 Tab by mouth daily.      07/10/2017  04/12/2018    90 day supply with three additional refills

## 2017-11-27 RX ORDER — ATORVASTATIN CALCIUM 20 MG/1
TABLET, FILM COATED ORAL
Qty: 90 TAB | Refills: 3 | Status: SHIPPED | OUTPATIENT
Start: 2017-11-27 | End: 2018-04-12 | Stop reason: SDUPTHER

## 2017-11-27 RX ORDER — ATORVASTATIN CALCIUM 20 MG/1
20 TABLET, FILM COATED ORAL DAILY
Qty: 90 TAB | Refills: 3 | Status: SHIPPED | OUTPATIENT
Start: 2017-11-27 | End: 2018-10-30 | Stop reason: SDUPTHER

## 2017-11-28 DIAGNOSIS — E53.8 FOLIC ACID DEFICIENCY: ICD-10-CM

## 2017-11-28 RX ORDER — FOLIC ACID 1 MG/1
1 TABLET ORAL DAILY
Qty: 90 TAB | Refills: 3 | Status: SHIPPED | OUTPATIENT
Start: 2017-11-28 | End: 2017-12-20 | Stop reason: ALTCHOICE

## 2017-11-28 NOTE — TELEPHONE ENCOUNTER
From: Evelia Fortune MD  To: Pilar Preciado MD  Sent: 11/28/2017 11:40 AM EST  Subject: Medication Renewal Request    Original authorizing provider: MD Evelia Villegas would like a refill of the following medications:  folic acid (FOLVITE) 1 mg tablet Pilar Preciado MD]    Preferred pharmacy: [place] [id number], [id number] ([address] [address]    Comment:

## 2017-12-12 RX ORDER — PREDNISONE 5 MG/1
15 TABLET ORAL DAILY
Qty: 45 TAB | Refills: 1 | Status: SHIPPED | OUTPATIENT
Start: 2017-12-12 | End: 2018-01-24 | Stop reason: SDUPTHER

## 2017-12-15 NOTE — TELEPHONE ENCOUNTER
DISCHARGE PLANNING     Discharge to home or other facility with appropriate resources Progressing        DISCHARGE PLANNING - CARE MANAGEMENT     Discharge to post-acute care or home with appropriate resources Progressing        INFECTION - ADULT     Absence or prevention of progression during hospitalization Progressing        Knowledge Deficit     Patient/family/caregiver demonstrates understanding of disease process, treatment plan, medications, and discharge instructions Progressing        PAIN - ADULT     Verbalizes/displays adequate comfort level or baseline comfort level Progressing        Potential for Falls     Patient will remain free of falls Progressing        Prexisting or High Potential for Compromised Skin Integrity     Skin integrity is maintained or improved Progressing        RESPIRATORY - ADULT     Achieves optimal ventilation and oxygenation Progressing        SAFETY ADULT     Maintain or return to baseline ADL function Progressing     Maintain or return mobility status to optimal level Progressing     Patient will remain free of falls Progressing Spoke with Dr. Pat Dyer regarding patient symptoms in previous note from today, and Dr. Pat Dyer states to tell the patient to increase Prednisone to 15 mg for 2 weeks, and then start tapering by 2.5 mg every 2 weeks, and call with update next week. Also patient told to come in for new patient appointment on 12/20/2017 at 2:00. Will mail appointment reminder to the patient.

## 2017-12-20 ENCOUNTER — OFFICE VISIT (OUTPATIENT)
Dept: RHEUMATOLOGY | Age: 62
End: 2017-12-20

## 2017-12-20 VITALS
HEART RATE: 80 BPM | DIASTOLIC BLOOD PRESSURE: 83 MMHG | BODY MASS INDEX: 30.7 KG/M2 | SYSTOLIC BLOOD PRESSURE: 163 MMHG | RESPIRATION RATE: 16 BRPM | WEIGHT: 191 LBS | TEMPERATURE: 98.3 F | HEIGHT: 66 IN | OXYGEN SATURATION: 99 %

## 2017-12-20 DIAGNOSIS — M31.6 TEMPORAL ARTERITIS (HCC): ICD-10-CM

## 2017-12-20 DIAGNOSIS — M31.6 GCA (GIANT CELL ARTERITIS) (HCC): ICD-10-CM

## 2017-12-20 DIAGNOSIS — M35.3 PMR (POLYMYALGIA RHEUMATICA) (HCC): Primary | ICD-10-CM

## 2017-12-20 RX ORDER — FOLIC ACID 1 MG/1
2 TABLET ORAL DAILY
Qty: 60 TAB | Refills: 11 | Status: SHIPPED | OUTPATIENT
Start: 2017-12-20 | End: 2020-03-12

## 2017-12-20 RX ORDER — CALCIUM CARB/VITAMIN D3/VIT K1 500-100-40
4 TABLET,CHEWABLE ORAL
Qty: 4 SYRINGE | Refills: 6 | Status: SHIPPED | OUTPATIENT
Start: 2017-12-20 | End: 2021-02-05

## 2017-12-20 RX ORDER — RISEDRONATE SODIUM 150 MG/1
150 TABLET, FILM COATED ORAL
Qty: 1 TAB | Refills: 11 | Status: SHIPPED | OUTPATIENT
Start: 2017-12-20 | End: 2018-09-18 | Stop reason: SDUPTHER

## 2017-12-20 RX ORDER — METHOTREXATE 25 MG/ML
25 INJECTION, SOLUTION INTRA-ARTERIAL; INTRAMUSCULAR; INTRAVENOUS
Qty: 10 ML | Refills: 3 | Status: SHIPPED | OUTPATIENT
Start: 2017-12-20 | End: 2020-03-12

## 2017-12-20 NOTE — PROGRESS NOTES
CHIEF COMPLAINT  The patient was seen for rheumatology consultation for evaluation of joint pain (previous Dr. Rosalie Suarez patient). HISTORY OF PRESENT ILLNESS  This is a 57 y.o.  female. Today, the patient complains of pain in the joints. Location: shoulder, knee  Severity:  3 on a scale of 0-10  Timing: all day   Duration:  2 years  Modifying factors: GCA/PMR   Context/Associated signs and symptoms: The patient has a history of GCA/PMR beginning in April 2016 with sudden loss of vision of her left eye, headaches, ESR (120). She was admitted to the hospital and her symptoms resolved with solumedrol. She was placed on a prednisone taper starting from 60 mg daily. She mentions two flares. The first flare was in January 2017 on prednisone 20 mg daily where symptoms progressed to include jaw pain, fatigue, blurry vision, and pain of feet, hands, and shoulders. She was placed on methotrexate 15 mg weekly and mentions some nausea as an adverse effect. Her second flare was in September 2017 on prednisone 7 mg daily. This episode resolved once she increased to prednisone 15 mg daily. She continues with methotrexate 15 mg weekly and prednisone 15 mg daily. She mentions skin rashes over her neck and face, positive BLANCA, negative lupus workup, and AEs to steroids. She has a history of IBS and herpes.      RHEUMATOLOGY REVIEW OF SYSTEMS   Positives as per HPI  Negatives as follows:  Davonte Hinojosa:  Denies unexplained persistent fevers, weight change  HEAD/EYES:   Denies eye redness, dry eyes  ENT:    Denies oral/nasal ulcers, recurrent sinus infections, dry mouth  RESPIRATORY:  No pleuritic pain, history of pleural effusions, hemoptysis, exertional dyspnea  CARDIOVASCULAR:  Denies chest pain, history of pericardial effusions  GASTRO:   Denies heartburn, esophageal dysmotility, abdominal pain, nausea, vomiting, diarrhea, blood in the stool  HEMATOLOGIC:  No easy bruising, purpura, swollen lymph nodes  SKIN:    Denies alopecia, ulcers, nodules, sun sensitivity, unexplained persistent rash   VASCULAR:   Denies edema, cyanosis, raynaud phenomenon  NEUROLOGIC:  Denies specific muscle weakness, paresthesias   PSYCHIATRIC:  No sleep disturbance / snoring, depression, anxiety  MSK:    No morning stiffness >1 hour, SI joint pain, persistent joint swelling    MEDICAL AND SOCIAL HISTORY  This was reviewed with the patient and reviewed in the medical records.       Past Medical History:   Diagnosis Date    Adverse effect of anesthesia     severe N/V with versed and narcotics/severe abdominal pain with morphine    Asthma     childhood    Autoimmune disease ([facility]     temporal arteritis/polyarthritis rheumatica    Chronic pain     shoulders/knees    DDD (degenerative disc disease), cervical     Degenerative arthritis of right knee 2014    Dr. Lacie Fernandes    Diabetes mellitus, type 2 ([address] 2014    Fibromyalgia     Gastric nodule     gastric nodules on endoscopy 3/26/12    GERD (gastroesophageal reflux disease)     Hyperlipidemia LDL goal < 100     Hypertension     Osteopenia 2016    Actonel started    PUD (peptic ulcer disease)     Pulmonary emboli ([facility]     s/p hysterectomy    Temporal arteritis ([facility] 16    Dr. Sandra Ward Thoracic outlet syndrome     left, Dr. Shonda Hood Thrombocytopenia ([facility] 10/2015    mild    Vitamin D deficiency      Past Surgical History:   Procedure Laterality Date    COLONOSCOPY N/A 2017    COLONOSCOPY performed by Keke Smith MD at [facility]      several breast biopsies (benign)    HX BREAST BIOPSY Bilateral , , ,    6-7 on R, 2 on L+all benign    HX BUNIONECTOMY      bilateral    HX  SECTION      x3    HX COLONOSCOPY      normal, f/u in 10 yrs    HX DILATION AND CURETTAGE      [id number], several D&C's    HX ENDOSCOPY      x3, h/o gastric polyp removal 3/2012    HX GI      cholecystectomy    HX HEART CATHETERIZATION x2, most recent 7/2012 was normal    HX KNEE ARTHROSCOPY Right     HX OTHER SURGICAL      lipoma removal    HX OTHER SURGICAL  5/11/16    L temporal artery bx (Dr. Mihir Perez)    HX AZAEL AND BSO  2006    secondary to endometrial hyperplasia with atypical changes    HX TONSILLECTOMY      T & A     Social History   Substance Use Topics    Smoking status: Never Smoker    Smokeless tobacco: Never Used    Alcohol use No     Employment - Retired  Sleep - Good, no issues   Exercise - yes    FAMILY HISTORY   No autoimmune disease in the family    MEDICATIONS  All the current medications were reviewed in detail. PHYSICAL EXAM  Blood pressure 163/83, pulse 80, temperature 98.3 °F (36.8 °C), temperature source Oral, resp. rate 16, height 5' 5.75\" (1.67 m), weight 191 lb (86.6 kg), SpO2 99 %. GENERAL APPEARANCE: Well-nourished adult in no acute distress. EYES: No scleral erythema, conjunctival injection. ENT: No oral ulcer, parotid enlargement. NECK: No adenopathy, thyroid enlargement. CARDIOVASCULAR: Heart rhythm is regular. No murmur, rub, gallop. CHEST: Normal vesicular breath sounds. No wheezes, rales, pleural friction rubs. ABDOMINAL: The abdomen is soft and nontender. Liver and spleen are nonpalpable. Bowel sounds are normal.  EXTREMITIES: There is no evidence of clubbing, cyanosis, edema. SKIN: No rash, palpable purpura, digital ulcer, abnormal thickening. NEUROLOGICAL: Normal gait and station, full strength in upper and lower extremities, normal sensation to light touch.   MUSCULOSKELETAL: Left temporal artery discomfort   Upper extremities - full range of motion, no tenderness, no swelling, no synovial thickening and no deformity of joints except for Right shoulder creputis  Lower extremities - full range of motion, no tenderness, no swelling, no synovial thickening and no deformity of joints except for B/L knees crepitus R>L    LABS, RADIOLOGY AND PROCEDURES  Previous labs reviewed -Yes  Previous radiology reviewed -Yes  Previous procedures reviewed -Yes  Previous medical records reviewed/summarized -Yes    ASSESSMENT  1. GCA/PMR - HAs, left temporal artery discomfort, sudden loss of vision, arthralgia of shoulder, TMJ, hands, feet, response to steroids, methotrexate (1/2017 - current) (New problem - Very good partial response) - The patient's GCA/PMR seems to be well-managed by methotrexate 15 mg weekly and prednisone 15 mg daily. The first goal will be to decrease her steroids so I will start her on Actemra. Until this is onboard, she should take methotrexate 1 mL SQ weekly, if tolerated. She should continue with prednisone 15 mg daily for now. She should return in 2 months for a follow up. I will check labs today. 2. Bone Health - Most recent bone density (9/2017) showed osteopenia. She is taking Actonel for this. She is taking steroids for GCA/PMR and will be tapered off in the future. 3. New medication - Tocilizumab - A written summary, as prepared by the [employer] of Rheumatology was provided. Reactions to tocilizumab infusions, including fever and chills, can occur, but these are rare. The most concerning potential side effect with regular therapy is the increased risk of infection, as it is with most biologic therapies. The primary concern is for common bacterial infections. Unusual infections, such as tuberculosis (TB), have not been seen frequently with tocilizumab, but they do remain a concern, and screening for prior exposure to TB is recommended before starting tocilizumab therapy. Screening and monitoring for TB and other important but unusual infections, including fungal infections, is important during treatment with tocilizumab. Overall, the rate of infection seen in clinical trials with tocilizumab was similar to that seen with other biologic drugs used in the treatment of rheumatoid arthritis.  Tocilizumab has been associated with increased cholesterol levels in some patients. Tocilizumab also can cause an increase in some liver enzymes or a decrease in the white blood cells important in fighting infections and/or platelets (important for blood clotting)? all of these are measured with regular blood tests. A rare complication seen with tocilizumab use in clinical trials was bowel perforation. PLAN  1. Switch Methotrexate to 1 mL SQ weekly   2. Prednisone 15 mg daily  3. Start Actemra SQ  4. Obtain labs for hepatitis and tuberculosis in anticipation of possible anti-TNF therapy / biologic therapy. 5. Check CBC, CMP, markers of inflammation (ESR, CRP), Vit D, A1C, Vitamin B12, Microalbumin, UA  6. Return in 2 months    Aarat M. Burnetta Goltz, MD  Adult and Pediatric Rheumatology     [facility], [address] [place] [address], Phone [phone], Fax [phone]   E-mail: [email or web address]    Visiting  of Pediatrics    Department of Pediatrics, [facility] of [address] [place] [address], [address] [place] [address], Phone [phone], Fax [phone]  E-mail: [email or web address]    There are no Patient Instructions on file for this visit. cc:  MD Keyon Suazo (Ophthalmology)    Written by josué Castillo, as dictated by Rosan Siemens.  Burnetta Goltz, M.D.

## 2017-12-20 NOTE — MR AVS SNAPSHOT
Visit Information Date & Time Provider Department Dept. Phone Encounter #  
 12/20/2017  1:00 PM Ortiz Kaur MD [address] [phone] [id number] Follow-up Instructions Return in about 2 months (around 2/20/2018). Your Appointments 4/12/2018  3:00 PM  
Any with Lucy Delgado MD  
[facility]) Appt Note: 6 mths fup; 6 mths fup 30 mins clot/cpe  
 [address], [address] [place] [address]  
[address], [address] [id number] Upcoming Health Maintenance Date Due  
 FOOT EXAM Q1 11/11/2017 MICROALBUMIN Q1 12/6/2017 LIPID PANEL Q1 12/6/2017 HEMOGLOBIN A1C Q6M 4/12/2018 EYE EXAM RETINAL OR DILATED Q1 5/26/2018 PAP AKA CERVICAL CYTOLOGY 12/20/2019 DTaP/Tdap/Td series (2 - Td) 1/1/2021 COLONOSCOPY 5/17/2027 Allergies as of 12/20/2017  Review Complete On: 12/20/2017 By: Sandra Rodríguez LPN Severity Noted Reaction Type Reactions Elfego Flavor Medium 07/22/2012   Systemic Hives [place] fruit not flavor Alendronate  07/06/2016    Nausea and Vomiting Morphine  07/22/2012    Other (comments) Extreme epigastric pain Valium [Diazepam]  03/07/2017    Nausea and Vomiting Versed [Midazolam]  10/16/2013    Nausea and Vomiting Current Immunizations  Reviewed on 9/14/2017 Name Date Influenza Vaccine 10/5/2016, 9/28/2015, 10/1/2013 Pneumococcal Conjugate (PCV-13) 10/6/2016 Tdap 1/1/2011 Zoster Vaccine, Live 11/1/2015 Not reviewed this visit You Were Diagnosed With   
  
 Codes Comments PMR (polymyalgia rheumatica) (HCC)    -  Primary ICD-10-CM: M35.3 ICD-9-CM: 057 GCA (giant cell arteritis) ([facility]     ICD-10-CM: M31.6 ICD-9-CM: 446. 5 Vitals BP Pulse Temp Resp Height(growth percentile) Weight(growth percentile) 163/83 (BP 1 Location: Left arm, BP Patient Position: Sitting) 80 98.3 °F (36.8 °C) (Oral) 16 5' 5.75\" (1.67 m) 191 lb (86.6 kg) SpO2 BMI OB Status Smoking Status 99% 31.06 kg/m2 Hysterectomy Never Smoker BMI and BSA Data Body Mass Index Body Surface Area 31.06 kg/m 2 2 m 2 Preferred Pharmacy Pharmacy Name Phone [facility]/PHARMACY #[id number]- [place], [address] ([place] [address]) [phone] Your Updated Medication List  
  
   
This list is accurate as of: 12/20/17  1:31 PM.  Always use your most recent med list.  
  
  
  
  
 * atorvastatin 20 mg tablet Commonly known as:  LIPITOR Take 1 Tab by mouth daily. * atorvastatin 20 mg tablet Commonly known as:  LIPITOR  
TAKE 1 TABLET BY MOUTH EVERY DAY  
  
 CARAFATE 100 mg/mL suspension Generic drug:  sucralfate TAKE 10 MLS 3 TIMES A DAY DULoxetine 60 mg capsule Commonly known as:  CYMBALTA Take 1 Cap by mouth daily. estradiol 1 mg tablet Commonly known as:  ESTRACE Take 1 Tab by mouth daily. FISH OIL 1,000 mg Cap Generic drug:  omega-3 fatty acids-vitamin e Take 1 Cap by mouth two (2) times a day. FLUARIX QUAD 9575-4288 (PF) Syrg injection Generic drug:  influenza vaccine 2017-18 (3 yrs+)(PF)  
TO BE ADMINISTERED BY PHARMACIST FOR IMMUNIZATION  
  
 folic acid 1 mg tablet Commonly known as:  Google Take 2 Tabs by mouth daily for 30 days. hydroCHLOROthiazide 25 mg tablet Commonly known as:  HYDRODIURIL  
TAKE 1 TABLET BY MOUTH EVERY DAY  
  
 hyoscyamine sulfate 0.125 mg tablet Commonly known as:  CYSTOSPAZ Take 1 Tab by mouth every four (4) hours as needed. Insulin Syringe-Needle U-100 1 mL 31 gauge x 5/16 Syrg 4 Syringes by Does Not Apply route every seven (7) days. iron fum,ps-FA-vit B,C#18-Lact 130 mg iron -1,250 mcg Cap Commonly known as:  FUSION PLUS Take 130 mg by mouth daily. JANUVIA 100 mg tablet Generic drug:  SITagliptin TAKE 1 TABLET BY MOUTH DAILY  
  
 methotrexate 25 mg/mL chemo injection 1 mL by SubCUTAneous route every seven (7) days for 30 days. multivitamin tablet Commonly known as:  ONE A DAY Take 1 Tab by mouth daily. omeprazole 40 mg capsule Commonly known as:  PRILOSEC  
TAKE 1 CAP BY MOUTH DAILY. ondansetron 4 mg disintegrating tablet Commonly known as:  ZOFRAN ODT Take 1 Tab by mouth every eight (8) hours as needed for Nausea. * predniSONE 1 mg tablet Commonly known as:  DELTASONE  
5 mg once daily. Taper as directed * predniSONE 5 mg tablet Commonly known as:  Jonetta Moment Take 3 Tabs by mouth daily. ramipril 10 mg capsule Commonly known as:  ALTACE Take 2 Caps by mouth daily. risedronate 150 mg tablet Commonly known as:  ACTONEL Take 1 Tab by mouth every thirty (30) days. tocilizumab 162 mg/0.9 mL Syrg Commonly known as:  ACTEMRA 162 mg by SubCUTAneous route every fourteen (14) days for 30 days. VITAMIN D3 2,000 unit Cap capsule Generic drug:  Cholecalciferol (Vitamin D3) Take 1 Cap by mouth daily. * Notice: This list has 4 medication(s) that are the same as other medications prescribed for you. Read the directions carefully, and ask your doctor or other care provider to review them with you. Prescriptions Sent to Pharmacy Refills  
 methotrexate 25 mg/mL chemo injection 3 Si mL by SubCUTAneous route every seven (7) days for 30 days. Class: Normal  
 Pharmacy: [facility]/pharmacy #[id number]- [address], [address] ([place] [address]) Ph #: [phone] Route: SubCUTAneous Insulin Syringe-Needle U-100 1 mL 31 gauge x 5/16 syrg 6 Si Syringes by Does Not Apply route every seven (7) days.   
 Class: Normal  
 Pharmacy: [facility]/pharmacy #[id number]- [address], [address] AT [facility] Ph #: [phone] Route: Does Not Apply  
 tocilizumab (ACTEMRA) 162 mg/0.9 mL syrg 6 Si mg by SubCUTAneous route every fourteen (14) days for 30 days. Class: Normal  
 Pharmacy: [address] Ph #: [phone] Route: SubCUTAneous  
 folic acid (FOLVITE) 1 mg tablet 11 Sig: Take 2 Tabs by mouth daily for 30 days. Class: Normal  
 Pharmacy: [facility]/pharmacy #[id number]- [address] [facility], [address] ([place] [address]) Ph #: [phone] Route: Oral  
 risedronate (ACTONEL) 150 mg tablet 11 Sig: Take 1 Tab by mouth every thirty (30) days. Class: Normal  
 Pharmacy: [facility]/pharmacy #[id number]- [address] [facility], [address] ([place] [address]) Ph #: [phone] Route: Oral  
  
We Performed the Following C REACTIVE PROTEIN, QT [26916 CPT(R)] CBC+PLATELET+HEM REVIEW [31508 CPT(R)] HCV AB W/RFLX TO DARIUSZ [89230 CPT(R)] HEMOGLOBIN A1C WITH EAG [12675 CPT(R)] HEP B SURFACE AG [id number] CPT(R)] HEPATITIS B CORE AB, TOTAL [id number] CPT(R)] HEPATITIS B SURF AB QUANT [id number] CPT(R)] METABOLIC PANEL, COMPREHENSIVE [09540 CPT(R)] MICROALBUMIN, UR, RAND [id number] CPT(R)] QUANTIFERON TB GOLD [[id number] Custom] SED RATE (ESR) [id number] CPT(R)] UA/M W/RFLX CULTURE, ROUTINE [[id number] Custom] VITAMIN B12 & FOLATE [56888 CPT(R)] VITAMIN D, 25 HYROXY PANEL [[id number] Custom] Follow-up Instructions Return in about 2 months (around 2018). Introducing [facility] & HEALTH SERVICES! Dear Mariana Botello: Thank you for requesting a [employer] account. Our records indicate that you already have an active [employer] account. You can access your account anytime at [email or web address] Did you know that you can access your hospital and ER discharge instructions at any time in [employer]?   You can also review all of your test results from your hospital stay or ER visit. Additional Information If you have questions, please visit the Frequently Asked Questions section of the [employer] website at [email or web address] Remember, [employer] is NOT to be used for urgent needs. For medical emergencies, dial 911. Now available from your iPhone and Android! Please provide this summary of care documentation to your next provider. Your primary care clinician is listed as Rayvon Oppenheim. If you have any questions after today's visit, please call [phone].

## 2017-12-21 LAB
APPEARANCE UR: CLEAR
BACTERIA #/AREA URNS HPF: NORMAL /[HPF]
BILIRUB UR QL STRIP: NEGATIVE
CASTS URNS QL MICRO: NORMAL /LPF
COLOR UR: YELLOW
EPI CELLS #/AREA URNS HPF: NORMAL /HPF
GLUCOSE UR QL: NEGATIVE
HGB UR QL STRIP: NEGATIVE
KETONES UR QL STRIP: NEGATIVE
LEUKOCYTE ESTERASE UR QL STRIP: NEGATIVE
MICRO URNS: NORMAL
MICRO URNS: NORMAL
MICROALBUMIN UR-MCNC: <3 UG/ML
MUCOUS THREADS URNS QL MICRO: PRESENT
NITRITE UR QL STRIP: NEGATIVE
PH UR STRIP: 5.5 [PH] (ref 5–7.5)
PROT UR QL STRIP: NEGATIVE
RBC #/AREA URNS HPF: NORMAL /HPF
SP GR UR: 1.01 (ref 1–1.03)
URINALYSIS REFLEX, 377202: NORMAL
UROBILINOGEN UR STRIP-MCNC: 0.2 MG/DL (ref 0.2–1)
WBC #/AREA URNS HPF: NORMAL /HPF

## 2017-12-23 LAB
ANNOTATION COMMENT IMP: NORMAL
GAMMA INTERFERON BACKGROUND BLD IA-ACNC: 0.02 IU/ML
M TB IFN-G BLD-IMP: NEGATIVE
M TB IFN-G CD4+ BCKGRND COR BLD-ACNC: <0 IU/ML
M TB IFN-G CD4+ T-CELLS BLD-ACNC: 0.01 IU/ML
MITOGEN IGNF BLD-ACNC: 7.19 IU/ML
QUANTIFERON INCUBATION: NORMAL
SERVICE CMNT-IMP: NORMAL

## 2017-12-24 LAB
25(OH)D2 SERPL-MCNC: <1 NG/ML
25(OH)D3 SERPL-MCNC: 48 NG/ML
25(OH)D3+25(OH)D2 SERPL-MCNC: 48 NG/ML
ALBUMIN SERPL-MCNC: 4.5 G/DL (ref 3.6–4.8)
ALBUMIN/GLOB SERPL: 1.8 {RATIO} (ref 1.2–2.2)
ALP SERPL-CCNC: 58 IU/L (ref 39–117)
ALT SERPL-CCNC: 24 IU/L (ref 0–32)
AST SERPL-CCNC: 24 IU/L (ref 0–40)
BASOPHILS # BLD MANUAL: 0 X10E3/UL (ref 0–0.2)
BASOPHILS NFR BLD MANUAL: 0 %
BILIRUB SERPL-MCNC: 0.3 MG/DL (ref 0–1.2)
BUN SERPL-MCNC: 17 MG/DL (ref 8–27)
BUN/CREAT SERPL: 20 (ref 12–28)
CALCIUM SERPL-MCNC: 9.9 MG/DL (ref 8.7–10.3)
CHLORIDE SERPL-SCNC: 95 MMOL/L (ref 96–106)
CO2 SERPL-SCNC: 24 MMOL/L (ref 18–29)
CREAT SERPL-MCNC: 0.83 MG/DL (ref 0.57–1)
CRP SERPL-MCNC: 13.5 MG/L (ref 0–4.9)
DIFFERENTIAL COMMENT, 115260: ABNORMAL
EOSINOPHIL # BLD MANUAL: 0 X10E3/UL (ref 0–0.4)
EOSINOPHIL NFR BLD MANUAL: 0 %
ERYTHROCYTE [DISTWIDTH] IN BLOOD BY AUTOMATED COUNT: 15.2 % (ref 12.3–15.4)
ERYTHROCYTE [SEDIMENTATION RATE] IN BLOOD BY WESTERGREN METHOD: 14 MM/HR (ref 0–40)
EST. AVERAGE GLUCOSE BLD GHB EST-MCNC: 140 MG/DL
FOLATE SERPL-MCNC: >20 NG/ML
GLOBULIN SER CALC-MCNC: 2.5 G/DL (ref 1.5–4.5)
GLUCOSE SERPL-MCNC: 164 MG/DL (ref 65–99)
HBA1C MFR BLD: 6.5 % (ref 4.8–5.6)
HBV CORE AB SERPL QL IA: NEGATIVE
HBV SURFACE AB SER-ACNC: 7 MIU/ML
HBV SURFACE AG SERPL QL IA: NEGATIVE
HCT VFR BLD AUTO: 39.1 % (ref 34–46.6)
HCV AB S/CO SERPL IA: <0.1 S/CO RATIO (ref 0–0.9)
HCV AB SERPL QL IA: NORMAL
HGB BLD-MCNC: 13.6 G/DL (ref 11.1–15.9)
LYMPHOCYTES # BLD MANUAL: 1 X10E3/UL (ref 0.7–3.1)
LYMPHOCYTES NFR BLD MANUAL: 13 %
MCH RBC QN AUTO: 31.6 PG (ref 26.6–33)
MCHC RBC AUTO-ENTMCNC: 34.8 G/DL (ref 31.5–35.7)
MCV RBC AUTO: 91 FL (ref 79–97)
MONOCYTES # BLD MANUAL: 0.3 X10E3/UL (ref 0.1–0.9)
MONOCYTES NFR BLD MANUAL: 4 %
NEUTROPHILS # BLD MANUAL: 6.3 X10E3/UL (ref 1.4–7)
NEUTROPHILS NFR BLD MANUAL: 82 %
PLATELET # BLD AUTO: 410 X10E3/UL (ref 150–379)
PLATELET BLD QL SMEAR: ABNORMAL
POTASSIUM SERPL-SCNC: 4.3 MMOL/L (ref 3.5–5.2)
PROT SERPL-MCNC: 7 G/DL (ref 6–8.5)
RBC # BLD AUTO: 4.31 X10E6/UL (ref 3.77–5.28)
RBC MORPH BLD: ABNORMAL
SODIUM SERPL-SCNC: 139 MMOL/L (ref 134–144)
VIT B12 SERPL-MCNC: 733 PG/ML (ref 232–1245)
WBC # BLD AUTO: 7.7 X10E3/UL (ref 3.4–10.8)

## 2017-12-27 ENCOUNTER — TELEPHONE (OUTPATIENT)
Dept: RHEUMATOLOGY | Age: 62
End: 2017-12-27

## 2017-12-27 NOTE — TELEPHONE ENCOUNTER
Faxed prior auth request to [facility] for request for insurance approval  for Actemra injection.

## 2018-01-11 ENCOUNTER — TELEPHONE (OUTPATIENT)
Dept: RHEUMATOLOGY | Age: 63
End: 2018-01-11

## 2018-01-11 DIAGNOSIS — M31.6 GCA (GIANT CELL ARTERITIS) (HCC): Primary | ICD-10-CM

## 2018-01-11 NOTE — TELEPHONE ENCOUNTER
Patient informed Actemra has been approved by MELANIE Koroma# [id number], good 12/27/2017-12/27/2018. Patient states her headache is about the same, and she is taking Prednisone 12.5 mg daily, and Methotrexate 25 mg/1 ml weekly. Message sent to Dr. Aguustine Davison. Patient instructed to go online to apply for co pay card for $5.00. Informed patient prescription was sent to [facility].

## 2018-01-11 NOTE — TELEPHONE ENCOUNTER
From: Pilar Freedman MD  To: Roz Gibbons MD  Sent: 1/10/2018 10:00 PM EST  Subject: Medication Renewal Request    Original authorizing provider: MD Pilar Jackson would like a refill of the following medications:  tocilizumab (ACTEMRA) 162 mg/0.9 mL syrg Roz Gibbons MD]    Preferred pharmacy: Other - [facility]    Comment:

## 2018-01-15 ENCOUNTER — TELEPHONE (OUTPATIENT)
Dept: RHEUMATOLOGY | Age: 63
End: 2018-01-15

## 2018-01-15 NOTE — TELEPHONE ENCOUNTER
Spoke to [facility] regarding ICD 9 code for her Actemra, gave Korey Alvarez code for pt, Haider Ruffin acknowledged verbal understanding

## 2018-01-15 NOTE — TELEPHONE ENCOUNTER
----- Message from Lucille Perry RN sent at 1/15/2018  2:07 PM EST -----  Regarding: FW: Dr. Keith Smart / refill      ----- Message -----     From: Wilbert Nascimento     Sent: 1/15/2018   2:03 PM       To: [facility] Nurse Pool  Subject: FW: Dr. Keith Smart / refill                             ----- Message -----     From: Yoselin Mclaughlin     Sent: 1/15/2018  11:26 AM       To: [facility] Front Office Pool  Subject: Dr. Keith Smart / Claudeen Bali with [address] ([phone]) requesting \" ICD10 Code\" for prescription

## 2018-01-22 RX ORDER — PREDNISONE 1 MG/1
TABLET ORAL
Qty: 150 TAB | Refills: 3 | Status: SHIPPED | OUTPATIENT
Start: 2018-01-22 | End: 2018-06-21 | Stop reason: SDUPTHER

## 2018-01-24 RX ORDER — PREDNISONE 5 MG/1
15 TABLET ORAL DAILY
Qty: 45 TAB | Refills: 6 | Status: SHIPPED | OUTPATIENT
Start: 2018-01-24 | End: 2018-07-19 | Stop reason: ALTCHOICE

## 2018-01-24 NOTE — TELEPHONE ENCOUNTER
Requested Prescriptions     Pending Prescriptions Disp Refills    predniSONE (DELTASONE) 5 mg tablet 45 Tab 1     Sig: Take 3 Tabs by mouth daily.      LOV- 12/20/17    NOV-2/22/18

## 2018-02-22 ENCOUNTER — OFFICE VISIT (OUTPATIENT)
Dept: RHEUMATOLOGY | Age: 63
End: 2018-02-22

## 2018-02-22 VITALS
HEART RATE: 96 BPM | SYSTOLIC BLOOD PRESSURE: 132 MMHG | TEMPERATURE: 98 F | WEIGHT: 199.4 LBS | RESPIRATION RATE: 16 BRPM | BODY MASS INDEX: 32.43 KG/M2 | OXYGEN SATURATION: 98 % | DIASTOLIC BLOOD PRESSURE: 78 MMHG

## 2018-02-22 DIAGNOSIS — M31.6 TEMPORAL ARTERITIS (HCC): ICD-10-CM

## 2018-02-22 DIAGNOSIS — M31.6 GCA (GIANT CELL ARTERITIS) (HCC): Primary | ICD-10-CM

## 2018-02-22 RX ORDER — METHOTREXATE 25 MG/ML
INJECTION INTRA-ARTERIAL; INTRAMUSCULAR; INTRATHECAL; INTRAVENOUS ONCE
COMMUNITY
End: 2018-03-12 | Stop reason: SDUPTHER

## 2018-02-22 NOTE — MR AVS SNAPSHOT
[address] 
[phone] Patient: Simon Fine MD 
MRN: [id number]:[id number] Visit Information Date & Time Provider Department Dept. Phone Encounter #  
 2/22/2018  1:00 PM Janette Kocher, MD [address] [phone] [id number] Follow-up Instructions Return in about 2 months (around 4/22/2018). Your Appointments 4/12/2018  3:00 PM  
Any with MD Susan Matthewva [facility] Nisha Mejia) Appt Note: 6 mths fup; 6 mths fup 30 mins clot/cpe  
 [address] , [address] [place] [address]  
[address], [facility] [address] [id number] Upcoming Health Maintenance Date Due  
 FOOT EXAM Q1 11/11/2017 LIPID PANEL Q1 12/6/2017 EYE EXAM RETINAL OR DILATED Q1 5/26/2018 HEMOGLOBIN A1C Q6M 6/20/2018 MICROALBUMIN Q1 12/20/2018 BREAST CANCER SCRN MAMMOGRAM 8/3/2019 PAP AKA CERVICAL CYTOLOGY 12/20/2019 DTaP/Tdap/Td series (2 - Td) 1/1/2021 COLONOSCOPY 5/17/2027 Allergies as of 2/22/2018  Review Complete On: 2/22/2018 By: Latoya Billings LPN Severity Noted Reaction Type Reactions [place] Flavor Medium 07/22/2012   Systemic Hives [place] fruit not flavor Alendronate  07/06/2016    Nausea and Vomiting Morphine  07/22/2012    Other (comments) Extreme epigastric pain Valium [Diazepam]  03/07/2017    Nausea and Vomiting Versed [Midazolam]  10/16/2013    Nausea and Vomiting Current Immunizations  Reviewed on 9/14/2017 Name Date Influenza Vaccine 10/5/2016, 9/28/2015, 10/1/2013 Pneumococcal Conjugate (PCV-13) 10/6/2016 Tdap 1/1/2011 Zoster Vaccine, Live 11/1/2015 Not reviewed this visit You Were Diagnosed With   
  
 Codes Comments G[facility] (giant cell arter[facility]) ([facility]    -  Primary ICD-10-CM: M31.6 ICD-9-CM: 446. 5 Vitals BP Pulse Temp Resp Weight(growth percentile) SpO2  
 132/78 (BP 1 Location: Left arm, BP Patient Position: Sitting) 96 98 °F (36.7 °C) (Oral) 16 199 lb 6.4 oz (90.4 kg) 98% BMI OB Status Smoking Status 32.43 kg/m2 Hysterectomy Never Smoker BMI and BSA Data Body Mass Index Body Surface Area  
 32.43 kg/m 2 2.05 m 2 Preferred Pharmacy Pharmacy Name Phone Lola Harkins [address] [place] [address] Your Updated Medication List  
  
   
This list is accurate as of 2/22/18  1:12 PM.  Always use your most recent med list.  
  
  
  
  
 * atorvastatin 20 mg tablet Commonly known as:  LIPITOR Take 1 Tab by mouth daily. * atorvastatin 20 mg tablet Commonly known as:  LIPITOR  
TAKE 1 TABLET BY MOUTH EVERY DAY  
  
 CARAFATE 100 mg/mL suspension Generic drug:  sucralfate TAKE 10 MLS 3 TIMES A DAY DULoxetine 60 mg capsule Commonly known as:  CYMBALTA TAKE 1 CAP BY MOUTH DAILY. estradiol 1 mg tablet Commonly known as:  ESTRACE Take 1 Tab by mouth daily. FISH OIL 1,000 mg Cap Generic drug:  omega-3 fatty acids-vitamin e Take 1 Cap by mouth two (2) times a day. FLUARIX QUAD 9225-0940 (PF) Syrg injection Generic drug:  influenza vaccine 2017-18 (6 mos+)(PF)  
TO BE ADMINISTERED BY PHARMACIST FOR IMMUNIZATION  
  
 hydroCHLOROthiazide 25 mg tablet Commonly known as:  HYDRODIURIL  
TAKE 1 TABLET BY MOUTH EVERY DAY  
  
 hyoscyamine sulfate 0.125 mg tablet Commonly known as:  CYSTOSPAZ Take 1 Tab by mouth every four (4) hours as needed. Insulin Syringe-Needle U-100 1 mL 31 gauge x 5/16 Syrg 4 Syringes by Does Not Apply route every seven (7) days. iron fum,ps-FA-vit B,C#18-Lact 130 mg iron -1,250 mcg Cap Commonly known as:  FUSION PLUS Take 130 mg by mouth daily. JANUVIA 100 mg tablet Generic drug:  SITagliptin TAKE 1 TABLET BY MOUTH DAILY methotrexate (PF) 25 mg/mL injection  
once. multivitamin tablet Commonly known as:  ONE A DAY Take 1 Tab by mouth daily. omeprazole 40 mg capsule Commonly known as:  PRILOSEC  
TAKE 1 CAP BY MOUTH DAILY. ondansetron 4 mg disintegrating tablet Commonly known as:  ZOFRAN ODT Take 1 Tab by mouth every eight (8) hours as needed for Nausea. * predniSONE 1 mg tablet Commonly known as:  DELTASONE  
5 mg once daily. Taper as directed * predniSONE 5 mg tablet Commonly known as:  Nohemi Joe Take 3 Tabs by mouth daily. ramipril 10 mg capsule Commonly known as:  ALTACE Take 2 Caps by mouth daily. risedronate 150 mg tablet Commonly known as:  ACTONEL Take 1 Tab by mouth every thirty (30) days. tocilizumab 162 mg/0.9 mL Syrg Commonly known as:  ACTEMRA 162 mg by SubCUTAneous route every seven (7) days for 30 days. VITAMIN D3 2,000 unit Cap capsule Generic drug:  Cholecalciferol (Vitamin D3) Take 1 Cap by mouth daily. * Notice: This list has 4 medication(s) that are the same as other medications prescribed for you. Read the directions carefully, and ask your doctor or other care provider to review them with you. Prescriptions Sent to Pharmacy Refills  
 tocilizumab (ACTEMRA) 162 mg/0.9 mL syrg 6 Si mg by SubCUTAneous route every seven (7) days for 30 days. Class: Normal  
 Pharmacy: [address] [place] [address] #: [phone] Route: SubCUTAneous We Performed the Following C REACTIVE PROTEIN, QT [37088 CPT(R)] CBC+PLATELET+HEM REVIEW [87572 CPT(R)] LIPID PANEL [90897 CPT(R)] METABOLIC PANEL, COMPREHENSIVE [03274 CPT(R)] SED RATE (ESR) [id number] CPT(R)] Follow-up Instructions Return in about 2 months (around 2018). Introducing [facility]! Dear Belkis Padilla: Thank you for requesting a [employer] account.   Our records indicate that you already have an active [employer] account. You can access your account anytime at [email or web address] Did you know that you can access your hospital and ER discharge instructions at any time in [employer]? You can also review all of your test results from your hospital stay or ER visit. Additional Information If you have questions, please visit the Frequently Asked Questions section of the [employer] website at [email or web address] Remember, [employer] is NOT to be used for urgent needs. For medical emergencies, dial 911. Now available from your iPhone and Android! Please provide this summary of care documentation to your next provider. Your primary care clinician is listed as Adin Mueller. If you have any questions after today's visit, please call [phone].

## 2018-02-22 NOTE — PROGRESS NOTES
RHEUMATOLOGY PROBLEM LIST AND CHIEF COMPLAINT  1. 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  female. Today, the patient complains of no pain in the joints. Location: Left Temporal  Severity:  2 on a scale of 0-10  Timing: all day   Duration:  3 months  Context/Associated signs and symptoms: The patient has started Actemra q2 weeks and states she is doing better. She mentions improvement in her HAs and mentions some on and off pain of her shoulders and hips. She complains of tightness over her left temporal area. She states she feels worse during the week after her Actemra injection. She has been tapering her prednisone by 2.5 mg q3 weeks and is currently on 10 mg daily. She continues with methotrexate 1 mL SQ weekly. RHEUMATOLOGY REVIEW OF SYSTEMS   Positives as per history  Negatives as follows:  Zakia Gearing:  Denies unexplained persistent fevers or weight change  RESPIRATORY:  No pleuritic pain, exertional dyspnea  CARDIOVASCULAR:  Denies chest pain  GASTRO:   Denies heartburn, abdominal pain, nausea, vomiting, diarrhea  SKIN:    Denies rash   MSK:    No morning stiffness >1 hour, persistent joint swelling, persistent joint pain    PAST MEDICAL HISTORY  Reviewed with patient, significant changes in medical history - no    Employment - Retired  Sleep - Good, no issues   Exercise - yes    FAMILY HISTORY   No autoimmune disease in the family    MEDICATIONS  All the current medications were reviewed in detail. PHYSICAL EXAM  Blood pressure 132/78, pulse 96, temperature 98 °F (36.7 °C), temperature source Oral, resp. rate 16, weight 199 lb 6.4 oz (90.4 kg), SpO2 98 %. GENERAL APPEARANCE: Well-nourished adult in no acute distress.   EYES: No scleral erythema, conjunctival injection. ENT: No oral ulcer, parotid enlargement. NECK: No adenopathy, thyroid enlargement. CARDIOVASCULAR: Heart rhythm is regular. No murmur, rub, gallop. CHEST: Normal vesicular breath sounds. No wheezes, rales, pleural friction rubs. ABDOMINAL: The abdomen is soft and nontender. Liver and spleen are nonpalpable. Bowel sounds are normal.  EXTREMITIES: There is no evidence of clubbing, cyanosis, edema. SKIN: No rash, palpable purpura, digital ulcer, abnormal thickening. NEUROLOGICAL: Normal gait and station, full strength in upper and lower extremities, normal sensation to light touch. MUSCULOSKELETAL: Left temporal artery discomfort   Upper extremities - full range of motion, no tenderness, no swelling, no synovial thickening and no deformity of joints except for Right shoulder creputis  Lower extremities - full range of motion, no tenderness, no swelling, no synovial thickening and no deformity of joints except for B/L knees crepitus (R>L)    LABS, RADIOLOGY AND PROCEDURES   Previous labs reviewed -Yes    Labs 12/2017  CRP (13.5)    ASSESSMENT  1. GCA/PMR - (Established problem -  Very good partial response) - the patient has improved with  Actemra q2 weeks but continues to experience tightness over her left temporal artery so we will increase it to weekly. She should continue to taper her prednisone. She should continue with methotrexate 1 mL SQ weekly. She should return in 2 months for a follow up. I will check labs. 2. Bone Health - Most recent bone density (9/2017) showed osteopenia. She is taking Actonel for this. She is taking steroids for GCA/PMR and will be tapered off in the future. 3. Drug therapy monitoring for toxicity (biologic) - CBC, BUN, Cr, AST, ALT and albumin every 4 months    PLAN  1. Methotrexate 1 mL SQ weekly   2. Prednisone 10 mg daily; taper as directed  3. Increase Actemra SQ to weekly  4. Check CBC, CMP, markers of inflammation (ESR, CRP), Lipid Panel  5.  Return in 2 months    Shai Keen MD  Adult and Pediatric Rheumatology     [facility] [address] [facility], [address] [place] [address], Phone [phone], Fax [phone]   E-mail: [email or web address]    Visiting  of Pediatrics    Department of Pediatrics, [facility] of [address] [place] [address], [address] [place] [address], Phone [phone], Fax [phone]  E-mail: [email or web address]    There are no Patient Instructions on file for this visit. cc:  MD Jimmy Gonzalez (Ophthalmology)    Written by josué Bal, as dictated by Darrion Donahue.  Romulo Keen M.D.

## 2018-02-23 LAB
ALBUMIN SERPL-MCNC: 4.6 G/DL (ref 3.6–4.8)
ALBUMIN/GLOB SERPL: 1.8 {RATIO} (ref 1.2–2.2)
ALP SERPL-CCNC: 44 IU/L (ref 39–117)
ALT SERPL-CCNC: 26 IU/L (ref 0–32)
AST SERPL-CCNC: 25 IU/L (ref 0–40)
BASOPHILS # BLD MANUAL: 0 X10E3/UL (ref 0–0.2)
BASOPHILS NFR BLD MANUAL: 0 %
BILIRUB SERPL-MCNC: 0.4 MG/DL (ref 0–1.2)
BUN SERPL-MCNC: 17 MG/DL (ref 8–27)
BUN/CREAT SERPL: 20 (ref 12–28)
CALCIUM SERPL-MCNC: 9.9 MG/DL (ref 8.7–10.3)
CHLORIDE SERPL-SCNC: 96 MMOL/L (ref 96–106)
CHOLEST SERPL-MCNC: 183 MG/DL (ref 100–199)
CO2 SERPL-SCNC: 21 MMOL/L (ref 18–29)
CREAT SERPL-MCNC: 0.86 MG/DL (ref 0.57–1)
CRP SERPL-MCNC: 6.3 MG/L (ref 0–4.9)
DIFFERENTIAL COMMENT, 115260: NORMAL
EOSINOPHIL # BLD MANUAL: 0.1 X10E3/UL (ref 0–0.4)
EOSINOPHIL NFR BLD MANUAL: 1 %
ERYTHROCYTE [DISTWIDTH] IN BLOOD BY AUTOMATED COUNT: 14.6 % (ref 12.3–15.4)
ERYTHROCYTE [SEDIMENTATION RATE] IN BLOOD BY WESTERGREN METHOD: 12 MM/HR (ref 0–40)
GFR SERPLBLD CREATININE-BSD FMLA CKD-EPI: 72 ML/MIN/1.73
GFR SERPLBLD CREATININE-BSD FMLA CKD-EPI: 83 ML/MIN/1.73
GLOBULIN SER CALC-MCNC: 2.6 G/DL (ref 1.5–4.5)
GLUCOSE SERPL-MCNC: 176 MG/DL (ref 65–99)
HCT VFR BLD AUTO: 41.1 % (ref 34–46.6)
HDLC SERPL-MCNC: 72 MG/DL
HGB BLD-MCNC: 13.8 G/DL (ref 11.1–15.9)
LDLC SERPL CALC-MCNC: 90 MG/DL (ref 0–99)
LYMPHOCYTES # BLD MANUAL: 1.4 X10E3/UL (ref 0.7–3.1)
LYMPHOCYTES NFR BLD MANUAL: 24 %
MCH RBC QN AUTO: 31.2 PG (ref 26.6–33)
MCHC RBC AUTO-ENTMCNC: 33.6 G/DL (ref 31.5–35.7)
MCV RBC AUTO: 93 FL (ref 79–97)
MONOCYTES # BLD MANUAL: 0.2 X10E3/UL (ref 0.1–0.9)
MONOCYTES NFR BLD MANUAL: 4 %
NEUTROPHILS # BLD MANUAL: 4.2 X10E3/UL (ref 1.4–7)
NEUTROPHILS NFR BLD MANUAL: 71 %
PLATELET # BLD AUTO: 326 X10E3/UL (ref 150–379)
PLATELET BLD QL SMEAR: ADEQUATE
POTASSIUM SERPL-SCNC: 4.1 MMOL/L (ref 3.5–5.2)
PROT SERPL-MCNC: 7.2 G/DL (ref 6–8.5)
RBC # BLD AUTO: 4.43 X10E6/UL (ref 3.77–5.28)
RBC MORPH BLD: NORMAL
SODIUM SERPL-SCNC: 138 MMOL/L (ref 134–144)
TRIGL SERPL-MCNC: 107 MG/DL (ref 0–149)
VLDLC SERPL CALC-MCNC: 21 MG/DL (ref 5–40)
WBC # BLD AUTO: 5.9 X10E3/UL (ref 3.4–10.8)

## 2018-02-27 RX ORDER — FOLIC ACID 1 MG/1
1 TABLET ORAL DAILY
Qty: 60 TAB | Refills: 3 | Status: SHIPPED | OUTPATIENT
Start: 2018-02-27 | End: 2018-07-19 | Stop reason: ALTCHOICE

## 2018-03-12 RX ORDER — METHOTREXATE 25 MG/ML
25 INJECTION INTRA-ARTERIAL; INTRAMUSCULAR; INTRATHECAL; INTRAVENOUS ONCE
Qty: 10 ML | Refills: 6 | Status: SHIPPED | OUTPATIENT
Start: 2018-03-12 | End: 2020-03-12

## 2018-04-12 ENCOUNTER — OFFICE VISIT (OUTPATIENT)
Dept: INTERNAL MEDICINE CLINIC | Age: 63
End: 2018-04-12

## 2018-04-12 VITALS
TEMPERATURE: 98.4 F | OXYGEN SATURATION: 95 % | HEIGHT: 66 IN | SYSTOLIC BLOOD PRESSURE: 130 MMHG | RESPIRATION RATE: 15 BRPM | WEIGHT: 198.6 LBS | BODY MASS INDEX: 31.92 KG/M2 | HEART RATE: 82 BPM | DIASTOLIC BLOOD PRESSURE: 78 MMHG

## 2018-04-12 DIAGNOSIS — B02.9 HERPES ZOSTER WITHOUT COMPLICATION: ICD-10-CM

## 2018-04-12 DIAGNOSIS — E11.9 TYPE 2 DIABETES MELLITUS WITHOUT COMPLICATION, WITHOUT LONG-TERM CURRENT USE OF INSULIN (HCC): ICD-10-CM

## 2018-04-12 DIAGNOSIS — F41.9 ANXIETY: ICD-10-CM

## 2018-04-12 DIAGNOSIS — J30.0 VASOMOTOR RHINITIS: ICD-10-CM

## 2018-04-12 DIAGNOSIS — E55.9 VITAMIN D DEFICIENCY: ICD-10-CM

## 2018-04-12 DIAGNOSIS — R30.0 DYSURIA: ICD-10-CM

## 2018-04-12 DIAGNOSIS — Z00.00 ROUTINE GENERAL MEDICAL EXAMINATION AT A HEALTH CARE FACILITY: Primary | ICD-10-CM

## 2018-04-12 LAB
BILIRUB UR QL STRIP: NEGATIVE
GLUCOSE UR-MCNC: NEGATIVE MG/DL
HBA1C MFR BLD HPLC: 6.5 %
KETONES P FAST UR STRIP-MCNC: NEGATIVE MG/DL
PH UR STRIP: 6 [PH] (ref 4.6–8)
PROT UR QL STRIP: NEGATIVE
SP GR UR STRIP: 1.02 (ref 1–1.03)
UA UROBILINOGEN AMB POC: NORMAL (ref 0.2–1)
URINALYSIS CLARITY POC: CLEAR
URINALYSIS COLOR POC: YELLOW
URINE BLOOD POC: NEGATIVE
URINE LEUKOCYTES POC: NEGATIVE
URINE NITRITES POC: NEGATIVE

## 2018-04-12 RX ORDER — CLONAZEPAM 0.5 MG/1
0.5 TABLET ORAL
Qty: 20 TAB | Refills: 0 | Status: SHIPPED | OUTPATIENT
Start: 2018-04-12 | End: 2018-07-19 | Stop reason: ALTCHOICE

## 2018-04-12 RX ORDER — METHOTREXATE 25 MG/ML
INJECTION, SOLUTION INTRA-ARTERIAL; INTRAMUSCULAR; INTRAVENOUS
Refills: 2 | COMMUNITY
Start: 2018-03-23 | End: 2018-07-19 | Stop reason: ALTCHOICE

## 2018-04-12 RX ORDER — IPRATROPIUM BROMIDE 42 UG/1
2 SPRAY, METERED NASAL
Qty: 15 ML | Refills: 1 | Status: SHIPPED | OUTPATIENT
Start: 2018-04-12 | End: 2020-03-12

## 2018-04-12 RX ORDER — VALACYCLOVIR HYDROCHLORIDE 1 G/1
1000 TABLET, FILM COATED ORAL 3 TIMES DAILY
Qty: 21 TAB | Refills: 0 | Status: SHIPPED | OUTPATIENT
Start: 2018-04-12 | End: 2019-05-18 | Stop reason: SDUPTHER

## 2018-04-12 NOTE — PROGRESS NOTES
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 Fingerstick today Hga1c (discussed its value with patient):  6.5    Having anxiety. Needs something for sleep. Assessment and Plan        1. Type 2 diabetes mellitus without complication, without long-term current use of insulin (HCC)  a1c much better. Now at 6.5. Now lows. - AMB POC HEMOGLOBIN A1C  - CBC WITH AUTOMATED DIFF; Future  - METABOLIC PANEL, COMPREHENSIVE; Future  - LIPID PANEL; Future  - HEMOGLOBIN A1C WITH EAG; Future  - TSH 3RD GENERATION; Future  - MICROALBUMIN, UR, RAND W/ MICROALB/CREAT RATIO; Future    2. Dysuria  Wonders if she has UTI. Will check urine today. - AMB POC URINALYSIS DIP STICK AUTO W/O MICRO  - URINALYSIS W/ RFLX MICROSCOPIC; Future    3. Anxiety  The risks and benefits of the new medication were discussed as well as possible side effects. Patient is instructed to call if any new symptoms arise that are listed in the AVS printed or available on MyChart or discussed:  Start to use only prn. Feels with all of her new dx of OZZIE, she is very worried. - clonazePAM (KLONOPIN) 0.5 mg tablet; Take 1 Tab by mouth nightly as needed. Max Daily Amount: 0.5 mg. Indications: Panic Disorder  Dispense: 20 Tab; Refill: 0    4. Herpes zoster without complication  Refill given to patient.   - valACYclovir (VALTREX) 1 gram tablet;  Take 1 Tab by mouth three (3) times daily. Dispense: 21 Tab; Refill: 0    5. Vasomotor rhinitis  Possible aid in her chronic congestion. - ipratropium (ATROVENT) 42 mcg (0.06 %) nasal spray; 2 Sprays by Both Nostrils route Before breakfast, lunch, and dinner for 30 days. Dispense: 15 mL; Refill: 1    6. Vitamin D deficiency  Patient compliant with Vit D. Emphasized importance of taking with food and not too much because it can be toxic to our body. Therefore, it should be checked regularly. Levels ordered.    - VITAMIN D, 25 HYDROXY; Future             Historical Data    Past Medical History:   Diagnosis Date    Adverse effect of anesthesia     severe N/V with versed and narcotics/severe abdominal pain with morphine    Asthma     childhood    Autoimmune disease ([address]     temporal arteritis/polyarthritis rheumatica    Chronic pain     shoulders/knees    DDD (degenerative disc disease), cervical     Degenerative arthritis of right knee 2014    Dr. Cuca Chew    Diabetes mellitus, type 2 ([address] 2014    Fibromyalgia     Gastric nodule     gastric nodules on endoscopy 3/26/12    GERD (gastroesophageal reflux disease)     Hyperlipidemia LDL goal < 100     Hypertension     Osteopenia 2016    Actonel started    PUD (peptic ulcer disease)     Pulmonary emboli ([address]     s/p hysterectomy    Temporal arteritis ([address] 16    Dr. Chase Taylor Thoracic outlet syndrome     left, Dr. Cecilia Carmona Thrombocytopenia ([address] 10/2015    mild    Vitamin D deficiency     Vulvar high-grade squamous intraepithelial lesion (HGSIL)        Past Surgical History:   Procedure Laterality Date    COLONOSCOPY N/A 2017    COLON[facility] Tung Warren [facility]    HX BREAST BIOPSY      several breast biopsies (benign)    HX BREAST BIOPSY Bilateral , , ,    6-7 on R, 2 on L+all benign    HX BUNIONECTOMY      bilateral    HX  SECTION      x3    HX COLONOSCOPY   normal, f/u in 10 yrs    HX DILATION AND CURETTAGE      C92H2D1, several D&C's    HX ENDOSCOPY      x3, h/o gastric polyp removal 3/2012    HX GI      cholecystectomy    HX HEART CATHETERIZATION      x2, most recent 2012 was normal    HX KNEE ARTHROSCOPY Right     HX OTHER SURGICAL      lipoma removal    HX OTHER SURGICAL  16    L temporal artery bx (Dr. Dwain Das)    HX AZAEL AND BSO  2006    secondary to endometrial hyperplasia with atypical changes    HX TONSILLECTOMY      T & A       Outpatient Encounter Prescriptions as of 2018   Medication Sig Dispense Refill    methotrexate 25 mg/mL chemo injection INJECT 1 ML SUBCUTANEOUSLY EVERY 7 DAYS  2    TOCILIZUMAB (ACTEMRA IV) by IntraVENous route.  clonazePAM (KLONOPIN) 0.5 mg tablet Take 1 Tab by mouth nightly as needed. Max Daily Amount: 0.5 mg. Indications: Panic Disorder 20 Tab 0    valACYclovir (VALTREX) 1 gram tablet Take 1 Tab by mouth three (3) times daily. 21 Tab 0    ipratropium (ATROVENT) 42 mcg (0.06 %) nasal spray 2 Sprays by Both Nostrils route Before breakfast, lunch, and dinner for 30 days. 15 mL 1    folic acid (FOLVITE) 1 mg tablet Take 1 Tab by mouth daily. 60 Tab 3    predniSONE (DELTASONE) 5 mg tablet Take 3 Tabs by mouth daily. 45 Tab 6    DULoxetine (CYMBALTA) 60 mg capsule TAKE 1 CAP BY MOUTH DAILY. 90 Cap 3    Insulin Syringe-Needle U-100 1 mL 31 gauge x 5/16 syrg 4 Syringes by Does Not Apply route every seven (7) days. 4 Syringe 6    risedronate (ACTONEL) 150 mg tablet Take 1 Tab by mouth every thirty (30) days. 1 Tab 11    atorvastatin (LIPITOR) 20 mg tablet Take 1 Tab by mouth daily. 90 Tab 3    JANUVIA 100 mg tablet TAKE 1 TABLET BY MOUTH DAILY 90 Tab 3    hyoscyamine sulfate (CYSTOSPAZ) 0.125 mg tablet Take 1 Tab by mouth every four (4) hours as needed. 20 Tab 0    omeprazole (PRILOSEC) 40 mg capsule TAKE 1 CAP BY MOUTH DAILY.   3    CARAFATE 100 mg/mL suspension TAKE 10 MLS 3 TIMES A DAY as needed  2    ondansetron (ZOFRAN ODT) 4 mg disintegrating tablet Take 1 Tab by mouth every eight (8) hours as needed for Nausea. 8 Tab 0    hydroCHLOROthiazide (HYDRODIURIL) 25 mg tablet TAKE 1 TABLET BY MOUTH EVERY DAY 90 Tab 3    ramipril (ALTACE) 10 mg capsule Take 2 Caps by mouth daily. 180 Cap 3    estradiol (ESTRACE) 1 mg tablet Take 1 Tab by mouth daily. 90 Tab 3    omega-3 fatty acids-vitamin e (FISH OIL) 1,000 mg cap Take 1 Cap by mouth two (2) times a day.  Cholecalciferol, Vitamin D3, (VITAMIN D3) 2,000 unit cap Take 1 Cap by mouth daily.  multivitamin (ONE A DAY) tablet Take 1 Tab by mouth daily.  predniSONE (DELTASONE) 1 mg tablet 5 mg once daily. Taper as directed 150 Tab 3    FLUARIX QUAD 9297-1462, PF, syrg injection TO BE ADMINISTERED BY PHARMACIST FOR IMMUNIZATION  0    [DISCONTINUED] atorvastatin (LIPITOR) 20 mg tablet TAKE 1 TABLET BY MOUTH EVERY DAY 90 Tab 3    iron fum,ps-FA-vit B,C#18-Lact (FUSION PLUS) 130 mg iron -1,250 mcg cap Take 130 mg by mouth daily. 30 Cap 6     No facility-administered encounter medications on file as of 4/12/2018. Allergies   Allergen Reactions    [place] Flavor Hives     [place] fruit not flavor    Alendronate Nausea and Vomiting    Morphine Other (comments)     Extreme epigastric pain    Valium [Diazepam] Nausea and Vomiting    Versed [Midazolam] Nausea and Vomiting        Social History     Social History    Marital status:      Spouse name: N/A    Number of children: N/A    Years of education: N/A     Occupational History    Not on file.      Social History Main Topics    Smoking status: Never Smoker    Smokeless tobacco: Never Used    Alcohol use No    Drug use: No    Sexual activity: Yes     Partners: Male     Other Topics Concern    Not on file     Social History Narrative        family history includes Breast Cancer in her maternal aunt; Breast Cancer (age of onset: 39) in her maternal aunt and mother; Cancer in her father and mother; Diabetes in her mother and paternal grandmother; Heart Disease in her brother, father, and mother; Heart Surgery in her mother; Hypertension in her father and mother; Osteoporosis in her mother and another family member; Other in her brother, sister, and sister; Psychiatric Disorder in her daughter, daughter, and son; Thyroid Disease in her mother and sister. Review of Systems   Constitutional: Negative for weight loss. Eyes: Negative for blurred vision. Respiratory: Negative for shortness of breath. Cardiovascular: Negative for chest pain. Gastrointestinal: Negative for abdominal pain. Genitourinary: Negative for dysuria and frequency. Skin: Negative for rash. Neurological: Negative for dizziness, focal weakness, weakness and headaches. Endo/Heme/Allergies: Negative for environmental allergies. Does not bruise/bleed easily. Visit Vitals    /78 (BP 1 Location: Left arm, BP Patient Position: Sitting)    Pulse 82    Temp 98.4 °F (36.9 °C) (Oral)    Resp 15    Ht 5' 5.75\" (1.67 m)    Wt 198 lb 9.6 oz (90.1 kg)    SpO2 95%    BMI 32.3 kg/m2         Physical Exam   Constitutional: She is oriented to person, place, and time and well-developed, well-nourished, and in no distress. No distress. HENT:   Nose: Nose normal.   Mouth/Throat: Oropharynx is clear and moist.   Eyes: Conjunctivae and EOM are normal.   Neck: Normal range of motion. Neck supple. No thyromegaly present. Cardiovascular: Normal rate, regular rhythm and normal heart sounds. Exam reveals no gallop and no friction rub. Pulmonary/Chest: Effort normal and breath sounds normal. No respiratory distress. She has no wheezes. She has no rales. Abdominal: Soft. Bowel sounds are normal. She exhibits no distension. There is no tenderness. Musculoskeletal: Normal range of motion. She exhibits no edema, tenderness or deformity. Lymphadenopathy:     She has no cervical adenopathy. Neurological: She is alert and oriented to person, place, and time. Skin: Skin is warm and dry. No rash noted. Psychiatric: Affect and judgment normal.     Ortho Exam     Assessment:  See Above for discussion/plan. Annual Physical completed. I have reviewed the patient's medical history in detail and updated the computerized patient record. We had a prolonged discussion about these complex clinical issues and went over the various important aspects to consider. All questions were answered. Advised her to call back or return to office if symptoms do not improve, change in nature, or persist.    She was given an after visit summary or informed of [employer] Access which includes patient instructions, diagnoses, current medications, & vitals. She expressed understanding with the diagnosis and plan.

## 2018-04-12 NOTE — PROGRESS NOTES
Chief Complaint   Patient presents with    Hypertension     6 month follow up    Diabetes     1. Have you been to the ER, urgent care clinic since your last visit? Hospitalized since your last visit? No    2. Have you seen or consulted any other health care providers outside of the [facility] since your last visit? Include any pap smears or colon screening.  No    Scheduled for 4/17/18 with Dr. Adam Avila

## 2018-04-23 ENCOUNTER — OFFICE VISIT (OUTPATIENT)
Dept: RHEUMATOLOGY | Age: 63
End: 2018-04-23

## 2018-04-23 VITALS
RESPIRATION RATE: 16 BRPM | SYSTOLIC BLOOD PRESSURE: 158 MMHG | BODY MASS INDEX: 32.1 KG/M2 | OXYGEN SATURATION: 98 % | TEMPERATURE: 97.8 F | DIASTOLIC BLOOD PRESSURE: 85 MMHG | HEART RATE: 91 BPM | WEIGHT: 197.4 LBS

## 2018-04-23 DIAGNOSIS — M35.3 PMR (POLYMYALGIA RHEUMATICA) (HCC): ICD-10-CM

## 2018-04-23 DIAGNOSIS — M31.6 GCA (GIANT CELL ARTERITIS) (HCC): Primary | ICD-10-CM

## 2018-04-23 DIAGNOSIS — E78.5 HYPERLIPIDEMIA, UNSPECIFIED HYPERLIPIDEMIA TYPE: ICD-10-CM

## 2018-04-23 RX ORDER — IBUPROFEN 200 MG
600 TABLET ORAL AS NEEDED
COMMUNITY
End: 2021-09-20 | Stop reason: ALTCHOICE

## 2018-04-23 RX ORDER — ACETAMINOPHEN 500 MG
TABLET ORAL
COMMUNITY
End: 2018-07-19 | Stop reason: ALTCHOICE

## 2018-04-23 RX ORDER — OXYCODONE HYDROCHLORIDE 5 MG/1
TABLET ORAL
Refills: 0 | COMMUNITY
Start: 2018-04-17 | End: 2018-07-19 | Stop reason: ALTCHOICE

## 2018-04-23 NOTE — PROGRESS NOTES
RHEUMATOLOGY PROBLEM LIST AND CHIEF COMPLAINT  1. GCA/PMR - HAs, left temporal artery discomfort, sudden loss of vision, arthralgia of shoulder, TMJ, hands, feet, response to steroids    Therapy History:  Previous DMARDs: methotrexate (1/2017 - 4/2018)   Current DMARDs: Actemra (1/2018 - current)    INTERVAL HISTORY  This is a 57 y.o.  female. Today, the patient complains of pain in the joints. Location: shoulder, knee, temporal artery  Severity:  3 on a scale of 0-10  Timing: all day   Duration:  2 months  Context/Associated signs and symptoms: The patient has had surgery recently and is currently holding Methotrexate 1 mL SQ weekly and Actemra. She complains of some right knee swelling and some temporal discomfort and HAs, but states some of it may be secondary to her recent surgery. She continues with prednisone 5 mg daily. She mentions improvement with Actemra weekly but states she had an episode of shingles the week after holding it; this has resolved. RHEUMATOLOGY REVIEW OF SYSTEMS   Positives as per history  Negatives as follows:  Ivana Hernandezer:  Denies unexplained persistent fevers or weight change  RESPIRATORY:  No pleuritic pain, exertional dyspnea  CARDIOVASCULAR:  Denies chest pain  GASTRO:   Denies heartburn, abdominal pain, nausea, vomiting, diarrhea  SKIN:    Denies rash   MSK:    No morning stiffness >1 hour, persistent joint swelling, persistent joint pain    PAST MEDICAL HISTORY  Reviewed with patient, significant changes in medical history - yes, recent surgery    Employment - Retired  Sleep - Good, no issues   Exercise - yes    FAMILY HISTORY   No autoimmune disease in the family    MEDICATIONS  All the current medications were reviewed in detail. PHYSICAL EXAM  Blood pressure 158/85, pulse 91, temperature 97.8 °F (36.6 °C), temperature source Oral, resp. rate 16, weight 197 lb 6.4 oz (89.5 kg), SpO2 98 %. GENERAL APPEARANCE: Well-nourished adult in no acute distress.   EYES: No scleral erythema, conjunctival injection. ENT: No oral ulcer, parotid enlargement. NECK: No adenopathy, thyroid enlargement. CARDIOVASCULAR: Heart rhythm is regular. No murmur, rub, gallop. CHEST: Normal vesicular breath sounds. No wheezes, rales, pleural friction rubs. ABDOMINAL: The abdomen is soft and nontender. Liver and spleen are nonpalpable. Bowel sounds are normal.  EXTREMITIES: There is no evidence of clubbing, cyanosis, edema. SKIN: No rash, palpable purpura, digital ulcer, abnormal thickening. NEUROLOGICAL: Normal gait and station, full strength in upper and lower extremities, normal sensation to light touch. MUSCULOSKELETAL: Left temporal artery discomfort   Upper extremities - full range of motion, no tenderness, no swelling, no synovial thickening and no deformity of joints except for Right shoulder creputis  Lower extremities - full range of motion, no tenderness, no swelling, no synovial thickening and no deformity of joints except for B/L knees crepitus (R>L)    LABS, RADIOLOGY AND PROCEDURES   Previous labs reviewed -Yes    ASSESSMENT  1. GCA/PMR - (Established problem -  Very good partial response) - the patient is currently holding her medication due to a recent surgery. She should restart Actemra 162 mg weekly when possible and she may stop methotrexate. She should continue with prednisone taper; tapering by 1 mg as tolerated. She should return in 3 months for a follow up. I will check labs. 2. Bone Health - Most recent bone density (9/2017) showed osteopenia. She is taking Actonel for this. She is taking steroids for GCA/PMR and will be tapered off in the future. 3. Drug therapy monitoring for toxicity (biologic) - CBC, BUN, Cr, AST, ALT and albumin every 4 months    PLAN  1. Stop Methotrexate 1 mL SQ weekly   2. Prednisone 5 mg daily; taper as directed  3. Restart Actemra SQ weekly  4. Check CBC, CMP, markers of inflammation (ESR, CRP), Lipid Panel  5.  Lipid Panel, TSH, A1C with EAG, Vit D  6. Return in 3 months    Shai Ybarra MD  Adult and Pediatric Rheumatology     [facility] [address], [place], [address] [place] [address], Phone [phone], Fax [phone]   E-mail: [email or web address]    Visiting  of Pediatrics    Department of Pediatrics, [facility] of [address] [place] [address], Phone [phone], Fax [phone]  E-mail: [email or web address]    There are no Patient Instructions on file for this visit. cc:  Alyssa Stokes MD  [facility] (Ophthalmology)    Written by josué Saldaña, as dictated by Hannah Beltrán.  Master Ybarra M.D.

## 2018-04-23 NOTE — MR AVS SNAPSHOT
[address] [facility] [place] [address] 
[phone] Patient: Ana María Park MD 
MRN: [id number] GHY:4/81/1936 Visit Information Date & Time Provider Department Dept. Phone Encounter #  
 4/23/2018  2:20 PM Eleanor Boyle MD [address] [id number] Follow-up Instructions Return in about 3 months (around 7/23/2018). Your Appointments 10/11/2018  2:15 PM  
ROUTINE CARE with Lu Dudley MD  
[facility] [facility] Appt Note: 6m f/u  
 [address], [address]  
[address], [address] [address] Upcoming Health Maintenance Date Due  
 FOOT EXAM Q1 11/11/2017 EYE EXAM RETINAL OR DILATED Q1 5/26/2018 HEMOGLOBIN A1C Q6M 10/12/2018 MICROALBUMIN Q1 12/20/2018 LIPID PANEL Q1 2/22/2019 BREAST CANCER SCRN MAMMOGRAM 8/3/2019 PAP AKA CERVICAL CYTOLOGY 12/20/2019 DTaP/Tdap/Td series (2 - Td) 1/1/2021 COLONOSCOPY 5/17/2027 Allergies as of 4/23/2018  Review Complete On: 4/23/2018 By: Liana Thurman LPN Severity Noted Reaction Type Reactions [place] Flavor Medium 07/22/2012   Systemic Hives [place] fruit not flavor Alendronate  07/06/2016    Nausea and Vomiting Morphine  07/22/2012    Other (comments) Extreme epigastric pain Valium [Diazepam]  03/07/2017    Nausea and Vomiting Versed [Midazolam]  10/16/2013    Nausea and Vomiting Current Immunizations  Reviewed on 9/14/2017 Name Date Influenza Vaccine 10/5/2016, 9/28/2015, 10/1/2013 Pneumococcal Conjugate (PCV-13) 10/6/2016 Tdap 1/1/2011 Zoster Vaccine, Live 11/1/2015 Not reviewed this visit You Were Diagnosed With   
  
 Codes Comments [facility]    -  Primary ICD-10-CM: M31.6 ICD-9-CM: 446.5 PMR (polymyalgia rheumatica) ([facility])     ICD-10-CM: M35.3 ICD-9-CM: 920 Hyperlipidemia, unspecified hyperlipidemia type     ICD-10-CM: E78.5 ICD-9-CM: 272.4 Vitals BP Pulse Temp Resp Weight(growth percentile) SpO2  
 158/85 (BP 1 Location: Left arm, BP Patient Position: Sitting) 91 97.8 °F (36.6 °C) (Oral) 16 197 lb 6.4 oz (89.5 kg) 98% BMI OB Status Smoking Status 32.1 kg/m2 Hysterectomy Never Smoker BMI and BSA Data Body Mass Index Body Surface Area  
 32.1 kg/m 2 2.04 m 2 Preferred Pharmacy Pharmacy Name Phone [id number] [place], [address] ([place]) [phone] Your Updated Medication List  
  
   
This list is accurate as of 4/23/18  2:56 PM.  Always use your most recent med list.  
  
  
  
  
 acetaminophen 500 mg tablet Commonly known as:  TYLENOL Take  by mouth every six (6) hours as needed for Pain. ACTEMRA IV  
by IntraVENous route. atorvastatin 20 mg tablet Commonly known as:  LIPITOR Take 1 Tab by mouth daily. CARAFATE 100 mg/mL suspension Generic drug:  sucralfate TAKE 10 MLS 3 TIMES A DAY as needed  
  
 clonazePAM 0.5 mg tablet Commonly known as:  Rollene Backbone Take 1 Tab by mouth nightly as needed. Max Daily Amount: 0.5 mg. Indications: Panic Disorder DULoxetine 60 mg capsule Commonly known as:  CYMBALTA TAKE 1 CAP BY MOUTH DAILY. estradiol 1 mg tablet Commonly known as:  ESTRACE Take 1 Tab by mouth daily. FISH OIL 1,000 mg Cap Generic drug:  omega-3 fatty acids-vitamin e Take 1 Cap by mouth two (2) times a day. FLUARIX QUAD [id number] (PF) Syrg injection Generic drug:  influenza vaccine 2017-18 (6 mos+)(PF)  
TO BE ADMINISTERED BY PHARMACIST FOR IMMUNIZATION  
  
 folic acid 1 mg tablet Commonly known as:  Google Take 1 Tab by mouth daily. hydroCHLOROthiazide 25 mg tablet Commonly known as:  HYDRODIURIL  
TAKE 1 TABLET BY MOUTH EVERY DAY  
  
 hyoscyamine sulfate 0.125 mg tablet Commonly known as:  CYSTOSPAZ Take 1 Tab by mouth every four (4) hours as needed. ibuprofen 200 mg tablet Commonly known as:  MOTRIN Take 600 mg by mouth. Insulin Syringe-Needle U-100 1 mL 31 gauge x 5/16 Syrg 4 Syringes by Does Not Apply route every seven (7) days. ipratropium 42 mcg (0.06 %) nasal spray Commonly known as:  ATROVENT  
2 Sprays by Both Nostrils route Before breakfast, lunch, and dinner for 30 days. iron fum,ps-FA-vit B,C#18-Lact 130 mg iron -1,250 mcg Cap Commonly known as:  FUSION PLUS Take 130 mg by mouth daily. JANUVIA 100 mg tablet Generic drug:  SITagliptin TAKE 1 TABLET BY MOUTH DAILY  
  
 methotrexate 25 mg/mL chemo injection INJECT 1 ML SUBCUTANEOUSLY EVERY 7 DAYS  
  
 multivitamin tablet Commonly known as:  ONE A DAY Take 1 Tab by mouth daily. omeprazole 40 mg capsule Commonly known as:  PRILOSEC  
TAKE 1 CAP BY MOUTH DAILY. ondansetron 4 mg disintegrating tablet Commonly known as:  ZOFRAN ODT Take 1 Tab by mouth every eight (8) hours as needed for Nausea. oxyCODONE IR 5 mg immediate release tablet Commonly known as:  ROXICODONE  
TAKE 1 TABLET BY MOUTH EVERY 6 HOURS FOR 30 DAYS * predniSONE 1 mg tablet Commonly known as:  DELTASONE  
5 mg once daily. Taper as directed * predniSONE 5 mg tablet Commonly known as:  Lannis Cordelia Take 3 Tabs by mouth daily. ramipril 10 mg capsule Commonly known as:  ALTACE Take 2 Caps by mouth daily. risedronate 150 mg tablet Commonly known as:  ACTONEL Take 1 Tab by mouth every thirty (30) days. valACYclovir 1 gram tablet Commonly known as:  VALTREX Take 1 Tab by mouth three (3) times daily. VITAMIN D3 2,000 unit Cap capsule Generic drug:  Cholecalciferol (Vitamin D3) Take 1 Cap by mouth daily. * Notice: This list has 2 medication(s) that are the same as other medications prescribed for you. Read the directions carefully, and ask your doctor or other care provider to review them with you. We Performed the Following C REACTIVE PROTEIN, QT [63911 CPT(R)] CBC+PLATELET+HEM REVIEW [31965 CPT(R)] HEMOGLOBIN A1C WITH EAG [52874 CPT(R)] LIPID PANEL [40812 CPT(R)] METABOLIC PANEL, COMPREHENSIVE [40027 CPT(R)] SED RATE (ESR) W375299 CPT(R)] TSH 3RD GENERATION [43132 CPT(R)] VITAMIN D, 25 HYROXY PANEL [[id number] Custom] Follow-up Instructions Return in about 3 months (around 7/23/2018). Introducing [facility] SERVICES! Dear Dante : Thank you for requesting a [employer] account. Our records indicate that you already have an active [employer]Build[employer] account. You can access your account anytime at [email or web address] Did you know that you can access your hospital and ER discharge instructions at any time in [employer]Builders? You can also review all of your test results from your hospital stay or ER visit. Additional Information If you have questions, please visit the Frequently Asked Questions section of the SecretBuilders website at [email or web address] Remember, SecretBuilders is NOT to be used for urgent needs. For medical emergencies, dial 911. Now available from your iPhone and Android! Please provide this summary of care documentation to your next provider. Your primary care clinician is listed as John Hitchcock. If you have any questions after today's visit, please call [phone].

## 2018-04-27 LAB
25(OH)D2 SERPL-MCNC: <1 NG/ML
25(OH)D3 SERPL-MCNC: 54 NG/ML
25(OH)D3+25(OH)D2 SERPL-MCNC: 54 NG/ML
ALBUMIN SERPL-MCNC: 4.4 G/DL (ref 3.6–4.8)
ALBUMIN/GLOB SERPL: 1.7 {RATIO} (ref 1.2–2.2)
ALP SERPL-CCNC: 43 IU/L (ref 39–117)
ALT SERPL-CCNC: 33 IU/L (ref 0–32)
AST SERPL-CCNC: 32 IU/L (ref 0–40)
BASOPHILS # BLD MANUAL: 0 X10E3/UL (ref 0–0.2)
BASOPHILS NFR BLD MANUAL: 0 %
BILIRUB SERPL-MCNC: 0.3 MG/DL (ref 0–1.2)
BUN SERPL-MCNC: 21 MG/DL (ref 8–27)
BUN/CREAT SERPL: 27 (ref 12–28)
CALCIUM SERPL-MCNC: 9.7 MG/DL (ref 8.7–10.3)
CHLORIDE SERPL-SCNC: 98 MMOL/L (ref 96–106)
CHOLEST SERPL-MCNC: 219 MG/DL (ref 100–199)
CO2 SERPL-SCNC: 24 MMOL/L (ref 18–29)
CREAT SERPL-MCNC: 0.79 MG/DL (ref 0.57–1)
CRP SERPL-MCNC: 4 MG/L (ref 0–4.9)
DIFFERENTIAL COMMENT, 115260: NORMAL
EOSINOPHIL # BLD MANUAL: 0.1 X10E3/UL (ref 0–0.4)
EOSINOPHIL NFR BLD MANUAL: 1 %
ERYTHROCYTE [DISTWIDTH] IN BLOOD BY AUTOMATED COUNT: 15.2 % (ref 12.3–15.4)
ERYTHROCYTE [SEDIMENTATION RATE] IN BLOOD BY WESTERGREN METHOD: 2 MM/HR (ref 0–40)
EST. AVERAGE GLUCOSE BLD GHB EST-MCNC: 146 MG/DL
GFR SERPLBLD CREATININE-BSD FMLA CKD-EPI: 79 ML/MIN/1.73
GFR SERPLBLD CREATININE-BSD FMLA CKD-EPI: 91 ML/MIN/1.73
GLOBULIN SER CALC-MCNC: 2.6 G/DL (ref 1.5–4.5)
GLUCOSE SERPL-MCNC: 125 MG/DL (ref 65–99)
HBA1C MFR BLD: 6.7 % (ref 4.8–5.6)
HCT VFR BLD AUTO: 42.5 % (ref 34–46.6)
HDLC SERPL-MCNC: 66 MG/DL
HGB BLD-MCNC: 14.2 G/DL (ref 11.1–15.9)
LDLC SERPL CALC-MCNC: 111 MG/DL (ref 0–99)
LYMPHOCYTES # BLD MANUAL: 2 X10E3/UL (ref 0.7–3.1)
LYMPHOCYTES NFR BLD MANUAL: 30 %
MCH RBC QN AUTO: 31.5 PG (ref 26.6–33)
MCHC RBC AUTO-ENTMCNC: 33.4 G/DL (ref 31.5–35.7)
MCV RBC AUTO: 94 FL (ref 79–97)
MONOCYTES # BLD MANUAL: 0.5 X10E3/UL (ref 0.1–0.9)
MONOCYTES NFR BLD MANUAL: 7 %
NEUTROPHILS # BLD MANUAL: 4.2 X10E3/UL (ref 1.4–7)
NEUTROPHILS NFR BLD MANUAL: 62 %
PLATELET # BLD AUTO: 371 X10E3/UL (ref 150–379)
PLATELET BLD QL SMEAR: ADEQUATE
POTASSIUM SERPL-SCNC: 4.3 MMOL/L (ref 3.5–5.2)
PROT SERPL-MCNC: 7 G/DL (ref 6–8.5)
RBC # BLD AUTO: 4.51 X10E6/UL (ref 3.77–5.28)
RBC MORPH BLD: NORMAL
SODIUM SERPL-SCNC: 141 MMOL/L (ref 134–144)
TRIGL SERPL-MCNC: 209 MG/DL (ref 0–149)
TSH SERPL DL<=0.005 MIU/L-ACNC: 1.3 UIU/ML (ref 0.45–4.5)
VLDLC SERPL CALC-MCNC: 42 MG/DL (ref 5–40)
WBC # BLD AUTO: 6.7 X10E3/UL (ref 3.4–10.8)

## 2018-06-07 RX ORDER — HYDROCHLOROTHIAZIDE 25 MG/1
TABLET ORAL
Qty: 90 TAB | Refills: 3 | Status: SHIPPED | OUTPATIENT
Start: 2018-06-07 | End: 2018-08-17 | Stop reason: SDUPTHER

## 2018-06-17 DIAGNOSIS — K21.9 GASTROESOPHAGEAL REFLUX DISEASE, ESOPHAGITIS PRESENCE NOT SPECIFIED: ICD-10-CM

## 2018-06-17 RX ORDER — OMEPRAZOLE 40 MG/1
CAPSULE, DELAYED RELEASE ORAL
Qty: 90 CAP | Refills: 3 | Status: SHIPPED | OUTPATIENT
Start: 2018-06-17 | End: 2019-07-05 | Stop reason: SDUPTHER

## 2018-06-21 RX ORDER — PREDNISONE 1 MG/1
TABLET ORAL
Qty: 150 TAB | Refills: 3 | Status: SHIPPED | OUTPATIENT
Start: 2018-06-21 | End: 2019-01-15 | Stop reason: ALTCHOICE

## 2018-07-19 ENCOUNTER — OFFICE VISIT (OUTPATIENT)
Dept: RHEUMATOLOGY | Age: 63
End: 2018-07-19

## 2018-07-19 VITALS
OXYGEN SATURATION: 95 % | SYSTOLIC BLOOD PRESSURE: 166 MMHG | WEIGHT: 198.8 LBS | HEART RATE: 96 BPM | TEMPERATURE: 97.8 F | BODY MASS INDEX: 32.33 KG/M2 | RESPIRATION RATE: 16 BRPM | DIASTOLIC BLOOD PRESSURE: 75 MMHG

## 2018-07-19 DIAGNOSIS — M31.6 GCA (GIANT CELL ARTERITIS) (HCC): Primary | ICD-10-CM

## 2018-07-19 DIAGNOSIS — M35.3 PMR (POLYMYALGIA RHEUMATICA) (HCC): ICD-10-CM

## 2018-07-19 NOTE — MR AVS SNAPSHOT
[address] [facility] [address] 
[phone] Patient: Jazmyn Johnson MD 
MRN: [id number] UYT:4/46/0442 Visit Information Date & Time Provider Department Dept. Phone Encounter #  
 7/19/2018  2:00 PM Tuan Cabrales MD [address] [place] [address] [id number] Follow-up Instructions Return in about 3 months (around 10/19/2018). Your Appointments 10/11/2018  2:15 PM  
ROUTINE CARE with MD Magali Gonzalez [address] [place] [address]) Appt Note: 6m f/u  
 [address], [address] [facility] [address]  
[address], [facility] [address] Upcoming Health Maintenance Date Due  
 FOOT EXAM Q1 11/11/2017 Influenza Age 5 to Adult 8/1/2018 HEMOGLOBIN A1C Q6M 10/23/2018 MICROALBUMIN Q1 12/20/2018 LIPID PANEL Q1 4/23/2019 EYE EXAM RETINAL OR DILATED Q1 6/12/2019 BREAST CANCER SCRN MAMMOGRAM 8/3/2019 PAP AKA CERVICAL CYTOLOGY 12/20/2019 DTaP/Tdap/Td series (2 - Td) 1/1/2021 COLONOSCOPY 5/17/2027 Allergies as of 7/19/2018  Review Complete On: 7/19/2018 By: Linda Diaz LPN Severity Noted Reaction Type Reactions [place] Flavor Medium 07/22/2012   Systemic Hives [place] fruit not flavor Alendronate  07/06/2016    Nausea and Vomiting Morphine  07/22/2012    Other (comments) Extreme epigastric pain Valium [Diazepam]  03/07/2017    Nausea and Vomiting Versed [Midazolam]  10/16/2013    Nausea and Vomiting Current Immunizations  Reviewed on 9/14/2017 Name Date Influenza Vaccine 10/5/2016, 9/28/2015, 10/1/2013 Pneumococcal Conjugate (PCV-13) 10/6/2016 Tdap 1/1/2011 Zoster Vaccine, Live 11/1/2015 Not reviewed this visit Vitals  BP Pulse Temp Resp Weight(growth percentile) SpO2  
 166/75 (BP 1 Location: Left arm, BP Patient Position: Sitting) 96 97.8 °F (36.6 °C) (Oral) 16 198 lb 12.8 oz (90.2 kg) 95% BMI OB Status Smoking Status 32.33 kg/m2 Hysterectomy Never Smoker BMI and BSA Data Body Mass Index Body Surface Area  
 32.33 kg/m 2 2.05 m 2 Preferred Pharmacy Pharmacy Name Phone [id number] [place], [address] ([place] [address]) [phone] Your Updated Medication List  
  
   
This list is accurate as of 7/19/18  2:15 PM.  Always use your most recent med list.  
  
  
  
  
 ACTEMRA SC  
162 mg by SubCUTAneous route every seven (7) days. atorvastatin 20 mg tablet Commonly known as:  LIPITOR Take 1 Tab by mouth daily. DULoxetine 60 mg capsule Commonly known as:  CYMBALTA TAKE 1 CAP BY MOUTH DAILY. estradiol 1 mg tablet Commonly known as:  ESTRACE Take 1 Tab by mouth daily. FISH OIL 1,000 mg Cap Generic drug:  omega-3 fatty acids-vitamin e Take 1 Cap by mouth two (2) times a day. FLUARIX QUAD [id number] (PF) Syrg injection Generic drug:  influenza vaccine 2017-18 (6 mos+)(PF)  
TO BE ADMINISTERED BY PHARMACIST FOR IMMUNIZATION  
  
 hydroCHLOROthiazide 25 mg tablet Commonly known as:  HYDRODIURIL  
TAKE 1 TABLET BY MOUTH EVERY DAY  
  
 ibuprofen 200 mg tablet Commonly known as:  MOTRIN Take 600 mg by mouth. Insulin Syringe-Needle U-100 1 mL 31 gauge x 5/16 Syrg 4 Syringes by Does Not Apply route every seven (7) days. JANUVIA 100 mg tablet Generic drug:  SITagliptin TAKE 1 TABLET BY MOUTH DAILY  
  
 multivitamin tablet Commonly known as:  ONE A DAY Take 1 Tab by mouth daily. * omeprazole 40 mg capsule Commonly known as:  PRILOSEC  
TAKE 1 CAP BY MOUTH DAILY. * omeprazole 40 mg capsule Commonly known as:  PRILOSEC  
TAKE 1 CAP BY MOUTH DAILY. predniSONE 1 mg tablet Commonly known as:  DELTASONE  
5 mg once daily. Taper as directed  
  
 ramipril 10 mg capsule Commonly known as:  ALTACE Take 2 Caps by mouth daily. risedronate 150 mg tablet Commonly known as:  ACTONEL Take 1 Tab by mouth every thirty (30) days. valACYclovir 1 gram tablet Commonly known as:  VALTREX Take 1 Tab by mouth three (3) times daily. VITAMIN D3 2,000 unit Cap capsule Generic drug:  Cholecalciferol (Vitamin D3) Take 1 Cap by mouth daily. * Notice: This list has 2 medication(s) that are the same as other medications prescribed for you. Read the directions carefully, and ask your doctor or other care provider to review them with you. Follow-up Instructions Return in about 3 months (around 10/19/2018). Introducing [facility] SERVICES! Dear Reymundo Peterson: Thank you for requesting a [employer] account. Our records indicate that you already have an active Besstech account. You can access your account anytime at [email or web address] Did you know that you can access your hospital and ER discharge instructions at any time in Besstech? You can also review all of your test results from your hospital stay or ER visit. Additional Information If you have questions, please visit the Frequently Asked Questions section of the Besstech website at [email or web address] Remember, Besstech is NOT to be used for urgent needs. For medical emergencies, dial 911. Now available from your iPhone and Android! Please provide this summary of care documentation to your next provider. Your primary care clinician is listed as Renata Medeiros. If you have any questions after today's visit, please call [phone].

## 2018-07-19 NOTE — PROGRESS NOTES
RHEUMATOLOGY PROBLEM LIST AND CHIEF COMPLAINT  1. GCA/PMR - HAs, left temporal artery discomfort, sudden loss of vision, arthralgia of shoulder, TMJ, hands, feet, response to steroids    Therapy History:  Previous DMARDs: methotrexate (1/2017 - 4/2018)   Current DMARDs: Actemra (2/2018, stopped for 1 month; 3/2018-current)    INTERVAL HISTORY  This is a 57 y.o.  female. Today, the patient complains of no pain in the joints. Location: none  Severity:  0 on a scale of 0-10  Timing: all day   Duration:  2 months  Context/Associated signs and symptoms: Patient reports significant improvement in the last 3 weeks. She states she failed her first prednisone taper attempt, and has recently restarted the taper by 1mg q2-3 weeks. She is currently taking 3 mg prednisone with no AEs. She continues with Actemra SQ weekly. She mentions her blood pressure has improved with her medication. She denies other symptoms or new medications. RHEUMATOLOGY REVIEW OF SYSTEMS   Positives as per history  Negatives as follows:  Arjun Haji:  Denies unexplained persistent fevers or weight change  RESPIRATORY:  No pleuritic pain, exertional dyspnea  CARDIOVASCULAR:  Denies chest pain  GASTRO:   Denies heartburn, abdominal pain, nausea, vomiting, diarrhea  SKIN:    Denies rash   MSK:    No morning stiffness >1 hour, persistent joint swelling, persistent joint pain    PAST MEDICAL HISTORY  Reviewed with patient, significant changes in medical history - yes, recent surgery    Employment - Retired  Sleep - Good, no issues   Exercise - yes    FAMILY HISTORY   No autoimmune disease in the family    MEDICATIONS  All the current medications were reviewed in detail. PHYSICAL EXAM  Blood pressure 166/75, pulse 96, temperature 97.8 °F (36.6 °C), temperature source Oral, resp. rate 16, weight 198 lb 12.8 oz (90.2 kg), SpO2 95 %. GENERAL APPEARANCE: Well-nourished adult in no acute distress.   EYES: No scleral erythema, conjunctival injection. ENT: No oral ulcer, parotid enlargement. NECK: No adenopathy, thyroid enlargement. CARDIOVASCULAR: Heart rhythm is regular. No murmur, rub, gallop. CHEST: Normal vesicular breath sounds. No wheezes, rales, pleural friction rubs. ABDOMINAL: The abdomen is soft and nontender. Liver and spleen are nonpalpable. Bowel sounds are normal.  EXTREMITIES: There is no evidence of clubbing, cyanosis, edema. SKIN: No rash, palpable purpura, digital ulcer, abnormal thickening. NEUROLOGICAL: Normal gait and station, full strength in upper and lower extremities, normal sensation to light touch. MUSCULOSKELETAL:  Upper extremities - full range of motion, no tenderness, no swelling, no synovial thickening and no deformity of joints except for Right shoulder creputis  Lower extremities - full range of motion, no tenderness, no swelling, no synovial thickening and no deformity of joints except for B/L knees crepitus (R>L)    LABS, RADIOLOGY AND PROCEDURES   Previous labs reviewed -Yes    ASSESSMENT  1. GCA/PMR - (Established problem -  Very good partial response) - Patient is asymptomatic on exam. She should continue with Actemra SQ weekly. She should continue prednisone 3mg taper by 1 mg as tolerated. She should return in 3 months for a follow up. 2. Bone Health - Most recent bone density (9/2017) showed osteopenia. She is taking Actonel for this. She is continuing with prednisone taper for GCA/PMR  3. Drug therapy monitoring for toxicity (biologic) - CBC, BUN, Cr, AST, ALT and albumin every 4 months    PLAN  1. Prednisone 3 mg daily; taper as directed  2. Continue Actemra SQ weekly  3. Return in 3 months      Shai Santa MD  Adult and Pediatric Rheumatology     [address] [place] [address], Phone [phone], Fax [phone]   E-mail: [email or web address]    Visiting  of Pediatrics    Department of Pediatrics, [address], [address] [place] [address], Phone [phone], Fax [phone]  E-mail: [email or web address]    There are no Patient Instructions on file for this visit. cc:  MD Khari Luciano (Ophthalmology)    Written by josué Dumont, as dictated by Concepcion Plummer.  Milton Wood M.D.

## 2018-08-07 ENCOUNTER — HOSPITAL ENCOUNTER (OUTPATIENT)
Dept: MAMMOGRAPHY | Age: 63
Discharge: HOME | End: 2018-08-07
Attending: INTERNAL MEDICINE
Payer: COMMERCIAL

## 2018-08-07 DIAGNOSIS — Z12.31 VISIT FOR SCREENING MAMMOGRAM: ICD-10-CM

## 2018-08-07 PROCEDURE — 77063 BREAST TOMOSYNTHESIS BI: CPT

## 2018-08-09 ENCOUNTER — HOSPITAL ENCOUNTER (OUTPATIENT)
Dept: MAMMOGRAPHY | Age: 63
Discharge: HOME | End: 2018-08-09
Attending: INTERNAL MEDICINE
Payer: COMMERCIAL

## 2018-08-09 DIAGNOSIS — R92.8 ABNORMAL MAMMOGRAM: ICD-10-CM

## 2018-08-09 PROCEDURE — 77061 BREAST TOMOSYNTHESIS UNI: CPT

## 2018-08-09 PROCEDURE — 76642 ULTRASOUND BREAST LIMITED: CPT

## 2018-08-23 RX ORDER — HYDROCHLOROTHIAZIDE 25 MG/1
25 TABLET ORAL DAILY
Qty: 90 TAB | Refills: 3 | Status: SHIPPED | OUTPATIENT
Start: 2018-08-23 | End: 2019-10-28 | Stop reason: SDUPTHER

## 2018-08-28 DIAGNOSIS — E11.9 TYPE 2 DIABETES MELLITUS WITHOUT COMPLICATION, WITHOUT LONG-TERM CURRENT USE OF INSULIN (HCC): Primary | ICD-10-CM

## 2018-08-29 ENCOUNTER — HOSPITAL ENCOUNTER (OUTPATIENT)
Dept: PHYSICAL THERAPY | Age: 63
Discharge: HOME | End: 2018-08-29
Payer: COMMERCIAL

## 2018-08-29 PROCEDURE — 97161 PT EVAL LOW COMPLEX 20 MIN: CPT | Performed by: PHYSICAL THERAPIST

## 2018-08-29 PROCEDURE — 97110 THERAPEUTIC EXERCISES: CPT | Performed by: PHYSICAL THERAPIST

## 2018-08-29 PROCEDURE — 97140 MANUAL THERAPY 1/> REGIONS: CPT | Performed by: PHYSICAL THERAPIST

## 2018-08-29 PROCEDURE — 97016 VASOPNEUMATIC DEVICE THERAPY: CPT | Performed by: PHYSICAL THERAPIST

## 2018-08-29 NOTE — PROGRESS NOTES
PT INITIAL EVALUATION NOTE 2-15    Patient Name: Evelia Fortune MD  Date:2018  : 1955  [x]  Patient  Verified  Payor: Wilmer Draper / Plan: Stacy Hudson / Product Type: PPO /    In time:3:30 PM  Out time:4:30 PM  Total Treatment Time (min): 60  Visit #: 1     Treatment Area: Left shoulder pain [M25.512]    SUBJECTIVE  Pain Level (0-10 scale): 4-5/10  At best: 3/10, pain is decreased with rest, ice, medication  At worst: 8/10, pain is increased by moving her shoulder back, up or out, dressing, shower, hanging her clothes up in the closet  Any medication changes, allergies to medications, adverse drug reactions, diagnosis change, or new procedure performed?: [] No    [x] Yes (see summary sheet for update)  Subjective:      she fell down her stairs. She went to the MD who gave her a steroid injection 18 which helped. Pt is taking Motrin which helps. Pt reports she recently came off steroids  after 2 years of taking them. Pain interferes with her sleep, \"there isn't any position that feels good\"  Pt denies numbness/ tingling   Pt reports her chronic neck pain worsened slightly since falling down the stairs. Pt has had an x-ray which was negative  PLOF: Pt enjoys walking  Mechanism of Injury: Fall  Previous Treatment/Compliance: Yes, excellent  PMHx/Surgical Hx: GERD, OA, cervical DDD, R TKR L TOS, DM2, HTN, OP  Work Hx: Pt works as a MD at [employer]: Pt lives with her  in a 2 story home  Pt Goals: \"no pain and improved movement\"  Barriers: hx of L TOS  Motivation: Good  Substance use: None   Cognition: A & O x 3        OBJECTIVE/EXAMINATION  Posture:   Forward head, rounded shoulders  Palpation: TTP L pec, L lateral and anterior deltoid, L levator    Upper Extremity AROM:         R   L  Shoulder Flexion  160   132       Shoulder Abduction  175   110  Shoulder Extension   65   50  Shoulder ER   90   80  Shoulder IR   T7   Iliac crest          UPPER QUARTER   MUSCLE STRENGTH  KEY       R  L  0 - No Contraction  C1, C2 Neck Flex 5    1 - Trace   C3 Side Flex  5  5    2 - Poor   C4 Sh Elev  5  5    3 - Fair    C5 Deltoid/Biceps 5  4    4 - Good   C6 Wrist Ext  5  5    5 - Normal   C7 Triceps  5  4+ p! C8 Thumb Ext  5  4+        T1 Hand Inst  5  4+      MMT: See above    Shoulder ER R 5/5 L 4/5 p! Shoulder IR R 5/5 L 4/5 p!   Neurological: Sensations:     Special Tests:   Full Can Test: R- L +   Drop Arm: Negative B   Apley Scratch Test: R - L +   West: R + L+      Modality rationale: decrease inflammation and decrease pain to improve the patients ability to sit, reach, lift, carry, perform ADLs   Min Type Additional Details    [] Estim: []Att   []Unatt        []TENS instruct                  []IFC  []Premod   []NMES                     []Other:  []w/US   []w/ice   []w/heat  Position:  Location:    []  Traction: [] Cervical       []Lumbar                       [] Prone          []Supine                       []Intermittent   []Continuous Lbs:  [] before manual  [] after manual  []w/heat    []  Ultrasound: []Continuous   [] Pulsed at:                            []1MHz   []3MHz Location:  W/cm2:    []  Paraffin         Location:  []w/heat    []  Ice     []  Heat  []  Ice massage Position:  Location:    []  Laser  []  Other: Position:  Location:   15 [x]  Vasopneumatic Device Pressure:       [] lo [x] med [] hi   Temperature: 34   [x] Skin assessment post-treatment:  [x]intact []redness- no adverse reaction    []redness - adverse reaction:     10 min Therapeutic Exercise:  [x] See flow sheet :   Rationale: increase ROM and increase strength to improve the patients ability to sit, reach, lift, carry, perform ADLs    10 min Manual Therapy:  Shoulder PROM, GH joint mobs grade II-III, MFR L levator, L delt, L pec   Rationale: decrease pain, increase ROM, increase tissue extensibility and decrease trigger points  to improve the patients ability to sit, reach, lift, carry, perform ADLs          With   [x] TE   [] TA   [] neuro   [] other: Patient Education: [x] Review HEP    [] Progressed/Changed HEP based on:   [x] positioning   [x] body mechanics   [] transfers   [x] heat/ice application    [] other:        Other Objective/Functional Measures:Poor tolerance to shoulder PROM secondary to muscle guarding     Pain Level (0-10 scale) post treatment: 0      ASSESSMENT:      [x]  See Plan of 2 [facility], PT 8/29/2018  3:13 PM

## 2018-08-29 NOTE — PROGRESS NOTES
[address] [facility] [place], [address]  Phone: [phone]  Fax: [phone]    Plan of Care/ Statement of Necessity for Physical Therapy Services 2-15    Patient name: Jazmyn Johnson MD  : 1955  Provider#: [id number]  Referral source: Urvashi Villarreal MD      Medical/Treatment Diagnosis: Left shoulder pain [M25.512]     Prior Hospitalization: see medical history     Comorbidities:GERD, OA, cervical DDD, R TKR L TOS, DM2, HTN, OP  Prior Level of Function: Pt is a pediatric ICU physician at [employer]  Medications: Verified on Patient Summary List    Start of Care: 18      Onset Date: 18       The Plan of Care and following information is based on the information from the initial evaluation. Assessment/ key information: The patient presents with signs and symptoms consistent with L shoulder impingment syndrome secondary to fall down the steps 18. The patient's condition is complicated by history of L TOS and chronic neck pain.     Evaluation Complexity History MEDIUM  Complexity : 1-2 comorbidities / personal factors will impact the outcome/ POC ; Examination HIGH Complexity : 4+ Standardized tests and measures addressing body structure, function, activity limitation and / or participation in recreation  ;Presentation LOW Complexity : Stable, uncomplicated  ;Clinical Decision Making MEDIUM Complexity : FOTO score of 26-74  Overall Complexity Rating: MEDIUM    Problem List: pain affecting function, decrease ROM, decrease strength, edema affecting function, decrease ADL/ functional abilitiies, decrease activity tolerance and decrease flexibility/ joint mobility   Treatment Plan may include any combination of the following: Therapeutic exercise, Physical agent/modality, Manual therapy, Patient education and Functional mobility training  Patient / Family readiness to learn indicated by: asking questions, trying to perform skills and interest  Persons(s) to be included in education: patient (P)  Barriers to Learning/Limitations: None  Patient Goal (s): less pain more motion  Patient Self Reported Health Status: fair  Rehabilitation Potential: good    Short Term Goals: To be accomplished in 4 weeks:  1) The patient will be independent with introductory HEP  2) The patient will improve shoulder flexion AROM to 140 to improve ease with reaching tasks  3) The patient will improve shoulder abduction AROM to 115 to improve ease with dressing  Long Term Goals: To be accomplished in 12 weeks:  1) The patient will report ability to complete household chores without an increase in pain  2) The patient will demonstrate pain free shoulder flexion AROM WFL to improve ease with ADLs  3) The patient will demonstrate 5/5 BUE strength to improve ease with lifting tasks  Frequency / Duration: Patient to be seen 1 times per week for 12 weeks. Patient/ Caregiver education and instruction: self care, activity modification and exercises    [x]  Plan of care has been reviewed with RAMANA Bowen, PT 8/29/2018 3:14 PM    ________________________________________________________________________    I certify that the above Therapy Services are being furnished while the patient is under my care. I agree with the treatment plan and certify that this therapy is necessary.     [phone] Signature:____________________  Date:____________Time: _________

## 2018-08-31 LAB
ALBUMIN SERPL-MCNC: 4.4 G/DL (ref 3.6–4.8)
ALBUMIN/GLOB SERPL: 1.9 {RATIO} (ref 1.2–2.2)
ALP SERPL-CCNC: 45 IU/L (ref 39–117)
ALT SERPL-CCNC: 22 IU/L (ref 0–32)
AST SERPL-CCNC: 26 IU/L (ref 0–40)
BILIRUB SERPL-MCNC: 0.3 MG/DL (ref 0–1.2)
BUN SERPL-MCNC: 19 MG/DL (ref 8–27)
BUN/CREAT SERPL: 23 (ref 12–28)
CALCIUM SERPL-MCNC: 9.8 MG/DL (ref 8.7–10.3)
CHLORIDE SERPL-SCNC: 96 MMOL/L (ref 96–106)
CO2 SERPL-SCNC: 25 MMOL/L (ref 20–29)
CREAT SERPL-MCNC: 0.84 MG/DL (ref 0.57–1)
EST. AVERAGE GLUCOSE BLD GHB EST-MCNC: 143 MG/DL
GLOBULIN SER CALC-MCNC: 2.3 G/DL (ref 1.5–4.5)
GLUCOSE SERPL-MCNC: 121 MG/DL (ref 65–99)
HBA1C MFR BLD: 6.6 % (ref 4.8–5.6)
POTASSIUM SERPL-SCNC: 4.2 MMOL/L (ref 3.5–5.2)
PROT SERPL-MCNC: 6.7 G/DL (ref 6–8.5)
SODIUM SERPL-SCNC: 137 MMOL/L (ref 134–144)

## 2018-08-31 NOTE — PROGRESS NOTES
It is holding stead at 6.6. I don't think we should change meds. Of course because it is a 3 month average, the recent readings may be bring up the mean. Let's continue to monitor your finger sticks.   Message sent to patient via [employer]:  August 31, 2018

## 2018-09-04 ENCOUNTER — HOSPITAL ENCOUNTER (OUTPATIENT)
Dept: PHYSICAL THERAPY | Age: 63
Discharge: HOME | End: 2018-09-04
Payer: MEDICARE

## 2018-09-04 PROCEDURE — 97110 THERAPEUTIC EXERCISES: CPT | Performed by: PHYSICAL MEDICINE & REHABILITATION

## 2018-09-04 PROCEDURE — 97016 VASOPNEUMATIC DEVICE THERAPY: CPT | Performed by: PHYSICAL MEDICINE & REHABILITATION

## 2018-09-04 PROCEDURE — 97140 MANUAL THERAPY 1/> REGIONS: CPT | Performed by: PHYSICAL MEDICINE & REHABILITATION

## 2018-09-04 NOTE — PROGRESS NOTES
PT DAILY TREATMENT NOTE - [facility] 215    Patient Name: Jose Pinto MD  Date:2018  : 1955  [x]  Patient  Verified  Payor: Yefri Drafts / Plan: [place] / Product Type: Medicare /    In time:835 am  Out time:935 am  Total Treatment Time (min): 60  Total Timed Codes (min): 45  1:1 Treatment Time ([address] only): 35   Visit #: 2      Treatment Area: Left shoulder pain [M25.512]    SUBJECTIVE  Pain Level (0-10 scale): 5/10  Any medication changes, allergies to medications, adverse drug reactions, diagnosis change, or new procedure performed?: [x] No    [] Yes (see summary sheet for update)  Subjective functional status/changes:   [] No changes reported  Patient reported having to drive to [place] and back and sitting all the way and time caused an increase in pain and stiffness.     OBJECTIVE    Modality rationale: decrease inflammation and decrease pain to improve the patients ability to ADLs, lift and carry   Min Type Additional Details    [] Estim: []Att   []Unatt        []TENS instruct                  []IFC  []Premod   []NMES                     []Other:  []w/US   []w/ice   []w/heat  Position:  Location:    []  Traction: [] Cervical       []Lumbar                       [] Prone          []Supine                       []Intermittent   []Continuous Lbs:  [] before manual  [] after manual  []w/heat    []  Ultrasound: []Continuous   [] Pulsed at:                           []1MHz   []3MHz Location:  W/cm2:    [] Paraffin         Location:   []w/heat    []  Ice     []  Heat  []  Ice massage Position:  Location:    []  Laser  []  Other: Position:  Location:   15   [x]  Vasopneumatic Device Pressure:       [x] lo [] med [] hi   Temperature: 34     [x] Skin assessment post-treatment:  [x]intact []redness- no adverse reaction    []redness - adverse reaction:     30 min Therapeutic Exercise:  [x] See flow sheet :   Rationale: increase ROM and increase strength to improve the patients ability to ADLs, lift and carry    15 min Manual Therapy:  PROM to leeanne and inf and A/P mobs to [facility] jt  gd III   Rationale: decrease pain, increase ROM and increase tissue extensibility to improve the patients ability to ADLs, lift and carry          With   [x] TE   [] TA   [] neuro   [] other: Patient Education: [x] Review HEP    [] Progressed/Changed HEP based on:   [] positioning   [] body mechanics   [] transfers   [] heat/ice application    [] other:      Other Objective/Functional Measures:   TTP along biceps tendon and supraspinatus. Improved ROM noted today with less muscle guarding. Unable to tolerate ER at 90 degrees but tolerated ER at 45 well. Pain Level (0-10 scale) post treatment: 1/10    ASSESSMENT/Changes in Function:     Patient will continue to benefit from skilled PT services to modify and progress therapeutic interventions, address functional mobility deficits, address ROM deficits, address strength deficits, analyze and address soft tissue restrictions, analyze and cue movement patterns, analyze and modify body mechanics/ergonomics and assess and modify postural abnormalities to attain remaining goals. []  See Plan of Care  []  See progress note/recertification  []  See Discharge Summary         Progress towards goals / Updated goals:  Patient is showing good progress with improving ROM noted today.     PLAN  [x]  Upgrade activities as tolerated     [x]  Continue plan of care  [x]  Update interventions per flow sheet       []  Discharge due to:_  []  Other:_      Lindsay Serna PTA, CPT 9/4/2018  10:43 AM

## 2018-09-10 RX ORDER — TOCILIZUMAB 180 MG/ML
INJECTION, SOLUTION SUBCUTANEOUS
Qty: 4 SYRINGE | Refills: 5 | Status: SHIPPED | OUTPATIENT
Start: 2018-09-10 | End: 2019-03-26 | Stop reason: SDUPTHER

## 2018-09-12 ENCOUNTER — HOSPITAL ENCOUNTER (OUTPATIENT)
Dept: PHYSICAL THERAPY | Age: 63
Discharge: HOME | End: 2018-09-12
Payer: MEDICARE

## 2018-09-12 PROCEDURE — 97110 THERAPEUTIC EXERCISES: CPT | Performed by: PHYSICAL THERAPIST

## 2018-09-12 PROCEDURE — 97140 MANUAL THERAPY 1/> REGIONS: CPT | Performed by: PHYSICAL THERAPIST

## 2018-09-12 PROCEDURE — 97016 VASOPNEUMATIC DEVICE THERAPY: CPT | Performed by: PHYSICAL THERAPIST

## 2018-09-12 NOTE — PROGRESS NOTES
PT DAILY TREATMENT NOTE - [facility] 2-15    Patient Name: Gaudencio Esteban MD  Date:2018  : 1955  [x]  Patient  Verified  Payor: Deonna Willie / Plan: [place] MEDICARE PART A & B / Product Type: Medicare /    In time:5:00 pm  Out time: 6:05 PM  Total Treatment Time (min): 65  Total Timed Codes (min): 50  1:1 Treatment Time ( [address] only): 35   Visit #: 3      Treatment Area: Left shoulder pain [M25.512]    SUBJECTIVE  Pain Level (0-10 scale): 4/10  Any medication changes, allergies to medications, adverse drug reactions, diagnosis change, or new procedure performed?: [x] No    [] Yes (see summary sheet for update)  Subjective functional status/changes:   [] No changes reported  Patient reports she is sore \"I think I might be over doing it with my exercises, especially the one going over my head\"    OBJECTIVE    Modality rationale: decrease inflammation and decrease pain to improve the patients ability to ADLs, lift and carry   Min Type Additional Details    [] Estim: []Att   []Unatt        []TENS instruct                  []IFC  []Premod   []NMES                     []Other:  []w/US   []w/ice   []w/heat  Position:  Location:    []  Traction: [] Cervical       []Lumbar                       [] Prone          []Supine                       []Intermittent   []Continuous Lbs:  [] before manual  [] after manual  []w/heat    []  Ultrasound: []Continuous   [] Pulsed at:                           []1MHz   []3MHz Location:  W/cm2:    [] Paraffin         Location:   []w/heat    []  Ice     []  Heat  []  Ice massage Position:  Location:    []  Laser  []  Other: Position:  Location:   15   [x]  Vasopneumatic Device Pressure:       [x] lo [] med [] hi   Temperature: 34     [x] Skin assessment post-treatment:  [x]intact []redness- no adverse reaction    []redness - adverse reaction:     35 min Therapeutic Exercise:  [x] See flow sheet :   Rationale: increase ROM and increase strength to improve the patients ability to ADLs, lift and carry    15 min Manual Therapy:  PROM to leeanne and inf and A/P mobs to [address] jt  gd III, MFR to L levator, L UT, L first rib mobs, MFR L pec and L subscap   Rationale: decrease pain, increase ROM and increase tissue extensibility to improve the patients ability to ADLs, lift and carry          With   [x] TE   [] TA   [] neuro   [] other: Patient Education: [x] Review HEP    [] Progressed/Changed HEP based on:   [] positioning   [] body mechanics   [] transfers   [] heat/ice application    [] other:      Other Objective/Functional Measures:   Pain with corner str and doorway str    Pain Level (0-10 scale) post treatment: 0    ASSESSMENT/Changes in Function:     Patient will continue to benefit from skilled PT services to modify and progress therapeutic interventions, address functional mobility deficits, address ROM deficits, address strength deficits, analyze and address soft tissue restrictions, analyze and cue movement patterns, analyze and modify body mechanics/ergonomics and assess and modify postural abnormalities to attain remaining goals. []  See Plan of Care  []  See progress note/recertification  []  See Discharge Summary         Progress towards goals / Updated goals:  Patient continues to require verbal cues to complete exercises with correct form and postural awareness. Patient was able to advance several exercises this visit and is progressing well towards goals.     PLAN  [x]  Upgrade activities as tolerated     [x]  Continue plan of care  [x]  Update interventions per flow sheet       []  Discharge due to:_  []  Other:_      Bari Sexton, PT, DPT 9/12/2018  5:00 PM

## 2018-09-17 DIAGNOSIS — I10 BENIGN ESSENTIAL HYPERTENSION: ICD-10-CM

## 2018-09-18 RX ORDER — RAMIPRIL 10 MG/1
20 CAPSULE ORAL DAILY
Qty: 180 CAP | Refills: 3 | Status: SHIPPED | OUTPATIENT
Start: 2018-09-18 | End: 2019-12-13 | Stop reason: SDUPTHER

## 2018-09-18 RX ORDER — RISEDRONATE SODIUM 150 MG/1
150 TABLET, FILM COATED ORAL
Qty: 1 TAB | Refills: 11 | Status: SHIPPED | OUTPATIENT
Start: 2018-09-18 | End: 2018-09-20 | Stop reason: SDUPTHER

## 2018-09-18 NOTE — TELEPHONE ENCOUNTER
From: Marley Kaufman MD  To: Tameka Gonzalez MD  Sent: 9/17/2018 11:39 AM EDT  Subject: Medication Renewal Request    Original authorizing provider: MD Marley Kahn would like a refill of the following medications:  ramipril (ALTACE) 10 mg capsule Tameka Gonzalez MD]    Preferred pharmacy: [address]:

## 2018-09-19 ENCOUNTER — HOSPITAL ENCOUNTER (OUTPATIENT)
Dept: PHYSICAL THERAPY | Age: 63
Discharge: HOME | End: 2018-09-19
Payer: MEDICARE

## 2018-09-19 PROCEDURE — 97110 THERAPEUTIC EXERCISES: CPT | Performed by: PHYSICAL THERAPIST

## 2018-09-19 PROCEDURE — 97140 MANUAL THERAPY 1/> REGIONS: CPT | Performed by: PHYSICAL THERAPIST

## 2018-09-19 PROCEDURE — 97016 VASOPNEUMATIC DEVICE THERAPY: CPT | Performed by: PHYSICAL THERAPIST

## 2018-09-19 NOTE — PROGRESS NOTES
PT DAILY TREATMENT NOTE - [facility] 2-15    Patient Name: Marley Kaufman MD  Date:2018  : 1955  [x]  Patient  Verified  Payor: Sheila Moeller / Plan: [place] MEDICARE PART A & B / Product Type: Medicare /    In time:12:35 PM  Out time: 1:35 PM  Total Treatment Time (min): 60  Total Timed Codes (min): 45  1:1 Treatment Time ([address] only): 40  Visit #: 4      Treatment Area: Left shoulder pain [M25.512]    SUBJECTIVE  Pain Level (0-10 scale): 3  Any medication changes, allergies to medications, adverse drug reactions, diagnosis change, or new procedure performed?: [x] No    [] Yes (see summary sheet for update)  Subjective functional status/changes:   [] No changes reported  \"I had to be put back on steroids for my arthritis\"    \"My shoulder is feeling pretty good though\"    OBJECTIVE    Modality rationale: decrease inflammation and decrease pain to improve the patients ability to ADLs, lift and carry   Min Type Additional Details    [] Estim: []Att   []Unatt        []TENS instruct                  []IFC  []Premod   []NMES                     []Other:  []w/US   []w/ice   []w/heat  Position:  Location:    []  Traction: [] Cervical       []Lumbar                       [] Prone          []Supine                       []Intermittent   []Continuous Lbs:  [] before manual  [] after manual  []w/heat    []  Ultrasound: []Continuous   [] Pulsed at:                           []1MHz   []3MHz Location:  W/cm2:    [] Paraffin         Location:   []w/heat    []  Ice     []  Heat  []  Ice massage Position:  Location:    []  Laser  []  Other: Position:  Location:   15   [x]  Vasopneumatic Device Pressure:       [x] lo [] med [] hi   Temperature: 34     [x] Skin assessment post-treatment:  [x]intact []redness- no adverse reaction    []redness - adverse reaction:     30 min Therapeutic Exercise:  [x] See flow sheet :   Rationale: increase ROM and increase strength to improve the patients ability to ADLs, lift and carry    15 min Manual Therapy:  PROM to leeanne and inf and A/P mobs to [facility] jt  gd III, MFR to L levator, L UT, L first rib mobs, MFR L pec and L subscap   Rationale: decrease pain, increase ROM and increase tissue extensibility to improve the patients ability to ADLs, lift and carry          With   [x] TE   [] TA   [] neuro   [] other: Patient Education: [x] Review HEP    [] Progressed/Changed HEP based on:   [] positioning   [] body mechanics   [] transfers   [] heat/ice application    [] other:      Other Objective/Functional Measures:   Improved shoulder PROM this visit  Flexion 165  Pain Level (0-10 scale) post treatment: 2    ASSESSMENT/Changes in Function:     Patient will continue to benefit from skilled PT services to modify and progress therapeutic interventions, address functional mobility deficits, address ROM deficits, address strength deficits, analyze and address soft tissue restrictions, analyze and cue movement patterns, analyze and modify body mechanics/ergonomics and assess and modify postural abnormalities to attain remaining goals. []  See Plan of Care  []  See progress note/recertification  []  See Discharge Summary         Progress towards goals / Updated goals:  Patient continues to require verbal cues to complete exercises with correct form and postural awareness. Patient was able to advance several exercises this visit and is progressing well towards goals.     PLAN  [x]  Upgrade activities as tolerated     [x]  Continue plan of care  [x]  Update interventions per flow sheet       []  Discharge due to:_  []  Other:_      Connie Wharton, PT, DPT 9/19/2018  12:45 PM

## 2018-09-20 RX ORDER — RISEDRONATE SODIUM 150 MG/1
150 TABLET, FILM COATED ORAL
Qty: 3 TAB | Refills: 11 | Status: SHIPPED | OUTPATIENT
Start: 2018-09-20 | End: 2021-12-06

## 2018-09-27 ENCOUNTER — HOSPITAL ENCOUNTER (OUTPATIENT)
Dept: PHYSICAL THERAPY | Age: 63
Discharge: HOME | End: 2018-09-27
Payer: MEDICARE

## 2018-09-27 PROCEDURE — 97016 VASOPNEUMATIC DEVICE THERAPY: CPT | Performed by: PHYSICAL MEDICINE & REHABILITATION

## 2018-09-27 PROCEDURE — 97110 THERAPEUTIC EXERCISES: CPT | Performed by: PHYSICAL MEDICINE & REHABILITATION

## 2018-09-27 NOTE — PROGRESS NOTES
PT DAILY TREATMENT NOTE - [facility] 215    Patient Name: Maryuri Osuna MD  Date:2018  : 1955  [x]  Patient  Verified  Payor: Maya Sweeney / Plan: [place] MEDICARE PART A & B / Product Type: Medicare /    In time:105 PM  Out time: 150 PM  Total Treatment Time (min): 45  Total Timed Codes (min): 30  1:1 Treatment Time ( [address] only): 30  Visit #: 5      Treatment Area: Left shoulder pain [M25.512]    SUBJECTIVE  Pain Level (0-10 scale): 0/10  Any medication changes, allergies to medications, adverse drug reactions, diagnosis change, or new procedure performed?: [x] No    [] Yes (see summary sheet for update)  Subjective functional status/changes:   [] No changes reported  Patient reported that since last visit she hasn't had any pain. Overall is feeling great!     OBJECTIVE    Modality rationale: decrease inflammation and decrease pain to improve the patients ability to ADLs, lift and carry   Min Type Additional Details    [] Estim: []Att   []Unatt        []TENS instruct                  []IFC  []Premod   []NMES                     []Other:  []w/US   []w/ice   []w/heat  Position:  Location:    []  Traction: [] Cervical       []Lumbar                       [] Prone          []Supine                       []Intermittent   []Continuous Lbs:  [] before manual  [] after manual  []w/heat    []  Ultrasound: []Continuous   [] Pulsed at:                           []1MHz   []3MHz Location:  W/cm2:    [] Paraffin         Location:   []w/heat    []  Ice     []  Heat  []  Ice massage Position:  Location:    []  Laser  []  Other: Position:  Location:   15   [x]  Vasopneumatic Device Pressure:       [x] lo [] med [] hi   Temperature: 34     [x] Skin assessment post-treatment:  [x]intact []redness- no adverse reaction    []redness - adverse reaction:     30 min Therapeutic Exercise:  [x] See flow sheet :   Rationale: increase ROM and increase strength to improve the patients ability to ADLs, lift and carry          With   [x] TE   [] TA   [] neuro   [] other: Patient Education: [x] Review HEP    [] Progressed/Changed HEP based on:   [] positioning   [] body mechanics   [] transfers   [] heat/ice application    [] other:      Other Objective/Functional Measures:   Full AROM noted today. Pain Level (0-10 scale) post treatment: 0/10    ASSESSMENT/Changes in Function:     Patient will continue to benefit from skilled PT services to modify and progress therapeutic interventions, address functional mobility deficits, address ROM deficits, address strength deficits, analyze and address soft tissue restrictions, analyze and cue movement patterns, analyze and modify body mechanics/ergonomics and assess and modify postural abnormalities to attain remaining goals. []  See Plan of Care  []  See progress note/recertification  []  See Discharge Summary          Progress towards goals / Updated goals:  Updated HEP to prepare for discharge next visit.      PLAN  [x]  Upgrade activities as tolerated     [x]  Continue plan of care  [x]  Update interventions per flow sheet       []  Discharge due to:_  []  Other:_      Lawrence Franks PTA, CPT 9/27/2018  12:45 PM

## 2018-10-03 ENCOUNTER — HOSPITAL ENCOUNTER (OUTPATIENT)
Dept: PHYSICAL THERAPY | Age: 63
Discharge: HOME | End: 2018-10-03
Payer: MEDICARE

## 2018-10-03 PROCEDURE — 97110 THERAPEUTIC EXERCISES: CPT | Performed by: PHYSICAL MEDICINE & REHABILITATION

## 2018-10-03 PROCEDURE — G8979 MOBILITY GOAL STATUS: HCPCS | Performed by: PHYSICAL THERAPIST

## 2018-10-03 PROCEDURE — G8980 MOBILITY D/C STATUS: HCPCS | Performed by: PHYSICAL THERAPIST

## 2018-10-03 NOTE — PROGRESS NOTES
PT DAILY TREATMENT NOTE - [facility] 2-15    Patient Name: Jorge Palumbo MD  Date:10/3/2018  : 1955  [x]  Patient  Verified  Payor: [place] / Plan: [place] MED[place] PART A & B / Product Type: Medicare /    In time:200 PM  Out time: 225 PM  Total Treatment Time (min): 25  Total Timed Codes (min): 25  1:1 Treatment Time ( [address] only): 25  Visit #: 6      Treatment Area: Left shoulder pain [M25.512]    SUBJECTIVE  Pain Level (0-10 scale): 0/10  Any medication changes, allergies to medications, adverse drug reactions, diagnosis change, or new procedure performed?: [x] No    [] Yes (see summary sheet for update)  Subjective functional status/changes:   [] No changes reported  Patient reported that she continues to have no pain and feels ready for discharge. OBJECTIVE    25 min Therapeutic Exercise:  [x] See flow sheet :   Rationale: increase ROM and increase strength to improve the patients ability to ADLs, lift and carry          With   [x] TE   [] TA   [] neuro   [] other: Patient Education: [x] Review HEP    [] Progressed/Changed HEP based on:   [] positioning   [] body mechanics   [] transfers   [] heat/ice application    [] other:      Other Objective/Functional Measures:   Full AROM  5/5 MMT    Pain Level (0-10 scale) post treatment: 0/10    ASSESSMENT/Changes in Function:      []  See Plan of Care  []  See progress note/recertification  [x]  See Discharge Summary          Progress towards goals / Updated goals:  Patient has met all STG and LTG and has significantly improved FOTO from 56 to 69, demonstrating improvement in ADLs. Patient will continue HEP. Short Term Goals: To be accomplished in 4 weeks:  1) The patient will be independent with introductory HEP - MET  2) The patient will improve shoulder flexion AROM to 140 to improve ease with reaching tasks - MET  3) The patient will improve shoulder abduction AROM to 115 to improve ease with dressing - MET  Long Term Goals:  To be accomplished in 12 weeks:  1) The patient will report ability to complete household chores without an increase in pain - MET  2) The patient will demonstrate pain free shoulder flexion AROM WFL to improve ease with ADLs - MET  3) The patient will demonstrate 5/5 BUE strength to improve ease with lifting tasks - MET    PLAN  []  Upgrade activities as tolerated     []  Continue plan of care  []  Update interventions per flow sheet       [x]  Discharge due to: Goals Met  []  Other:_      John Bianchi PTA, CPT 10/3/2018  12:45 PM

## 2018-10-11 ENCOUNTER — OFFICE VISIT (OUTPATIENT)
Dept: INTERNAL MEDICINE CLINIC | Age: 63
End: 2018-10-11

## 2018-10-11 ENCOUNTER — HOSPITAL ENCOUNTER (OUTPATIENT)
Dept: LAB | Age: 63
Discharge: HOME | End: 2018-10-11
Payer: MEDICARE

## 2018-10-11 VITALS
BODY MASS INDEX: 31.98 KG/M2 | RESPIRATION RATE: 16 BRPM | TEMPERATURE: 98 F | SYSTOLIC BLOOD PRESSURE: 138 MMHG | DIASTOLIC BLOOD PRESSURE: 90 MMHG | WEIGHT: 199 LBS | HEIGHT: 66 IN | OXYGEN SATURATION: 98 % | HEART RATE: 58 BPM

## 2018-10-11 DIAGNOSIS — E11.9 TYPE 2 DIABETES MELLITUS WITHOUT COMPLICATION, WITHOUT LONG-TERM CURRENT USE OF INSULIN (HCC): Primary | ICD-10-CM

## 2018-10-11 DIAGNOSIS — Z00.00 MEDICARE ANNUAL WELLNESS VISIT, SUBSEQUENT: ICD-10-CM

## 2018-10-11 DIAGNOSIS — K21.9 GASTROESOPHAGEAL REFLUX DISEASE, ESOPHAGITIS PRESENCE NOT SPECIFIED: ICD-10-CM

## 2018-10-11 DIAGNOSIS — I10 ESSENTIAL HYPERTENSION: ICD-10-CM

## 2018-10-11 DIAGNOSIS — Z13.31 SCREENING FOR DEPRESSION: ICD-10-CM

## 2018-10-11 PROCEDURE — 81001 URINALYSIS AUTO W/SCOPE: CPT

## 2018-10-11 PROCEDURE — 82043 UR ALBUMIN QUANTITATIVE: CPT

## 2018-10-11 RX ORDER — METFORMIN HYDROCHLORIDE 500 MG/1
1000 TABLET, EXTENDED RELEASE ORAL 2 TIMES DAILY
Qty: 360 TAB | Refills: 3 | Status: SHIPPED | OUTPATIENT
Start: 2018-10-11 | End: 2019-06-07

## 2018-10-11 NOTE — PROGRESS NOTES
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 She was last seen by me on 8/30/2018. She reports the following:    Getting joint pain with [place] and [place]. Wants to go back to [facility]. a1c was 6.6 end of August    PT shoulder and it helped. Doing much better. No proteinuria seen. Cholesterol is very good    GERD    Patient has history of acid reflux. Tolerates medications and tries to use PRN. She denies acid related chest pain, early satiety, difficulty swallowing, or unexpected weight loss. needs prevnar immunization    Hypertension    Patient has a history of HTN. The patient is taking hypertensive medications compliantly without side effects. Denies chest pain, dyspnea, edema, or symptoms of TIA's. BP Readings from Last 3 Encounters:   10/11/18 138/90   07/19/18 166/75   04/23/18 158/85       no cough        Assessment and Plan        1. Type 2 diabetes mellitus without complication, without long-term current use of insulin (HCC)  Change over from [place] and [place] to metformin because of joint pain. Monitor blood sugars  - metFORMIN ER (GLUCOPHAGE XR) 500 mg tablet; Take 2 Tabs by mouth two (2) times a day. Dispense: 360 Tab; Refill: 3  - MICROALBUMIN, UR, RAND W/ MICROALB/CREAT RATIO  - UA/M W/RFLX CULTURE, ROUTINE    2. Essential hypertension  Tolerating medication. Denies dizziness that is positional, SOB, or chest pain. Understands the importance of compliance to reduce risk of future heart failure. Agreed to call if any of above symptoms develop and  stay on current regimen of    Key CAD CHF Meds             ramipril (ALTACE) 10 mg capsule  (Taking) Take 2 Caps by mouth daily. hydroCHLOROthiazide (HYDRODIURIL) 25 mg tablet  (Taking) Take 1 Tab by mouth daily. atorvastatin (LIPITOR) 20 mg tablet  (Taking) Take 1 Tab by mouth daily.     omega-3 fatty acids-vitamin e (FISH OIL) 1,000 mg cap  (Taking) Take 1 Cap by mouth two (2) times a day.       no cough    3. Gastroesophageal reflux disease, esophagitis presence not specified  On NSAIDS. Hx of ulcers. Stay on PPI    4. Medicare annual wellness visit, subsequent  Completed.  is HCP.      5. Screening for depression  PHQ over the last two weeks 10/11/2018   Little interest or pleasure in doing things Not at all   Feeling down, depressed, irritable, or hopeless Not at all   Total Score PHQ 2 0   Trouble falling or staying asleep, or sleeping too much -   Feeling tired or having little energy -   Poor appetite, weight loss, or overeating -   Feeling bad about yourself - or that you are a failure or have let yourself or your family down -   Trouble concentrating on things such as school, work, reading, or watching TV -   Moving or speaking so slowly that other people could have noticed; or the opposite being so fidgety that others notice -   Thoughts of being better off dead, or hurting yourself in some way -   PHQ 9 Score -   How difficult have these problems made it for you to do your work, take care of your home and get along with others -            Visit Vitals    /90 (BP 1 Location: Left arm, BP Patient Position: Sitting)    Pulse (!) 58    Temp 98 °F (36.7 °C) (Oral)    Resp 16    Ht 5' 5.75\" (1.67 m)    Wt 199 lb (90.3 kg)    SpO2 98%    BMI 32.36 kg/m2       Historical Data    Past Medical History:   Diagnosis Date    Adverse effect of anesthesia     severe N/V with versed and narcotics/severe abdominal pain with morphine    Asthma     childhood    Autoimmune disease ([facility] [address]     temporal arteritis/polyarthritis rheumatica    Chronic pain     shoulders/knees    DDD (degenerative disc disease), cervical     Degenerative arthritis of right knee 2/2014    Dr. Monty Moore    Diabetes mellitus, type 2 ([facility] [address] 12/2014    Fibromyalgia     Gastric nodule     gastric nodules on endoscopy 3/26/12    GERD (gastroesophageal reflux disease)     Hyperlipidemia LDL goal < 100     Hypertension     Osteopenia 2016    Actonel started    PUD (peptic ulcer disease)     Pulmonary emboli ([facility] [address] 2006    s/p hysterectomy    Temporal arteritis ([facility] [address] 16    Dr. Shanthi Rangel Thoracic outlet syndrome     left, Dr. Soraida Cheng Thrombocytopenia ([facility] [address] 10/2015    mild    Vitamin D deficiency     Vulvar high-grade squamous intraepithelial lesion (HGSIL)        Past Surgical History:   Procedure Laterality Date    COLONOSCOPY N/A 2017    COLONOSCOPY performed by Bao Hoskins MD at [facility]      several breast biopsies (benign)    HX BREAST BIOPSY Bilateral , , ,    6-7 on R, 2 on L+all benign    HX BUNIONECTOMY      bilateral    HX  SECTION      x3    HX COLONOSCOPY      normal, f/u in 10 yrs    HX DILATION AND CURETTAGE      [id number], several D&C's    HX ENDOSCOPY      x3, h/o gastric polyp removal 3/2012    HX GI      cholecystectomy    HX GYN  2018    OZZIE    HX HEART CATHETERIZATION      x2, most recent 2012 was normal    HX KNEE ARTHROSCOPY Right     HX OTHER SURGICAL      lipoma removal    HX OTHER SURGICAL  16    L temporal artery bx (Dr. Gt Contreras)    HX AZAEL AND BSO      secondary to endometrial hyperplasia with atypical changes    HX TONSILLECTOMY      T & A       Outpatient Encounter Prescriptions as of 10/11/2018   Medication Sig Dispense Refill    metFORMIN ER (GLUCOPHAGE XR) 500 mg tablet Take 2 Tabs by mouth two (2) times a day. 360 Tab 3    [] pneumococcal 13 raudel conj dip (PREVNAR-13) 0.5 mL syrg injection 0.5 mL by IntraMUSCular route once for 1 dose. 0.5 mL 0    risedronate (ACTONEL) 150 mg tablet Take 1 Tab by mouth every thirty (30) days. 3 Tab 11    ramipril (ALTACE) 10 mg capsule Take 2 Caps by mouth daily.  180 Cap 3    ACTEMRA 162 mg/0.9 mL syrg INJECT 162MG SUBCUTANEOUSLY EVERY 7 DAYS. 4 Syringe 5    hydroCHLOROthiazide (HYDRODIURIL) 25 mg tablet Take 1 Tab by mouth daily. 90 Tab 3    predniSONE (DELTASONE) 1 mg tablet 5 mg once daily. Taper as directed (Patient taking differently: Take 3 mg by mouth daily. 5 mg once daily. Taper as directed) 150 Tab 3    omeprazole (PRILOSEC) 40 mg capsule TAKE 1 CAP BY MOUTH DAILY. 90 Cap 3    ibuprofen (MOTRIN) 200 mg tablet Take 600 mg by mouth.  valACYclovir (VALTREX) 1 gram tablet Take 1 Tab by mouth three (3) times daily. 21 Tab 0    DULoxetine (CYMBALTA) 60 mg capsule TAKE 1 CAP BY MOUTH DAILY. 90 Cap 3    atorvastatin (LIPITOR) 20 mg tablet Take 1 Tab by mouth daily. 90 Tab 3    omeprazole (PRILOSEC) 40 mg capsule TAKE 1 CAP BY MOUTH DAILY. 3    estradiol (ESTRACE) 1 mg tablet Take 1 Tab by mouth daily. 90 Tab 3    omega-3 fatty acids-vitamin e (FISH OIL) 1,000 mg cap Take 1 Cap by mouth two (2) times a day.  multivitamin (ONE A DAY) tablet Take 1 Tab by mouth daily.  [DISCONTINUED] tocilizumab (ACTEMRA SC) 162 mg by SubCUTAneous route every seven (7) days.  Insulin Syringe-Needle U-100 1 mL 31 gauge x 5/16 syrg 4 Syringes by Does Not Apply route every seven (7) days. 4 Syringe 6    [DISCONTINUED] FLUARIX QUAD 5850-0294, PF, syrg injection TO BE ADMINISTERED BY PHARMACIST FOR IMMUNIZATION  0    [DISCONTINUED] JANUVIA 100 mg tablet TAKE 1 TABLET BY MOUTH DAILY 90 Tab 3    Cholecalciferol, Vitamin D3, (VITAMIN D3) 2,000 unit cap Take 1 Cap by mouth daily. No facility-administered encounter medications on file as of 10/11/2018. Allergies   Allergen Reactions    [place] Flavor Hives     Elfego fruit not flavor    Alendronate Nausea and Vomiting    Morphine Other (comments)     Extreme epigastric pain    Valium [Diazepam] Nausea and Vomiting    Versed [Midazolam] Nausea and Vomiting        Social History     Social History    Marital status:      Spouse name: N/A    Number of children: N/A    Years of education: N/A     Occupational History    Not on file.      Social History Main Topics    Smoking status: Never Smoker    Smokeless tobacco: Never Used    Alcohol use No    Drug use: No    Sexual activity: Yes     Partners: Male     Other Topics Concern    Not on file     Social History Narrative        family history includes Breast Cancer in her maternal aunt and maternal aunt; Breast Cancer (age of onset: 39) in her maternal aunt and mother; Cancer in her father and mother; Diabetes in her mother and paternal grandmother; Heart Disease in her brother, father, and mother; Heart Surgery in her mother; Hypertension in her father and mother; Osteoporosis in her mother and another family member; Other in her brother, sister, and sister; Psychiatric Disorder in her daughter, daughter, and son; Thyroid Disease in her mother and sister. Review of Systems   Constitutional: Negative for weight loss. Eyes: Negative for blurred vision. Respiratory: Negative for shortness of breath. Cardiovascular: Negative for chest pain. Gastrointestinal: Negative for abdominal pain. Genitourinary: Negative for dysuria and frequency. Skin: Negative for rash. Neurological: Negative for dizziness, focal weakness, weakness and headaches. Endo/Heme/Allergies: Negative for environmental allergies. Does not bruise/bleed easily. Physical Exam   Constitutional: She is oriented to person, place, and time. She appears well-developed and well-nourished. She is active. Non-toxic appearance. She does not have a sickly appearance. She does not appear ill. No distress. Eyes: Conjunctivae are normal. Right eye exhibits no discharge. Neck: Carotid bruit is not present. Cardiovascular: Normal rate, regular rhythm, S1 normal, S2 normal, normal heart sounds and normal pulses. Exam reveals no gallop and no friction rub. Pulmonary/Chest: Effort normal and breath sounds normal. No respiratory distress. Abdominal: Soft.  Bowel sounds are normal.   Musculoskeletal: She exhibits no edema or deformity. Neurological: She is alert and oriented to person, place, and time. Skin: Skin is warm and dry. No rash noted. No pallor. Psychiatric: She has a normal mood and affect. Her behavior is normal.   Vitals reviewed. Ortho Exam      Orders Placed This Encounter    MICROALBUMIN, UR, RAND W/ MICROALB/CREAT RATIO    UA/M W/RFLX CULTURE, ROUTINE    MICROSCOPIC EXAMINATION    metFORMIN ER (GLUCOPHAGE XR) 500 mg tablet     Sig: Take 2 Tabs by mouth two (2) times a day. Dispense:  360 Tab     Refill:  3    pneumococcal 13 raudel conj dip (PREVNAR-13) 0.5 mL syrg injection     Si.5 mL by IntraMUSCular route once for 1 dose. Dispense:  0.5 mL     Refill:  0        I have reviewed the patient's medical history in detail and updated the computerized patient record. We had a prolonged discussion about these complex clinical issues and went over the various important aspects to consider. All questions were answered. Advised her to call back or return to office if symptoms do not improve, change in nature, or persist.    She was given an after visit summary or informed of [facility] Access which includes patient instructions, diagnoses, current medications, & vitals. She expressed understanding with the diagnosis and plan. 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 Assessment of cognitive impairment: Alert and oriented x 3. Patient denies concerns about cognitive impairment. Problem List: Reviewed with patient and discussed risk factors.     Patient Active Problem List   Diagnosis Code    PUD (peptic ulcer disease) K27.9    GERD (gastroesophageal reflux disease) K21.9    OA (osteoarthritis) M19.90    H/O cardiac catheterization Z98.890    Pap smear for cervical cancer screening Z12.4    Hx of screening mammography Z92.89    DDD (degenerative disc disease), cervical M50.30    Vitamin D deficiency E55.9    Screening for osteoporosis Z13.820    Degenerative arthritis of right knee M17.11    Thoracic outlet syndrome G54.0    Fibrocystic disease of breast N60.19    Benign essential hypertension I10    Hyperlipidemia with target LDL less than 100 E78.5    Thrombocytopenia (HCC) D69.6    Diabetic eye exam ([facility] [address] Z01.00, E11.9    Stroke risk Z91.89    Temporal arteritis (HCC) M31.6    Diabetes mellitus, type 2 (HCC) E11.9    Current chronic use of systemic steroids Z79.52    Osteopenia M85.80    Iron deficiency anemia D50.9       Current medical providers:  Patient Care Team:  Caroline Schaeffer MD as PCP - General (Internal Medicine)  Nixon Staton MD (Breast Surgery)  Ericka Nicholson MD as Physician (Endocrinology)  Clarke Gupta MD (Ophthalmology)  Subha Abbasi MD (Neurology)  Lucas Gilliam, RN  Sydni Franks, RN as Benefits Care Manager        End of Life Planning: This was discussed with her today and she  has NO advanced directive  - add't info provided. Reviewed DNR/DNI and patient is no. Depression Screen: Reviewed PQH2 done by nurse.   PHQ over the last two weeks 10/11/2018   Little interest or pleasure in doing things Not at all   Feeling down, depressed, irritable, or hopeless Not at all   Total Score PHQ 2 0   Trouble falling or staying asleep, or sleeping too much -   Feeling tired or having little energy -   Poor appetite, weight loss, or overeating -   Feeling bad about yourself - or that you are a failure or have let yourself or your family down -   Trouble concentrating on things such as school, work, reading, or watching TV -   Moving or speaking so slowly that other people could have noticed; or the opposite being so fidgety that others notice -   Thoughts of being better off dead, or hurting yourself in some way -   PHQ 9 Score -   How difficult have these problems made it for you to do your work, take care of your home and get along with others -       Fall Risk:   Fall Risk Assessment, last 12 mths 10/11/2018   Able to walk? Yes   Fall in past 12 months? No       Home Safety - discussion completed. No issues found. Hearing Loss:  denies any hearing loss    Activities of Daily Living:  Self-care. Requires assistance with: no ADLs    Adult Nutrition Screen:  No risk factors noted. Health Maintenance- Reviewed    AAA Screening:  Glaucoma Screening:       Health Maintenance Due   Topic Date Due    Shingrix Vaccine Age 50> (1 of 2) 09/20/2003    FOOT EXAM Q1  11/11/2017    MEDICARE YEARLY EXAM  09/06/2018    Pneumococcal 65+ Low/Medium Risk (2 of 2 - PPSV23) 09/20/2018        Health Maintenance reviewed -  Plan:      Orders Placed This Encounter    MICROALBUMIN, UR, RAND W/ MICROALB/CREAT RATIO    UA/M W/RFLX CULTURE, ROUTINE    MICROSCOPIC EXAMINATION    metFORMIN ER (GLUCOPHAGE XR) 500 mg tablet    pneumococcal 13 raudel conj dip (PREVNAR-13) 0.5 mL syrg injection           Plan:    Diagnoses and all orders for this visit:    1. Type 2 diabetes mellitus without complication, without long-term current use of insulin (HCC)  -     metFORMIN ER (GLUCOPHAGE XR) 500 mg tablet; Take 2 Tabs by mouth two (2) times a day. -     MICROALBUMIN, UR, RAND W/ MICROALB/CREAT RATIO  -     UA/M W/RFLX CULTURE, ROUTINE    2. Essential hypertension    3. Gastroesophageal reflux disease, esophagitis presence not specified    4. Medicare annual wellness visit, subsequent    5. Screening for depression    Other orders  -     pneumococcal 13 raudel conj dip (PREVNAR-13) 0.5 mL syrg injection; 0.5 mL by IntraMUSCular route once for 1 dose.   -     MICROSCOPIC EXAMINATION      Orders Placed This Encounter    MICROALBUMIN, UR, RAND W/ MICROALB/CREAT RATIO    UA/M W/RFLX CULTURE, ROUTINE    MICROSCOPIC EXAMINATION    metFORMIN ER (GLUCOPHAGE XR) 500 mg tablet    pneumococcal 13 raudel conj dip (PREVNAR-13) 0.5 mL syrg injection         Follow-up Disposition:  Return in about 6 months (around 4/11/2019) for Follow up 15 minutes. Reviewed with patient the treatment plan, goals of treatment plan, and limitations of treatment plan, to include the potential for side effects from medications and procedures. If side effects occur, it is the responsibility of the patient to inform the clinic so that a change in the treatment plan can be made in a safe manner. The patient is advised that stopping prescribed medication may cause an increase in symptoms and possible medication withdrawal symptoms. The patient is informed an emergency room evaluation may be necessary if this occurs. Patient verbalized understanding and is in agreement with treatment plan as outlined above. All questions answered.

## 2018-10-13 LAB
ALBUMIN/CREAT UR: 3.4 MG/G CREAT (ref 0–30)
APPEARANCE UR: CLEAR
BACTERIA #/AREA URNS HPF: ABNORMAL /[HPF]
BILIRUB UR QL STRIP: NEGATIVE
CASTS URNS QL MICRO: ABNORMAL /LPF
COLOR UR: YELLOW
CREAT UR-MCNC: 97.7 MG/DL
CRYSTALS URNS MICRO: ABNORMAL
EPI CELLS #/AREA URNS HPF: ABNORMAL /HPF
GLUCOSE UR QL: ABNORMAL
HGB UR QL STRIP: NEGATIVE
KETONES UR QL STRIP: NEGATIVE
LEUKOCYTE ESTERASE UR QL STRIP: NEGATIVE
MICRO URNS: ABNORMAL
MICRO URNS: ABNORMAL
MICROALBUMIN UR-MCNC: 3.3 UG/ML
MUCOUS THREADS URNS QL MICRO: PRESENT
NITRITE UR QL STRIP: NEGATIVE
PH UR STRIP: 5 [PH] (ref 5–7.5)
PROT UR QL STRIP: NEGATIVE
RBC #/AREA URNS HPF: ABNORMAL /HPF
SP GR UR: 1.02 (ref 1–1.03)
UNIDENT CRYS URNS QL MICRO: PRESENT
URINALYSIS REFLEX, 377202: ABNORMAL
UROBILINOGEN UR STRIP-MCNC: 0.2 MG/DL (ref 0.2–1)
WBC #/AREA URNS HPF: ABNORMAL /HPF

## 2018-10-13 NOTE — PROGRESS NOTES
Urine test negative. No proteinuria.   Some glucose  Message sent to patient via [employer]:  October 13, 2018

## 2018-10-16 ENCOUNTER — HOSPITAL ENCOUNTER (OUTPATIENT)
Dept: LAB | Age: 63
Discharge: HOME | End: 2018-10-16
Payer: MEDICARE

## 2018-10-16 ENCOUNTER — OFFICE VISIT (OUTPATIENT)
Dept: RHEUMATOLOGY | Age: 63
End: 2018-10-16

## 2018-10-16 VITALS
WEIGHT: 200.6 LBS | BODY MASS INDEX: 33.42 KG/M2 | HEART RATE: 76 BPM | SYSTOLIC BLOOD PRESSURE: 143 MMHG | RESPIRATION RATE: 18 BRPM | TEMPERATURE: 98.1 F | DIASTOLIC BLOOD PRESSURE: 81 MMHG | OXYGEN SATURATION: 95 % | HEIGHT: 65 IN

## 2018-10-16 DIAGNOSIS — M31.6 GCA (GIANT CELL ARTERITIS) (HCC): ICD-10-CM

## 2018-10-16 DIAGNOSIS — M35.3 PMR (POLYMYALGIA RHEUMATICA) (HCC): Primary | ICD-10-CM

## 2018-10-16 PROCEDURE — 85651 RBC SED RATE NONAUTOMATED: CPT

## 2018-10-16 PROCEDURE — 36415 COLL VENOUS BLD VENIPUNCTURE: CPT

## 2018-10-16 PROCEDURE — 86140 C-REACTIVE PROTEIN: CPT

## 2018-10-16 PROCEDURE — 80053 COMPREHEN METABOLIC PANEL: CPT

## 2018-10-16 PROCEDURE — 85007 BL SMEAR W/DIFF WBC COUNT: CPT

## 2018-10-16 RX ORDER — NAPROXEN 500 MG/1
500 TABLET ORAL 2 TIMES DAILY WITH MEALS
Qty: 60 TAB | Refills: 6 | Status: SHIPPED | OUTPATIENT
Start: 2018-10-16 | End: 2020-03-12

## 2018-10-16 NOTE — PROGRESS NOTES
RHEUMATOLOGY PROBLEM LIST AND CHIEF COMPLAINT  1. GCA/PMR - HAs, left temporal artery discomfort, sudden loss of vision, arthralgia of shoulder, TMJ, hands, feet, response to steroids    Therapy History:  Previous DMARDs: methotrexate (1/2017 - 4/2018)   Current DMARDs: Actemra (2/2018, stopped for 1 month; 3/2018-current)    INTERVAL HISTORY  This is a 70 y.o.  female. Today, the patient complains of pain in the joints. Location: shoulder, hand, foot  Severity:  3 on a scale of 0-10  Timing: all day   Duration:  1 month  Context/Associated signs and symptoms: The patient tapered prednisone completely until she experienced symptoms of b/l foot pain ~1 month ago. She started prednisone 5 mg daily and has tapered to 3 mg daily. Her worst symptoms today are foot stiffness and pain, and heel tenderness. She also complains of hand pain. She continues with Actemra SQ weekly. RHEUMATOLOGY REVIEW OF SYSTEMS   Positives as per history  Negatives as follows:  Donelda Paul:  Denies unexplained persistent fevers or weight change  RESPIRATORY:  No pleuritic pain, exertional dyspnea  CARDIOVASCULAR:  Denies chest pain  GASTRO:   Denies heartburn, abdominal pain, nausea, vomiting, diarrhea  SKIN:    Denies rash   MSK:    No morning stiffness >1 hour, persistent joint swelling    PAST MEDICAL, SOCIAL AND FAMILY HISTORY  Reviewed with patient, significant changes in medical history - no    PHYSICAL EXAM  Blood pressure 143/81, pulse 76, temperature 98.1 °F (36.7 °C), temperature source Oral, resp. rate 18, height 5' 5\" (1.651 m), weight 200 lb 9.6 oz (91 kg), SpO2 95 %. GENERAL APPEARANCE: Well-nourished adult in no acute distress. EYES: No scleral erythema, conjunctival injection. ENT: No oral ulcer, parotid enlargement. NECK: No adenopathy, thyroid enlargement. CARDIOVASCULAR: Heart rhythm is regular. No murmur, rub, gallop. CHEST: Normal vesicular breath sounds.  No wheezes, rales, pleural friction rubs.  ABDOMINAL: The abdomen is soft and nontender. Liver and spleen are nonpalpable. Bowel sounds are normal.  EXTREMITIES: There is no evidence of clubbing, cyanosis, edema. SKIN: No rash, palpable purpura, digital ulcer, abnormal thickening. NEUROLOGICAL: Normal gait and station, full strength in upper and lower extremities, normal sensation to light touch. MUSCULOSKELETAL:  Upper extremities - full range of motion, no tenderness, no swelling, no synovial thickening and no deformity of joints except for Right shoulder crepitus  Lower extremities - full range of motion, no tenderness, no swelling, no synovial thickening and no deformity of joints except for B/L knees crepitus (R>L)    LABS, RADIOLOGY AND PROCEDURES   Previous labs reviewed -Yes    ASSESSMENT  1. GCA/PMR - (Established problem -  Very good partial response) - The majority of her pain today is due to OA. However, I suspect the patient's b/l foot pain is due to a PMR flare. She restarted prednisone 5 mg in September, and has tapered to 3 mg daily. I want her to take 10 mg daily of prednisone for 2 days and monitor symptoms. If symptoms improve, she should start Naproxen 500 mg BID indefinitely. Reevaluate treatment plan if her labs today are abnormal. She will return in 3 months for a follow up. 2. Bone Health - Most recent bone density (9/2017) showed osteopenia. She is taking Actonel for this. She is continuing with prednisone taper for GCA/PMR  3. Drug therapy monitoring for toxicity (biologic) - CBC, BUN, Cr, AST, ALT and albumin every 4 months    PLAN  1. Prednisone 10 mg daily for 2 days; monitor symptoms  2. Start Naproxen 500 mg BID  3. Actemra SQ weekly  4. Check CBC, CMP, markers of inflammation (ESR, CRP)  5. Return in 3 months      Aarat M. Verla Holstein, MD  Adult and Pediatric Rheumatology     [address] [place] [address], Phone [phone], Fax [phone]   E-mail: [email or web address]    Visiting  of Pediatrics    Department of Pediatrics, [address], [address] [place] [address], Phone [phone], Fax [phone]  E-mail: [email or web address]    There are no Patient Instructions on file for this visit. cc:  Esperanza Canchola MD  [facility] (Ophthalmology)    Written by josué Madrigal, as dictated by Nazario Sanderson.  Cj Hurtado M.D.

## 2018-10-16 NOTE — ANCILLARY DISCHARGE INSTRUCTIONS
[address] [facility], [facility] [address]  Phone: [phone]  Fax: [phone]    Discharge Summary  2-15    Patient name: Nai Prather MD  : 1955  Provider#: [id number]  Referral source: Raimundo Blevins MD      Medical/Treatment Diagnosis: Left shoulder pain [M25.512]     Prior Hospitalization: see medical history     Comorbidities: See Plan of Care  Prior Level of Function:See Plan of Care  Medications: Verified on Patient Summary List  Discharge Summary 2-15       Patient name: Nai Prather MD                                  : 1955                                              Provider#: [id number]  Referral source: Raimundo Blevins MD                                                                                     Medical/Treatment Diagnosis: Left shoulder pain [M25.512]                                                                 Prior Hospitalization: see medical history                                                                    Comorbidities: See Plan of Care  Prior Level of Function: See Plan of Care  Medications: Verified on Patient Summary List    Start of Care: 18                                                                                                                      Onset Date: 18                  Visits from Start of Care: 6                                                                                                             Missed Visits: 0  Reporting Period : 18 to 10/3/18     Assessment/Summary of care: Patient has met all STG and LTG and has significantly improved FOTO from 56 to 69, demonstrating improvement in ADLs and overall function. Patient has full shoulder AROM in all directions with no pain present. Patient will continue HEP.        Short Term Goals: To be accomplished in 4 weeks:  1) The patient will be independent with introductory HEP - MET  2) The patient will improve shoulder flexion AROM to 140 to improve ease with reaching tasks - MET  3) The patient will improve shoulder abduction AROM to 115 to improve ease with dressing - MET  Long Term Goals: To be accomplished in 12 weeks:  1) The patient will report ability to complete household chores without an increase in pain - MET  2) The patient will demonstrate pain free shoulder flexion AROM WFL to improve ease with ADLs - MET  3) The patient will demonstrate 5/5 BUE strength to improve ease with lifting tasks - MET     G-Codes (GP)  Self Care   Goal  CJ= 20-39%   D/C  CJ= 20-39%     The severity rating is based on clinical judgment and the FOTO Score score.       RECOMMENDATIONS:  [x]Discontinue therapy: [x]Patient has reached or is progressing toward set goals      []Patient is non-compliant or has abdicated      []Due to lack of appreciable progress towards set goals      []Other    Matheus Luna, PT 10/16/2018 12:46 PM

## 2018-10-16 NOTE — MR AVS SNAPSHOT
[address] [facility] [address] 
[phone] Patient: Jorge Palumbo MD 
MRN: [id number] Visit Information Date & Time Provider Department Dept. Phone Encounter #  
 10/16/2018  2:00 PM Angelina Glover MD [facility] [phone] [id number] Follow-up Instructions Return in about 3 months (around 1/16/2019). Your Appointments 4/11/2019  2:15 PM  
ROUTINE CARE with MD Magali Roche [facility] Appt Note: 6 mo f/u  
 [address], [address]  
[address], [facility] [address] [facility] [address] [address] Upcoming Health Maintenance Date Due Shingrix Vaccine Age 50> (1 of 2) 9/20/2003 FOOT EXAM Q1 11/11/2017 Pneumococcal 65+ Low/Medium Risk (2 of 2 - PPSV23) 9/20/2018 HEMOGLOBIN A1C Q6M 2/28/2019 LIPID PANEL Q1 4/23/2019 EYE EXAM RETINAL OR DILATED Q1 6/12/2019 MICROALBUMIN Q1 10/11/2019 MEDICARE YEARLY EXAM 10/12/2019 PAP AKA CERVICAL CYTOLOGY 12/20/2019 GLAUCOMA SCREENING Q2Y 6/12/2020 BREAST CANCER SCRN MAMMOGRAM 8/9/2020 DTaP/Tdap/Td series (2 - Td) 1/1/2021 COLONOSCOPY 5/17/2027 Allergies as of 10/16/2018  Review Complete On: 10/16/2018 By: Jess Hurley LPN Severity Noted Reaction Type Reactions Agricola Flavor Medium 07/22/2012   Systemic Hives Agricola fruit not flavor Alendronate  07/06/2016    Nausea and Vomiting Morphine  07/22/2012    Other (comments) Extreme epigastric pain Valium [Diazepam]  03/07/2017    Nausea and Vomiting Versed [Midazolam]  10/16/2013    Nausea and Vomiting Current Immunizations  Reviewed on 9/14/2017 Name Date Influenza High Dose Vaccine PF 9/21/2018 Influenza Vaccine 10/5/2016, 9/28/2015, 10/1/2013 Pneumococcal Conjugate (PCV-13) 10/6/2016 Tdap 1/1/2011 Zoster Vaccine, Live 11/1/2015 Not reviewed this visit You Were Diagnosed With   
  
 Codes Comments PMR (polymyalgia rheumatica) ([facility])    -  Primary ICD-10-CM: M35.3 ICD-9-CM: 431 GCA (giant cell arteritis) ([facility] 75.)     ICD-10-CM: M31.6 ICD-9-CM: 446. 5 Vitals BP Pulse Temp Resp Height(growth percentile) Weight(growth percentile) 143/81 (BP 1 Location: Right arm, BP Patient Position: Sitting) 76 98.1 °F (36.7 °C) (Oral) 18 5' 5\" (1.651 m) 200 lb 9.6 oz (91 kg) SpO2 BMI OB Status Smoking Status 95% 33.38 kg/m2 Hysterectomy Never Smoker Vitals History BMI and BSA Data Body Mass Index Body Surface Area  
 33.38 kg/m 2 2.04 m 2 Preferred Pharmacy Pharm[facility] Name Phone [id number] [address] [place] [address]) [phone] Your Updated Medication List  
  
   
This list is accurate as of 10/16/18  2:43 PM.  Always use your most recent med list.  
  
  
  
  
 ACTEMRA 162 mg/0.9 mL Syrg Generic drug:  tocilizumab INJECT 162MG SUBCUTANEOUSLY EVERY 7 DAYS. atorvastatin 20 mg tablet Commonly known as:  LIPITOR Take 1 Tab by mouth daily. DULoxetine 60 mg capsule Commonly known as:  CYMBALTA TAKE 1 CAP BY MOUTH DAILY. estradiol 1 mg tablet Commonly known as:  ESTRACE Take 1 Tab by mouth daily. FISH OIL 1,000 mg Cap Generic drug:  omega-3 fatty acids-vitamin e Take 1 Cap by mouth two (2) times a day. hydroCHLOROthiazide 25 mg tablet Commonly known as:  HYDRODIURIL Take 1 Tab by mouth daily. ibuprofen 200 mg tablet Commonly known as:  MOTRIN Take 600 mg by mouth. Insulin Syringe-Needle U-100 1 mL 31 gauge x 5/16 Syrg 4 Syringes by Does Not Apply route every seven (7) days. metFORMIN  mg tablet Commonly known as:  GLUCOPHAGE XR Take 2 Tabs by mouth two (2) times a day. multivitamin tablet Commonly known as:  ONE A DAY Take 1 Tab by mouth daily. naproxen 500 mg tablet Commonly known as:  NAPROSYN Take 1 Tab by mouth two (2) times daily (with meals) for 30 days. * omeprazole 40 mg capsule Commonly known as:  PRILOSEC  
TAKE 1 CAP BY MOUTH DAILY. * omeprazole 40 mg capsule Commonly known as:  PRILOSEC  
TAKE 1 CAP BY MOUTH DAILY. predniSONE 1 mg tablet Commonly known as:  DELTASONE  
5 mg once daily. Taper as directed  
  
 ramipril 10 mg capsule Commonly known as:  ALTACE Take 2 Caps by mouth daily. risedronate 150 mg tablet Commonly known as:  ACTONEL Take 1 Tab by mouth every thirty (30) days. valACYclovir 1 gram tablet Commonly known as:  VALTREX Take 1 Tab by mouth three (3) times daily. VITAMIN D3 2,000 unit Cap capsule Generic drug:  Cholecalciferol (Vitamin D3) Take 1 Cap by mouth daily. * Notice: This list has 2 medication(s) that are the same as other medications prescribed for you. Read the directions carefully, and ask your doctor or other care provider to review them with you. Prescriptions Sent to Pharmacy Refills  
 naproxen (NAPROSYN) 500 mg tablet 6 Sig: Take 1 Tab by mouth two (2) times daily (with meals) for 30 days. Class: Normal  
 Pharmacy: [id number]- [address], [address] ([place] [address]) Ph #: [phone] Route: Oral  
  
We Performed the Following C REACTIVE PROTEIN, QT [64501 CPT(R)] CBC+PLATELET+HEM REVIEW [80472 CPT(R)] METABOLIC PANEL, COMPREHENSIVE [72545 CPT(R)] SED RATE (ESR) [id number] CPT(R)] Follow-up Instructions Return in about 3 months (around 1/16/2019). Introducing hospitals & HEALTH SERVICES! Dear Tre Fung: Thank you for requesting a [employer] account. Our records indicate that you already have an active [employer] account. You can access your account anytime at [email or web address] Did you know that you can access your hospital and ER discharge instructions at any time in [employer]? You can also review all of your test results from your hospital stay or ER visit. Additional Information If you have questions, please visit the Frequently Asked Questions section of the [employer] website at [email or web address] Remember, [employer] is NOT to be used for urgent needs. For medical emergencies, dial 911. Now available from your iPhone and Android! Please provide this summary of care documentation to your next provider. Your primary care clinician is listed as Blue Mckinley. If you have any questions after today's visit, please call [phone].

## 2018-10-18 LAB
ALBUMIN SERPL-MCNC: 4.3 G/DL (ref 3.6–4.8)
ALBUMIN/GLOB SERPL: 1.9 {RATIO} (ref 1.2–2.2)
ALP SERPL-CCNC: 47 IU/L (ref 39–117)
ALT SERPL-CCNC: 23 IU/L (ref 0–32)
AST SERPL-CCNC: 24 IU/L (ref 0–40)
BASOPHILS # BLD MANUAL: 0 X10E3/UL (ref 0–0.2)
BASOPHILS NFR BLD MANUAL: 0 %
BILIRUB SERPL-MCNC: 0.2 MG/DL (ref 0–1.2)
BUN SERPL-MCNC: 18 MG/DL (ref 8–27)
BUN/CREAT SERPL: 20 (ref 12–28)
CALCIUM SERPL-MCNC: 9.2 MG/DL (ref 8.7–10.3)
CHLORIDE SERPL-SCNC: 101 MMOL/L (ref 96–106)
CO2 SERPL-SCNC: 21 MMOL/L (ref 20–29)
CREAT SERPL-MCNC: 0.89 MG/DL (ref 0.57–1)
CRP SERPL-MCNC: 3.8 MG/L (ref 0–4.9)
DIFFERENTIAL COMMENT, 115260: NORMAL
EOSINOPHIL # BLD MANUAL: 0 X10E3/UL (ref 0–0.4)
EOSINOPHIL NFR BLD MANUAL: 1 %
ERYTHROCYTE [DISTWIDTH] IN BLOOD BY AUTOMATED COUNT: 14.5 % (ref 12.3–15.4)
ERYTHROCYTE [SEDIMENTATION RATE] IN BLOOD BY WESTERGREN METHOD: 2 MM/HR (ref 0–40)
GLOBULIN SER CALC-MCNC: 2.3 G/DL (ref 1.5–4.5)
GLUCOSE SERPL-MCNC: 101 MG/DL (ref 65–99)
HCT VFR BLD AUTO: 40.1 % (ref 34–46.6)
HGB BLD-MCNC: 12.8 G/DL (ref 11.1–15.9)
LYMPHOCYTES # BLD MANUAL: 1.6 X10E3/UL (ref 0.7–3.1)
LYMPHOCYTES NFR BLD MANUAL: 34 %
MCH RBC QN AUTO: 30.1 PG (ref 26.6–33)
MCHC RBC AUTO-ENTMCNC: 31.9 G/DL (ref 31.5–35.7)
MCV RBC AUTO: 94 FL (ref 79–97)
MONOCYTES # BLD MANUAL: 0.6 X10E3/UL (ref 0.1–0.9)
MONOCYTES NFR BLD MANUAL: 12 %
NEUTROPHILS # BLD MANUAL: 2.5 X10E3/UL (ref 1.4–7)
NEUTROPHILS NFR BLD MANUAL: 53 %
PLATELET # BLD AUTO: 321 X10E3/UL (ref 150–379)
PLATELET BLD QL SMEAR: ADEQUATE
POTASSIUM SERPL-SCNC: 4.5 MMOL/L (ref 3.5–5.2)
PROT SERPL-MCNC: 6.6 G/DL (ref 6–8.5)
RBC # BLD AUTO: 4.25 X10E6/UL (ref 3.77–5.28)
RBC MORPH BLD: NORMAL
SODIUM SERPL-SCNC: 139 MMOL/L (ref 134–144)
WBC # BLD AUTO: 4.8 X10E3/UL (ref 3.4–10.8)

## 2018-10-30 RX ORDER — ATORVASTATIN CALCIUM 20 MG/1
20 TABLET, FILM COATED ORAL DAILY
Qty: 90 TAB | Refills: 3 | Status: SHIPPED | OUTPATIENT
Start: 2018-10-30 | End: 2019-10-23 | Stop reason: SDUPTHER

## 2018-10-30 NOTE — TELEPHONE ENCOUNTER
Requested Prescriptions     Pending Prescriptions Disp Refills    atorvastatin (LIPITOR) 20 mg tablet 90 Tab 3     Sig: Take 1 Tab by mouth daily.  SITagliptin (JANUVIA) 100 mg tablet 90 Tab 1     Sig: Take 1 Tab by mouth daily. This was called in by the pharmacy.   10/11/2018  04/11/2019  [facility]

## 2018-11-19 DIAGNOSIS — Z79.899 ENCOUNTER FOR LONG-TERM (CURRENT) USE OF MEDICATIONS: ICD-10-CM

## 2018-11-19 RX ORDER — DULOXETIN HYDROCHLORIDE 60 MG/1
CAPSULE, DELAYED RELEASE ORAL
Qty: 90 CAP | Refills: 3 | Status: SHIPPED | OUTPATIENT
Start: 2018-11-19 | End: 2019-10-29 | Stop reason: SDUPTHER

## 2018-11-19 NOTE — TELEPHONE ENCOUNTER
PCP: Kati Jacobs MD    Last appt: 10/11/2018  Future Appointments   Date Time Provider Aggie Zapata   1/15/2019  2:00 PM Saroj Jiménez MD [address] [place] [address]   4/11/2019  2:15 PM Kati Jacobs MD [facility] 10.       Requested Prescriptions     Pending Prescriptions Disp Refills    DULoxetine (CYMBALTA) 60 mg capsule 90 Cap 3

## 2018-12-13 RX ORDER — NAPROXEN 500 MG/1
500 TABLET ORAL 2 TIMES DAILY WITH MEALS
Qty: 180 TAB | Refills: 2 | Status: SHIPPED | OUTPATIENT
Start: 2018-12-13 | End: 2021-02-05

## 2018-12-18 ENCOUNTER — DOCUMENTATION ONLY (OUTPATIENT)
Dept: RHEUMATOLOGY | Age: 63
End: 2018-12-18

## 2019-01-15 ENCOUNTER — HOSPITAL ENCOUNTER (OUTPATIENT)
Dept: LAB | Age: 64
Discharge: HOME | End: 2019-01-15
Payer: MEDICARE

## 2019-01-15 ENCOUNTER — OFFICE VISIT (OUTPATIENT)
Dept: RHEUMATOLOGY | Age: 64
End: 2019-01-15

## 2019-01-15 VITALS
OXYGEN SATURATION: 98 % | HEART RATE: 79 BPM | BODY MASS INDEX: 32.29 KG/M2 | SYSTOLIC BLOOD PRESSURE: 118 MMHG | TEMPERATURE: 98.4 F | DIASTOLIC BLOOD PRESSURE: 78 MMHG | HEIGHT: 65 IN | WEIGHT: 193.8 LBS | RESPIRATION RATE: 20 BRPM

## 2019-01-15 DIAGNOSIS — M35.3 PMR (POLYMYALGIA RHEUMATICA) (HCC): Primary | ICD-10-CM

## 2019-01-15 DIAGNOSIS — M31.6 GCA (GIANT CELL ARTERITIS) (HCC): ICD-10-CM

## 2019-01-15 PROCEDURE — 36415 COLL VENOUS BLD VENIPUNCTURE: CPT

## 2019-01-15 PROCEDURE — 86140 C-REACTIVE PROTEIN: CPT

## 2019-01-15 PROCEDURE — 80053 COMPREHEN METABOLIC PANEL: CPT

## 2019-01-15 PROCEDURE — 85651 RBC SED RATE NONAUTOMATED: CPT

## 2019-01-15 PROCEDURE — 85007 BL SMEAR W/DIFF WBC COUNT: CPT

## 2019-01-15 RX ORDER — PREDNISONE 5 MG/1
5 TABLET ORAL DAILY
Qty: 90 TAB | Refills: 1 | Status: SHIPPED | OUTPATIENT
Start: 2019-01-15 | End: 2019-07-20 | Stop reason: SDUPTHER

## 2019-01-15 NOTE — PROGRESS NOTES
RHEUMATOLOGY PROBLEM LIST AND CHIEF COMPLAINT  1. GCA/PMR - HAs, left temporal artery discomfort, sudden loss of vision, arthralgia of shoulder, TMJ, hands, feet, response to steroids    Therapy History:  Previous DMARDs: methotrexate (1/2017 - 4/2018)   Current DMARDs: Actemra (2/2018, stopped for 1 month; 3/2018-current)    INTERVAL HISTORY  This is a 70 y.o.  female. Today, the patient complains of pain in the joints. Location: shoulders, temporal artery  Severity:  7 on a scale of 0-10  Timing: all day   Duration: 3 months  Context/Associated signs and symptoms: The patient was unable to taper prednisone completely and restarted 5 mg daily med-November. She was doing well in December and tapering prednisone by 1 mg q2 weeks. She is currently on 3 mg of prednisone for the last week and complains of joint pain, shoulder pain, and HAs. The HAs and temporal artery pain are her worst complaint today. She went back up to prednisone 5 mg a couple days ago, which improved her symptoms. She continues with Actemra SQ weekly and states Actemra runs out the day before her next injection.       RHEUMATOLOGY REVIEW OF SYSTEMS   Positives as per HPI  Negatives as follows:  Solmon Robinson:  Denies unexplained persistent fevers, weight change, chronic fatigue  HEAD/EYES:   Denies eye redness, blurry vision or sudden loss of vision, dry eyes  ENT:    Denies oral/nasal ulcers, recurrent sinus infections, dry mouth  RESPIRATORY:  No pleuritic pain, history of pleural effusions, hemoptysis, exertional dyspnea  CARDIOVASCULAR:  Denies chest pain, history of pericardial effusions  GASTRO:   Denies heartburn, esophageal dysmotility, abdominal pain, nausea, vomiting, diarrhea, blood in the stool  HEMATOLOGIC:  No easy bruising, purpura, swollen lymph nodes  SKIN:    Denies alopecia, ulcers, nodules, sun sensitivity, unexplained persistent rash   VASCULAR:   Denies edema, cyanosis, raynaud phenomenon  NEUROLOGIC:  Denies specific muscle weakness, paresthesias   PSYCHIATRIC:  No sleep disturbance / snoring, depression, anxiety  MSK:    No morning stiffness >1 hour, SI joint pain    PAST MEDICAL, SOCIAL AND FAMILY HISTORY  Reviewed with patient, significant changes in medical history - no    PHYSICAL EXAM  Blood pressure 118/78, pulse 79, temperature 98.4 °F (36.9 °C), temperature source Oral, resp. rate 20, height 5' 5\" (1.651 m), weight 193 lb 12.8 oz (87.9 kg), SpO2 98 %. GENERAL APPEARANCE: Well-nourished adult in no acute distress. Temporal artery pain. EYES: No scleral erythema, conjunctival injection. ENT: No oral ulcer, parotid enlargement. NECK: No adenopathy, thyroid enlargement. CARDIOVASCULAR: Heart rhythm is regular. No murmur, rub, gallop. CHEST: Normal vesicular breath sounds. No wheezes, rales, pleural friction rubs. ABDOMINAL: The abdomen is soft and nontender. Liver and spleen are nonpalpable. Bowel sounds are normal.  EXTREMITIES: There is no evidence of clubbing, cyanosis, edema. SKIN: No rash, palpable purpura, digital ulcer, abnormal thickening. NEUROLOGICAL: Normal gait and station, full strength in upper and lower extremities, normal sensation to light touch. MUSCULOSKELETAL:  Upper extremities - Left shoulder dROM w/ int rotation Right shoulder crepitus  Lower extremities - full range of motion, no tenderness, no swelling, no synovial thickening and no deformity of joints except for B/L knees crepitus (R>L)    LABS, RADIOLOGY AND PROCEDURES   Previous labs reviewed -Yes    ASSESSMENT  1. GCA/PMR - (Established problem -  Very good partial response) - Actemra has not completely controlled her symptoms given her concerning complaints of HAs, temporal artery pain, and joint pain. I will increase her prednisone 10 mg daily for a few days and have her taper down to 5 mg daily. I am fine with her continuing 5 mg for 1-2 months.  We will consider low dose MTX if she is unable to taper the prednisone after a couple of months. We will continue Actemra SQ weekly, naproxen 500 mg BID. I will check some labs today given her complaint of temporal artery pain. She should return in 3 months for a follow up. 2. Bone Health - Most recent bone density (2017) showed osteopenia. She is taking Actonel for this. She is continuing with prednisone taper for GCA/PMR  3. Drug therapy monitoring for toxicity (biologic) - CBC, BUN, Cr, AST, ALT and albumin every 4 months    PLAN  1. Prednisone 10 mg daily for 2 days; monitor symptoms and taper down to 5 mg daily  2. Naproxen 500 mg BID  3. Actemra SQ weekly  4. Check CBC, CMP, markers of inflammation (ESR, CRP)  5. Consider low dose MTX  6. Return in 3 months      Shai Goodson MD  Adult and Pediatric Rheumatology     [address] [place] [address], Phone [phone], Fax [phone]   E-mail: [email or web address]    Visiting  of Pediatrics    Department of Pediatrics, [facility] of [address] [place] [address], [address] [place] [address], Phone [phone], Fax [phone]  E-mail: [email or web address]    There are no Patient Instructions on file for this visit. cc:  MD Willy Naik (Ophthalmology)    Written by josué Elliott, as dictated by Bev Erwin.  Roz Goodson M.D.

## 2019-01-16 LAB
ALBUMIN SERPL-MCNC: 4.8 G/DL (ref 3.6–4.8)
ALBUMIN/GLOB SERPL: 1.8 {RATIO} (ref 1.2–2.2)
ALP SERPL-CCNC: 49 IU/L (ref 39–117)
ALT SERPL-CCNC: 24 IU/L (ref 0–32)
AST SERPL-CCNC: 27 IU/L (ref 0–40)
BASOPHILS # BLD MANUAL: 0 X10E3/UL (ref 0–0.2)
BASOPHILS NFR BLD MANUAL: 0 %
BILIRUB SERPL-MCNC: 0.3 MG/DL (ref 0–1.2)
BUN SERPL-MCNC: 21 MG/DL (ref 8–27)
BUN/CREAT SERPL: 23 (ref 12–28)
CALCIUM SERPL-MCNC: 9.8 MG/DL (ref 8.7–10.3)
CHLORIDE SERPL-SCNC: 96 MMOL/L (ref 96–106)
CO2 SERPL-SCNC: 25 MMOL/L (ref 20–29)
CREAT SERPL-MCNC: 0.93 MG/DL (ref 0.57–1)
CRP SERPL-MCNC: 1.8 MG/L (ref 0–4.9)
DIFFERENTIAL COMMENT, 115260: ABNORMAL
EOSINOPHIL # BLD MANUAL: 0 X10E3/UL (ref 0–0.4)
EOSINOPHIL NFR BLD MANUAL: 0 %
ERYTHROCYTE [DISTWIDTH] IN BLOOD BY AUTOMATED COUNT: 14.5 % (ref 12.3–15.4)
ERYTHROCYTE [SEDIMENTATION RATE] IN BLOOD BY WESTERGREN METHOD: 3 MM/HR (ref 0–40)
GLOBULIN SER CALC-MCNC: 2.6 G/DL (ref 1.5–4.5)
GLUCOSE SERPL-MCNC: 130 MG/DL (ref 65–99)
HCT VFR BLD AUTO: 39.6 % (ref 34–46.6)
HGB BLD-MCNC: 13.2 G/DL (ref 11.1–15.9)
LYMPHOCYTES # BLD MANUAL: 1.5 X10E3/UL (ref 0.7–3.1)
LYMPHOCYTES NFR BLD MANUAL: 34 %
MCH RBC QN AUTO: 28.6 PG (ref 26.6–33)
MCHC RBC AUTO-ENTMCNC: 33.3 G/DL (ref 31.5–35.7)
MCV RBC AUTO: 86 FL (ref 79–97)
MONOCYTES # BLD MANUAL: 0.3 X10E3/UL (ref 0.1–0.9)
MONOCYTES NFR BLD MANUAL: 7 %
NEUTROPHILS # BLD MANUAL: 2.7 X10E3/UL (ref 1.4–7)
NEUTROPHILS NFR BLD MANUAL: 59 %
PLATELET # BLD AUTO: 386 X10E3/UL (ref 150–379)
PLATELET BLD QL SMEAR: ABNORMAL
POTASSIUM SERPL-SCNC: 4.1 MMOL/L (ref 3.5–5.2)
PROT SERPL-MCNC: 7.4 G/DL (ref 6–8.5)
RBC # BLD AUTO: 4.62 X10E6/UL (ref 3.77–5.28)
RBC MORPH BLD: ABNORMAL
SODIUM SERPL-SCNC: 140 MMOL/L (ref 134–144)
WBC # BLD AUTO: 4.5 X10E3/UL (ref 3.4–10.8)

## 2019-02-27 RX ORDER — METHOTREXATE 2.5 MG/1
15 TABLET ORAL
Qty: 24 TAB | Refills: 6 | Status: SHIPPED | OUTPATIENT
Start: 2019-03-03 | End: 2020-03-12

## 2019-02-28 RX ORDER — FOLIC ACID 1 MG/1
1 TABLET ORAL DAILY
Qty: 60 TAB | Refills: 11 | Status: SHIPPED | OUTPATIENT
Start: 2019-02-28 | End: 2019-06-02 | Stop reason: SDUPTHER

## 2019-03-12 ENCOUNTER — TELEPHONE (OUTPATIENT)
Dept: RHEUMATOLOGY | Age: 64
End: 2019-03-12

## 2019-03-14 RX ORDER — SODIUM CHLORIDE 9 MG/ML
25 INJECTION, SOLUTION INTRAVENOUS CONTINUOUS
Status: DISCONTINUED | OUTPATIENT
Start: 2019-03-19 | End: 2019-03-20 | Stop reason: HOSPADM

## 2019-03-14 RX ORDER — ACETAMINOPHEN 325 MG/1
650 TABLET ORAL
Status: DISCONTINUED | OUTPATIENT
Start: 2019-03-19 | End: 2019-03-20 | Stop reason: HOSPADM

## 2019-03-14 RX ORDER — DIPHENHYDRAMINE HCL 25 MG
50 CAPSULE ORAL ONCE
Status: DISCONTINUED | OUTPATIENT
Start: 2019-03-19 | End: 2019-03-20 | Stop reason: HOSPADM

## 2019-03-14 RX ORDER — DIPHENHYDRAMINE HYDROCHLORIDE 50 MG/ML
50 INJECTION, SOLUTION INTRAMUSCULAR; INTRAVENOUS
Status: DISCONTINUED | OUTPATIENT
Start: 2019-03-19 | End: 2019-03-20 | Stop reason: HOSPADM

## 2019-03-14 RX ORDER — ACETAMINOPHEN 325 MG/1
650 TABLET ORAL ONCE
Status: DISCONTINUED | OUTPATIENT
Start: 2019-03-19 | End: 2019-03-20 | Stop reason: HOSPADM

## 2019-03-19 ENCOUNTER — HOSPITAL ENCOUNTER (OUTPATIENT)
Dept: INFUSION THERAPY | Age: 64
Discharge: HOME | End: 2019-03-19

## 2019-03-21 RX ORDER — ACETAMINOPHEN 325 MG/1
650 TABLET ORAL ONCE
Status: DISCONTINUED | OUTPATIENT
Start: 2019-03-26 | End: 2019-03-27 | Stop reason: HOSPADM

## 2019-03-21 RX ORDER — DIPHENHYDRAMINE HYDROCHLORIDE 50 MG/ML
50 INJECTION, SOLUTION INTRAMUSCULAR; INTRAVENOUS
Status: DISCONTINUED | OUTPATIENT
Start: 2019-03-26 | End: 2019-03-27 | Stop reason: HOSPADM

## 2019-03-21 RX ORDER — ACETAMINOPHEN 325 MG/1
650 TABLET ORAL
Status: DISCONTINUED | OUTPATIENT
Start: 2019-03-26 | End: 2019-03-27 | Stop reason: HOSPADM

## 2019-03-21 RX ORDER — SODIUM CHLORIDE 9 MG/ML
25 INJECTION, SOLUTION INTRAVENOUS CONTINUOUS
Status: DISCONTINUED | OUTPATIENT
Start: 2019-03-26 | End: 2019-03-27 | Stop reason: HOSPADM

## 2019-03-21 RX ORDER — DIPHENHYDRAMINE HCL 25 MG
50 CAPSULE ORAL ONCE
Status: DISCONTINUED | OUTPATIENT
Start: 2019-03-26 | End: 2019-03-27 | Stop reason: HOSPADM

## 2019-03-26 ENCOUNTER — HOSPITAL ENCOUNTER (OUTPATIENT)
Dept: INFUSION THERAPY | Age: 64
Discharge: HOME | End: 2019-03-26

## 2019-04-09 ENCOUNTER — APPOINTMENT (OUTPATIENT)
Dept: INFUSION THERAPY | Age: 64
End: 2019-04-09

## 2019-04-10 ENCOUNTER — DOCUMENTATION ONLY (OUTPATIENT)
Dept: RHEUMATOLOGY | Age: 64
End: 2019-04-10

## 2019-04-15 ENCOUNTER — HOSPITAL ENCOUNTER (OUTPATIENT)
Dept: LAB | Age: 64
Discharge: HOME | End: 2019-04-15
Payer: MEDICARE

## 2019-04-15 ENCOUNTER — OFFICE VISIT (OUTPATIENT)
Dept: RHEUMATOLOGY | Age: 64
End: 2019-04-15

## 2019-04-15 VITALS
WEIGHT: 199 LBS | TEMPERATURE: 98 F | OXYGEN SATURATION: 98 % | HEART RATE: 81 BPM | RESPIRATION RATE: 16 BRPM | BODY MASS INDEX: 33.12 KG/M2

## 2019-04-15 DIAGNOSIS — M35.3 PMR (POLYMYALGIA RHEUMATICA) (HCC): ICD-10-CM

## 2019-04-15 DIAGNOSIS — M31.6 GCA (GIANT CELL ARTERITIS) (HCC): Primary | ICD-10-CM

## 2019-04-15 PROCEDURE — 86140 C-REACTIVE PROTEIN: CPT

## 2019-04-15 PROCEDURE — 36415 COLL VENOUS BLD VENIPUNCTURE: CPT

## 2019-04-15 PROCEDURE — 80053 COMPREHEN METABOLIC PANEL: CPT

## 2019-04-15 PROCEDURE — 85007 BL SMEAR W/DIFF WBC COUNT: CPT

## 2019-04-15 PROCEDURE — 85651 RBC SED RATE NONAUTOMATED: CPT

## 2019-04-15 RX ORDER — METHOTREXATE 2.5 MG/1
15 TABLET ORAL
COMMUNITY
End: 2019-05-14 | Stop reason: SDUPTHER

## 2019-04-15 NOTE — PROGRESS NOTES
Chief Complaint   Patient presents with    Joint Pain     1. Have you been to the ER, urgent care clinic since your last visit? Hospitalized since your last visit? No    2. Have you seen or consulted any other health care providers outside of the [address] [place] [address] since your last visit? Include any pap smears or colon screening. Yes Podiatrist and GYN

## 2019-04-15 NOTE — PROGRESS NOTES
RHEUMATOLOGY PROBLEM LIST AND CHIEF COMPLAINT  1. GCA/PMR - HAs, left temporal artery discomfort, sudden loss of vision, arthralgia of shoulder, TMJ, hands, feet, response to steroids    Therapy History:  Previous DMARDs:   Current DMARDs: Actemra (2/2018, stopped for 1 month; 3/2018-current), Methotrexate (1/2017 - 4/2018, restarted 3/2019-current)     INTERVAL HISTORY  This is a 70 y.o.  female. Today, the patient complains of pain in the joints. Location: chest  Severity:  3 on a scale of 0-10  Timing: all day   Duration: 3 months  Context/Associated signs and symptoms: The patient is doing well today. Her worst complaint today is chest pain while breathing. She is currently tapering prednisone down to 6 mg daily. She continues with Actemra SQ weekly. She started Methotrexate 15 mg PO weekly ~6 weeks ago. She has not needed Naproxen.      RHEUMATOLOGY REVIEW OF SYSTEMS   Positives as per history  Negatives as follows:  Marvel Mcdermott:  Denies unexplained persistent fevers, weight change, chronic fatigue  HEAD/EYES:   Denies eye redness, blurry vision or sudden loss of vision, dry eyes, HA, temporal artery pain  ENT:    Denies oral/nasal ulcers, recurrent sinus infections, dry mouth  RESPIRATORY:  No pleuritic pain, history of pleural effusions, hemoptysis, exertional dyspnea  CARDIOVASCULAR:  Denies chest pain, history of pericardial effusions  GASTRO:   Denies heartburn, esophageal dysmotility, abdominal pain, nausea, vomiting, diarrhea, blood in the stool  HEMATOLOGIC:  No easy bruising, purpura, swollen lymph nodes  SKIN:    Denies alopecia, ulcers, nodules, sun sensitivity, unexplained persistent rash   VASCULAR:   Denies edema, cyanosis, raynaud phenomenon  NEUROLOGIC:  Denies specific muscle weakness, paresthesias   PSYCHIATRIC:  No sleep disturbance / snoring, depression, anxiety  MSK:    No morning stiffness >1 hour, SI joint pain, persistent joint swelling, persistent joint pain    PAST MEDICAL HISTORY  Reviewed with patient, significant changes in medical history - no    PHYSICAL EXAM  Pulse 81, temperature 98 °F (36.7 °C), temperature source Oral, resp. rate 16, weight 199 lb (90.3 kg), SpO2 98 %. GENERAL APPEARANCE: Well-nourished adult in no acute distress. EYES: No scleral erythema, conjunctival injection. ENT: No oral ulcer, parotid enlargement. NECK: No adenopathy, thyroid enlargement. CARDIOVASCULAR: Heart rhythm is regular. No murmur, rub, gallop. CHEST: Normal vesicular breath sounds. No wheezes, rales, pleural friction rubs. ABDOMINAL: The abdomen is soft and nontender. Liver and spleen are nonpalpable. Bowel sounds are normal.  EXTREMITIES: There is no evidence of clubbing, cyanosis, edema. SKIN: No rash, palpable purpura, digital ulcer, abnormal thickening. NEUROLOGICAL: Normal gait and station, full strength in upper and lower extremities, normal sensation to light touch. MUSCULOSKELETAL:  Upper extremities - Left shoulder dROM w/ int rotation. Right shoulder crepitus - unchanged. Lower extremities - full range of motion, no tenderness, no swelling, no synovial thickening and no deformity of joints except for B/L knees crepitus (R>L)    LABS, RADIOLOGY AND PROCEDURES   Previous labs reviewed -Yes    ASSESSMENT  1. GCA/PMR - (Established problem -  Very good partial response) - Her disease is now under control with Actemra SQ weekly, Methotrexate 15 mg PO weekly and prednisone. She is currently on 6 mg prednisone daily and I want her to continue tapering this. We will continue her medications MTX and Actemra. She should return in 2 months for a follow up. I will check labs today. 2. Bone Health - Most recent bone density (9/2017) showed osteopenia. She is taking Actonel for this. She is continuing with prednisone taper for GCA/PMR - unchanged. 3. Drug therapy monitoring for toxicity (biologic) - CBC, BUN, Cr, AST, ALT and albumin every 4 months      PLAN  1.  Prednisone 6 mg daily; continue taper  2. Actemra SQ weekly  3. Methotrexate 15 mg PO weekly  4. Check CBC, CMP, markers of inflammation (ESR, CRP)  5. Return in 2 months      Shai Jimenez MD  Adult and Pediatric Rheumatology     [address] [place] [address], [address] [place] [address], Phone [phone], Fax [phone]   E-mail: [email or web address]    Visiting  of Pediatrics    Department of Pediatrics, [address], [address] [place] [address], Phone [phone], Fax [phone]  E-mail: [email or web address]    There are no Patient Instructions on file for this visit. cc:  MD Kailey Hannon (Ophthalmology)    Written by josué Wynn, as dictated by Luis Hinds.  Tejal Jimenez M.D.

## 2019-04-16 LAB
ALBUMIN SERPL-MCNC: 4.3 G/DL (ref 3.6–4.8)
ALBUMIN/GLOB SERPL: 1.7 {RATIO} (ref 1.2–2.2)
ALP SERPL-CCNC: 43 IU/L (ref 39–117)
ALT SERPL-CCNC: 32 IU/L (ref 0–32)
AST SERPL-CCNC: 35 IU/L (ref 0–40)
BASOPHILS # BLD MANUAL: 0 X10E3/UL (ref 0–0.2)
BASOPHILS NFR BLD MANUAL: 0 %
BILIRUB SERPL-MCNC: 0.2 MG/DL (ref 0–1.2)
BUN SERPL-MCNC: 15 MG/DL (ref 8–27)
BUN/CREAT SERPL: 21 (ref 12–28)
CALCIUM SERPL-MCNC: 9.6 MG/DL (ref 8.7–10.3)
CHLORIDE SERPL-SCNC: 97 MMOL/L (ref 96–106)
CO2 SERPL-SCNC: 26 MMOL/L (ref 20–29)
CREAT SERPL-MCNC: 0.73 MG/DL (ref 0.57–1)
CRP SERPL-MCNC: 3.4 MG/L (ref 0–4.9)
DIFFERENTIAL COMMENT, 115260: ABNORMAL
EOSINOPHIL # BLD MANUAL: 0 X10E3/UL (ref 0–0.4)
EOSINOPHIL NFR BLD MANUAL: 0 %
ERYTHROCYTE [DISTWIDTH] IN BLOOD BY AUTOMATED COUNT: 17.2 % (ref 12.3–15.4)
ERYTHROCYTE [SEDIMENTATION RATE] IN BLOOD BY WESTERGREN METHOD: 2 MM/HR (ref 0–40)
GLOBULIN SER CALC-MCNC: 2.5 G/DL (ref 1.5–4.5)
GLUCOSE SERPL-MCNC: 104 MG/DL (ref 65–99)
HCT VFR BLD AUTO: 36.4 % (ref 34–46.6)
HGB BLD-MCNC: 12 G/DL (ref 11.1–15.9)
LYMPHOCYTES # BLD MANUAL: 1.7 X10E3/UL (ref 0.7–3.1)
LYMPHOCYTES NFR BLD MANUAL: 29 %
MCH RBC QN AUTO: 27.8 PG (ref 26.6–33)
MCHC RBC AUTO-ENTMCNC: 33 G/DL (ref 31.5–35.7)
MCV RBC AUTO: 85 FL (ref 79–97)
MONOCYTES # BLD MANUAL: 0.3 X10E3/UL (ref 0.1–0.9)
MONOCYTES NFR BLD MANUAL: 6 %
NEUTROPHILS # BLD MANUAL: 3.7 X10E3/UL (ref 1.4–7)
NEUTROPHILS NFR BLD MANUAL: 65 %
PLATELET # BLD AUTO: 392 X10E3/UL (ref 150–379)
PLATELET BLD QL SMEAR: ABNORMAL
POTASSIUM SERPL-SCNC: 4 MMOL/L (ref 3.5–5.2)
PROT SERPL-MCNC: 6.8 G/DL (ref 6–8.5)
RBC # BLD AUTO: 4.31 X10E6/UL (ref 3.77–5.28)
RBC MORPH BLD: ABNORMAL
SODIUM SERPL-SCNC: 140 MMOL/L (ref 134–144)
WBC # BLD AUTO: 5.7 X10E3/UL (ref 3.4–10.8)

## 2019-04-23 ENCOUNTER — APPOINTMENT (OUTPATIENT)
Dept: INFUSION THERAPY | Age: 64
End: 2019-04-23

## 2019-04-30 ENCOUNTER — APPOINTMENT (OUTPATIENT)
Dept: INFUSION THERAPY | Age: 64
End: 2019-04-30

## 2019-05-06 ENCOUNTER — TELEPHONE (OUTPATIENT)
Dept: PHARMACY | Age: 64
End: 2019-05-06

## 2019-05-08 NOTE — TELEPHONE ENCOUNTER
[facility] at [address] [facility] Update    Date: 5/6/19     I spoke with Dain Blizzard, MD 1955 and let her know that her Actemra was approved by her Medicare Part D plan and co-pay = $1330.81. She said she also has [facility] insurance for prescriptions and recently had her Actemra filled by [facility]. When we submitted the claim to [employer], it said \"refill too soon until 5/16/19\". Patient would like for us to fill it on that day and ship it to her home. Patient couldn't find Actemra coupon that brings her co-pay to $5, so our pharmacy team called [facility] and obtained the coupon info. We also requested that [address] [place] [address] cancel their prescription for her so that she won't receive her medication twice. Will call pt on 5/16/19 to set up delivery.     Medication: Actemra    Next Fill Due: 5/16/19    MYNOR Mancini  [facility] at [facility], [address]   [address] [facility], [facility] [address]  phone: [phone]   fax: [phone]

## 2019-05-14 RX ORDER — METHOTREXATE 2.5 MG/1
15 TABLET ORAL
Qty: 72 TAB | Refills: 3 | Status: SHIPPED | OUTPATIENT
Start: 2019-05-19 | End: 2019-08-30 | Stop reason: SDUPTHER

## 2019-05-16 ENCOUNTER — DOCUMENTATION ONLY (OUTPATIENT)
Dept: PHARMACY | Age: 64
End: 2019-05-16

## 2019-05-18 DIAGNOSIS — B02.9 HERPES ZOSTER WITHOUT COMPLICATION: ICD-10-CM

## 2019-05-20 RX ORDER — VALACYCLOVIR HYDROCHLORIDE 1 G/1
1000 TABLET, FILM COATED ORAL 3 TIMES DAILY
Qty: 21 TAB | Refills: 0 | Status: SHIPPED | OUTPATIENT
Start: 2019-05-20 | End: 2019-10-07

## 2019-05-20 NOTE — TELEPHONE ENCOUNTER
Last refill- 4/12/18 (Valtrex) Sitagliptin (10/30/18)  Last office visit - 10/11/2018  Next office visit -   Future Appointments   Date Time Provider Aggie Zapata   6/7/2019  1:00 PM Stefano Skiff, NP [facility] SCHED       Requested Prescriptions     Pending Prescriptions Disp Refills    valACYclovir (VALTREX) 1 gram tablet 21 Tab 0     Sig: Take 1 Tab by mouth three (3) times daily.  SITagliptin (JANUVIA) 100 mg tablet 90 Tab 0     Sig: Take 1 Tab by mouth daily.

## 2019-05-21 ENCOUNTER — APPOINTMENT (OUTPATIENT)
Dept: INFUSION THERAPY | Age: 64
End: 2019-05-21

## 2019-06-01 ENCOUNTER — DOCUMENTATION ONLY (OUTPATIENT)
Dept: RHEUMATOLOGY | Age: 64
End: 2019-06-01

## 2019-06-01 ENCOUNTER — HOSPITAL ENCOUNTER (EMERGENCY)
Age: 64
Discharge: HOME | End: 2019-06-01
Attending: STUDENT IN AN ORGANIZED HEALTH CARE EDUCATION/TRAINING PROGRAM
Payer: MEDICARE

## 2019-06-01 ENCOUNTER — APPOINTMENT (OUTPATIENT)
Dept: CT IMAGING | Age: 64
End: 2019-06-01
Attending: STUDENT IN AN ORGANIZED HEALTH CARE EDUCATION/TRAINING PROGRAM
Payer: MEDICARE

## 2019-06-01 VITALS
WEIGHT: 199 LBS | RESPIRATION RATE: 20 BRPM | BODY MASS INDEX: 31.23 KG/M2 | HEART RATE: 76 BPM | DIASTOLIC BLOOD PRESSURE: 100 MMHG | HEIGHT: 67 IN | SYSTOLIC BLOOD PRESSURE: 181 MMHG | OXYGEN SATURATION: 98 % | TEMPERATURE: 98.2 F

## 2019-06-01 DIAGNOSIS — H66.001 NON-RECURRENT ACUTE SUPPURATIVE OTITIS MEDIA OF RIGHT EAR WITHOUT SPONTANEOUS RUPTURE OF TYMPANIC MEMBRANE: ICD-10-CM

## 2019-06-01 DIAGNOSIS — H60.501 ACUTE NON-INFECTIVE OTITIS EXTERNA OF RIGHT EAR, UNSPECIFIED TYPE: ICD-10-CM

## 2019-06-01 DIAGNOSIS — J01.00 ACUTE NON-RECURRENT MAXILLARY SINUSITIS: Primary | ICD-10-CM

## 2019-06-01 LAB
ANION GAP SERPL CALC-SCNC: 6 MMOL/L (ref 5–15)
BASOPHILS # BLD: 0 K/UL (ref 0–0.1)
BASOPHILS NFR BLD: 0 % (ref 0–1)
BUN SERPL-MCNC: 13 MG/DL (ref 6–20)
BUN/CREAT SERPL: 14 (ref 12–20)
CALCIUM SERPL-MCNC: 8.7 MG/DL (ref 8.5–10.1)
CHLORIDE SERPL-SCNC: 102 MMOL/L (ref 97–108)
CO2 SERPL-SCNC: 28 MMOL/L (ref 21–32)
CREAT SERPL-MCNC: 0.95 MG/DL (ref 0.55–1.02)
CRP SERPL-MCNC: 0.9 MG/DL (ref 0–0.6)
DIFFERENTIAL METHOD BLD: ABNORMAL
EOSINOPHIL # BLD: 0.1 K/UL (ref 0–0.4)
EOSINOPHIL NFR BLD: 1 % (ref 0–7)
ERYTHROCYTE [DISTWIDTH] IN BLOOD BY AUTOMATED COUNT: 16.7 % (ref 11.5–14.5)
GLUCOSE SERPL-MCNC: 169 MG/DL (ref 65–100)
HCT VFR BLD AUTO: 36.5 % (ref 35–47)
HGB BLD-MCNC: 11.7 G/DL (ref 11.5–16)
IMM GRANULOCYTES # BLD AUTO: 0.1 K/UL (ref 0–0.04)
IMM GRANULOCYTES NFR BLD AUTO: 1 % (ref 0–0.5)
LYMPHOCYTES # BLD: 2.3 K/UL (ref 0.8–3.5)
LYMPHOCYTES NFR BLD: 26 % (ref 12–49)
MAGNESIUM SERPL-MCNC: 2.1 MG/DL (ref 1.6–2.4)
MCH RBC QN AUTO: 28.7 PG (ref 26–34)
MCHC RBC AUTO-ENTMCNC: 32.1 G/DL (ref 30–36.5)
MCV RBC AUTO: 89.5 FL (ref 80–99)
MONOCYTES # BLD: 0.8 K/UL (ref 0–1)
MONOCYTES NFR BLD: 9 % (ref 5–13)
NEUTS SEG # BLD: 5.6 K/UL (ref 1.8–8)
NEUTS SEG NFR BLD: 63 % (ref 32–75)
NRBC # BLD: 0 K/UL (ref 0–0.01)
NRBC BLD-RTO: 0 PER 100 WBC
PLATELET # BLD AUTO: 340 K/UL (ref 150–400)
PMV BLD AUTO: 9.7 FL (ref 8.9–12.9)
POTASSIUM SERPL-SCNC: 3.7 MMOL/L (ref 3.5–5.1)
RBC # BLD AUTO: 4.08 M/UL (ref 3.8–5.2)
SODIUM SERPL-SCNC: 136 MMOL/L (ref 136–145)
WBC # BLD AUTO: 8.9 K/UL (ref 3.6–11)

## 2019-06-01 PROCEDURE — 36415 COLL VENOUS BLD VENIPUNCTURE: CPT

## 2019-06-01 PROCEDURE — 70450 CT HEAD/BRAIN W/O DYE: CPT

## 2019-06-01 PROCEDURE — 85025 COMPLETE CBC W/AUTO DIFF WBC: CPT

## 2019-06-01 PROCEDURE — 96374 THER/PROPH/DIAG INJ IV PUSH: CPT

## 2019-06-01 PROCEDURE — 70491 CT SOFT TISSUE NECK W/DYE: CPT

## 2019-06-01 PROCEDURE — 86140 C-REACTIVE PROTEIN: CPT

## 2019-06-01 PROCEDURE — 74011636320 HC RX REV CODE- 636/320: Performed by: RADIOLOGY

## 2019-06-01 PROCEDURE — 74011250636 HC RX REV CODE- 250/636: Performed by: STUDENT IN AN ORGANIZED HEALTH CARE EDUCATION/TRAINING PROGRAM

## 2019-06-01 PROCEDURE — 83735 ASSAY OF MAGNESIUM: CPT

## 2019-06-01 PROCEDURE — 99281 EMR DPT VST MAYX REQ PHY/QHP: CPT

## 2019-06-01 PROCEDURE — 80048 BASIC METABOLIC PNL TOTAL CA: CPT

## 2019-06-01 RX ORDER — CIPROFLOXACIN AND DEXAMETHASONE 3; 1 MG/ML; MG/ML
4 SUSPENSION/ DROPS AURICULAR (OTIC) 2 TIMES DAILY
Qty: 7.5 ML | Refills: 0 | Status: SHIPPED | OUTPATIENT
Start: 2019-06-01 | End: 2020-03-12

## 2019-06-01 RX ORDER — AMOXICILLIN AND CLAVULANATE POTASSIUM 875; 125 MG/1; MG/1
1 TABLET, FILM COATED ORAL 2 TIMES DAILY
Qty: 20 TAB | Refills: 0 | Status: SHIPPED | OUTPATIENT
Start: 2019-06-01 | End: 2020-03-12

## 2019-06-01 RX ORDER — KETOROLAC TROMETHAMINE 30 MG/ML
30 INJECTION, SOLUTION INTRAMUSCULAR; INTRAVENOUS
Status: COMPLETED | OUTPATIENT
Start: 2019-06-01 | End: 2019-06-01

## 2019-06-01 RX ADMIN — IOPAMIDOL 100 ML: 612 INJECTION, SOLUTION INTRAVENOUS at 19:52

## 2019-06-01 RX ADMIN — KETOROLAC TROMETHAMINE 30 MG: 30 INJECTION, SOLUTION INTRAMUSCULAR; INTRAVENOUS at 20:57

## 2019-06-01 NOTE — PROGRESS NOTES
Pt approved for Actemra 162mg/0.9ml through 12/31/19 under Medicare Part D  [facility] pharmacy   Reference # [id number]

## 2019-06-01 NOTE — ED PROVIDER NOTES
70 y.o. female with past medical history significant for HTN, HLD, DMT2, fibromyalgia, asthma,GERD, PUD, gastric nodule, thrombocytopenia, temporal arteritis, h/o PE, polyarthritis rheumatica, arthritis, DDD, osteopenia, vitamin D deficiency, s/p cholecystectomy, s/p AZAEL and BSO, s/p  x 3, s/p heart catheterization, who presents ambulatory to the ED accompanied by family member, with chief complaint of right ear pain. 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 She states that she developed congestion, \"hoarseness\", swollen jaw, sore throat and chest pain on Monday, and notes that her symptoms have been worsening. Pt states that she visited an ED in [place], was told that she has a virus and was prescribed Flonase, but no other medications. Pt also complains of decreased hearing in the right ear, right ear pain, right sided facial pain and \"continuous\" drainage of the right ear which all started yesterday. She describes her pain as \"aching\" in quality and rates her current pain as 7/10 in intensity. Pt also complains of headache, \"thick yellow\" nasal discharge and cough. She notes that she is taking Motrin for her symptoms. Pt also notes that she currently takes Methotrexate, Actemra and Prednisone 5 mg \"long term\" for her temporal arteritis and polyarthritis rheumatica. Pt specifically denies weakness, numbness, confusion, visual disturbances, or neck stiffness. There are no other acute medical concerns at this time. Social hx: Negative for Tobacco use; Negative for EtOH use; Negative for Illicit Drug use  PCP: Liberty Friend NP    Note written by Narcisa Dorsey, as dictated by Margarita Perez MD 8:33 PM    The history is provided by the patient and medical records. No  was used.         Past Medical History:   Diagnosis Date    Adverse effect of anesthesia     severe N/V with versed and narcotics/severe abdominal pain with morphine    Asthma     childhood    Autoimmune disease ([facility]     temporal arteritis/polyarthritis rheumatica    Chronic pain     shoulders/knees    DDD (degenerative disc disease), cervical     Degenerative arthritis of right knee 2014    Dr. Evelia Pollock    Diabetes mellitus, type 2 ([facility] 2014    Fibromyalgia     Gastric nodule     gastric nodules on endoscopy 3/26/12    GERD (gastroesophageal reflux disease)     Hyperlipidemia LDL goal < 100     Hypertension     Osteopenia 2016    Actonel started    PUD (peptic ulcer disease)     Pulmonary emboli ([address]     s/p hysterectomy    Temporal arteritis ([facility] 16    Dr. Sangita Greene Thoracic outlet syndrome     left, Dr. Miguel Adhikari Thrombocytopenia ([facility] 10/2015    mild    Vitamin D deficiency     Vulvar high-grade squamous intraepithelial lesion (HGSIL)        Past Surgical History:   Procedure Laterality Date    COLONOSCOPY N/A 2017    COLONOSCOPY performed by Joann Verdugo MD at [address] [place] [address] ENDOSCOPY    HX BREAST BIOPSY      several breast biopsies (benign)    HX BREAST BIOPSY Bilateral , , ,    6-7 on R, 2 on L+all benign    HX BUNIONECTOMY      bilateral    HX  SECTION      x3    HX COLONOSCOPY      normal, f/u in 10 yrs    HX DILATION AND CURETTAGE      [id number], several D&C's    HX ENDOSCOPY      x3, h/o gastric polyp removal 3/2012    HX GI      cholecystectomy    HX GYN  2018    OZZIE    HX HEART CATHETERIZATION      x2, most recent 2012 was normal    HX KNEE ARTHROSCOPY Right     HX OTHER SURGICAL      lipoma removal    HX OTHER SURGICAL  16    L temporal artery bx (Dr. Elle Santiago)    HX AZAEL AND BSO      secondary to endometrial hyperplasia with atypical changes    HX TONSILLECTOMY      T & A         Family History:   Problem Relation Age of Onset    Heart Disease Mother  Diabetes Mother     Cancer Mother         breast    Thyroid Disease Mother         hashimotos    Hypertension Mother     Breast Cancer Mother 39    Osteoporosis Mother     Heart Surgery Mother         aortic valve replacement    Cancer Father         gastric    Heart Disease Father     Hypertension Father     Diabetes Paternal Grandmother     Heart Disease Brother     Thyroid Disease Sister         hashimotos    Other Sister         bipolar    Other Sister         depression    Other Brother         mental illness    Breast Cancer Maternal Aunt          from breast cancer    Breast Cancer Maternal Aunt 39        and recurred at 80    Osteoporosis Other         maternal aunts    Psychiatric Disorder Son         bipolar    Psychiatric Disorder Daughter         depression    Psychiatric Disorder Daughter         depression    Breast Cancer Maternal Aunt        Social History     Socioeconomic History    Marital status:      Spouse name: Not on file    Number of children: Not on file    Years of education: Not on file    Highest education level: Not on file   Occupational History    Not on file   Social Needs    Financial resource strain: Not on file    Food insecurity:     Worry: Not on file     Inability: Not on file    Transportation needs:     Medical: Not on file     Non-medical: Not on file   Tobacco Use    Smoking status: Never Smoker    Smokeless tobacco: Never Used   Substance and Sexual Activity    Alcohol use: No     Alcohol/week: 0.0 oz    Drug use: No    Sexual activity: Yes     Partners: Male   Lifestyle    Physical activity:     Days per week: Not on file     Minutes per session: Not on file    Stress: Not on file   Relationships    Social connections:     Talks on phone: Not on file     Gets together: Not on file     Attends Jain service: Not on file     Active member of club or organization: Not on file     Attends meetings of clubs or organizations: Not on file     Relationship status: Not on file    Intimate partner violence:     Fear of current or ex partner: Not on file     Emotionally abused: Not on file     Physically abused: Not on file     Forced sexual activity: Not on file   Other Topics Concern    Not on file   Social History Narrative    Not on file         ALLERGIES: Urie flavor; Alendronate; Morphine; Valium [diazepam]; and Versed [midazolam]    Review of Systems   HENT: Positive for congestion, ear discharge (right), ear pain (right), hearing loss (right), rhinorrhea, sore throat and voice change. Positive for right sided facial pain. Eyes: Negative for visual disturbance. Respiratory: Positive for cough. Cardiovascular: Positive for chest pain. Musculoskeletal: Positive for joint swelling (jaw). Negative for neck stiffness. Neurological: Positive for headaches. Negative for weakness and numbness. Psychiatric/Behavioral: Negative for confusion. Vitals:    06/01/19 1736   BP: (!) 181/100   Pulse: 76   Resp: 20   Temp: 98.2 °F (36.8 °C)   SpO2: 98%   Weight: 90.3 kg (199 lb)   Height: 5' 7\" (1.702 m)            Physical Exam   Constitutional: She appears well-nourished. No distress. HENT:   Head: Normocephalic. Swollen right external auditory meatus. Right TM not visualized. Clear, serous discharge from right ear canal. Right facial tenderness over maxillary sinus and sphenoid sinus. Eyes: Pupils are equal, round, and reactive to light. Cardiovascular: Intact distal pulses. Pulmonary/Chest: Effort normal.   Abdominal: She exhibits no distension. Neurological: She is alert. Skin: Skin is warm.    Psychiatric: Her behavior is normal.   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 Improved pain with NSAIDS  Extensive sinus disease on CT. Doubt mastoiditis, cerebral infection. No meningismus, normal cognition. Plan to trial po and topical abx, return if sx worsen or progress. Risk of Complications, Morbidity, and/or Mortality  Presenting problems: moderate  Diagnostic procedures: moderate           Procedures    9:00 PM  Patient's results have been reviewed with them. Patient and/or family have verbally conveyed their understanding and agreement of the patient's signs, symptoms, diagnosis, treatment and prognosis and additionally agree to follow up as recommended or return to the Emergency Room should their condition change prior to follow-up. Discharge instructions have also been provided to the patient with some educational information regarding their diagnosis as well a list of reasons why they would want to return to the ER prior to their follow-up appointment should their condition change.

## 2019-06-01 NOTE — ED TRIAGE NOTES
Patient here with c/o cold symptoms, hoarseness, severe to right side of face and ear x 2 days. Relayed that this pain was accompanied by clear drainage to that ear.

## 2019-06-03 RX ORDER — FOLIC ACID 1 MG/1
1 TABLET ORAL DAILY
Qty: 60 TAB | Refills: 11 | Status: SHIPPED | OUTPATIENT
Start: 2019-06-03 | End: 2020-06-18 | Stop reason: SDUPTHER

## 2019-06-07 ENCOUNTER — OFFICE VISIT (OUTPATIENT)
Dept: INTERNAL MEDICINE CLINIC | Age: 64
End: 2019-06-07

## 2019-06-07 ENCOUNTER — HOSPITAL ENCOUNTER (OUTPATIENT)
Dept: LAB | Age: 64
Discharge: HOME | End: 2019-06-07
Payer: MEDICARE

## 2019-06-07 VITALS
TEMPERATURE: 97.8 F | HEART RATE: 91 BPM | RESPIRATION RATE: 18 BRPM | OXYGEN SATURATION: 99 % | WEIGHT: 199.5 LBS | BODY MASS INDEX: 31.31 KG/M2 | HEIGHT: 67 IN | SYSTOLIC BLOOD PRESSURE: 144 MMHG | DIASTOLIC BLOOD PRESSURE: 80 MMHG

## 2019-06-07 DIAGNOSIS — M85.80 OSTEOPENIA, UNSPECIFIED LOCATION: ICD-10-CM

## 2019-06-07 DIAGNOSIS — Z83.49 FAMILY HISTORY OF HASHIMOTO THYROIDITIS: ICD-10-CM

## 2019-06-07 DIAGNOSIS — R05.9 COUGH: ICD-10-CM

## 2019-06-07 DIAGNOSIS — Z80.3 FAMILY HISTORY OF BREAST CANCER: ICD-10-CM

## 2019-06-07 DIAGNOSIS — M31.6 GCA (GIANT CELL ARTERITIS) (HCC): ICD-10-CM

## 2019-06-07 DIAGNOSIS — I10 BENIGN ESSENTIAL HYPERTENSION: ICD-10-CM

## 2019-06-07 DIAGNOSIS — J32.9 SINUSITIS, UNSPECIFIED CHRONICITY, UNSPECIFIED LOCATION: ICD-10-CM

## 2019-06-07 DIAGNOSIS — Z12.31 ENCOUNTER FOR SCREENING MAMMOGRAM FOR MALIGNANT NEOPLASM OF BREAST: ICD-10-CM

## 2019-06-07 DIAGNOSIS — E11.9 TYPE 2 DIABETES MELLITUS WITHOUT COMPLICATION, WITHOUT LONG-TERM CURRENT USE OF INSULIN (HCC): Primary | ICD-10-CM

## 2019-06-07 DIAGNOSIS — M35.3 PMR (POLYMYALGIA RHEUMATICA) (HCC): ICD-10-CM

## 2019-06-07 DIAGNOSIS — M89.9 DISORDER OF BONE: ICD-10-CM

## 2019-06-07 PROCEDURE — 84439 ASSAY OF FREE THYROXINE: CPT

## 2019-06-07 PROCEDURE — 83036 HEMOGLOBIN GLYCOSYLATED A1C: CPT

## 2019-06-07 PROCEDURE — 80053 COMPREHEN METABOLIC PANEL: CPT

## 2019-06-07 PROCEDURE — 85025 COMPLETE CBC W/AUTO DIFF WBC: CPT

## 2019-06-07 PROCEDURE — 84443 ASSAY THYROID STIM HORMONE: CPT

## 2019-06-07 PROCEDURE — 36415 COLL VENOUS BLD VENIPUNCTURE: CPT

## 2019-06-07 PROCEDURE — 80061 LIPID PANEL: CPT

## 2019-06-07 PROCEDURE — 84480 ASSAY TRIIODOTHYRONINE (T3): CPT

## 2019-06-07 RX ORDER — CODEINE PHOSPHATE AND GUAIFENESIN 10; 100 MG/5ML; MG/5ML
5 SOLUTION ORAL
Qty: 120 ML | Refills: 0 | Status: SHIPPED | OUTPATIENT
Start: 2019-06-07 | End: 2020-03-12

## 2019-06-07 RX ORDER — LEVOFLOXACIN 500 MG/1
500 TABLET, FILM COATED ORAL DAILY
Qty: 7 TAB | Refills: 0 | Status: SHIPPED | OUTPATIENT
Start: 2019-06-07 | End: 2020-03-12

## 2019-06-07 NOTE — PROGRESS NOTES
1. Have you been to the ER, urgent care clinic since your last visit? Hospitalized since your last visit? Yes, [facility] and [facility]    2. Have you seen or consulted any other health care providers outside of the [address] [place] [address] since your last visit? Include any pap smears or colon screening.  No

## 2019-06-07 NOTE — PROGRESS NOTES
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 Hunter Jovel dtr child psych [address]  Has another son  Another dtr in [facility] getting  later this summer    05/29/19 seen at ED in [facility]. Was having chest pain, cardiac enzymes negative. CP largely resolved. CXR negative  Dx' URI, started on flonase  Not improving    Seen at [facility] ED for same  CT showing sinusitis, given script for augmentin which she has been taking   Secretions have improved  Still coughing- productive cough, yellow to grayish. hasnt improved at all  No dyspnea  Right ear with pain and rupture. Doing cipro dex.   Cant hear out of the ear  Not sleeping due to coughing    DM2:  Taking januvia  No longer taking metformin because of GI side effects  Sugars had been fine until this URI , 115-120  taking statin and acei  Last hgba1c 6.6%    HTN:  Taking ramipril, hctz    GCA/PRM:  Following with rheumatology Dr. Diomedes Weber  Taking metotrexate, actemra, prednisone, duloxetine    Osteopenia:  Taking actonel, vit d  Last DEXA 09/2017    Obesity:  Weight worse with lower dose of steroid  Family history of hashimotos    Health maintenance:   Last mammogram:  08/2018  Last DEXA:  09/2017  Last colonoscopy: 05/2017, polyps, repeat in 3 years  Last pneumonia vaccination  Needs pneumococcal 23  Shingles vaccination: needs shingrix, back ordered    Patient Active Problem List    Diagnosis Date Noted    Iron deficiency anemia 07/13/2017    Current chronic use of systemic steroids 06/29/2016    Osteopenia 06/29/2016    Stroke risk 04/29/2016    Temporal arteritis ([facility] 04/29/2016    Diabetic eye exam ([facility] 02/24/2016    Benign essential hypertension 10/05/2015    Hyperlipidemia with target LDL less than 100     Thrombocytopenia ([address] 10/01/2015    Fibrocystic disease of breast 06/29/2015    Diabetes mellitus, type 2 ([address] 12/01/2014    Thoracic outlet syndrome     Degenerative arthritis of right knee 02/01/2014    Screening for osteoporosis 11/03/2013    Vitamin D deficiency 10/22/2013    DDD (degenerative disc disease), cervical     H/O cardiac catheterization 10/16/2013    Pap smear for cervical cancer screening 10/16/2013    Hx of screening mammography 10/16/2013    GERD (gastroesophageal reflux disease)     OA (osteoarthritis)     PUD (peptic ulcer disease) 07/22/2012     Current Outpatient Medications   Medication Sig Dispense Refill    guaiFENesin-codeine (ROBITUSSIN AC) 100-10 mg/5 mL solution Take 5 mL by mouth three (3) times daily as needed for Cough for up to 3 days. Max Daily Amount: 15 mL. 120 mL 0    levoFLOXacin (LEVAQUIN) 500 mg tablet Take 1 Tab by mouth daily for 7 days. 7 Tab 0    folic acid (FOLVITE) 1 mg tablet Take 1 Tab by mouth daily. 60 Tab 11    ciprofloxacin-dexamethasone (CIPRODEX) 0.3-0.1 % otic suspension Administer 4 Drops in right ear two (2) times a day for 7 days. 7.5 mL 0    amoxicillin-clavulanate (AUGMENTIN) 875-125 mg per tablet Take 1 Tab by mouth two (2) times a day for 10 days. 20 Tab 0    valACYclovir (VALTREX) 1 gram tablet Take 1 Tab by mouth three (3) times daily. 21 Tab 0    SITagliptin (JANUVIA) 100 mg tablet Take 1 Tab by mouth daily. 90 Tab 0    methotrexate (RHEUMATREX) 2.5 mg tablet Take 6 Tabs by mouth every Sunday. 72 Tab 3    tocilizumab (ACTEMRA) 162 mg/0.9 mL syrg 162 mg by SubCUTAneous route every seven (7) days. 4 Syringe 11    predniSONE (DELTASONE) 5 mg tablet Take 1 Tab by mouth daily. 90 Tab 1    naproxen (NAPROSYN) 500 mg tablet Take 1 Tab by mouth two (2) times daily (with meals). 180 Tab 2    DULoxetine (CYMBALTA) 60 mg capsule TAKE 1 CAP BY MOUTH DAILY. 90 Cap 3    atorvastatin (LIPITOR) 20 mg tablet Take 1 Tab by mouth daily. 90 Tab 3    risedronate (ACTONEL) 150 mg tablet Take 1 Tab by mouth every thirty (30) days. 3 Tab 11    ramipril (ALTACE) 10 mg capsule Take 2 Caps by mouth daily. 180 Cap 3    hydroCHLOROthiazide (HYDRODIURIL) 25 mg tablet Take 1 Tab by mouth daily. 90 Tab 3    omeprazole (PRILOSEC) 40 mg capsule TAKE 1 CAP BY MOUTH DAILY. 90 Cap 3    ibuprofen (MOTRIN) 200 mg tablet Take 600 mg by mouth.  Insulin Syringe-Needle U-100 1 mL 31 gauge x 5/16 syrg 4 Syringes by Does Not Apply route every seven (7) days. 4 Syringe 6    estradiol (ESTRACE) 1 mg tablet Take 1 Tab by mouth daily. 90 Tab 3    omega-3 fatty acids-vitamin e (FISH OIL) 1,000 mg cap Take 1 Cap by mouth two (2) times a day.  Cholecalciferol, Vitamin D3, (VITAMIN D3) 2,000 unit cap Take 1 Cap by mouth daily.  multivitamin (ONE A DAY) tablet Take 1 Tab by mouth daily. Review of Systems    Pertinent items are noted in HPI. Objective:     Visit Vitals  /80 (BP 1 Location: Left arm, BP Patient Position: Sitting)   Pulse 91   Temp 97.8 °F (36.6 °C) (Oral)   Resp 18   Ht 5' 7\" (1.702 m)   Wt 199 lb 8 oz (90.5 kg)   SpO2 99%   BMI 31.25 kg/m²     General appearance: alert, cooperative, appears stated age, overweight, coughing throughout visit  Head: Normocephalic, without obvious abnormality, atraumatic  Ears: R TM moderately erythematous with mild bulging, no perforation, left TM normal  Throat: pale, + PND, no erythema or exudate  Neck: supple, symmetrical, trachea midline, no adenopathy  Lungs: clear to auscultation bilaterally, + persistent hacking cough, normal effort  Heart: regular rate and rhythm  Extremities: extremities normal, atraumatic, steady gait  Lymph nodes: Cervical nodes normal.  Neurologic: Grossly normal  Psych: calm, cooperative, appropriate affect      Assessment/Plan:     1. Type 2 diabetes mellitus without complication, without long-term current use of insulin (HCC)  - cont januvia  - LIPID PANEL  - HEMOGLOBIN A1C WITH EAG  - CBC WITH AUTOMATED DIFF  - METABOLIC PANEL, COMPREHENSIVE    2.  Benign essential hypertension  - above goal, feeling poorly  - cont current meds  - TSH 3RD GENERATION  - T4, FREE  - T3 TOTAL  - CBC WITH AUTOMATED DIFF  - METABOLIC PANEL, COMPREHENSIVE    3. GCA (giant cell arteritis) ([address]  - cont current meds, cont following with rheumatology    4. PMR (polymyalgia rheumatica) (HCC)  - as above    5. Family history of breast cancer  - [facility] 3D JAIMEE W MAMMO BI SCREENING INCL CAD; Future    6. Osteopenia, unspecified location  - cont actonel  - DEXA BONE DENSITY STUDY AXIAL; Future    7. Disorder of bone   - as above  - DEXA BONE DENSITY STUDY AXIAL; Future    8. Encounter for screening mammogram for malignant neoplasm of breast   - [facility] 3D JAIMEE W MAMMO BI SCREENING INCL CAD; Future    9. BMI 31.0-31.9,adult  - REFERRAL TO WEIGHT LOSS    10. Family history of Hashimoto thyroiditis  - TSH 3RD GENERATION  - T4, FREE  - T3 TOTAL    11. Sinusitis, unspecified chronicity, unspecified location  - reviewed previous medical records and imaging  - start saline nasal spray  - guaiFENesin-codeine (ROBITUSSIN AC) 100-10 mg/5 mL solution; Take 5 mL by mouth three (3) times daily as needed for Cough for up to 3 days. Max Daily Amount: 15 mL. Dispense: 120 mL; Refill: 0  - levoFLOXacin (LEVAQUIN) 500 mg tablet; Take 1 Tab by mouth daily for 7 days. Dispense: 7 Tab; Refill: 0    12. Cough  - as above  - guaiFENesin-codeine (ROBITUSSIN AC) 100-10 mg/5 mL solution; Take 5 mL by mouth three (3) times daily as needed for Cough for up to 3 days. Max Daily Amount: 15 mL. Dispense: 120 mL; Refill: 0  - levoFLOXacin (LEVAQUIN) 500 mg tablet; Take 1 Tab by mouth daily for 7 days. Dispense: 7 Tab; Refill: 0      Advised her to call back or return to office if symptoms worsen/change/persist.  Discussed expected course/resolution/complications of diagnosis in detail with patient. Medication risks/benefits/costs/interactions/alternatives discussed with patient. She was given an after visit summary which includes diagnoses, current medications, & vitals.   She expressed understanding with the diagnosis and plan.     RADHA Barnes

## 2019-06-08 LAB
ALBUMIN SERPL-MCNC: 4.5 G/DL (ref 3.6–4.8)
ALBUMIN/GLOB SERPL: 1.7 {RATIO} (ref 1.2–2.2)
ALP SERPL-CCNC: 76 IU/L (ref 39–117)
ALT SERPL-CCNC: 52 IU/L (ref 0–32)
AST SERPL-CCNC: 40 IU/L (ref 0–40)
BASOPHILS # BLD AUTO: 0 X10E3/UL (ref 0–0.2)
BASOPHILS NFR BLD AUTO: 1 %
BILIRUB SERPL-MCNC: 0.2 MG/DL (ref 0–1.2)
BUN SERPL-MCNC: 15 MG/DL (ref 8–27)
BUN/CREAT SERPL: 20 (ref 12–28)
CALCIUM SERPL-MCNC: 9.4 MG/DL (ref 8.7–10.3)
CHLORIDE SERPL-SCNC: 97 MMOL/L (ref 96–106)
CHOLEST SERPL-MCNC: 222 MG/DL (ref 100–199)
CO2 SERPL-SCNC: 24 MMOL/L (ref 20–29)
CREAT SERPL-MCNC: 0.75 MG/DL (ref 0.57–1)
EOSINOPHIL # BLD AUTO: 0.1 X10E3/UL (ref 0–0.4)
EOSINOPHIL NFR BLD AUTO: 2 %
ERYTHROCYTE [DISTWIDTH] IN BLOOD BY AUTOMATED COUNT: 17 % (ref 12.3–15.4)
EST. AVERAGE GLUCOSE BLD GHB EST-MCNC: 154 MG/DL
GLOBULIN SER CALC-MCNC: 2.6 G/DL (ref 1.5–4.5)
GLUCOSE SERPL-MCNC: 136 MG/DL (ref 65–99)
HBA1C MFR BLD: 7 % (ref 4.8–5.6)
HCT VFR BLD AUTO: 39.4 % (ref 34–46.6)
HDLC SERPL-MCNC: 58 MG/DL
HGB BLD-MCNC: 12.8 G/DL (ref 11.1–15.9)
IMM GRANULOCYTES # BLD AUTO: 0.1 X10E3/UL (ref 0–0.1)
IMM GRANULOCYTES NFR BLD AUTO: 1 %
INTERPRETATION, 910389: NORMAL
LDLC SERPL CALC-MCNC: 122 MG/DL (ref 0–99)
LYMPHOCYTES # BLD AUTO: 1.5 X10E3/UL (ref 0.7–3.1)
LYMPHOCYTES NFR BLD AUTO: 20 %
Lab: NORMAL
MCH RBC QN AUTO: 28.7 PG (ref 26.6–33)
MCHC RBC AUTO-ENTMCNC: 32.5 G/DL (ref 31.5–35.7)
MCV RBC AUTO: 88 FL (ref 79–97)
MONOCYTES # BLD AUTO: 1.1 X10E3/UL (ref 0.1–0.9)
MONOCYTES NFR BLD AUTO: 15 %
NEUTROPHILS # BLD AUTO: 4.7 X10E3/UL (ref 1.4–7)
NEUTROPHILS NFR BLD AUTO: 61 %
PLATELET # BLD AUTO: 377 X10E3/UL (ref 150–450)
POTASSIUM SERPL-SCNC: 4.3 MMOL/L (ref 3.5–5.2)
PROT SERPL-MCNC: 7.1 G/DL (ref 6–8.5)
RBC # BLD AUTO: 4.46 X10E6/UL (ref 3.77–5.28)
SODIUM SERPL-SCNC: 138 MMOL/L (ref 134–144)
T3 SERPL-MCNC: 130 NG/DL (ref 71–180)
T4 FREE SERPL-MCNC: 1.08 NG/DL (ref 0.82–1.77)
TRIGL SERPL-MCNC: 209 MG/DL (ref 0–149)
TSH SERPL DL<=0.005 MIU/L-ACNC: 2.5 UIU/ML (ref 0.45–4.5)
VLDLC SERPL CALC-MCNC: 42 MG/DL (ref 5–40)
WBC # BLD AUTO: 7.5 X10E3/UL (ref 3.4–10.8)

## 2019-06-10 DIAGNOSIS — R79.89 ELEVATED LFTS: Primary | ICD-10-CM

## 2019-06-17 ENCOUNTER — OFFICE VISIT (OUTPATIENT)
Dept: RHEUMATOLOGY | Age: 64
End: 2019-06-17

## 2019-06-17 VITALS
BODY MASS INDEX: 32.08 KG/M2 | WEIGHT: 204.8 LBS | DIASTOLIC BLOOD PRESSURE: 86 MMHG | OXYGEN SATURATION: 96 % | RESPIRATION RATE: 16 BRPM | SYSTOLIC BLOOD PRESSURE: 146 MMHG | HEART RATE: 103 BPM | TEMPERATURE: 98.5 F

## 2019-06-17 DIAGNOSIS — M35.3 PMR (POLYMYALGIA RHEUMATICA) (HCC): ICD-10-CM

## 2019-06-17 DIAGNOSIS — R76.8 ANA POSITIVE: ICD-10-CM

## 2019-06-17 DIAGNOSIS — M31.6 GCA (GIANT CELL ARTERITIS) (HCC): Primary | ICD-10-CM

## 2019-06-17 RX ORDER — METFORMIN HYDROCHLORIDE 500 MG/1
TABLET, EXTENDED RELEASE ORAL
Refills: 3 | COMMUNITY
Start: 2019-03-31 | End: 2019-10-07

## 2019-06-17 NOTE — PROGRESS NOTES
Chief Complaint   Patient presents with    Joint Pain     1. Have you been to the ER, urgent care clinic since your last visit? Hospitalized since your last visit? Yes for acute respiratory infection    2. Have you seen or consulted any other health care providers outside of the [address] [place] [address] since your last visit? Include any pap smears or colon screening.  No

## 2019-06-17 NOTE — PROGRESS NOTES
RHEUMATOLOGY PROBLEM LIST AND CHIEF COMPLAINT  1. GCA/PMR - HAs, left temporal artery discomfort, sudden loss of vision, arthralgia of shoulder, TMJ, hands, feet, response to steroids    Therapy History:  Current DMARDs: Actemra (2/2018, stopped for 1 month; 3/2018-current), Methotrexate (1/2017 - 4/2018, restarted 3/2019-current)     INTERVAL HISTORY  This is a 70 y.o.  female. Today, the patient complains of pain in the joints. Location: shoulder  Severity:  2 on a scale of 0-10  Timing: all day   Duration: 2 months  Context/Associated signs and symptoms: The patient is doing well today. She is currently tapering prednisone down to 4 mg daily; she has not weaned more due to being sick. She continues with Actemra SQ weekly and Methotrexate 15 mg PO weekly. Her primary complaint is sinusitis and an ear infection that are currently being treated with antibiotics. She is following up with ENT next week.      RHEUMATOLOGY REVIEW OF SYSTEMS   Positives as per history  Negatives as follows:  Yazmin Mckeon:  Denies unexplained persistent fevers, weight change, chronic fatigue  HEAD/EYES:   Denies eye redness, blurry vision or sudden loss of vision, dry eyes, HA, temporal artery pain  ENT:    Denies oral/nasal ulcers, recurrent sinus infections, dry mouth  RESPIRATORY:  No pleuritic pain, history of pleural effusions, hemoptysis, exertional dyspnea  CARDIOVASCULAR:  Denies chest pain, history of pericardial effusions  GASTRO:   Denies heartburn, esophageal dysmotility, abdominal pain, nausea, vomiting, diarrhea, blood in the stool  HEMATOLOGIC:  No easy bruising, purpura, swollen lymph nodes  SKIN:    Denies alopecia, ulcers, nodules, sun sensitivity, unexplained persistent rash   VASCULAR:   Denies edema, cyanosis, raynaud phenomenon  NEUROLOGIC:  Denies specific muscle weakness, paresthesias   PSYCHIATRIC:  No sleep disturbance / snoring, depression, anxiety  MSK:    No morning stiffness >1 hour, SI joint pain, persistent joint swelling, persistent joint pain    PAST MEDICAL HISTORY  Reviewed with patient, significant changes in medical history - no    PHYSICAL EXAM  Blood pressure 146/86, pulse (!) 103, temperature 98.5 °F (36.9 °C), temperature source Oral, resp. rate 16, weight 204 lb 12.8 oz (92.9 kg), SpO2 96 %. GENERAL APPEARANCE: Well-nourished adult in no acute distress. EYES: No scleral erythema, conjunctival injection. ENT: No oral ulcer, parotid enlargement, mild right ear bulge  NECK: No adenopathy, thyroid enlargement. CARDIOVASCULAR: Heart rhythm is regular. No murmur, rub, gallop. CHEST: Normal vesicular breath sounds. No wheezes, rales, pleural friction rubs. ABDOMINAL: The abdomen is soft and nontender. Liver and spleen are nonpalpable. Bowel sounds are normal.  EXTREMITIES: There is no evidence of clubbing, cyanosis, edema. SKIN: No rash, palpable purpura, digital ulcer, abnormal thickening. NEUROLOGICAL: Normal gait and station, full strength in upper and lower extremities, normal sensation to light touch. MUSCULOSKELETAL:  Upper extremities - Left shoulder dROM w/ int rotation. Right shoulder crepitus - unchanged. Lower extremities - full range of motion, no tenderness, no swelling, no synovial thickening and no deformity of joints except for B/L knees crepitus (R>L)    LABS, RADIOLOGY AND PROCEDURES   Previous labs reviewed -Yes    ASSESSMENT  1. GCA/PMR - (Established problem -  Stable disease) - Her disease is stable on her current regimen. She should continue on Actemra SQ weekly, Methotrexate 15 mg PO weekly and Prednisone 4 mg. She should taper Prednisone as tolerated before we can consider discontinuing other medications. She should return in 4 months for a follow up. She does not need labs today. 2. Bone Health - Most recent bone density (9/2017) showed osteopenia. She is taking Actonel for this. She is continuing with prednisone taper for GCA/PMR - unchanged.   3. Drug therapy monitoring for toxicity (biologic) - CBC, BUN, Cr, AST, ALT and albumin every 4 months    PLAN  1. Prednisone 4 mg daily; continue taper  2. Actemra SQ weekly  3. Methotrexate 15 mg PO weekly  4. Return in 4 months        Shai Dyer MD  Adult and Pediatric Rheumatology     [address] [address], Phone [phone], Fax [phone]   E-mail: [email or web address]    Visiting  of Pediatrics    Department of Pediatrics, [facility] of [address] [place] [address], [address] [place] [address], Phone [phone], Fax [phone]  E-mail: [email or web address]    There are no Patient Instructions on file for this visit. cc:  MARICRUZ Mendez (Ophthalmology)    Written by josué Gu, as dictated by Martinez Pineda.  Pat Dyer M.D.

## 2019-06-19 ENCOUNTER — DOCUMENTATION ONLY (OUTPATIENT)
Dept: PHARMACY | Age: 64
End: 2019-06-19

## 2019-07-02 ENCOUNTER — DOCUMENTATION ONLY (OUTPATIENT)
Dept: PHARMACY | Age: 64
End: 2019-07-02

## 2019-07-02 NOTE — PROGRESS NOTES
[facility] at [address] [facility] Update     Date: 7/2/19     Mark Hunter MD 1955      Medication: Actemra syringes     Co-pay = $5 for 28 day supply    Shipped: 7/1/19    Scheduled to be delivered: 7/2/19      Next Fill Due: 7/18/19     Pharmacist offered counseling.     Kacey Gama, [address] at [place] [address], [address], [address]  phone: [phone]   fax: [phone]

## 2019-07-05 DIAGNOSIS — K21.9 GASTROESOPHAGEAL REFLUX DISEASE, ESOPHAGITIS PRESENCE NOT SPECIFIED: ICD-10-CM

## 2019-07-05 RX ORDER — OMEPRAZOLE 40 MG/1
CAPSULE, DELAYED RELEASE ORAL
Qty: 90 CAP | Refills: 3 | Status: SHIPPED | OUTPATIENT
Start: 2019-07-05 | End: 2020-07-10

## 2019-07-05 NOTE — TELEPHONE ENCOUNTER
Last refill- 6/17/18  Last office visit - 6/7/2019  Next office visit -   Future Appointments   Date Time Provider Aggie Zapata   9/18/2019  1:00 PM [facility] KHOI [address] [facility]   9/18/2019  1:30 PM [facility] DEXA 1 SM[facility] [address]   10/14/2019  2:00 PM MARICRUZ Mazariegos   10/15/2019  2:20 PM Jose Sorto MD [address] [place] [address]         Requested Prescriptions     Pending Prescriptions Disp Refills    omeprazole (PRILOSEC) 40 mg capsule 90 Cap 3     Sig: TAKE 1 CAP BY MOUTH DAILY.

## 2019-07-22 ENCOUNTER — DOCUMENTATION ONLY (OUTPATIENT)
Dept: PHARMACY | Age: 64
End: 2019-07-22

## 2019-07-22 RX ORDER — PREDNISONE 5 MG/1
5 TABLET ORAL DAILY
Qty: 90 TAB | Refills: 1 | Status: SHIPPED | OUTPATIENT
Start: 2019-07-22 | End: 2020-01-07

## 2019-07-22 NOTE — PROGRESS NOTES
[facility] at [address] [facility] Update    Date: 07/22/19    Odalis Calixto MD 1955     Medication: Actemra Syringes     Co-pay = $5 (with coupon)    Fill: 7/18/19    Ship: 7/22/19    Deliver: 7/23/19    Next Fill Due: 8/8/19    Pharmacist offered counseling.     Nick Lake, [address] [place] [address] Pharmacy at [place] [address]  [address], [address] Herm[address] [place]   [address]  phone: [phone]   fax: [phone]

## 2019-08-13 ENCOUNTER — DOCUMENTATION ONLY (OUTPATIENT)
Dept: PHARMACY | Age: 64
End: 2019-08-13

## 2019-08-13 NOTE — PROGRESS NOTES
[facility] at [address] [facility] Update    Date: 8/9/19    [facility] 1955     Medication: Actemra Syringes      Co-pay = $5 (with coupon)     Fill: 8/8/19     Ship: 8/8/19     Deliver: 8/9/19     Next Fill Due: 8/29/19     Pharmacist offered counseling.     Sreekanth Camarena, [address] at [facility],  Hermila Hilliard, [address]  phone: [phone]   fax: [phone]

## 2019-08-30 ENCOUNTER — DOCUMENTATION ONLY (OUTPATIENT)
Dept: PHARMACY | Age: 64
End: 2019-08-30

## 2019-09-03 RX ORDER — METHOTREXATE 2.5 MG/1
15 TABLET ORAL
Qty: 72 TAB | Refills: 3 | Status: SHIPPED | OUTPATIENT
Start: 2019-09-08 | End: 2019-12-13 | Stop reason: SDUPTHER

## 2019-09-03 RX ORDER — METHOTREXATE 2.5 MG/1
15 TABLET ORAL
Qty: 72 TAB | Refills: 3 | Status: SHIPPED | OUTPATIENT
Start: 2019-09-08 | End: 2019-10-07

## 2019-09-05 RX ORDER — METHOTREXATE 2.5 MG/1
15 TABLET ORAL
Qty: 72 TAB | Refills: 3 | Status: SHIPPED | OUTPATIENT
Start: 2019-09-08 | End: 2019-12-17 | Stop reason: SDUPTHER

## 2019-09-18 ENCOUNTER — HOSPITAL ENCOUNTER (OUTPATIENT)
Dept: MAMMOGRAPHY | Age: 64
Discharge: HOME | End: 2019-09-18
Attending: NURSE PRACTITIONER
Payer: COMMERCIAL

## 2019-09-18 DIAGNOSIS — M85.80 OSTEOPENIA, UNSPECIFIED LOCATION: ICD-10-CM

## 2019-09-18 DIAGNOSIS — Z80.3 FAMILY HISTORY OF BREAST CANCER: ICD-10-CM

## 2019-09-18 DIAGNOSIS — M89.9 DISORDER OF BONE: ICD-10-CM

## 2019-09-18 DIAGNOSIS — Z12.31 ENCOUNTER FOR SCREENING MAMMOGRAM FOR MALIGNANT NEOPLASM OF BREAST: ICD-10-CM

## 2019-09-18 PROCEDURE — 77080 DXA BONE DENSITY AXIAL: CPT

## 2019-09-18 PROCEDURE — 77063 BREAST TOMOSYNTHESIS BI: CPT

## 2019-09-18 NOTE — PROGRESS NOTES
09/18/19  Results sent to patient via [id number] [facility] Lawanda, NP

## 2019-10-03 ENCOUNTER — DOCUMENTATION ONLY (OUTPATIENT)
Dept: PHARMACY | Age: 64
End: 2019-10-03

## 2019-10-03 NOTE — PROGRESS NOTES
[facility] at [address] [facility] Update     Date: 10/1/19     Sánchez Sahni MD 1955      Medication: Actemra Syringes      Co-pay = $5 (with coupon)     Fill: 9/19/19     Ship: 9/30/19     Deliver: 10/1/19     Next Fill Due: 10/10/19     Pharmacist offered counseling.     Nissa Ramon, [address] at [facility],  [place]erm[place] Hilliard, [address]  phone: [phone]   fax: [phone]

## 2019-10-07 ENCOUNTER — HOSPITAL ENCOUNTER (OUTPATIENT)
Dept: LAB | Age: 64
Discharge: HOME | End: 2019-10-07
Payer: COMMERCIAL

## 2019-10-07 ENCOUNTER — OFFICE VISIT (OUTPATIENT)
Dept: INTERNAL MEDICINE CLINIC | Age: 64
End: 2019-10-07

## 2019-10-07 VITALS
TEMPERATURE: 98.2 F | WEIGHT: 197 LBS | BODY MASS INDEX: 30.92 KG/M2 | SYSTOLIC BLOOD PRESSURE: 136 MMHG | HEART RATE: 72 BPM | HEIGHT: 67 IN | RESPIRATION RATE: 20 BRPM | OXYGEN SATURATION: 99 % | DIASTOLIC BLOOD PRESSURE: 68 MMHG

## 2019-10-07 DIAGNOSIS — M35.3 PMR (POLYMYALGIA RHEUMATICA) (HCC): ICD-10-CM

## 2019-10-07 DIAGNOSIS — R00.2 PALPITATIONS: ICD-10-CM

## 2019-10-07 DIAGNOSIS — R53.83 FATIGUE, UNSPECIFIED TYPE: ICD-10-CM

## 2019-10-07 DIAGNOSIS — Z00.00 MEDICARE ANNUAL WELLNESS VISIT, SUBSEQUENT: Primary | ICD-10-CM

## 2019-10-07 DIAGNOSIS — M31.6 GCA (GIANT CELL ARTERITIS) (HCC): ICD-10-CM

## 2019-10-07 DIAGNOSIS — E55.9 VITAMIN D DEFICIENCY: ICD-10-CM

## 2019-10-07 DIAGNOSIS — E11.51 TYPE 2 DIABETES MELLITUS WITH PERIPHERAL VASCULAR DISEASE (HCC): ICD-10-CM

## 2019-10-07 PROCEDURE — 84443 ASSAY THYROID STIM HORMONE: CPT

## 2019-10-07 PROCEDURE — 86141 C-REACTIVE PROTEIN HS: CPT

## 2019-10-07 PROCEDURE — 85651 RBC SED RATE NONAUTOMATED: CPT

## 2019-10-07 PROCEDURE — 83036 HEMOGLOBIN GLYCOSYLATED A1C: CPT

## 2019-10-07 PROCEDURE — 82306 VITAMIN D 25 HYDROXY: CPT

## 2019-10-07 PROCEDURE — 80053 COMPREHEN METABOLIC PANEL: CPT

## 2019-10-07 PROCEDURE — 36415 COLL VENOUS BLD VENIPUNCTURE: CPT

## 2019-10-07 PROCEDURE — 85025 COMPLETE CBC W/AUTO DIFF WBC: CPT

## 2019-10-07 NOTE — PROGRESS NOTES
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 Mercedes Pisano  30 day holter ordered, has   Had stress test this morning, no results yet    Fatigue, had iron deficiency in the past, wondering if could be anemic. Does have h/o vit d def- takes daily supplement.  + fam hist hashimotos     DM2:  Takes Januvia  Fasting and 2pm BGs stable  Last hgba1c 7.0%  Has lost 7lb since June, with diet  Needs to increase her activity     HTN:  Takes ramipril, hctz     GCA/PRM:  Rheumatology Dr. Antonia Smith  Taking metotrexate, actemra, prednisone, duloxetine  Will be trying to taper off of the prednisone. Has tried before, had recurrence of all of her symptoms    Osteopenia:  Taking actonel, vit d  Last DEXA improved    Mammogram UTD        Health maintenance:   Last colonoscopy: 05/2017, polyps, repeat in 3 years. No changes in bowels or blood in her stool  Pneumonia vaccination UTD  Has had 1:2 shingrix vaccinations       Annual Wellness Visit    End of Life Planning: on file  Info for MyPreventativeCare: Given to patient, see After Visit Summary      Depression Screen: Patient Health Questionnaire (PHQ-2)   Over the last 2 weeks, how often have you been bothered by any of the following problems? Little interest or pleasure in doing things? Not at all. [0]   Feeling down, depressed, or hopeless? Not at all. [0]    Total Score: 0/6  PHQ-2 Assessment Scoring:   A score of 2 or more requires further screening with the PHQ-9  N/A      Alcohol Risk Factor Screening: You do not drink alcohol or very rarely. Hearing Loss:  Hearing is good. Activities of Daily Living:  Self-care.    Requires assistance with: no ADLs    Fall Risk:  No fall risk factors    Abuse Screen:  Patient is not abused    Adult Nutrition Screen:  No risk factors noted. Patient Active Problem List    Diagnosis Date Noted    Iron deficiency anemia 07/13/2017    Current chronic use of systemic steroids 06/29/2016    Osteopenia 06/29/2016    Stroke risk 04/29/2016    Temporal arteritis ([facility] 04/29/2016    Di[facility] 02/24/2016    Benign essential hypertension 10/05/2015    Hyperlipidemia with target LDL less than 100     Thrombocytopenia ([facility] 10/01/2015    Fibrocystic disease of breast 06/29/2015    Diabetes mellitus, type 2 ([facility] 12/01/2014    Thoracic outlet syndrome     Degenerative arthritis of right knee 02/01/2014    Screening for osteoporosis 11/03/2013    Vitamin D deficiency 10/22/2013    DDD (degenerative disc disease), cervical     H/O cardiac catheterization 10/16/2013    Pap smear for cervical cancer screening 10/16/2013    Hx of screening mammography 10/16/2013    GERD (gastroesophageal reflux disease)     OA (osteoarthritis)     PUD (peptic ulcer disease) 07/22/2012     Current Outpatient Medications   Medication Sig Dispense Refill    methotrexate (RHEUMATREX) 2.5 mg tablet Take 6 Tabs by mouth every Sunday. 72 Tab 3    methotrexate (RHEUMATREX) 2.5 mg tablet Take 6 Tabs by mouth every Sunday. 72 Tab 3    predniSONE (DELTASONE) 5 mg tablet Take 1 Tab by mouth daily. 90 Tab 1    omeprazole (PRILOSEC) 40 mg capsule TAKE 1 CAP BY MOUTH DAILY. 90 Cap 3    folic acid (FOLVITE) 1 mg tablet Take 1 Tab by mouth daily. (Patient taking differently: Take 2 mg by mouth daily.) 60 Tab 11    SITagliptin (JANUVIA) 100 mg tablet Take 1 Tab by mouth daily. 90 Tab 0    tocilizumab (ACTEMRA) 162 mg/0.9 mL syrg 162 mg by SubCUTAneous route every seven (7) days. 4 Syringe 11    naproxen (NAPROSYN) 500 mg tablet Take 1 Tab by mouth two (2) times daily (with meals). 180 Tab 2    DULoxetine (CYMBALTA) 60 mg capsule TAKE 1 CAP BY MOUTH DAILY.  80 Cap 3    atorvastatin (LIPITOR) 20 mg tablet Take 1 Tab by mouth daily. 90 Tab 3    risedronate (ACTONEL) 150 mg tablet Take 1 Tab by mouth every thirty (30) days. 3 Tab 11    ramipril (ALTACE) 10 mg capsule Take 2 Caps by mouth daily. 180 Cap 3    hydroCHLOROthiazide (HYDRODIURIL) 25 mg tablet Take 1 Tab by mouth daily. 90 Tab 3    ibuprofen (MOTRIN) 200 mg tablet Take 600 mg by mouth.  Insulin Syringe-Needle U-100 1 mL 31 gauge x 5/16 syrg 4 Syringes by Does Not Apply route every seven (7) days. 4 Syringe 6    estradiol (ESTRACE) 1 mg tablet Take 1 Tab by mouth daily. 90 Tab 3    omega-3 fatty acids-vitamin e (FISH OIL) 1,000 mg cap Take 1 Cap by mouth two (2) times a day.  Cholecalciferol, Vitamin D3, (VITAMIN D3) 2,000 unit cap Take 1 Cap by mouth daily.  multivitamin (ONE A DAY) tablet Take 1 Tab by mouth daily. Review of Systems    A comprehensive review of systems was negative except for that written in the HPI. Objective:     Visit Vitals  /68 (BP 1 Location: Right arm, BP Patient Position: Sitting)   Pulse 72   Temp 98.2 °F (36.8 °C) (Oral)   Resp 20   Ht 5' 6.5\" (1.689 m)   Wt 197 lb (89.4 kg)   SpO2 99%   BMI 31.32 kg/m²     General appearance: alert, cooperative, no distress, appears stated age, overweight  Head: Normocephalic, without obvious abnormality, atraumatic  Neck: supple, symmetrical, trachea midline, no adenopathy, thyroid: not enlarged, symmetric, no tenderness/mass/nodules, no carotid bruit and no JVD  Lungs: clear to auscultation bilaterally  Heart: regular rate and rhythm, S1, S2 normal, no murmur, click, rub or gallop  Extremities: extremities normal, atraumatic, no cyanosis or edema, steady gait  Skin: Skin color, texture, turgor normal.  Neurologic: Grossly normal  Psych: calm, cooperative, appropriate affect      Assessment/Plan:     1.  Medicare annual wellness visit, subsequent  -  UTD  - TSH 3RD GENERATION  - CBC WITH AUTOMATED DIFF  - METABOLIC PANEL, COMPREHENSIVE  - HEMOGLOBIN A1C WITH EAG  - VITAMIN D, 25 HYDROXY    2. Type 2 diabetes mellitus with peripheral vascular disease ([facility]  - cont januvia  - recheck A1c today  - cont weight loss, reducing breads  - CBC WITH AUTOMATED DIFF  - METABOLIC PANEL, COMPREHENSIVE  - HEMOGLOBIN A1C WITH EAG    3. PMR (polymyalgia rheumatica) ([facility])  - cont current meds, following with Rheumatology  - SED RATE (ESR)  - CRP, HIGH SENSITIVITY    4. GCA (giant cell arteritis) ([facility])  - as above  - SED RATE (ESR)  - CRP, HIGH SENSITIVITY    5. Fatigue, unspecified type  - following with Dr. Kelly Triplett  - stress test done this morning, results pending  - holter pending  - TSH 3RD GENERATION  - CBC WITH AUTOMATED DIFF  - METABOLIC PANEL, COMPREHENSIVE  - VITAMIN D, 25 HYDROXY    6. Palpitations  - as above    7. Vitamin D deficiency  - taking vit d supplement daily  - VITAMIN D, 25 HYDROXY      Advised her to call back or return to office if symptoms worsen/change/persist.  Discussed expected course/resolution/complications of diagnosis in detail with patient. Medication risks/benefits/costs/interactions/alternatives discussed with patient. She was given an after visit summary which includes diagnoses, current medications, & vitals. She expressed understanding with the diagnosis and plan.     RADHA Hamlin

## 2019-10-07 NOTE — PROGRESS NOTES
1. Have you been to the ER, urgent care clinic since your last visit? Hospitalized since your last visit? No    2. Have you seen or consulted any other health care providers outside of the [address] [place] [address] since your last visit? Include any pap smears or colon screening.  No

## 2019-10-08 LAB
25(OH)D3+25(OH)D2 SERPL-MCNC: 55 NG/ML (ref 30–100)
ALBUMIN SERPL-MCNC: 4.3 G/DL (ref 3.6–4.8)
ALBUMIN/GLOB SERPL: 2 {RATIO} (ref 1.2–2.2)
ALP SERPL-CCNC: 48 IU/L (ref 39–117)
ALT SERPL-CCNC: 24 IU/L (ref 0–32)
AST SERPL-CCNC: 24 IU/L (ref 0–40)
BASOPHILS # BLD AUTO: 0 X10E3/UL (ref 0–0.2)
BASOPHILS NFR BLD AUTO: 0 %
BILIRUB SERPL-MCNC: <0.2 MG/DL (ref 0–1.2)
BUN SERPL-MCNC: 18 MG/DL (ref 8–27)
BUN/CREAT SERPL: 24 (ref 12–28)
CALCIUM SERPL-MCNC: 9.3 MG/DL (ref 8.7–10.3)
CHLORIDE SERPL-SCNC: 98 MMOL/L (ref 96–106)
CO2 SERPL-SCNC: 25 MMOL/L (ref 20–29)
CREAT SERPL-MCNC: 0.74 MG/DL (ref 0.57–1)
CRP SERPL HS-MCNC: 4.9 MG/L (ref 0–3)
EOSINOPHIL # BLD AUTO: 0 X10E3/UL (ref 0–0.4)
EOSINOPHIL NFR BLD AUTO: 0 %
ERYTHROCYTE [DISTWIDTH] IN BLOOD BY AUTOMATED COUNT: 15.9 % (ref 12.3–15.4)
ERYTHROCYTE [SEDIMENTATION RATE] IN BLOOD BY WESTERGREN METHOD: 2 MM/HR (ref 0–40)
EST. AVERAGE GLUCOSE BLD GHB EST-MCNC: 160 MG/DL
GLOBULIN SER CALC-MCNC: 2.2 G/DL (ref 1.5–4.5)
GLUCOSE SERPL-MCNC: 127 MG/DL (ref 65–99)
HBA1C MFR BLD: 7.2 % (ref 4.8–5.6)
HCT VFR BLD AUTO: 35.2 % (ref 34–46.6)
HGB BLD-MCNC: 11.2 G/DL (ref 11.1–15.9)
IMM GRANULOCYTES # BLD AUTO: 0.1 X10E3/UL (ref 0–0.1)
IMM GRANULOCYTES NFR BLD AUTO: 1 %
LYMPHOCYTES # BLD AUTO: 2.2 X10E3/UL (ref 0.7–3.1)
LYMPHOCYTES NFR BLD AUTO: 27 %
MCH RBC QN AUTO: 26.7 PG (ref 26.6–33)
MCHC RBC AUTO-ENTMCNC: 31.8 G/DL (ref 31.5–35.7)
MCV RBC AUTO: 84 FL (ref 79–97)
MONOCYTES # BLD AUTO: 0.6 X10E3/UL (ref 0.1–0.9)
MONOCYTES NFR BLD AUTO: 8 %
NEUTROPHILS # BLD AUTO: 5.1 X10E3/UL (ref 1.4–7)
NEUTROPHILS NFR BLD AUTO: 64 %
PLATELET # BLD AUTO: 382 X10E3/UL (ref 150–450)
POTASSIUM SERPL-SCNC: 4.3 MMOL/L (ref 3.5–5.2)
PROT SERPL-MCNC: 6.5 G/DL (ref 6–8.5)
RBC # BLD AUTO: 4.19 X10E6/UL (ref 3.77–5.28)
SODIUM SERPL-SCNC: 139 MMOL/L (ref 134–144)
TSH SERPL DL<=0.005 MIU/L-ACNC: 1.62 UIU/ML (ref 0.45–4.5)
WBC # BLD AUTO: 8.1 X10E3/UL (ref 3.4–10.8)

## 2019-10-15 ENCOUNTER — OFFICE VISIT (OUTPATIENT)
Dept: RHEUMATOLOGY | Age: 64
End: 2019-10-15

## 2019-10-15 VITALS
RESPIRATION RATE: 16 BRPM | HEART RATE: 104 BPM | TEMPERATURE: 98.2 F | BODY MASS INDEX: 30.84 KG/M2 | DIASTOLIC BLOOD PRESSURE: 85 MMHG | OXYGEN SATURATION: 97 % | SYSTOLIC BLOOD PRESSURE: 171 MMHG | WEIGHT: 194 LBS

## 2019-10-15 DIAGNOSIS — E78.5 HYPERLIPIDEMIA, UNSPECIFIED HYPERLIPIDEMIA TYPE: ICD-10-CM

## 2019-10-15 DIAGNOSIS — R76.8 ANA POSITIVE: Primary | ICD-10-CM

## 2019-10-15 DIAGNOSIS — M31.6 GCA (GIANT CELL ARTERITIS) (HCC): ICD-10-CM

## 2019-10-15 DIAGNOSIS — M35.3 PMR (POLYMYALGIA RHEUMATICA) (HCC): ICD-10-CM

## 2019-10-15 RX ORDER — METFORMIN HYDROCHLORIDE 1000 MG/1
1000 TABLET ORAL 2 TIMES DAILY WITH MEALS
COMMUNITY
End: 2020-03-13

## 2019-10-15 RX ORDER — METFORMIN HYDROCHLORIDE 500 MG/1
TABLET, EXTENDED RELEASE ORAL
COMMUNITY
End: 2019-10-15 | Stop reason: SDUPTHER

## 2019-10-15 RX ORDER — METFORMIN HYDROCHLORIDE 1000 MG/1
TABLET ORAL
OUTPATIENT
Start: 2019-10-15

## 2019-10-15 RX ORDER — METFORMIN HYDROCHLORIDE 500 MG/1
TABLET, EXTENDED RELEASE ORAL
Qty: 120 TAB | Refills: 6 | Status: SHIPPED | OUTPATIENT
Start: 2019-10-15 | End: 2020-03-13

## 2019-10-15 RX ORDER — METFORMIN HYDROCHLORIDE 500 MG/1
TABLET, FILM COATED, EXTENDED RELEASE ORAL
COMMUNITY
End: 2019-10-15 | Stop reason: CLARIF

## 2019-10-15 NOTE — PROGRESS NOTES
RHEUMATOLOGY PROBLEM LIST AND CHIEF COMPLAINT  1. GCA/PMR - HAs, left temporal artery discomfort, sudden loss of vision, arthralgia of shoulder, TMJ, hands, feet, response to steroids    Therapy History:  Current DMARDs: Actemra (2/2018, stopped for 1 month; 3/2018-current), Methotrexate (1/2017 - 4/2018, restarted 3/2019-current)     INTERVAL HISTORY  This is a 75 y.o.  female. Today, the patient complains of no pain in the joints. Location: none  Severity:  0 on a scale of 0-10  Timing: all day   Duration: 4 months  Context/Associated signs and symptoms: The patient is doing well today. She denies fatigue or joint pain today. The patient continues on Prednisone 4 mg daily, Actemra SQ weekly,  and Methotrexate 15 mg PO weekly. The patient mentions she developedchest pain and palpitations in August. She has followed up with cardiology for a PET scan, LFTs, and blood work. She is currently using a [employer] monitor.      RHEUMATOLOGY REVIEW OF SYSTEMS   Positives as per history  Negatives as follows:  Zelda Odom:  Denies unexplained persistent fevers, weight change, chronic fatigue  HEAD/EYES:   Denies eye redness, blurry vision or sudden loss of vision, dry eyes, HA, temporal artery pain  ENT:    Denies oral/nasal ulcers, recurrent sinus infections, dry mouth  RESPIRATORY:  No pleuritic pain, history of pleural effusions, hemoptysis, exertional dyspnea  CARDIOVASCULAR:  Denies chest pain, history of pericardial effusions  GASTRO:   Denies heartburn, esophageal dysmotility, abdominal pain, nausea, vomiting, diarrhea, blood in the stool  HEMATOLOGIC:  No easy bruising, purpura, swollen lymph nodes  SKIN:    Denies alopecia, ulcers, nodules, sun sensitivity, unexplained persistent rash   VASCULAR:   Denies edema, cyanosis, raynaud phenomenon  NEUROLOGIC:  Denies specific muscle weakness, paresthesias   PSYCHIATRIC:  No sleep disturbance / snoring, depression, anxiety  MSK:    No morning stiffness >1 hour, SI joint pain, persistent joint swelling, persistent joint pain    PAST MEDICAL HISTORY  Reviewed with patient, significant changes in medical history - no    PHYSICAL EXAM  Blood pressure 171/85, pulse (!) 104, temperature 98.2 °F (36.8 °C), temperature source Oral, resp. rate 16, weight 194 lb (88 kg), SpO2 97 %. GENERAL APPEARANCE: Well-nourished adult in no acute distress. EYES: No scleral erythema, conjunctival injection. ENT: No oral ulcer, parotid enlargement  NECK: No adenopathy, thyroid enlargement. CARDIOVASCULAR: Heart rhythm is regular. No murmur, rub, gallop. CHEST: Normal vesicular breath sounds. No wheezes, rales, pleural friction rubs. ABDOMINAL: The abdomen is soft and nontender. Liver and spleen are nonpalpable. Bowel sounds are normal.  EXTREMITIES: There is no evidence of clubbing, cyanosis, edema. SKIN: No rash, palpable purpura, digital ulcer, abnormal thickening. NEUROLOGICAL: Normal gait and station, full strength in upper and lower extremities, normal sensation to light touch. MUSCULOSKELETAL:  Upper extremities - Left shoulder dROM w/ int rotation. Right shoulder crepitus - unchanged. Lower extremities - full range of motion, no tenderness, no swelling, no synovial thickening and no deformity of joints except for B/L knees crepitus (R>L)    LABS, RADIOLOGY AND PROCEDURES   Previous labs reviewed -Yes    ASSESSMENT  1. GCA/PMR - (Established problem -  Stable disease) - Her disease is well managed with Actemra SQ weekly, Methotrexate 15 mg PO weekly, and Prednisone 4 mg daily. She should continue on these medications. I recommend the patient continue to taper off the Prednisone. She should return in 4 months for a follow up. She does not need labs today. 2. Bone Health - Most recent bone density (9/2017) showed osteopenia. The patient continues on Actonel. We will continue having the patient taper Prednisone.   3. Drug therapy monitoring for toxicity (biologic) - CBC, BUN, Cr, AST, ALT and albumin every 4 months    PLAN  1. Prednisone 4 mg daily; continue taper  2. Actemra SQ weekly  3. Methotrexate 15 mg PO weekly  4. Return in 4 months    Shai Mckeon MD  Adult and Pediatric Rheumatology     [facility] [address], [address] [facility], Phone [phone], Fax [phone]   E-mail: [email or web address]    Visiting  of Pediatrics    Department of Pediatrics, [facility] of [address] [place] [address], [address] [place] [address], Phone [phone], Fax [phone]  E-mail: [email or web address]    There are no Patient Instructions on file for this visit. cc:  MARICRUZ Cheatham (Ophthalmology)    Written by josué Ball, as dictated by Jhonathan Morgan.  Shanelle Mckeon M.D.

## 2019-10-15 NOTE — PROGRESS NOTES
Chief Complaint   Patient presents with    Joint Pain     1. Have you been to the ER, urgent care clinic since your last visit? Hospitalized since your last visit? No    2. Have you seen or consulted any other health care providers outside of the [address] [place] [address] since your last visit? Include any pap smears or colon screening.  No

## 2019-10-15 NOTE — TELEPHONE ENCOUNTER
Last refill-   Last office visit - 10/7/2019  Next office visit -   Future Appointments   Date Time Provider Aggie Jodi   2/17/2020  1:00 PM Jesus Musatfa MD [facility]   4/10/2020  3:40 PM [place] [id number] [facility]         Requested Prescriptions     Pending Prescriptions Disp Refills    metFORMIN (GLUCOPHAGE) 1,000 mg tablet       Sig: Take 1 Tab by mouth two (2) times daily (with meals).

## 2019-10-15 NOTE — TELEPHONE ENCOUNTER
Patient is taking Metformin  mg 2 tablets BID    metFORMIN ER (GLUCOPHAGE XR) 500 mg tablet   Sig: TAKE 2 TABLETS BY MOUTH TWICE A DAY

## 2019-10-15 NOTE — TELEPHONE ENCOUNTER
Requested Prescriptions     Pending Prescriptions Disp Refills    metFORMIN (GLUCOPHAGE) 1,000 mg tablet       Sig: Take 1 Tab by mouth two (2) times daily (with meals).    10/07/2019  04/10/2020  [facility]

## 2019-10-28 RX ORDER — HYDROCHLOROTHIAZIDE 25 MG/1
25 TABLET ORAL DAILY
Qty: 90 TAB | Refills: 3 | Status: SHIPPED | OUTPATIENT
Start: 2019-10-28 | End: 2019-12-13 | Stop reason: SDUPTHER

## 2019-10-29 DIAGNOSIS — Z79.899 ENCOUNTER FOR LONG-TERM (CURRENT) USE OF MEDICATIONS: ICD-10-CM

## 2019-10-30 RX ORDER — DULOXETIN HYDROCHLORIDE 60 MG/1
CAPSULE, DELAYED RELEASE ORAL
Qty: 90 CAP | Refills: 0 | Status: SHIPPED | OUTPATIENT
Start: 2019-10-30 | End: 2020-01-20

## 2019-10-30 NOTE — TELEPHONE ENCOUNTER
Last refill- 11/19/2018  Last office visit - 10/7/19  Next office visit -   Future Appointments   Date Time Provider Aggie Jodi   2/17/2020  1:00 PM Dangelo Villagran MD [address] [facility]   4/10/2020  3:40 PM Sunny Monahan NP [address] [facility]         Requested Prescriptions     Pending Prescriptions Disp Refills    DULoxetine (CYMBALTA) 60 mg capsule 90 Cap 3     Sig: TAKE 1 CAP BY MOUTH DAILY.

## 2019-11-06 ENCOUNTER — DOCUMENTATION ONLY (OUTPATIENT)
Dept: PHARMACY | Age: 64
End: 2019-11-06

## 2019-11-06 NOTE — PROGRESS NOTES
[facility] at [address] [facility] Update     Date: 11/1/19     Tamara Mckeon MD 1955      [place] [facility]     Co-pay = $5 (with coupon)     Fill: 10/28/19     Ship: 10/31/19     Deliver: 11/1/19     Next Fill Due: 11/18/19     Pharmacist offered counseling.     Darryn Parker, [address] at [facility],  [place]ermila Hilliard, [address]  phone: [phone]   fax: [phone]

## 2019-12-12 NOTE — PROGRESS NOTES
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 female     Previous PCP left the practice. Patient is a part-time pediatric hospitalist    Hyperlipidemia-on atorvastatin 20 and fish oil. Denies any side effects    Chronic pain- has been on Cymbalta. This also treats depression, anxiety. Does not feel that the Cymbalta works well for her neuropathy although she does feel like it does help with depression and anxiety. Diabetes- since her last A1c in the chart, she was started on metformin. Also on Januvia. Denies any side effects from the medications. Hypertension- usually runs in the 120s at home. Compliant with medications. Asking for refills. Denies any chest pain, shortness of breath, lightheadedness, dizziness. Atrial fibrillation/tachycardia- had initially presented with palpitations and lightheadedness. Had an event recorder that showed atrial fibrillation or atrial tachycardia. Was started on on Bystolic by cardiology. Symptoms have been much improved since starting Bystolic. Giant cell arteritis/PMR- on chronic prednisone, methotrexate, actemra. Not currently in a flare. Has had issues trying to come off of prednisone and so she has been on prednisone for the last 4 years. Compounded by osteoporosis for which she takes risedronate. Review of Systems   Constitutional: Negative for chills and fever. HENT: Negative for hearing loss. Eyes: Negative for blurred vision. Respiratory: Negative for shortness of breath. Cardiovascular: Negative for chest pain. Gastrointestinal: Negative for abdominal pain, blood in stool, constipation, diarrhea, melena, nausea and vomiting. Genitourinary: Negative for dysuria and hematuria. Musculoskeletal: Negative for joint pain. Skin: Negative for rash. Neurological: Negative for headaches.         Past Medical History     Allergies   Allergen Reactions    Elfego Flavor Hives     Elfego fruit not flavor    Alendronate Nausea and Vomiting    Morphine Other (comments)     Extreme epigastric pain    Valium [Diazepam] Nausea and Vomiting    Versed [Midazolam] Nausea and Vomiting        Current Outpatient Medications   Medication Sig    BYSTOLIC 5 mg tablet TAKE 1 TABLET BY MOUTH EVERY DAY    atorvastatin (LIPITOR) 40 mg tablet Take 1 Tab by mouth daily for 90 days.  hydroCHLOROthiazide (HYDRODIURIL) 25 mg tablet Take 1 Tab by mouth daily for 90 days.  ramipril (ALTACE) 10 mg capsule Take 2 Caps by mouth daily for 90 days.  SITagliptin (JANUVIA) 100 mg tablet Take 1 Tab by mouth daily for 90 days.  gabapentin (NEURONTIN) 300 mg capsule Take 1 Cap by mouth three (3) times daily for 30 days. Max Daily Amount: 900 mg.    DULoxetine (CYMBALTA) 60 mg capsule TAKE 1 CAP BY MOUTH DAILY.  metFORMIN (GLUCOPHAGE) 1,000 mg tablet Take 1,000 mg by mouth two (2) times daily (with meals).  metFORMIN ER (GLUCOPHAGE XR) 500 mg tablet TAKE 2 TABLETS BY MOUTH TWICE A DAY    methotrexate (RHEUMATREX) 2.5 mg tablet Take 6 Tabs by mouth every Sunday.  predniSONE (DELTASONE) 5 mg tablet Take 1 Tab by mouth daily. (Patient taking differently: Take 4 mg by mouth daily. Indications: taking 5mg daily)    omeprazole (PRILOSEC) 40 mg capsule TAKE 1 CAP BY MOUTH DAILY.  folic acid (FOLVITE) 1 mg tablet Take 1 Tab by mouth daily. (Patient taking differently: Take 2 mg by mouth daily.)    tocilizumab (ACTEMRA) 162 mg/0.9 mL syrg 162 mg by SubCUTAneous route every seven (7) days.  risedronate (ACTONEL) 150 mg tablet Take 1 Tab by mouth every thirty (30) days.  ibuprofen (MOTRIN) 200 mg tablet Take 600 mg by mouth.  Insulin Syringe-Needle U-100 1 mL 31 gauge x 5/16 syrg 4 Syringes by Does Not Apply route every seven (7) days.  estradiol (ESTRACE) 1 mg tablet Take 1 Tab by mouth daily.  omega-3 fatty acids-vitamin e (FISH OIL) 1,000 mg cap Take 1 Cap by mouth two (2) times a day.     Cholecalciferol, Vitamin D3, (VITAMIN D3) 2,000 unit cap Take 1 Cap by mouth daily.  multivitamin (ONE A DAY) tablet Take 1 Tab by mouth daily.  naproxen (NAPROSYN) 500 mg tablet Take 1 Tab by mouth two (2) times daily (with meals). (Patient taking differently: Take 500 mg by mouth two (2) times daily (with meals). Indications: not taking)     No current facility-administered medications for this visit.            Patient Active Problem List   Diagnosis Code    PUD (peptic ulcer disease) K27.9    GERD (gastroesophageal reflux disease) K21.9    OA (osteoarthritis) M19.90   [facility] H/O cardiac catheterization Z98.890    Pap smear for cervical cancer screening Z12.4    DDD (degenerative disc disease), cervical M50.30    Vitamin D deficiency E55.9    Degenerative arthritis of right knee M17.11    Thoracic outlet syndrome G54.0    Fibrocystic disease of breast N60.19    Benign essential hypertension I10    Hyperlipidemia with target LDL less than 100 E78.5    Temporal arteritis ([address] M31.6    Type 2 diabetes mellitus with diabetic polyneuropathy, without long-term current use of insulin (HCC) E11.42    Current chronic use of systemic steroids Z79.52    Osteopenia M85.80    Iron deficiency anemia D50.9    Type 2 diabetes mellitus with peripheral vascular disease (HCC) E11.51    Paroxysmal atrial fibrillation (HCC) I48.0    Polymyalgia rheumatica (HCC) M35.3    Pulmonary emboli (HCC) I26.99    Chronic pain G89.29    Vulvar high-grade squamous intraepithelial lesion (HGSIL) R87.69    Irritable bowel syndrome with both constipation and diarrhea K58.2    History of hysterectomy Z90.710    Atypical mole D22.9     Past Surgical History:   Procedure Laterality Date    COLONOSCOPY N/A 5/17/2017    COLONOSCOPY performed by Brian [facility]imi [facility] at [facility]    HX BREAST BIOPSY      several breast biopsies (benign)    HX BREAST BIOPSY Bilateral 1975, 1999, 2008,2009    6-7 on R, 2 on L+all benign    HX BUNIONECTOMY      bilateral    HX  SECTION      x3    HX CHOLECYSTECTOMY  2009    HX COLONOSCOPY  2003    normal, f/u in 10 yrs    HX DILATION AND CURETTAGE      [id number], several D&C's    HX ENDOSCOPY      x3, h/o gastric polyp removal 3/2012    HX GI      cholecystectomy    HX GYN  2018    OZZIE    HX HEART CATHETERIZATION      x2, most recent 2012 was normal    HX KNEE ARTHROSCOPY Right     HX OTHER SURGICAL      lipoma removal    HX OTHER SURGICAL  16    L temporal artery bx (Dr. Albert Raman)    HX AZAEL AND BSO  2006    secondary to endometrial hyperplasia with atypical changes    HX TONSILLECTOMY      T & A      Social History     Tobacco Use    Smoking status: Never Smoker    Smokeless tobacco: Never Used   Substance Use Topics    Alcohol use: No     Alcohol/week: 0.0 standard drinks      Family History   Problem Relation Age of Onset    Heart Disease Mother     Diabetes Mother     Cancer Mother 40        breast    Thyroid Disease Mother         hashimotos    Hypertension Mother     Osteoporosis Mother     Heart Surgery Mother         aortic valve replacement    Cancer Father         gastric    Heart Disease Father     Hypertension Father     Diabetes Paternal Grandmother     Heart Disease Brother     Thyroid Disease Sister         hashimotos    Other Sister         bipolar    Other Sister         depression    Other Brother         mental illness    Breast Cancer Maternal Aunt          from breast cancer    Breast Cancer Maternal Aunt 39        and recurred at 80    Osteoporosis Other         maternal aunts    Psychiatric Disorder Son         bipolar    Psychiatric Disorder Daughter         depression    Psychiatric Disorder Daughter         depression    Breast Cancer Maternal Aunt         Physical Exam   Vitals:       Visit Vitals  /79 (BP 1 Location: Left arm, BP Patient Position: Sitting)   Pulse 70   Temp 97.9 °F (36.6 °C) (Oral) Resp 12   Ht 5' 6.5\" (1.689 m)   Wt 195 lb 12.8 oz (88.8 kg)   SpO2 96%   BMI 31.13 kg/m²        Physical Exam  Constitutional:       General: She is not in acute distress. HENT:      Right Ear: Tympanic membrane, ear canal and external ear normal.      Left Ear: Tympanic membrane, ear canal and external ear normal.      Nose: Nose normal.      Mouth/Throat:      Mouth: Mucous membranes are moist.      Pharynx: No posterior oropharyngeal erythema. Eyes:      Conjunctiva/sclera: Conjunctivae normal.   Neck:      Musculoskeletal: Neck supple. Thyroid: No thyromegaly. Cardiovascular:      Rate and Rhythm: Normal rate and regular rhythm. Heart sounds: No murmur. No friction rub. No gallop. Pulmonary:      Effort: No respiratory distress. Breath sounds: No wheezing or rales. Abdominal:      General: Bowel sounds are normal. There is no distension. Palpations: Abdomen is soft. Tenderness: There is no tenderness. Skin:     General: Skin is warm. Findings: No rash. Neurological:      Mental Status: She is alert and oriented to person, place, and time. Psychiatric:         Mood and Affect: Affect normal.         Judgment: Judgment normal.        Foot exam: Normal DP and PT pulses bilaterally, good cap refill, normal monofilament testing, no wounds or ulcers    Assessment and Plan   Diagnoses and all orders for this visit:    1. Type 2 diabetes mellitus without complication, without long-term current use of insulin ([facility])  Orders:  -     MICROALBUMIN, UR, RAND W/ MICROALB/CREAT RATIO; Future  -     atorvastatin (LIPITOR) 40 mg tablet; Take 1 Tab by mouth daily for 90 days.  -     SITagliptin (JANUVIA) 100 mg tablet; Take 1 Tab by mouth daily for 90 days. Also continue metformin  We will get A1c checked when she gets her room labs done in January  Discussed good foot care    2. Benign essential hypertension  -     hydroCHLOROthiazide (HYDRODIURIL) 25 mg tablet;  Take 1 Tab by mouth daily for 90 days. -     ramipril (ALTACE) 10 mg capsule; Take 2 Caps by mouth daily for 90 days. Well-controlled at home in the 120s    3. Polyneuropathy  -     gabapentin (NEURONTIN) 300 mg capsule; Take 1 Cap by mouth three (3) times daily for 30 days. Max Daily Amount: 900 mg. Recommend starting out with 300 at night then 300 twice a day then 300 3 times a day. Increase dose every week as tolerated. 4. Paroxysmal atrial fibrillation ([address]  Followed by cards. Chads vas score of 4. Unclear if her study showed A. fib or atrial tachycardia which would make a difference in her need for anticoagulation  Instructed patient to bring this up with cardiology when she sees them next month    5. Other chronic pain  Anxiety and depression  Continue Cymbalta. Biggest complaint for pain is neuropathy in her feet for which we are trying gabapentin as described above    6. Hyperlipidemia with target LDL less than 100  Increase atorvastatin to 40 mg    7. Osteopenia of other site  Assessment & Plan:  Followed by rheumatology. On risedronate          8. History of hysterectomy  On estradiol. Does have history of a PE in the setting of a hysterectomy    9. Temporal arteritis ([facility]  10. Polymyalgia rheumatica ([facility]  Followed by rheumatology. Continue medications as prescribed by them    11. Atypical mole  Followed by dermatology    Benefits, risks, possible drug interactions, and side effects of all new medications were reviewed with the patient. Pt verbalized understanding.     Return to clinic: Return to clinic after she sees rheumatology with labs to follow-up on increased atorvastatin dose, A1c, gabapentin    Angeline Hammans, MD  [facility]  12/13/2019    Future Appointments   Date Time Provider Aggie Zapata   2/17/2020  1:00 PM Constance Dominique MD [address] [facility]   7/80/8047  1:16 PM Helena Jj MD [address] [address]   4/10/2020  3:40 PM Rohan Jean-Baptiste NP [address] [facility]

## 2019-12-13 ENCOUNTER — OFFICE VISIT (OUTPATIENT)
Dept: INTERNAL MEDICINE CLINIC | Age: 64
End: 2019-12-13

## 2019-12-13 VITALS
BODY MASS INDEX: 30.73 KG/M2 | SYSTOLIC BLOOD PRESSURE: 134 MMHG | OXYGEN SATURATION: 96 % | DIASTOLIC BLOOD PRESSURE: 79 MMHG | HEIGHT: 67 IN | HEART RATE: 70 BPM | RESPIRATION RATE: 12 BRPM | WEIGHT: 195.8 LBS | TEMPERATURE: 97.9 F

## 2019-12-13 DIAGNOSIS — G89.29 OTHER CHRONIC PAIN: ICD-10-CM

## 2019-12-13 DIAGNOSIS — M31.6 TEMPORAL ARTERITIS (HCC): ICD-10-CM

## 2019-12-13 DIAGNOSIS — E78.5 HYPERLIPIDEMIA WITH TARGET LDL LESS THAN 100: ICD-10-CM

## 2019-12-13 DIAGNOSIS — D22.9 ATYPICAL MOLE: ICD-10-CM

## 2019-12-13 DIAGNOSIS — F41.9 ANXIETY AND DEPRESSION: ICD-10-CM

## 2019-12-13 DIAGNOSIS — M85.88 OSTEOPENIA OF OTHER SITE: ICD-10-CM

## 2019-12-13 DIAGNOSIS — Z90.710 HISTORY OF HYSTERECTOMY: ICD-10-CM

## 2019-12-13 DIAGNOSIS — F32.A ANXIETY AND DEPRESSION: ICD-10-CM

## 2019-12-13 DIAGNOSIS — G62.9 POLYNEUROPATHY: ICD-10-CM

## 2019-12-13 DIAGNOSIS — I10 BENIGN ESSENTIAL HYPERTENSION: ICD-10-CM

## 2019-12-13 DIAGNOSIS — I48.0 PAROXYSMAL ATRIAL FIBRILLATION (HCC): ICD-10-CM

## 2019-12-13 DIAGNOSIS — M35.3 POLYMYALGIA RHEUMATICA (HCC): ICD-10-CM

## 2019-12-13 DIAGNOSIS — E11.9 TYPE 2 DIABETES MELLITUS WITHOUT COMPLICATION, WITHOUT LONG-TERM CURRENT USE OF INSULIN (HCC): Primary | ICD-10-CM

## 2019-12-13 PROBLEM — R87.69 VULVAR HIGH-GRADE SQUAMOUS INTRAEPITHELIAL LESION (HGSIL): Status: ACTIVE | Noted: 2018-01-01

## 2019-12-13 PROBLEM — K58.2 IRRITABLE BOWEL SYNDROME WITH BOTH CONSTIPATION AND DIARRHEA: Status: ACTIVE | Noted: 2019-12-13

## 2019-12-13 PROBLEM — E11.40 TYPE 2 DIABETES MELLITUS WITH DIABETIC NEUROPATHY (HCC): Status: ACTIVE | Noted: 2019-12-13

## 2019-12-13 RX ORDER — RAMIPRIL 10 MG/1
20 CAPSULE ORAL DAILY
Qty: 180 CAP | Refills: 1 | Status: SHIPPED | OUTPATIENT
Start: 2019-12-13 | End: 2020-03-13 | Stop reason: SDUPTHER

## 2019-12-13 RX ORDER — ATORVASTATIN CALCIUM 40 MG/1
40 TABLET, FILM COATED ORAL DAILY
Qty: 90 TAB | Refills: 1 | Status: SHIPPED | OUTPATIENT
Start: 2019-12-13 | End: 2020-03-13 | Stop reason: SDUPTHER

## 2019-12-13 RX ORDER — HYDROCHLOROTHIAZIDE 25 MG/1
25 TABLET ORAL DAILY
Qty: 90 TAB | Refills: 1 | Status: SHIPPED | OUTPATIENT
Start: 2019-12-13 | End: 2020-03-13 | Stop reason: SDUPTHER

## 2019-12-13 RX ORDER — NEBIVOLOL HYDROCHLORIDE 5 MG/1
TABLET ORAL
Refills: 5 | COMMUNITY
Start: 2019-11-04 | End: 2021-02-05

## 2019-12-13 RX ORDER — GABAPENTIN 300 MG/1
300 CAPSULE ORAL 3 TIMES DAILY
Qty: 90 CAP | Refills: 1 | Status: SHIPPED | OUTPATIENT
Start: 2019-12-13 | End: 2020-01-12

## 2019-12-13 NOTE — ASSESSMENT & PLAN NOTE
Key Antihyperglycemic Medications             SITagliptin (JANUVIA) 100 mg tablet (Taking) Take 1 Tab by mouth daily for 90 days. metFORMIN (GLUCOPHAGE) 1,000 mg tablet (Taking) Take 1,000 mg by mouth two (2) times daily (with meals). metFORMIN ER (GLUCOPHAGE XR) 500 mg tablet (Taking) TAKE 2 TABLETS BY MOUTH TWICE A DAY        Other Key Diabetic Medications             atorvastatin (LIPITOR) 40 mg tablet (Taking) Take 1 Tab by mouth daily for 90 days. ramipril (ALTACE) 10 mg capsule (Taking) Take 2 Caps by mouth daily for 90 days. gabapentin (NEURONTIN) 300 mg capsule (Taking) Take 1 Cap by mouth three (3) times daily for 30 days. Max Daily Amount: 900 mg.    omega-3 fatty acids-vitamin e (FISH OIL) 1,000 mg cap (Taking) Take 1 Cap by mouth two (2) times a day.         Lab Results   Component Value Date/Time    Hemoglobin A1c 7.2 10/07/2019 02:48 PM    Glucose 127 10/07/2019 02:48 PM    Creatinine 0.74 10/07/2019 02:48 PM    Microalb/Creat ratio (ug/mg creat.) 3.4 10/11/2018 04:34 PM    Cholesterol, total 222 06/07/2019 01:35 PM    HDL Cholesterol 58 06/07/2019 01:35 PM    LDL, calculated 122 06/07/2019 01:35 PM    Triglyceride 209 06/07/2019 01:35 PM     Diabetic Foot and Eye Exam HM Status   Topic Date Due    Diabetic Foot Care  11/13/2017    Eye Exam  06/12/2020

## 2019-12-17 RX ORDER — METHOTREXATE 2.5 MG/1
15 TABLET ORAL
Qty: 72 TAB | Refills: 3 | Status: SHIPPED | OUTPATIENT
Start: 2019-12-22 | End: 2020-09-01 | Stop reason: SDUPTHER

## 2019-12-18 ENCOUNTER — DOCUMENTATION ONLY (OUTPATIENT)
Dept: PHARMACY | Age: 64
End: 2019-12-18

## 2019-12-18 NOTE — PROGRESS NOTES
[facility] at [address] [facility] Update     Date: 12/11/19     Jacklyn Sorrel, [place] Darrelyn Crigler     Co-pay = $5 (with coupon)     Fill: 12/2/19     Ship: 12/10/19     Deliver: 12/11/19     Next Fill Due: 12/23/19     Pharmacist offered counseling.     Jose R Elizalde, [address] at [facility],  [place], [address]  phone: [phone]   fax: [phone]

## 2019-12-26 NOTE — TELEPHONE ENCOUNTER
----- Message from Hamlet Ballesteros sent at 12/26/2019  1:44 PM EST -----  Contact: [phone]  PT CALLING BECAUSE THE PAIN FROM HIS TRIGEMINAL NEURALGIA IS COMING BACK AND THINKS HE NEEDS TO INCREASE HIS Oxcarbazepine. CALL [phone]   Last visit 06/17/19

## 2020-01-07 RX ORDER — PREDNISONE 5 MG/1
4 TABLET ORAL DAILY
Qty: 120 TAB | Refills: 4 | Status: SHIPPED | OUTPATIENT
Start: 2020-01-07 | End: 2020-10-03 | Stop reason: SDUPTHER

## 2020-01-20 DIAGNOSIS — Z79.899 ENCOUNTER FOR LONG-TERM (CURRENT) USE OF MEDICATIONS: ICD-10-CM

## 2020-01-20 RX ORDER — DULOXETIN HYDROCHLORIDE 60 MG/1
CAPSULE, DELAYED RELEASE ORAL
Qty: 90 CAP | Refills: 1 | Status: SHIPPED | OUTPATIENT
Start: 2020-01-20 | End: 2020-07-11

## 2020-01-28 ENCOUNTER — DOCUMENTATION ONLY (OUTPATIENT)
Dept: PHARMACY | Age: 65
End: 2020-01-28

## 2020-01-28 NOTE — PROGRESS NOTES
[facility] at [address] [facility] Update     Date: 1/24/2020     Elida Hernandez MD 1955      Carlos Eduardo Kidney     Co-pay = $5 (with coupon)     Fill: 1/17/2020     Ship: 1/23/2020     Deliver: 1/24/2020     Next Fill Due: 2/7/2020     Pharmacist offered counseling.     Cesar Meade, [address] at [facility] [place] [address] [place], [address]  phone: [phone]   fax: [phone]

## 2020-01-28 NOTE — PROGRESS NOTES
[facility] at [address] [facility] Update     Date: 1/7/2020     Yoselin Nava, MAK [birth year]      Amandanikolas Trinidad     Co-pay = $5 (with coupon)     Fill: 12/23/19     Ship: 1/6/2020     Deliver: 1/7/2020     Next Fill Due: 1/13/2020     Pharmacist offered counseling.     Heaven Calles, [address] at [facility], [address], [address]  phone: [phone]   fax: [phone]

## 2020-02-17 ENCOUNTER — OFFICE VISIT (OUTPATIENT)
Dept: RHEUMATOLOGY | Age: 65
End: 2020-02-17

## 2020-02-17 VITALS
HEART RATE: 82 BPM | WEIGHT: 198.2 LBS | TEMPERATURE: 98 F | DIASTOLIC BLOOD PRESSURE: 79 MMHG | BODY MASS INDEX: 31.51 KG/M2 | OXYGEN SATURATION: 96 % | SYSTOLIC BLOOD PRESSURE: 154 MMHG | RESPIRATION RATE: 16 BRPM

## 2020-02-17 DIAGNOSIS — M31.6 GCA (GIANT CELL ARTERITIS) (HCC): ICD-10-CM

## 2020-02-17 DIAGNOSIS — I47.1 SVT (SUPRAVENTRICULAR TACHYCARDIA) (HCC): Primary | ICD-10-CM

## 2020-02-17 RX ORDER — METOPROLOL SUCCINATE 25 MG/1
25 TABLET, EXTENDED RELEASE ORAL DAILY
COMMUNITY
Start: 2020-01-31 | End: 2021-02-05

## 2020-02-17 NOTE — PROGRESS NOTES
Chief Complaint   Patient presents with    Joint Pain     1. Have you been to the ER, urgent care clinic since your last visit? Hospitalized since your last visit? No    2. Have you seen or consulted any other health care providers outside of the [address] [place] [address] since your last visit? Include any pap smears or colon screening.  No

## 2020-02-17 NOTE — PROGRESS NOTES
RHEUMATOLOGY PROBLEM LIST AND CHIEF COMPLAINT  1. GCA/PMR - HAs, left temporal artery discomfort, sudden loss of vision, arthralgia of shoulder, TMJ, hands, feet, response to steroids    Therapy History:  Current DMARDs: Actemra (2018, stopped for 1 month; 3/2018-current), Methotrexate (2017 - 2018, restarted 3/2019-current)     INTERVAL HISTORY  This is a 75 y.o.  female. Today, the patient complains of no pain in the joints. Location: none  Severity:  0 on a scale of 0-10  Timing: all day   Duration: 4 months  Context/Associated signs and symptoms: The patient has been unable to taper off Prednisone, she has increased to 5 mg daily up from 4 mg. 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 The patient continues on Actemra SQ weekly and Methotrexate 15 mg PO weekly. She reports some hair thinning and skin peeling with Methotrexate use. The patient mentions she was diagnosed with SVT in October, she is now being treated with metoprolol.       RHEUMATOLOGY REVIEW OF SYSTEMS   Positives as per history  Negatives as follows:  Jen Jordanurray:  Denies unexplained persistent fevers, weight change, chronic fatigue  HEAD/EYES:   Denies eye redness, blurry vision or sudden loss of vision, dry eyes, HA, temporal artery pain  ENT:    Denies oral/nasal ulcers, recurrent sinus infections, dry mouth  RESPIRATORY:  No pleuritic pain, history of pleural effusions, hemoptysis, exertional dyspnea  CARDIOVASCULAR:  Denies chest pain, history of pericardial effusions  GASTRO:   Denies heartburn, esophageal dysmotility, abdominal pain, nausea, vomiting, diarrhea, blood in the stool  HEMATOLOGIC:  No easy bruising, purpura, swollen lymph nodes  SKIN:    Denies alopecia, ulcers, nodules, sun sensitivity, unexplained persistent rash   VASCULAR:   Denies edema, cyanosis, raynaud phenomenon  NEUROLOGIC:  Denies specific muscle weakness, paresthesias   PSYCHIATRIC:  No sleep disturbance / snoring, depression, anxiety  MSK:    No morning stiffness >1 hour, SI joint pain, persistent joint swelling, persistent joint pain    PAST MEDICAL HISTORY  Reviewed with patient, significant changes in medical history - no    PHYSICAL EXAM  Blood pressure 154/79, pulse 82, temperature 98 °F (36.7 °C), temperature source Oral, resp. rate 16, weight 198 lb 3.2 oz (89.9 kg), SpO2 96 %. GENERAL APPEARANCE: Well-nourished adult in no acute distress. EYES: No scleral erythema, conjunctival injection. ENT: No oral ulcer, parotid enlargement  NECK: No adenopathy, thyroid enlargement. CARDIOVASCULAR: Heart rhythm is regular. No murmur, rub, gallop. CHEST: Normal vesicular breath sounds. No wheezes, rales, pleural friction rubs. ABDOMINAL: The abdomen is soft and nontender. Liver and spleen are nonpalpable. Bowel sounds are normal.  EXTREMITIES: There is no evidence of clubbing, cyanosis, edema. SKIN: No rash, palpable purpura, digital ulcer, abnormal thickening. NEUROLOGICAL: Normal gait and station, full strength in upper and lower extremities, normal sensation to light touch. MUSCULOSKELETAL:  Upper extremities - Left shoulder dROM w/ int rotation. Right shoulder crepitus - unchanged. Lower extremities - full range of motion, no tenderness, no swelling, no synovial thickening and no deformity of joints except for B/L knees crepitus (R>L)    LABS, RADIOLOGY AND PROCEDURES   Previous labs reviewed -Yes    ASSESSMENT  1. GCA/PMR - (Established problem -  Stable disease) - The patient's disease is overall stable on Actemra SQ weekly, Methotrexate 15 mg PO weekly, and Prednisone 5 mg daily. She should continue to taper the Prednisone as tolerated. I recommend the patient continue attempting to taper off the Prednisone. I will give the patient a referral to EP for a second opinion. She should return in 4 months for a follow up. I will order labs today including an A1C for her PCP.   2. Bone Health - Most recent bone density (9/2017) showed osteopenia. The patient continues on Actonel. We will continue having the patient taper Prednisone.-unchanged  3. Drug therapy monitoring for toxicity (Actemra, Methotrexate) - CBC, BUN, Cr, AST, ALT and albumin every 4 months    PLAN  1. Prednisone 5 mg daily; taper as tolerated  2. Actemra SQ weekly  3. Methotrexate 15 mg PO weekly  4. Referral to EP cardiologist for SVT - Dr. Erica Triplett  5. Check CBC, CMP, A1C  6. Return in 4 months    Shai Dyer MD  Adult and Pediatric Rheumatology     [facility], [address] [place] [address], Phone [phone], Fax [phone]     Visiting  of Pediatrics    Department of Pediatrics, [facility] [address], Phone [phone], Fax [phone]    There are no Patient Instructions on file for this visit. cc:  MD Magno Street (Ophthalmology)    Written by josué Gu, as dictated by Martinez Pineda.  Pat Dyer M.D.

## 2020-02-19 LAB
ALBUMIN SERPL-MCNC: 4.3 G/DL (ref 3.8–4.8)
ALBUMIN/GLOB SERPL: 1.8 {RATIO} (ref 1.2–2.2)
ALP SERPL-CCNC: 47 IU/L (ref 39–117)
ALT SERPL-CCNC: 34 IU/L (ref 0–32)
AST SERPL-CCNC: 27 IU/L (ref 0–40)
BASOPHILS # BLD MANUAL: 0 X10E3/UL (ref 0–0.2)
BASOPHILS NFR BLD MANUAL: 1 %
BILIRUB SERPL-MCNC: 0.2 MG/DL (ref 0–1.2)
BUN SERPL-MCNC: 13 MG/DL (ref 8–27)
BUN/CREAT SERPL: 16 (ref 12–28)
CALCIUM SERPL-MCNC: 9.1 MG/DL (ref 8.7–10.3)
CHLORIDE SERPL-SCNC: 102 MMOL/L (ref 96–106)
CO2 SERPL-SCNC: 23 MMOL/L (ref 20–29)
CREAT SERPL-MCNC: 0.82 MG/DL (ref 0.57–1)
DIFFERENTIAL COMMENT, 115260: ABNORMAL
EOSINOPHIL # BLD MANUAL: 0 X10E3/UL (ref 0–0.4)
EOSINOPHIL NFR BLD MANUAL: 1 %
ERYTHROCYTE [DISTWIDTH] IN BLOOD BY AUTOMATED COUNT: 18.8 % (ref 11.7–15.4)
EST. AVERAGE GLUCOSE BLD GHB EST-MCNC: 154 MG/DL
GLOBULIN SER CALC-MCNC: 2.4 G/DL (ref 1.5–4.5)
GLUCOSE SERPL-MCNC: 141 MG/DL (ref 65–99)
HBA1C MFR BLD: 7 % (ref 4.8–5.6)
HCT VFR BLD AUTO: 32.5 % (ref 34–46.6)
HGB BLD-MCNC: 10.4 G/DL (ref 11.1–15.9)
LYMPHOCYTES # BLD MANUAL: 1.3 X10E3/UL (ref 0.7–3.1)
LYMPHOCYTES NFR BLD MANUAL: 27 %
MCH RBC QN AUTO: 25.4 PG (ref 26.6–33)
MCHC RBC AUTO-ENTMCNC: 32 G/DL (ref 31.5–35.7)
MCV RBC AUTO: 79 FL (ref 79–97)
MONOCYTES # BLD MANUAL: 0.4 X10E3/UL (ref 0.1–0.9)
MONOCYTES NFR BLD MANUAL: 8 %
NEUTROPHILS # BLD MANUAL: 3 X10E3/UL (ref 1.4–7)
NEUTROPHILS NFR BLD MANUAL: 63 %
PLATELET # BLD AUTO: 459 X10E3/UL (ref 150–450)
PLATELET BLD QL SMEAR: ABNORMAL
POTASSIUM SERPL-SCNC: 4.2 MMOL/L (ref 3.5–5.2)
PROT SERPL-MCNC: 6.7 G/DL (ref 6–8.5)
RBC # BLD AUTO: 4.1 X10E6/UL (ref 3.77–5.28)
RBC MORPH BLD: ABNORMAL
SODIUM SERPL-SCNC: 142 MMOL/L (ref 134–144)
WBC # BLD AUTO: 4.7 X10E3/UL (ref 3.4–10.8)

## 2020-02-21 RX ORDER — TOCILIZUMAB 180 MG/ML
INJECTION, SOLUTION SUBCUTANEOUS
Qty: 3.6 ML | Refills: 11 | Status: SHIPPED | OUTPATIENT
Start: 2020-02-21 | End: 2020-12-29

## 2020-03-03 ENCOUNTER — DOCUMENTATION ONLY (OUTPATIENT)
Dept: PHARMACY | Age: 65
End: 2020-03-03

## 2020-03-03 NOTE — PROGRESS NOTES
[facility] at [address] [facility] Update     Date: 3/3/2020     Rylan Nino MD 1955      Dequna Martin     Co-pay = $5 (with coupon)     Fill: 3/2/2020     Ship: 3/2/2020     Deliver: 3/3/2020     Next Fill Due: 3/20/2020     Pharmacist offered counseling.     Jacinda Fajardo, [address] at [facility], [address] [place] [address], [address]  phone: [phone]   fax: [phone]

## 2020-03-12 NOTE — PROGRESS NOTES
Assessment and Plan   Diagnoses and all orders for this visit:    1. Type 2 diabetes mellitus without complication, without long-term current use of insulin (HCC)  -     MICROALBUMIN, UR, RAND W/ MICROALB/CREAT RATIO; Future  Recommend trying to increase to 1500 mg a day of metformin. If unable to, okay to take metformin 500 twice a day. Continue Januvia    2. Benign essential hypertension  Continue HCTZ and ramipril    3. Hyperlipidemia with target LDL less than 100  Continue atorvastatin    4. Neuropathy  Did not tolerate gabapentin well but symptoms are better improved    5. SVT (supraventricular tachycardia) ([facility]  We will be making an appointment with EP discuss. Resting heart rate is in the 60s so I do not think there is much room to go up on her metoprolol. Benefits, risks, possible drug interactions, and side effects of all new medications were reviewed with the patient. Pt verbalized understanding. 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 Doing well. No issues. Diabetes- reports taking metformin 500 twice a day as she was having a lot of GI upset with higher dose. Taking Januvia. No other issues. Hypertension-taking hydrochlorothiazide and ramipril without issues. Denies any chest pain, shortness of breath, lightheadedness, dizziness.     Neuropathy- had been started on gabapentin during the last visit but patient reports that she had some side effects with the medicine. Reports that symptoms have actually been better since last visit anyway    SVHALLIE- has been seen by cardiology and was switched to metoprolol due to price. Reports that she has been having more episodes of palpitations on metoprolol compared to Bystolic. Reports baseline heart rate at home is in the 60s. Review of Systems   Constitutional: Negative for chills and fever. Respiratory: Negative for shortness of breath. Cardiovascular: Negative for chest pain. Objective   Vitals:       Visit Vitals  /77 (BP 1 Location: Left arm, BP Patient Position: Sitting)   Pulse 70   Temp 98.6 °F (37 °C) (Oral)   Resp 18   Ht 5' 6.5\" (1.689 m)   Wt 200 lb 8 oz (90.9 kg)   SpO2 99%   BMI 31.88 kg/m²        Physical Exam  Constitutional:       Appearance: Normal appearance. Cardiovascular:      Rate and Rhythm: Normal rate and regular rhythm. Heart sounds: No murmur. No friction rub. No gallop. Pulmonary:      Effort: No respiratory distress. Breath sounds: No wheezing, rhonchi or rales. Neurological:      Mental Status: She is alert.           Voice recognition software is utilized and this note may contain transcription errors

## 2020-03-13 ENCOUNTER — HOSPITAL ENCOUNTER (OUTPATIENT)
Dept: LAB | Age: 65
Discharge: HOME | End: 2020-03-13

## 2020-03-13 ENCOUNTER — OFFICE VISIT (OUTPATIENT)
Dept: INTERNAL MEDICINE CLINIC | Age: 65
End: 2020-03-13

## 2020-03-13 VITALS
HEIGHT: 67 IN | DIASTOLIC BLOOD PRESSURE: 77 MMHG | BODY MASS INDEX: 31.47 KG/M2 | TEMPERATURE: 98.6 F | WEIGHT: 200.5 LBS | RESPIRATION RATE: 18 BRPM | HEART RATE: 70 BPM | OXYGEN SATURATION: 99 % | SYSTOLIC BLOOD PRESSURE: 133 MMHG

## 2020-03-13 DIAGNOSIS — E11.9 TYPE 2 DIABETES MELLITUS WITHOUT COMPLICATION, WITHOUT LONG-TERM CURRENT USE OF INSULIN (HCC): Primary | ICD-10-CM

## 2020-03-13 DIAGNOSIS — E78.5 HYPERLIPIDEMIA WITH TARGET LDL LESS THAN 100: ICD-10-CM

## 2020-03-13 DIAGNOSIS — G62.9 NEUROPATHY: ICD-10-CM

## 2020-03-13 DIAGNOSIS — I10 BENIGN ESSENTIAL HYPERTENSION: ICD-10-CM

## 2020-03-13 DIAGNOSIS — I47.1 SVT (SUPRAVENTRICULAR TACHYCARDIA) (HCC): ICD-10-CM

## 2020-03-13 RX ORDER — HYDROCHLOROTHIAZIDE 25 MG/1
25 TABLET ORAL DAILY
Qty: 90 TAB | Refills: 1
Start: 2020-03-13 | End: 2020-07-24

## 2020-03-13 RX ORDER — ATORVASTATIN CALCIUM 40 MG/1
40 TABLET, FILM COATED ORAL DAILY
Qty: 90 TAB | Refills: 1
Start: 2020-03-13 | End: 2020-05-29

## 2020-03-13 RX ORDER — METFORMIN HYDROCHLORIDE 500 MG/1
500 TABLET, EXTENDED RELEASE ORAL 2 TIMES DAILY
Qty: 180 TAB | Refills: 1
Start: 2020-03-13 | End: 2020-08-17

## 2020-03-13 RX ORDER — RAMIPRIL 10 MG/1
20 CAPSULE ORAL DAILY
Qty: 180 CAP | Refills: 1
Start: 2020-03-13 | End: 2020-05-29

## 2020-03-17 ENCOUNTER — TELEPHONE (OUTPATIENT)
Dept: INTERNAL MEDICINE CLINIC | Age: 65
End: 2020-03-17

## 2020-03-17 NOTE — TELEPHONE ENCOUNTER
Called patient, apologized for the mix up with her urine sample. She will come in this week to give a new sample.

## 2020-03-17 NOTE — TELEPHONE ENCOUNTER
Cha Mustafa states pts urine from 3/16/20 was in the wrong container and they are unable to process it. Sample must be recollected. Asked to speak with nurse. Thanks.

## 2020-03-26 ENCOUNTER — DOCUMENTATION ONLY (OUTPATIENT)
Dept: PHARMACY | Age: 65
End: 2020-03-26

## 2020-03-26 NOTE — PROGRESS NOTES
[facility] at [address] [facility] Update     Date: 3/25/2020     Raquel Stiles MD 1955      Amy Holland     Co-pay = $5 (with coupon)     Fill: 3/20/2020     Ship: 3/24/2020     Deliver: 3/25/2020     Next Fill Due: 4/10/2020     Pharmacist offered counseling.     Anant Mayfield, [address] at [facility],  Hermila Hilliard, [address]  phone: [phone]   fax: [phone]

## 2020-05-15 ENCOUNTER — DOCUMENTATION ONLY (OUTPATIENT)
Dept: PHARMACY | Age: 65
End: 2020-05-15

## 2020-05-15 NOTE — PROGRESS NOTES
[facility] at [address] [facility] Update     Date: 5/12/2020     Isamar Chavez MD 1955      Maximus Moore     Co-pay = $5 (with coupon)     Fill: 5/12/2020     Ship: 5/11/2020     Deliver: 5/5/2020     Next Fill Due: 6/1/2020     Pharmacist offered counseling.     Trey Levy, [address] at [facility],  Hermila Hilliard, [address]  phone: [phone]   fax: [phone]

## 2020-06-05 ENCOUNTER — DOCUMENTATION ONLY (OUTPATIENT)
Dept: PHARMACY | Age: 65
End: 2020-06-05

## 2020-06-05 NOTE — PROGRESS NOTES
[facility] at [address] [place] [address] Update    Date: 06/05/20    Amanda Johnson MD 1955     Medication: Actemra 162 mg/0.9 ml syringes    Co-pay = $5    Fill: 6/1/20    Ship: 6/3/20    Deliver: 6/4/20    Next Fill Due: 6/29/20    Pharmacist counseled the patient on:        - Use  - Dosing  - Administration  - Side effects    Handout was provided.     Arturo Medina, [address] at [facility],  Hermila Huntley   [place], [address]  phone: [phone]   fax: [phone]

## 2020-06-16 ENCOUNTER — VIRTUAL VISIT (OUTPATIENT)
Dept: RHEUMATOLOGY | Age: 65
End: 2020-06-16

## 2020-06-16 DIAGNOSIS — M31.6 GCA (GIANT CELL ARTERITIS) (HCC): ICD-10-CM

## 2020-06-16 DIAGNOSIS — I47.1 SVT (SUPRAVENTRICULAR TACHYCARDIA) (HCC): Primary | ICD-10-CM

## 2020-06-16 DIAGNOSIS — M35.3 PMR (POLYMYALGIA RHEUMATICA) (HCC): ICD-10-CM

## 2020-06-16 NOTE — PROGRESS NOTES
Due to the recent COVID19 outbreak, this patient's appointment today was converted to a telephone/video visit. Current outbreak of COVID19- we discussed the current CDC recommendations of practicing social distancing, only going out for needed trips to the store/pharmacy and avoiding groups of 10 or more. Discussed that people with obesity, DM, heart disease and advanced age are likely at increased risk of severe disease if they were to contract the virus. We discussed the rheum-covid project and the registry data which shows that the medications we use do not put patients at higher risk for severe disease. At this time, we are not recommending stopping these medications in asymptomatic people. We discussed holding DMARDs, biologics and KASEY inhibitors in those that are exposed and have been diagnosed with COVID19. We reviewed the previous plan from the last visit. Below is a synopsis of what was covered during the phone/video call. RHEUMATOLOGY PROBLEM LIST AND CHIEF COMPLAINT  1. GCA/PMR - HAs, left temporal artery discomfort, sudden loss of vision, arthralgia of shoulder, TMJ, hands, feet, response to steroids    Therapy History:  Current DMARDs: Actemra (2/2018, stopped for 1 month; 3/2018-current), Methotrexate (1/2017 - 4/2018, restarted 3/2019-current)     INTERVAL HISTORY  This is a 75 y.o.  female. Today, the patient complains of no pain in the joints. Location: none  Timing: all day   Duration: 4 months  Context/Associated signs and symptoms: The patient continues on Actemra SQ weekly, Methotrexate 15 mg PO weekly, and Prednisone 5 mg daily. Patient reports doing well from a \"PMR point of view\" except for intermittent knee pain. She reports swelling of her bilateral knees, lower legs and ankles, with fatigue and shortness of breath. She notes increased reflux as well. Her February labs reviewed included hemoglobin (10).  She notes previously having hemoglobin levels dropping slowly and receiving a full GI workup showing iron deficiency anemia. She subsequently started iron supplementation and symptoms resolved. She has not been on iron supplementation since 2018. RHEUMATOLOGY REVIEW OF SYSTEMS   Positives as per history  Negatives as follows:  Cy Schaeffer:  Denies unexplained persistent fevers, weight change  HEAD/EYES:   Denies eye redness, blurry vision or sudden loss of vision, dry eyes, HA, temporal artery pain  ENT:    Denies oral/nasal ulcers, recurrent sinus infections, dry mouth  RESPIRATORY:  No pleuritic pain, history of pleural effusions, hemoptysis  CARDIOVASCULAR:  Denies chest pain, history of pericardial effusions  GASTRO:   Denies heartburn, esophageal dysmotility, abdominal pain, nausea, vomiting, diarrhea, blood in the stool  HEMATOLOGIC:  No easy bruising, purpura, swollen lymph nodes  SKIN:    Denies alopecia, ulcers, nodules, sun sensitivity, unexplained persistent rash   VASCULAR:   Denies cyanosis, raynaud phenomenon  NEUROLOGIC:  Denies specific muscle weakness, paresthesias   PSYCHIATRIC:  No sleep disturbance / snoring, depression, anxiety  MSK:    No morning stiffness >1 hour, SI joint pain    PAST MEDICAL HISTORY  Reviewed with patient, significant changes in medical history - no    PHYSICAL EXAM  Patient was not fully examined. LABS, RADIOLOGY AND PROCEDURES   Previous labs reviewed -Yes    ASSESSMENT  1. GCA/PMR - (Established problem -  Stable disease) - The patient continues to be stable on Actemra SQ weekly, Methotrexate 15 mg PO weekly, and Prednisone 5 mg daily. Advised patient to start tapering Prednisone 5 mg as tolerated. Patient should follow up with her electrophysiologist for evaluation of lower leg swelling and shortness of breath. Follow up with gastroenterology for evaluation of abnormal blood counts which could be causing her fatigue. I will order labs today. Follow up in 5-6 months.    2. Bone Health - Most recent bone density (9/2017) showed osteopenia. The patient continues on Actonel. We will continue having the patient taper Prednisone. - unchanged  3. Drug therapy monitoring for toxicity (Actemra, Methotrexate) - CBC, BUN, Cr, AST, ALT and albumin every 4 months    PLAN  1. Prednisone 5 mg daily; taper as tolerated  2. Actemra SQ weekly  3. Methotrexate 15 mg PO weekly  4. Check CBC, CMP, ESR, CRP  5. Follow up with electrophysiologist and gastroenterologist   6. Return in 5-6 months    Shai Mina MD  Adult and Pediatric Rheumatology     [facility], [address] [place] [address], Phone [phone], Fax [phone]     Visiting  of Pediatrics    Department of Pediatrics, [facility] of [address] [place] [address], [address] [place] [address], Phone [phone], Fax [phone]    There are no Patient Instructions on file for this visit.     cc:  MD Jennifer Phan (Ophthalmology)     Written by josué Echeverria, as dictated by Dr. Palmer Arzate M.D.

## 2020-06-18 RX ORDER — FOLIC ACID 1 MG/1
1 TABLET ORAL DAILY
Qty: 60 TAB | Refills: 11 | Status: SHIPPED | OUTPATIENT
Start: 2020-06-18 | End: 2020-10-03 | Stop reason: SDUPTHER

## 2020-06-18 RX ORDER — FOLIC ACID 1 MG/1
1 TABLET ORAL DAILY
Qty: 60 TAB | Refills: 11 | Status: SHIPPED | OUTPATIENT
Start: 2020-06-18 | End: 2020-08-17 | Stop reason: SDUPTHER

## 2020-07-06 ENCOUNTER — DOCUMENTATION ONLY (OUTPATIENT)
Dept: PHARMACY | Age: 65
End: 2020-07-06

## 2020-07-06 NOTE — PROGRESS NOTES
[facility] at [address] [facility] Update     Date: 6/30/2020     Fernando Padilla MD 1955      Urvashi Khoury     Co-pay = $5 (with coupon)     Fill: 6/29/2020     Ship: 6/29/2020     Deliver: 6/30/2020     Next Fill Due: 7/20/2020     Pharmacist offered counseling.     Gt Panda, [address] at [facility],  Hermila Hilliard, [address]  phone: [phone]   fax: [phone]

## 2020-07-09 LAB
ALBUMIN SERPL-MCNC: 4.4 G/DL (ref 3.8–4.8)
ALBUMIN/GLOB SERPL: 1.8 {RATIO} (ref 1.2–2.2)
ALP SERPL-CCNC: 47 IU/L (ref 39–117)
ALT SERPL-CCNC: 19 IU/L (ref 0–32)
AST SERPL-CCNC: 24 IU/L (ref 0–40)
BASOPHILS # BLD MANUAL: 0.1 X10E3/UL (ref 0–0.2)
BASOPHILS NFR BLD MANUAL: 1 %
BILIRUB SERPL-MCNC: 0.2 MG/DL (ref 0–1.2)
BUN SERPL-MCNC: 20 MG/DL (ref 8–27)
BUN/CREAT SERPL: 19 (ref 12–28)
CALCIUM SERPL-MCNC: 9.6 MG/DL (ref 8.7–10.3)
CHLORIDE SERPL-SCNC: 96 MMOL/L (ref 96–106)
CO2 SERPL-SCNC: 23 MMOL/L (ref 20–29)
CREAT SERPL-MCNC: 1.03 MG/DL (ref 0.57–1)
CRP SERPL-MCNC: 3 MG/L (ref 0–10)
DIFFERENTIAL COMMENT, 115260: ABNORMAL
EOSINOPHIL # BLD MANUAL: 0 X10E3/UL (ref 0–0.4)
EOSINOPHIL NFR BLD MANUAL: 0 %
ERYTHROCYTE [DISTWIDTH] IN BLOOD BY AUTOMATED COUNT: 17.4 % (ref 11.7–15.4)
ERYTHROCYTE [SEDIMENTATION RATE] IN BLOOD BY WESTERGREN METHOD: 10 MM/HR (ref 0–40)
GLOBULIN SER CALC-MCNC: 2.5 G/DL (ref 1.5–4.5)
GLUCOSE SERPL-MCNC: 232 MG/DL (ref 65–99)
HCT VFR BLD AUTO: 32.6 % (ref 34–46.6)
HGB BLD-MCNC: 10.2 G/DL (ref 11.1–15.9)
LYMPHOCYTES # BLD MANUAL: 1.7 X10E3/UL (ref 0.7–3.1)
LYMPHOCYTES NFR BLD MANUAL: 20 %
MCH RBC QN AUTO: 24.4 PG (ref 26.6–33)
MCHC RBC AUTO-ENTMCNC: 31.3 G/DL (ref 31.5–35.7)
MCV RBC AUTO: 78 FL (ref 79–97)
MONOCYTES # BLD MANUAL: 0.8 X10E3/UL (ref 0.1–0.9)
MONOCYTES NFR BLD MANUAL: 9 %
NEUTROPHILS # BLD MANUAL: 5.9 X10E3/UL (ref 1.4–7)
NEUTROPHILS NFR BLD MANUAL: 69 %
PLATELET # BLD AUTO: 476 X10E3/UL (ref 150–450)
PLATELET BLD QL SMEAR: ABNORMAL
POTASSIUM SERPL-SCNC: 4.1 MMOL/L (ref 3.5–5.2)
PROT SERPL-MCNC: 6.9 G/DL (ref 6–8.5)
RBC # BLD AUTO: 4.18 X10E6/UL (ref 3.77–5.28)
RBC MORPH BLD: ABNORMAL
SODIUM SERPL-SCNC: 137 MMOL/L (ref 134–144)
WBC # BLD AUTO: 8.5 X10E3/UL (ref 3.4–10.8)

## 2020-07-10 DIAGNOSIS — Z79.899 ENCOUNTER FOR LONG-TERM (CURRENT) USE OF MEDICATIONS: ICD-10-CM

## 2020-07-10 DIAGNOSIS — K21.9 GASTROESOPHAGEAL REFLUX DISEASE, ESOPHAGITIS PRESENCE NOT SPECIFIED: ICD-10-CM

## 2020-07-10 RX ORDER — OMEPRAZOLE 40 MG/1
CAPSULE, DELAYED RELEASE ORAL
Qty: 90 CAP | Refills: 3 | Status: SHIPPED | OUTPATIENT
Start: 2020-07-10 | End: 2021-08-24

## 2020-07-13 RX ORDER — DULOXETIN HYDROCHLORIDE 60 MG/1
CAPSULE, DELAYED RELEASE ORAL
Qty: 90 CAP | Refills: 1 | Status: SHIPPED | OUTPATIENT
Start: 2020-07-13 | End: 2021-01-11

## 2020-07-22 DIAGNOSIS — I10 BENIGN ESSENTIAL HYPERTENSION: ICD-10-CM

## 2020-07-24 ENCOUNTER — DOCUMENTATION ONLY (OUTPATIENT)
Dept: PHARMACY | Age: 65
End: 2020-07-24

## 2020-07-24 RX ORDER — HYDROCHLOROTHIAZIDE 25 MG/1
TABLET ORAL
Qty: 90 TAB | Refills: 1 | Status: SHIPPED | OUTPATIENT
Start: 2020-07-24 | End: 2020-10-03 | Stop reason: SDUPTHER

## 2020-07-24 NOTE — PROGRESS NOTES
[facility] at [address] [facility] Update     Date: 7/22/2020     Kavitha Ashby MD 1955      Amadou Johnson     Co-pay = $5 (with coupon)     Fill: 7/20/2020     Ship: 7/21/2020     Deliver: 7/22/2020     Next Fill Due: 8/10/2020     Pharmacist offered counseling.     Jessee Osorio, [address] at [facility],  Hermila Hilliard, [address]  phone: [phone]   fax: [phone]

## 2020-07-31 ENCOUNTER — HOSPITAL ENCOUNTER (EMERGENCY)
Age: 65
Discharge: HOME | End: 2020-07-31
Attending: EMERGENCY MEDICINE
Payer: COMMERCIAL

## 2020-07-31 VITALS
TEMPERATURE: 98.4 F | SYSTOLIC BLOOD PRESSURE: 156 MMHG | WEIGHT: 195 LBS | DIASTOLIC BLOOD PRESSURE: 77 MMHG | BODY MASS INDEX: 30.61 KG/M2 | RESPIRATION RATE: 17 BRPM | HEART RATE: 87 BPM | HEIGHT: 67 IN | OXYGEN SATURATION: 97 %

## 2020-07-31 DIAGNOSIS — D64.9 ANEMIA, UNSPECIFIED TYPE: ICD-10-CM

## 2020-07-31 DIAGNOSIS — R00.0 TACHYCARDIA: Primary | ICD-10-CM

## 2020-07-31 LAB
ALBUMIN SERPL-MCNC: 3.5 G/DL (ref 3.5–5)
ALBUMIN/GLOB SERPL: 0.9 {RATIO} (ref 1.1–2.2)
ALP SERPL-CCNC: 49 U/L (ref 45–117)
ALT SERPL-CCNC: 34 U/L (ref 12–78)
ANION GAP SERPL CALC-SCNC: 8 MMOL/L (ref 5–15)
AST SERPL-CCNC: 26 U/L (ref 15–37)
BASOPHILS # BLD: 0.1 K/UL (ref 0–0.1)
BASOPHILS NFR BLD: 1 % (ref 0–1)
BILIRUB SERPL-MCNC: 0.4 MG/DL (ref 0.2–1)
BUN SERPL-MCNC: 21 MG/DL (ref 6–20)
BUN/CREAT SERPL: 19 (ref 12–20)
CALCIUM SERPL-MCNC: 8.7 MG/DL (ref 8.5–10.1)
CHLORIDE SERPL-SCNC: 101 MMOL/L (ref 97–108)
CO2 SERPL-SCNC: 27 MMOL/L (ref 21–32)
COMMENT, HOLDF: NORMAL
CREAT SERPL-MCNC: 1.08 MG/DL (ref 0.55–1.02)
DIFFERENTIAL METHOD BLD: ABNORMAL
EOSINOPHIL # BLD: 0 K/UL (ref 0–0.4)
EOSINOPHIL NFR BLD: 0 % (ref 0–7)
ERYTHROCYTE [DISTWIDTH] IN BLOOD BY AUTOMATED COUNT: 19 % (ref 11.5–14.5)
GLOBULIN SER CALC-MCNC: 3.7 G/DL (ref 2–4)
GLUCOSE SERPL-MCNC: 202 MG/DL (ref 65–100)
HCT VFR BLD AUTO: 30.2 % (ref 35–47)
HGB BLD-MCNC: 9.6 G/DL (ref 11.5–16)
IMM GRANULOCYTES # BLD AUTO: 0.1 K/UL (ref 0–0.04)
IMM GRANULOCYTES NFR BLD AUTO: 1 % (ref 0–0.5)
LYMPHOCYTES # BLD: 1.6 K/UL (ref 0.8–3.5)
LYMPHOCYTES NFR BLD: 21 % (ref 12–49)
MCH RBC QN AUTO: 24.7 PG (ref 26–34)
MCHC RBC AUTO-ENTMCNC: 31.8 G/DL (ref 30–36.5)
MCV RBC AUTO: 77.6 FL (ref 80–99)
MONOCYTES # BLD: 0.8 K/UL (ref 0–1)
MONOCYTES NFR BLD: 10 % (ref 5–13)
NEUTS SEG # BLD: 4.9 K/UL (ref 1.8–8)
NEUTS SEG NFR BLD: 67 % (ref 32–75)
NRBC # BLD: 0.07 K/UL (ref 0–0.01)
NRBC BLD-RTO: 0.9 PER 100 WBC
PLATELET # BLD AUTO: 373 K/UL (ref 150–400)
PMV BLD AUTO: 9.3 FL (ref 8.9–12.9)
POTASSIUM SERPL-SCNC: 3.5 MMOL/L (ref 3.5–5.1)
PROT SERPL-MCNC: 7.2 G/DL (ref 6.4–8.2)
RBC # BLD AUTO: 3.89 M/UL (ref 3.8–5.2)
RBC MORPH BLD: ABNORMAL
SAMPLES BEING HELD,HOLD: NORMAL
SODIUM SERPL-SCNC: 136 MMOL/L (ref 136–145)
TROPONIN I SERPL-MCNC: <0.05 NG/ML
WBC # BLD AUTO: 7.5 K/UL (ref 3.6–11)

## 2020-07-31 PROCEDURE — 36415 COLL VENOUS BLD VENIPUNCTURE: CPT

## 2020-07-31 PROCEDURE — 99285 EMERGENCY DEPT VISIT HI MDM: CPT

## 2020-07-31 PROCEDURE — 85025 COMPLETE CBC W/AUTO DIFF WBC: CPT

## 2020-07-31 PROCEDURE — 93005 ELECTROCARDIOGRAM TRACING: CPT

## 2020-07-31 PROCEDURE — 80053 COMPREHEN METABOLIC PANEL: CPT

## 2020-07-31 PROCEDURE — 84484 ASSAY OF TROPONIN QUANT: CPT

## 2020-07-31 RX ORDER — DILTIAZEM HYDROCHLORIDE 180 MG/1
180 CAPSULE, EXTENDED RELEASE ORAL DAILY
Qty: 30 CAP | Refills: 0 | Status: SHIPPED | OUTPATIENT
Start: 2020-07-31 | End: 2021-02-05

## 2020-07-31 NOTE — ED TRIAGE NOTES
Pt experiencing dizziness, SOB, palpitations. Recently switched from metoprolol to diltiazem. On Tuesday pt had ECHO, EF was ok, tricuspid valve had some regurgitation. Pt sees Dr. Andrea Ortiz for cardiology. BNP was normal.     PMH: Giant cell arteritis, which causes variable Hgb. Takes methotrexate, prednisone, and actemra.

## 2020-07-31 NOTE — ED PROVIDER NOTES
52-year-old female with history of SVT and previously on metoprolol presents complaining of episodes of tachycardia in the afternoons. She was previously on metoprolol but this was discontinued due to episodes of shortness of breath in conjunction with her asthma. She down titrated on her metoprolol and then started diltiazem 3 days ago. She takes the diltiazem in the evening and notes that in the afternoon prior to her dose she gets episodes of tachycardia up to 130. She also notes slight increase in her home blood pressures. Palpitations    This is a new problem. Episode onset: Several days. The problem has not changed since onset. The problem occurs daily. Associated symptoms include chest pressure and irregular heartbeat. Pertinent negatives include no fever, no chest pain, no near-syncope, no syncope, no abdominal pain, no nausea, no vomiting, no headaches, no weakness, no cough and no shortness of breath.         Past Medical History:   Diagnosis Date    Asthma     childhood    Atypical mole     Chronic pain     shoulders/knees    DDD (degenerative disc disease), cervical     Degenerative arthritis of right knee 2014    Dr. Meghan Vail Gastric nodule     gastric nodules on endoscopy 3/26/12    GERD (gastroesophageal reflux disease)     PUD (peptic ulcer disease)     Pulmonary emboli ([address]     s/p hysterectomy    Temporal arteritis ([facility] 16    Dr. Bates Majestic Thoracic outlet syndrome     left, Dr. Petty Clark Vitamin D deficiency     Vulvar high-grade squamous intraepithelial lesion (HGSIL)        Past Surgical History:   Procedure Laterality Date    COLONOSCOPY N/A 2017    COLONOSCOPY performed by Russell Fernandes MD at [facility]      several breast biopsies (benign)    HX BREAST BIOPSY Bilateral , , ,    6-7 on R, 2 on L+all benign    HX BUNIONECTOMY      bilateral    HX  SECTION      x3    HX CHOLECYSTECTOMY      HX COLONOSCOPY      normal, f/u in 10 yrs    HX DILATION AND CURETTAGE      [id number], several D&C's    HX ENDOSCOPY      x3, h/o gastric polyp removal 3/2012    HX GI      cholecystectomy    HX GYN  2018    OZZIE    HX HEART CATHETERIZATION      x2, most recent 2012 was normal    HX KNEE ARTHROSCOPY Right     HX OTHER SURGICAL      lipoma removal    HX OTHER SURGICAL  16    L temporal artery bx (Dr. Claudette Corral)    HX AZAEL AND BSO      secondary to endometrial hyperplasia with atypical changes    HX TONSILLECTOMY      T & A         Family History:   Problem Relation Age of Onset    Heart Disease Mother     Diabetes Mother     Cancer Mother 40        breast    Thyroid Disease Mother         hashimotos    Hypertension Mother     Osteoporosis Mother     Heart Surgery Mother         aortic valve replacement    Cancer Father         gastric    Heart Disease Father     Hypertension Father     Diabetes Paternal Grandmother     Heart Disease Brother     Thyroid Disease Sister         hashimotos    Other Sister         bipolar    Other Sister         depression    Other Brother         mental illness    Breast Cancer Maternal Aunt          from breast cancer    Breast Cancer Maternal Aunt 39        and recurred at 80    Osteoporosis Other         maternal aunts    Psychiatric Disorder Son         bipolar    Psychiatric Disorder Daughter         depression    Psychiatric Disorder Daughter         depression    Breast Cancer Maternal Aunt        Social History     Socioeconomic History    Marital status:      Spouse name: Not on file    Number of children: Not on file    Years of education: Not on file    Highest education level: Not on file   Occupational History    Not on file   Social Needs    Financial resource strain: Not on file    Food insecurity     Worry: Not on file     Inability: Not on file    Transportation needs     Medical: Not on file Non-medical: Not on file   Tobacco Use    Smoking status: Never Smoker    Smokeless tobacco: Never Used   Substance and Sexual Activity    Alcohol use: No     Alcohol/week: 0.0 standard drinks    Drug use: No    Sexual activity: Yes     Partners: Male   Lifestyle    Physical activity     Days per week: Not on file     Minutes per session: Not on file    Stress: Not on file   Relationships    Social connections     Talks on phone: Not on file     Gets together: Not on file     Attends Religion service: Not on file     Active member of club or organization: Not on file     Attends meetings of clubs or organizations: Not on file     Relationship status: Not on file    Intimate partner violence     Fear of current or ex partner: Not on file     Emotionally abused: Not on file     Physically abused: Not on file     Forced sexual activity: Not on file   Other Topics Concern    Not on file   Social History Narrative    Not on file         ALLERGIES: [place] flavor; Alendronate; Morphine; Valium [diazepam]; and Versed [midazolam]    Review of Systems   Constitutional: Negative for fatigue and fever. HENT: Negative for sneezing and sore throat. Respiratory: Negative for cough and shortness of breath. Cardiovascular: Positive for palpitations. Negative for chest pain, leg swelling, syncope and near-syncope. Gastrointestinal: Negative for abdominal pain, diarrhea, nausea and vomiting. Genitourinary: Negative for difficulty urinating and dysuria. Musculoskeletal: Negative for arthralgias and myalgias. Skin: Negative for color change and rash. Neurological: Negative for weakness and headaches. Psychiatric/Behavioral: Negative for agitation and behavioral problems. Vitals:    07/31/20 1829   BP: 155/72   Pulse: 90   Resp: 18   Temp: 98.4 °F (36.9 °C)   SpO2: 98%   Weight: 88.5 kg (195 lb)   Height: 5' 7\" (1.702 m)            Physical Exam  Vitals signs and nursing note reviewed. Constitutional:       General: She is not in acute distress. Appearance: She is well-developed. HENT:      Head: Normocephalic and atraumatic. Mouth/Throat:      Mouth: Mucous membranes are moist.      Pharynx: Oropharynx is clear. Eyes:      Extraocular Movements: Extraocular movements intact. Pupils: Pupils are equal, round, and reactive to light. Cardiovascular:      Rate and Rhythm: Normal rate and regular rhythm. Heart sounds: Normal heart sounds. No murmur. Pulmonary:      Effort: Pulmonary effort is normal.      Breath sounds: Normal breath sounds. Abdominal:      General: Abdomen is flat. There is no distension. Palpations: Abdomen is soft. Tenderness: There is no abdominal tenderness. Skin:     General: Skin is warm and dry. Neurological:      General: No focal deficit present. Mental Status: She is alert and oriented to person, place, and time. Psychiatric:         Mood and Affect: Mood is anxious. Behavior: Behavior normal.          MDM  Number of Diagnoses or Management Options  Diagnosis management comments: Cc-year-old female presents with complaint of tachycardias at home after switching from metoprolol to diltiazem. After discussion with cardiology will uptitrate her diltiazem and have her follow-up with cardiology next week. Amount and/or Complexity of Data Reviewed  Clinical lab tests: reviewed  Decide to obtain previous medical records or to obtain history from someone other than the patient: yes    Risk of Complications, Morbidity, and/or Mortality  Presenting problems: moderate  Management options: moderate    Patient Progress  Patient progress: stable           1940: Discussed with her cardiologist on-call who feels that it would be safe to titrate her diltiazem up and have her follow-up in the office after the weekend. Procedures      Rhythm: Sinus tachycardia at 102 with short FL and a single PVC on the tracing.   Axis: normal.  ST segment:  No concerning ST elevations or depressions. This EKG was interpreted by Roz Haynes MD,ED Provider.

## 2020-08-03 LAB
ATRIAL RATE: 102 BPM
CALCULATED R AXIS, ECG10: 16 DEGREES
CALCULATED T AXIS, ECG11: 41 DEGREES
DIAGNOSIS, 93000: NORMAL
P-R INTERVAL, ECG05: 88 MS
Q-T INTERVAL, ECG07: 360 MS
QRS DURATION, ECG06: 104 MS
QTC CALCULATION (BEZET), ECG08: 469 MS
VENTRICULAR RATE, ECG03: 102 BPM

## 2020-08-04 ENCOUNTER — HOSPITAL ENCOUNTER (OUTPATIENT)
Dept: LAB | Age: 65
Discharge: HOME | End: 2020-08-04

## 2020-08-04 DIAGNOSIS — D50.9 IRON DEFICIENCY ANEMIA, UNSPECIFIED IRON DEFICIENCY ANEMIA TYPE: ICD-10-CM

## 2020-08-04 DIAGNOSIS — R79.9 ABNORMAL FINDING OF BLOOD CHEMISTRY, UNSPECIFIED: ICD-10-CM

## 2020-08-04 LAB
EST. AVERAGE GLUCOSE BLD GHB EST-MCNC: 171 MG/DL
FERRITIN SERPL-MCNC: 10 NG/ML (ref 8–252)
HBA1C MFR BLD: 7.6 % (ref 4–5.6)
IRON SATN MFR SERPL: 4 % (ref 20–50)
IRON SERPL-MCNC: 22 UG/DL (ref 35–150)
TIBC SERPL-MCNC: 504 UG/DL (ref 250–450)

## 2020-08-05 ENCOUNTER — PATIENT MESSAGE (OUTPATIENT)
Dept: INTERNAL MEDICINE CLINIC | Age: 65
End: 2020-08-05

## 2020-08-05 NOTE — TELEPHONE ENCOUNTER
----- Message from Wilfred Novoa sent at 8/5/2020  8:44 AM EDT -----  Regarding: Test Results Question  Contact: [phone]  Hi   In response to your message about the labs I would prefer a virtual visit    I am available all day on 8/11 and 8/12    Betzaida Dennis

## 2020-08-05 NOTE — PROGRESS NOTES
Addendum   08/05/20   N-1-1t message sent. a1c slightly higher than last time. Consider adding SGLT2 vs inc metformin.   Iron low - consider supplementation  appt requested to discuss

## 2020-08-12 ENCOUNTER — HOSPITAL ENCOUNTER (OUTPATIENT)
Dept: GENERAL RADIOLOGY | Age: 65
Discharge: HOME | End: 2020-08-12
Payer: COMMERCIAL

## 2020-08-12 DIAGNOSIS — R06.02 SHORTNESS OF BREATH: ICD-10-CM

## 2020-08-12 PROCEDURE — 71046 X-RAY EXAM CHEST 2 VIEWS: CPT

## 2020-08-17 ENCOUNTER — VIRTUAL VISIT (OUTPATIENT)
Dept: INTERNAL MEDICINE CLINIC | Age: 65
End: 2020-08-17
Payer: COMMERCIAL

## 2020-08-17 DIAGNOSIS — R06.02 SHORTNESS OF BREATH: ICD-10-CM

## 2020-08-17 DIAGNOSIS — I47.1 ECTOPIC ATRIAL TACHYCARDIA (HCC): ICD-10-CM

## 2020-08-17 DIAGNOSIS — D50.9 IRON DEFICIENCY ANEMIA, UNSPECIFIED IRON DEFICIENCY ANEMIA TYPE: Primary | ICD-10-CM

## 2020-08-17 DIAGNOSIS — E11.42 TYPE 2 DIABETES MELLITUS WITH DIABETIC POLYNEUROPATHY, WITHOUT LONG-TERM CURRENT USE OF INSULIN (HCC): ICD-10-CM

## 2020-08-17 PROCEDURE — G9899 SCRN MAM PERF RSLTS DOC: HCPCS | Performed by: INTERNAL MEDICINE

## 2020-08-17 PROCEDURE — 99214 OFFICE O/P EST MOD 30 MIN: CPT | Performed by: INTERNAL MEDICINE

## 2020-08-17 PROCEDURE — 1101F PT FALLS ASSESS-DOCD LE1/YR: CPT | Performed by: INTERNAL MEDICINE

## 2020-08-17 PROCEDURE — G8399 PT W/DXA RESULTS DOCUMENT: HCPCS | Performed by: INTERNAL MEDICINE

## 2020-08-17 PROCEDURE — G8417 CALC BMI ABV UP PARAM F/U: HCPCS | Performed by: INTERNAL MEDICINE

## 2020-08-17 PROCEDURE — G9717 DOC PT DX DEP/BP F/U NT REQ: HCPCS | Performed by: INTERNAL MEDICINE

## 2020-08-17 PROCEDURE — 3017F COLORECTAL CA SCREEN DOC REV: CPT | Performed by: INTERNAL MEDICINE

## 2020-08-17 PROCEDURE — G8428 CUR MEDS NOT DOCUMENT: HCPCS | Performed by: INTERNAL MEDICINE

## 2020-08-17 PROCEDURE — G8756 NO BP MEASURE DOC: HCPCS | Performed by: INTERNAL MEDICINE

## 2020-08-17 PROCEDURE — 1090F PRES/ABSN URINE INCON ASSESS: CPT | Performed by: INTERNAL MEDICINE

## 2020-08-17 PROCEDURE — 2022F DILAT RTA XM EVC RTNOPTHY: CPT | Performed by: INTERNAL MEDICINE

## 2020-08-17 PROCEDURE — G8536 NO DOC ELDER MAL SCRN: HCPCS | Performed by: INTERNAL MEDICINE

## 2020-08-17 RX ORDER — VALSARTAN 80 MG/1
TABLET ORAL
COMMUNITY
Start: 2020-07-23 | End: 2021-02-05 | Stop reason: SDUPTHER

## 2020-08-17 RX ORDER — FERROUS SULFATE 325(65) MG
325 TABLET, DELAYED RELEASE (ENTERIC COATED) ORAL
Qty: 30 TAB | Refills: 0 | Status: SHIPPED | OUTPATIENT
Start: 2020-08-17 | End: 2020-09-11

## 2020-08-17 RX ORDER — METFORMIN HYDROCHLORIDE 500 MG/1
500 TABLET ORAL 2 TIMES DAILY WITH MEALS
Qty: 60 TAB | Refills: 1 | Status: SHIPPED | OUTPATIENT
Start: 2020-08-17 | End: 2020-09-11

## 2020-08-17 NOTE — PROGRESS NOTES
Consent: Abbey Monreal MD, who was seen by synchronous (real-time) audio-video technology, and/or her healthcare decision maker, is aware that this patient-initiated, Telehealth encounter on 8/17/2020 is a billable service, with coverage as determined by her insurance carrier. She is aware that she may receive a bill and has provided verbal consent to proceed: Yes. I was in the office while conducting this encounter. This visit was done with doxy. me    Assessment and Plan   Diagnoses and all orders for this visit:    1. Iron deficiency anemia, unspecified iron deficiency anemia type  -     ferrous sulfate (IRON) 325 mg (65 mg iron) EC tablet; Take 1 Tab by mouth Daily (before breakfast). Ferritin of 10. Possibly diet related as well. Recommend increasing iron in her diet and starting iron supplements. Patient is to follow-up with GI to evaluate need for colonoscopy. She is due for 1 due to history of polyps    2. Type 2 diabetes mellitus with diabetic polyneuropathy, without long-term current use of insulin (HCC)  -     metFORMIN (GLUCOPHAGE) 500 mg tablet; Take 1 Tab by mouth two (2) times daily (with meals). A1c has been creeping up. She had only been on Januvia prior to her A1c. She has stopped metformin due to GI upset. However, she thinks she could tolerate metformin 500 twice daily so we will restart that. If not tolerated well, consider Jardiance. She is technically within goal for diabetic but closer to 7 is a reasonable goal.    3. Ectopic atrial tachycardia ([address]  4. Shortness of breath  Has been having increased shortness of breath, dyspnea on exertion, tachycardia, dizziness. She is following up with Dr. Jefry Izaguirre with [facility] and Dr. Lakshmi Vasquez with cardiology. Has a sleep study planned and she currently has a 2-week event monitor on. We will try to follow-up with those records    We discussed the expected course, resolution and complications of the diagnosis(es) in detail. Medication risks, benefits, costs, interactions, and alternatives were discussed as indicated. I advised her to contact the office if her condition worsens, changes or fails to improve as anticipated. She expressed understanding with the diagnosis(es) and plan. 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 Iron deficiency also noted on labs. Has only been taking Januvia for diabetes. Review of Systems   Constitutional: Negative for chills and fever. Respiratory: Positive for shortness of breath. Cardiovascular: Negative for chest pain. Objective   Vitals:       Patient-Reported Vitals 8/17/2020   Patient-Reported Weight 190.0lb   Patient-Reported Height 5'7   Patient-Reported Pulse 120   Patient-Reported Temperature -   Patient-Reported SpO2 -   Patient-Reported Systolic  -   Patient-Reported Diastolic -   Patient-Reported Peak Flow -   Patient-Reported LMP -                 Physical Exam  Constitutional:       Appearance: Normal appearance. She is not ill-appearing. HENT:      Head: Normocephalic and atraumatic. Right Ear: External ear normal.      Left Ear: External ear normal.      Mouth/Throat:      Mouth: Mucous membranes are moist.   Eyes:      General:         Right eye: No discharge. Left eye: No discharge. Extraocular Movements: Extraocular movements intact. Conjunctiva/sclera: Conjunctivae normal.   Neck:      Musculoskeletal: Normal range of motion. Pulmonary:      Effort: Pulmonary effort is normal. No respiratory distress.    Musculoskeletal:      Comments: Able to hold phone without issue   Skin: Comments: No obvious facial rashes or abnormalities   Neurological:      Mental Status: She is alert and oriented to person, place, and time. Comments: No obvious focal deficit   Psychiatric:         Mood and Affect: Mood normal.         Thought Content: Thought content normal.         Judgment: Judgment normal.          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 A caregiver was present when appropriate. Due to this being a TeleHealth encounter (During PBLDY-47 public health emergency), evaluation of the following organ systems was limited: Vitals/Constitutional/EENT/Resp/CV/GI//MS/Neuro/Skin/Heme-Lymph-Imm. Pursuant to the emergency declaration under the [facility] waiver authority and the [employer] General Act, this Virtual  Visit was conducted, with patient's (and/or legal guardian's) consent, to reduce the patient's risk of exposure to COVID-19 and provide necessary medical care. Services were provided through a video synchronous discussion virtually to substitute for in-person clinic visit.

## 2020-08-21 ENCOUNTER — DOCUMENTATION ONLY (OUTPATIENT)
Dept: PHARMACY | Age: 65
End: 2020-08-21

## 2020-08-21 NOTE — PROGRESS NOTES
[facility] at [address] [facility] Update     Date: 8/20/2020     Fernando Padilla MD 1955      Urvashi Khoury     Co-pay = $5 (with coupon)     Fill: 8/10/2020     Ship: 8/19/2020     Deliver: 8/20/2020     Next Fill Due: 9/14/2020     Pharmacist offered counseling.     Gt Panda, [address] at [facility],  Agapito Yuko   [address] [place] [address] Avenue  phone: [phone]   fax: [phone]

## 2020-08-28 RX ORDER — PREDNISONE 1 MG/1
4 TABLET ORAL DAILY
Qty: 360 TAB | Refills: 2 | Status: SHIPPED | OUTPATIENT
Start: 2020-08-28 | End: 2020-10-03 | Stop reason: SDUPTHER

## 2020-08-28 RX ORDER — PREDNISONE 1 MG/1
4 TABLET ORAL DAILY
Qty: 120 TAB | Refills: 2 | Status: SHIPPED | OUTPATIENT
Start: 2020-08-28 | End: 2020-08-28 | Stop reason: SDUPTHER

## 2020-09-02 RX ORDER — METHOTREXATE 2.5 MG/1
15 TABLET ORAL
Qty: 72 TAB | Refills: 3 | Status: SHIPPED | OUTPATIENT
Start: 2020-09-06 | End: 2020-10-03 | Stop reason: SDUPTHER

## 2020-09-11 DIAGNOSIS — D50.9 IRON DEFICIENCY ANEMIA, UNSPECIFIED IRON DEFICIENCY ANEMIA TYPE: ICD-10-CM

## 2020-09-11 DIAGNOSIS — E11.42 TYPE 2 DIABETES MELLITUS WITH DIABETIC POLYNEUROPATHY, WITHOUT LONG-TERM CURRENT USE OF INSULIN (HCC): ICD-10-CM

## 2020-09-11 RX ORDER — METFORMIN HYDROCHLORIDE 500 MG/1
TABLET ORAL
Qty: 60 TAB | Refills: 3 | Status: SHIPPED | OUTPATIENT
Start: 2020-09-11 | End: 2020-10-03 | Stop reason: SDUPTHER

## 2020-09-11 RX ORDER — FERROUS SULFATE 325(65) MG
TABLET, DELAYED RELEASE (ENTERIC COATED) ORAL
Qty: 30 TAB | Refills: 5 | Status: SHIPPED | OUTPATIENT
Start: 2020-09-11 | End: 2021-03-14

## 2020-09-23 ENCOUNTER — DOCUMENTATION ONLY (OUTPATIENT)
Dept: PHARMACY | Age: 65
End: 2020-09-23

## 2020-09-23 NOTE — PROGRESS NOTES
[facility] at [address] [facility] Update     Date: 9/23/2020     Aliyah Maldonado MD 1955      Efrain Ayala     Co-pay = $5 (with coupon)     Fill: 9/14/2020     Ship: 9/22/2020     Deliver: 9/23/2020     Next Fill Due: 10/19/2020     Pharmacist offered counseling.     Zaida Correa, [address] at [facility], [address] [place] [address], [address]  phone: [phone]   fax: [phone]

## 2020-10-03 DIAGNOSIS — E11.42 TYPE 2 DIABETES MELLITUS WITH DIABETIC POLYNEUROPATHY, WITHOUT LONG-TERM CURRENT USE OF INSULIN (HCC): ICD-10-CM

## 2020-10-03 DIAGNOSIS — E11.9 TYPE 2 DIABETES MELLITUS WITHOUT COMPLICATION, WITHOUT LONG-TERM CURRENT USE OF INSULIN (HCC): ICD-10-CM

## 2020-10-03 DIAGNOSIS — I10 BENIGN ESSENTIAL HYPERTENSION: ICD-10-CM

## 2020-10-05 RX ORDER — METHOTREXATE 2.5 MG/1
15 TABLET ORAL
Qty: 72 TAB | Refills: 3 | Status: SHIPPED | OUTPATIENT
Start: 2020-10-11 | End: 2020-10-05 | Stop reason: SDUPTHER

## 2020-10-05 RX ORDER — METFORMIN HYDROCHLORIDE 500 MG/1
500 TABLET ORAL 2 TIMES DAILY WITH MEALS
Qty: 60 TAB | Refills: 3 | Status: SHIPPED | OUTPATIENT
Start: 2020-10-05 | End: 2020-11-16 | Stop reason: SDUPTHER

## 2020-10-05 RX ORDER — ATORVASTATIN CALCIUM 40 MG/1
40 TABLET, FILM COATED ORAL DAILY
Qty: 90 TAB | Refills: 3 | Status: SHIPPED | OUTPATIENT
Start: 2020-10-05 | End: 2021-02-08 | Stop reason: DRUGHIGH

## 2020-10-05 RX ORDER — PREDNISONE 1 MG/1
4 TABLET ORAL DAILY
Qty: 360 TAB | Refills: 2 | Status: SHIPPED | OUTPATIENT
Start: 2020-10-05 | End: 2021-01-27

## 2020-10-05 RX ORDER — PREDNISONE 5 MG/1
4 TABLET ORAL DAILY
Qty: 120 TAB | Refills: 4 | Status: SHIPPED | OUTPATIENT
Start: 2020-10-05 | End: 2021-02-05

## 2020-10-05 RX ORDER — FOLIC ACID 1 MG/1
1 TABLET ORAL DAILY
Qty: 60 TAB | Refills: 11 | Status: SHIPPED | OUTPATIENT
Start: 2020-10-05 | End: 2021-02-16 | Stop reason: SDUPTHER

## 2020-10-05 RX ORDER — HYDROCHLOROTHIAZIDE 25 MG/1
25 TABLET ORAL DAILY
Qty: 90 TAB | Refills: 1 | Status: SHIPPED | OUTPATIENT
Start: 2020-10-05 | End: 2021-05-31

## 2020-10-05 RX ORDER — METHOTREXATE 2.5 MG/1
15 TABLET ORAL
Qty: 72 TAB | Refills: 3 | Status: SHIPPED | OUTPATIENT
Start: 2020-10-11 | End: 2021-02-16 | Stop reason: SDUPTHER

## 2020-10-07 ENCOUNTER — TELEPHONE (OUTPATIENT)
Dept: RHEUMATOLOGY | Age: 65
End: 2020-10-07

## 2020-10-07 NOTE — TELEPHONE ENCOUNTER
----- Message from Laura Gan RN sent at 10/6/2020  3:31 PM EDT -----  Regarding: FW: Dr. Cristina Wilder    ----- Message -----  From: Tommy Elam  Sent: 10/6/2020   3:16 PM EDT  To: [facility] Nurse Pool  Subject: Dr. Miguelina Farias (if not patient):Carmen([facility])      Relationship of caller (if not patient):      Best contact number(s):[phone]      Name of medication and dosage if known:\"Prednisone \" 1 mg and \"Prednisone \" 5 mg      Is patient out of this medication (yes/no):      Pharmacy name:[facility]    Pharmacy listed in chart? (yes/no):  Pharmacy phone number:      Details to clarify the request:Carmen stated 2 Rxs was received, and has questions regarding the Rxs.       Khadijah Coronado

## 2020-10-07 NOTE — TELEPHONE ENCOUNTER
Spoke to pharmacist Joseline Byrd [facility]) informed Joseline Byrd that pt is on both the 1mg and 5mg prednisone when tapering up or down on the medication, Narinder verbally acknowledged understanding

## 2020-11-04 ENCOUNTER — DOCUMENTATION ONLY (OUTPATIENT)
Dept: PHARMACY | Age: 65
End: 2020-11-04

## 2020-11-04 NOTE — PROGRESS NOTES
[facility] at [address] [facility] Update     Date: 10/27/2020     Brittny Orosco MD 1955      Rosy Feldman     Co-pay = $5 (with coupon)     Fill: 10/19/2020     Ship: 10/26/2020     Deliver: 10/27/2020     Next Fill Due: 11/17/2020     Pharmacist offered counseling.     Nicolle Todd, [address] at [facility],  [place], [address]  phone: [phone]   fax: [phone]

## 2020-11-16 DIAGNOSIS — E11.42 TYPE 2 DIABETES MELLITUS WITH DIABETIC POLYNEUROPATHY, WITHOUT LONG-TERM CURRENT USE OF INSULIN (HCC): ICD-10-CM

## 2020-11-16 DIAGNOSIS — E11.9 TYPE 2 DIABETES MELLITUS WITHOUT COMPLICATION, WITHOUT LONG-TERM CURRENT USE OF INSULIN (HCC): ICD-10-CM

## 2020-11-16 RX ORDER — METFORMIN HYDROCHLORIDE 500 MG/1
500 TABLET ORAL 2 TIMES DAILY WITH MEALS
Qty: 180 TAB | Refills: 3 | Status: SHIPPED | OUTPATIENT
Start: 2020-11-16 | End: 2021-02-21 | Stop reason: SDUPTHER

## 2020-11-27 ENCOUNTER — DOCUMENTATION ONLY (OUTPATIENT)
Dept: PHARMACY | Age: 65
End: 2020-11-27

## 2020-11-27 NOTE — PROGRESS NOTES
[facility] at [address] [facility] Update    Date: 11/19/2020    Ashkan Burr MD 1955     Medication: Actemra 162mg/0.9mL syringe (weekly)    Co-pay = $5    Fill: 11/17/2020    Ship: 11/18/2020    Deliver: 11/19/2020    Next Fill Due: 12/8/2020    Pharmacist offered counseling.     Amari Jain, [address] at [facility],  [place], [address]  phone: [phone]   fax: [phone]

## 2021-01-09 DIAGNOSIS — Z79.899 ENCOUNTER FOR LONG-TERM (CURRENT) USE OF MEDICATIONS: ICD-10-CM

## 2021-01-11 RX ORDER — DULOXETIN HYDROCHLORIDE 60 MG/1
CAPSULE, DELAYED RELEASE ORAL
Qty: 90 CAP | Refills: 3 | Status: SHIPPED | OUTPATIENT
Start: 2021-01-11 | End: 2021-08-29

## 2021-01-27 ENCOUNTER — DOCUMENTATION ONLY (OUTPATIENT)
Dept: RHEUMATOLOGY | Age: 66
End: 2021-01-27

## 2021-01-27 RX ORDER — PREDNISONE 1 MG/1
TABLET ORAL
Qty: 120 TAB | Refills: 2 | Status: SHIPPED | OUTPATIENT
Start: 2021-01-27 | End: 2021-06-01

## 2021-01-27 NOTE — PROGRESS NOTES
I called this patient per Dr. Annamaria Lemos request, to schedule an appointment and the patient stated she will call our office back to schedule the appointment.

## 2021-02-04 NOTE — PROGRESS NOTES
Assessment and Plan   Diagnoses and all orders for this visit:    Hx of temporal arteritis and PMR followed by rheum, immunosuppression (actemra/prednisone/MTX) DM2, PE (post-surgical), atrial tachycardia/PVCs followed by Dr. Lissa Benavides, DINH, GERD, HTN, HLD, iron deficiency anemia, anxiety/depression      1. Type 2 diabetes mellitus without complication, without long-term current use of insulin (HCC)  -     AMB POC HEMOGLOBIN A1C  -     LIPID PANEL; Future  -     MICROALBUMIN, UR, RAND W/ MICROALB/CREAT RATIO; Future  A1c today is 7.5%. On Januvia 100 mg. Was started on Metformin 500 mg twice a day last visit. She is tolerating that medication well. We will continue. 2. Encounter for screening mammogram for malignant neoplasm of breast  -     [facility] 3D JAIMEE W MAMMO BI SCREENING INCL CAD; Future    3. Essential hypertension  -     valsartan (DIOVAN) 160 mg tablet; Take 1 Tab by mouth daily. Indications: high blood pressure  -     METABOLIC PANEL, BASIC; Future  Reports blood pressure runs in the 140s-150s at home. Currently on hydrochlorothiazide 25 mg, verapamil 120 mg, and valsartan 80 mg.  Recommend increasing valsartan 260 continuing other medications. 4. Iron deficiency anemia, unspecified iron deficiency anemia type  -     IRON PROFILE; Future  -     FERRITIN; Future  -     CBC W/O DIFF; Future  Was only able to tolerate oral iron for a month. Felt medication made her GI symptoms worse including an upset stomach and diarrhea. Will recheck today. If still anemic and iron still low, consider heme-onc referral for iron infusions. She is planning on having a colonoscopy for diarrhea work-up soon. Her joint pain is better and they are weaning her prednisone. Her chest pain and breathing is better with Symbicort. Her exercise tolerance is better.   She has a premelanoma that is planned for resection with her dermatologist.       Benefits, risks, possible drug interactions, and side effects of all new medications were reviewed with the patient. Pt verbalized understanding. Return to clinic: 6 months for Medicare wellness    An electronic signature was used to authenticate this note. Tricia López MD  [facility]  2/5/2021    Future Appointments   Date Time Provider Aggie Zapata   2/16/2021  1:40 PM Maya Delong MD [facility] BS AMB   7/0/1476  1:19 PM Bernarda Mccarthy MD [facility] BS AMB        Subjective   Chief Complaint   Diabetes    Angelica Deutsch MD is a 77 y.o. female           Objective   Vitals:       Visit Vitals  BP (!) 148/79   Pulse 98   Temp 98.7 °F (37.1 °C) (Oral)   Resp 16   Ht 5' 7\" (1.702 m)   Wt 202 lb (91.6 kg)   SpO2 98%   BMI 31.64 kg/m²        Physical Exam  Constitutional:       Appearance: Normal appearance. She is not ill-appearing. Cardiovascular:      Rate and Rhythm: Normal rate and regular rhythm. Heart sounds: No murmur. No friction rub. No gallop. Pulmonary:      Effort: No respiratory distress. Breath sounds: Normal breath sounds. No wheezing, rhonchi or rales. Neurological:      Mental Status: She is alert. Current Outpatient Medications   Medication Sig    verapamil ER (CALAN-SR) 120 mg tablet Take 120 mg by mouth daily.  valsartan (DIOVAN) 160 mg tablet Take 1 Tab by mouth daily. Indications: high blood pressure    DULoxetine (CYMBALTA) 60 mg capsule TAKE 1 CAPSULE BY MOUTH EVERY DAY    Actemra 162 mg/0.9 mL syringe INJECT 1 SYRINGE UNDER THE SKIN EVERY 7 DAYS    SITagliptin (Januvia) 100 mg tablet Take 1 Tab by mouth daily.  metFORMIN (GLUCOPHAGE) 500 mg tablet Take 1 Tab by mouth two (2) times daily (with meals).  atorvastatin (LIPITOR) 40 mg tablet Take 1 Tab by mouth daily.  hydroCHLOROthiazide (HYDRODIURIL) 25 mg tablet Take 1 Tab by mouth daily.  folic acid (FOLVITE) 1 mg tablet Take 1 Tab by mouth daily.  methotrexate (RHEUMATREX) 2.5 mg tablet Take 6 Tabs by mouth every Sunday.   • omeprazole (PRILOSEC) 40 mg capsule TAKE 1 CAPSULE BY MOUTH EVERY DAY   • risedronate (ACTONEL) 150 mg tablet Take 1 Tab by mouth every thirty (30) days.   • ibuprofen (MOTRIN) 200 mg tablet Take 600 mg by mouth as needed.   • estradiol (ESTRACE) 1 mg tablet Take 1 Tab by mouth daily.   • omega-3 fatty acids-vitamin e (FISH OIL) 1,000 mg cap Take 1 Cap by mouth two (2) times a day.   • Cholecalciferol, Vitamin D3, (VITAMIN D3) 2,000 unit cap Take 1 Cap by mouth daily.   • multivitamin (ONE A DAY) tablet Take 1 Tab by mouth daily.   • predniSONE (DELTASONE) 1 mg tablet TAKE 4 TABLETS BY MOUTH DAILY   • ferrous sulfate (IRON) 325 mg (65 mg iron) EC tablet TAKE 1 TABLET BY MOUTH EVERY DAY BEFORE BREAKFAST     No current facility-administered medications for this visit.

## 2021-02-05 ENCOUNTER — OFFICE VISIT (OUTPATIENT)
Dept: INTERNAL MEDICINE CLINIC | Age: 66
End: 2021-02-05
Payer: COMMERCIAL

## 2021-02-05 VITALS
TEMPERATURE: 98.7 F | DIASTOLIC BLOOD PRESSURE: 79 MMHG | HEART RATE: 98 BPM | BODY MASS INDEX: 31.71 KG/M2 | OXYGEN SATURATION: 98 % | WEIGHT: 202 LBS | SYSTOLIC BLOOD PRESSURE: 148 MMHG | RESPIRATION RATE: 16 BRPM | HEIGHT: 67 IN

## 2021-02-05 DIAGNOSIS — I10 ESSENTIAL HYPERTENSION: ICD-10-CM

## 2021-02-05 DIAGNOSIS — E11.9 TYPE 2 DIABETES MELLITUS WITHOUT COMPLICATION, WITHOUT LONG-TERM CURRENT USE OF INSULIN (HCC): Primary | ICD-10-CM

## 2021-02-05 DIAGNOSIS — Z12.31 ENCOUNTER FOR SCREENING MAMMOGRAM FOR MALIGNANT NEOPLASM OF BREAST: ICD-10-CM

## 2021-02-05 DIAGNOSIS — D50.9 IRON DEFICIENCY ANEMIA, UNSPECIFIED IRON DEFICIENCY ANEMIA TYPE: ICD-10-CM

## 2021-02-05 PROCEDURE — G8427 DOCREV CUR MEDS BY ELIG CLIN: HCPCS | Performed by: INTERNAL MEDICINE

## 2021-02-05 PROCEDURE — 99214 OFFICE O/P EST MOD 30 MIN: CPT | Performed by: INTERNAL MEDICINE

## 2021-02-05 PROCEDURE — G9717 DOC PT DX DEP/BP F/U NT REQ: HCPCS | Performed by: INTERNAL MEDICINE

## 2021-02-05 PROCEDURE — 2022F DILAT RTA XM EVC RTNOPTHY: CPT | Performed by: INTERNAL MEDICINE

## 2021-02-05 PROCEDURE — 1101F PT FALLS ASSESS-DOCD LE1/YR: CPT | Performed by: INTERNAL MEDICINE

## 2021-02-05 PROCEDURE — G8536 NO DOC ELDER MAL SCRN: HCPCS | Performed by: INTERNAL MEDICINE

## 2021-02-05 PROCEDURE — G8754 DIAS BP LESS 90: HCPCS | Performed by: INTERNAL MEDICINE

## 2021-02-05 PROCEDURE — 3017F COLORECTAL CA SCREEN DOC REV: CPT | Performed by: INTERNAL MEDICINE

## 2021-02-05 PROCEDURE — G0463 HOSPITAL OUTPT CLINIC VISIT: HCPCS | Performed by: INTERNAL MEDICINE

## 2021-02-05 PROCEDURE — G9899 SCRN MAM PERF RSLTS DOC: HCPCS | Performed by: INTERNAL MEDICINE

## 2021-02-05 PROCEDURE — 83036 HEMOGLOBIN GLYCOSYLATED A1C: CPT | Performed by: INTERNAL MEDICINE

## 2021-02-05 PROCEDURE — G8399 PT W/DXA RESULTS DOCUMENT: HCPCS | Performed by: INTERNAL MEDICINE

## 2021-02-05 PROCEDURE — G8417 CALC BMI ABV UP PARAM F/U: HCPCS | Performed by: INTERNAL MEDICINE

## 2021-02-05 PROCEDURE — 1090F PRES/ABSN URINE INCON ASSESS: CPT | Performed by: INTERNAL MEDICINE

## 2021-02-05 PROCEDURE — 3046F HEMOGLOBIN A1C LEVEL >9.0%: CPT | Performed by: INTERNAL MEDICINE

## 2021-02-05 PROCEDURE — G8753 SYS BP > OR = 140: HCPCS | Performed by: INTERNAL MEDICINE

## 2021-02-05 RX ORDER — BUDESONIDE AND FORMOTEROL FUMARATE DIHYDRATE 160; 4.5 UG/1; UG/1
2 AEROSOL RESPIRATORY (INHALATION) 2 TIMES DAILY
COMMUNITY

## 2021-02-05 RX ORDER — VERAPAMIL HYDROCHLORIDE 120 MG/1
120 TABLET, FILM COATED, EXTENDED RELEASE ORAL DAILY
COMMUNITY
Start: 2021-01-20 | End: 2021-09-08

## 2021-02-05 RX ORDER — BUDESONIDE AND FORMOTEROL FUMARATE DIHYDRATE 160; 4.5 UG/1; UG/1
160 AEROSOL RESPIRATORY (INHALATION) 2 TIMES DAILY
COMMUNITY
Start: 2020-11-17 | End: 2021-02-05

## 2021-02-05 RX ORDER — VALSARTAN 160 MG/1
160 TABLET ORAL DAILY
Qty: 90 TAB | Refills: 1 | Status: SHIPPED | OUTPATIENT
Start: 2021-02-05 | End: 2021-08-06

## 2021-02-06 LAB
ANION GAP SERPL CALC-SCNC: 10 MMOL/L (ref 5–15)
BUN SERPL-MCNC: 18 MG/DL (ref 6–20)
BUN/CREAT SERPL: 20 (ref 12–20)
CALCIUM SERPL-MCNC: 8.7 MG/DL (ref 8.5–10.1)
CHLORIDE SERPL-SCNC: 102 MMOL/L (ref 97–108)
CHOLEST SERPL-MCNC: 196 MG/DL
CO2 SERPL-SCNC: 27 MMOL/L (ref 21–32)
CREAT SERPL-MCNC: 0.91 MG/DL (ref 0.55–1.02)
CREAT UR-MCNC: 169 MG/DL
ERYTHROCYTE [DISTWIDTH] IN BLOOD BY AUTOMATED COUNT: 18.9 % (ref 11.5–14.5)
FERRITIN SERPL-MCNC: 9 NG/ML (ref 26–388)
GLUCOSE SERPL-MCNC: 131 MG/DL (ref 65–100)
HCT VFR BLD AUTO: 35.2 % (ref 35–47)
HDLC SERPL-MCNC: 63 MG/DL
HDLC SERPL: 3.1 {RATIO} (ref 0–5)
HGB BLD-MCNC: 11.1 G/DL (ref 11.5–16)
IRON SATN MFR SERPL: 5 % (ref 20–50)
IRON SERPL-MCNC: 24 UG/DL (ref 35–150)
LDLC SERPL CALC-MCNC: 95.8 MG/DL (ref 0–100)
LIPID PROFILE,FLP: ABNORMAL
MCH RBC QN AUTO: 25.8 PG (ref 26–34)
MCHC RBC AUTO-ENTMCNC: 31.5 G/DL (ref 30–36.5)
MCV RBC AUTO: 81.7 FL (ref 80–99)
MICROALBUMIN UR-MCNC: 3.07 MG/DL
MICROALBUMIN/CREAT UR-RTO: 18 MG/G (ref 0–30)
NRBC # BLD: 0 K/UL (ref 0–0.01)
NRBC BLD-RTO: 0 PER 100 WBC
PLATELET # BLD AUTO: 407 K/UL (ref 150–400)
PMV BLD AUTO: 10.3 FL (ref 8.9–12.9)
POTASSIUM SERPL-SCNC: 3.8 MMOL/L (ref 3.5–5.1)
RBC # BLD AUTO: 4.31 M/UL (ref 3.8–5.2)
SODIUM SERPL-SCNC: 139 MMOL/L (ref 136–145)
TIBC SERPL-MCNC: 504 UG/DL (ref 250–450)
TRIGL SERPL-MCNC: 186 MG/DL (ref ?–150)
VLDLC SERPL CALC-MCNC: 37.2 MG/DL
WBC # BLD AUTO: 7.2 K/UL (ref 3.6–11)

## 2021-02-08 DIAGNOSIS — E78.5 HYPERLIPIDEMIA, UNSPECIFIED HYPERLIPIDEMIA TYPE: ICD-10-CM

## 2021-02-08 DIAGNOSIS — D50.9 IRON DEFICIENCY ANEMIA, UNSPECIFIED IRON DEFICIENCY ANEMIA TYPE: Primary | ICD-10-CM

## 2021-02-08 LAB — HBA1C MFR BLD HPLC: 7.5 %

## 2021-02-08 RX ORDER — ATORVASTATIN CALCIUM 80 MG/1
80 TABLET, FILM COATED ORAL DAILY
Qty: 90 TAB | Refills: 3 | Status: SHIPPED | OUTPATIENT
Start: 2021-02-08 | End: 2022-04-04

## 2021-02-08 NOTE — PROGRESS NOTES
Content [employer] message sent. micro/cr ratio 18.  On valsartan  LDL 95  Hgb 11.1 from 9.6, plt 407  ferritin 9  bmp nl  increase atorva to 80, referral to heme/onc for possible IV infusions

## 2021-02-15 ENCOUNTER — TELEPHONE (OUTPATIENT)
Dept: RHEUMATOLOGY | Age: 66
End: 2021-02-15

## 2021-02-16 ENCOUNTER — OFFICE VISIT (OUTPATIENT)
Dept: RHEUMATOLOGY | Age: 66
End: 2021-02-16
Payer: COMMERCIAL

## 2021-02-16 VITALS
OXYGEN SATURATION: 97 % | RESPIRATION RATE: 20 BRPM | HEIGHT: 67 IN | WEIGHT: 201 LBS | DIASTOLIC BLOOD PRESSURE: 85 MMHG | HEART RATE: 85 BPM | TEMPERATURE: 97.1 F | SYSTOLIC BLOOD PRESSURE: 138 MMHG | BODY MASS INDEX: 31.55 KG/M2

## 2021-02-16 DIAGNOSIS — M31.6 TEMPORAL ARTERITIS (HCC): ICD-10-CM

## 2021-02-16 DIAGNOSIS — M35.3 POLYMYALGIA RHEUMATICA (HCC): Primary | ICD-10-CM

## 2021-02-16 DIAGNOSIS — M17.11 PRIMARY OSTEOARTHRITIS OF RIGHT KNEE: ICD-10-CM

## 2021-02-16 PROCEDURE — 99214 OFFICE O/P EST MOD 30 MIN: CPT | Performed by: PEDIATRICS

## 2021-02-16 PROCEDURE — G8752 SYS BP LESS 140: HCPCS | Performed by: PEDIATRICS

## 2021-02-16 PROCEDURE — G8754 DIAS BP LESS 90: HCPCS | Performed by: PEDIATRICS

## 2021-02-16 PROCEDURE — G9899 SCRN MAM PERF RSLTS DOC: HCPCS | Performed by: PEDIATRICS

## 2021-02-16 PROCEDURE — G0463 HOSPITAL OUTPT CLINIC VISIT: HCPCS | Performed by: PEDIATRICS

## 2021-02-16 PROCEDURE — G8399 PT W/DXA RESULTS DOCUMENT: HCPCS | Performed by: PEDIATRICS

## 2021-02-16 PROCEDURE — 1101F PT FALLS ASSESS-DOCD LE1/YR: CPT | Performed by: PEDIATRICS

## 2021-02-16 PROCEDURE — 3017F COLORECTAL CA SCREEN DOC REV: CPT | Performed by: PEDIATRICS

## 2021-02-16 PROCEDURE — G8417 CALC BMI ABV UP PARAM F/U: HCPCS | Performed by: PEDIATRICS

## 2021-02-16 PROCEDURE — 1090F PRES/ABSN URINE INCON ASSESS: CPT | Performed by: PEDIATRICS

## 2021-02-16 PROCEDURE — G8427 DOCREV CUR MEDS BY ELIG CLIN: HCPCS | Performed by: PEDIATRICS

## 2021-02-16 PROCEDURE — G8536 NO DOC ELDER MAL SCRN: HCPCS | Performed by: PEDIATRICS

## 2021-02-16 PROCEDURE — G9717 DOC PT DX DEP/BP F/U NT REQ: HCPCS | Performed by: PEDIATRICS

## 2021-02-16 RX ORDER — FOLIC ACID 1 MG/1
1 TABLET ORAL DAILY
Qty: 60 TAB | Refills: 11 | Status: SHIPPED | OUTPATIENT
Start: 2021-02-16 | End: 2021-02-21 | Stop reason: SDUPTHER

## 2021-02-16 RX ORDER — METHOTREXATE 2.5 MG/1
15 TABLET ORAL
Qty: 72 TAB | Refills: 3 | Status: SHIPPED | OUTPATIENT
Start: 2021-02-21 | End: 2022-03-15

## 2021-02-16 NOTE — PROGRESS NOTES
RHEUMATOLOGY PROBLEM LIST AND CHIEF COMPLAINT  1. GCA/PMR - HAs, left temporal artery discomfort, sudden loss of vision, arthralgia of shoulder, TMJ, hands, feet, response to steroids    Therapy History:  Current DMARDs: Actemra (2/2018, stopped for 1 month; 3/2018-current), Methotrexate (1/2017 - 4/2018, restarted 3/2019-current)     INTERVAL HISTORY  This is a 77 y.o.  female. Today, the patient complains of no pain in the joints. Location: generalized   Severity: 3 on a scale of 0-10   Timing: intermittent   Duration: 8 months  Context/Associated signs and symptoms: The patient reports feeling better overall since 12/2020 with improvement of her joint symptoms and has been able to taper Prednisone to 3 mg daily. She noted that during this time she also started Symbicort which improved her chest pain and CPAP with improvement in sleep quality. Prior to 12/2020 she experienced pain over her shoulders and hips with fatigue. She continues on Actemra SQ weekly, Methotrexate 15 mg PO weekly, and Prednisone 3 mg daily. Of note, patient has received both doses of the Pfizer Covid-19 vaccine. Mentions that some of her shoulder pain improved without significant resurgence for three weeks. She reports pre-melanoma resection on her back within the next month.      RHEUMATOLOGY REVIEW OF SYSTEMS   Positives as per history  Negatives as follows:  Derinda Leonel:  Denies unexplained persistent fevers, weight change  HEAD/EYES:   Denies eye redness, blurry vision or sudden loss of vision, dry eyes, HA, temporal artery pain  ENT:    Denies oral/nasal ulcers, recurrent sinus infections, dry mouth  RESPIRATORY:  No pleuritic pain, history of pleural effusions, hemoptysis  CARDIOVASCULAR:  Denies chest pain, history of pericardial effusions  GASTRO:   Denies heartburn, esophageal dysmotility, abdominal pain, nausea, vomiting, diarrhea, blood in the stool  HEMATOLOGIC:  No easy bruising, purpura, swollen lymph nodes  SKIN:    Denies alopecia, ulcers, nodules, sun sensitivity, unexplained persistent rash   VASCULAR:   Denies cyanosis, raynaud phenomenon  NEUROLOGIC:  Denies specific muscle weakness, paresthesias   PSYCHIATRIC:  No sleep disturbance / snoring, depression, anxiety  MSK:    No morning stiffness >1 hour, SI joint pain    PAST MEDICAL HISTORY  Reviewed with patient, significant changes in medical history - no    PHYSICAL EXAM  Blood pressure 138/85, pulse 85, temperature 97.1 °F (36.2 °C), temperature source Oral, resp. rate 20, height 5' 7\" (1.702 m), weight 201 lb (91.2 kg), SpO2 97 %.  GENERAL APPEARANCE: Well-nourished adult in no acute distress.  EYES: No scleral erythema, conjunctival injection.  ENT: No oral ulcer, parotid enlargement  NECK: No adenopathy, thyroid enlargement.  CARDIOVASCULAR: Heart rhythm is regular. No murmur, rub, gallop.  CHEST: Normal vesicular breath sounds. No wheezes, rales, pleural friction rubs.  ABDOMINAL: The abdomen is soft and nontender. Liver and spleen are nonpalpable. Bowel sounds are normal.  EXTREMITIES: There is no evidence of clubbing, cyanosis, edema.  SKIN: No rash, palpable purpura, digital ulcer, abnormal thickening.  NEUROLOGICAL: Normal gait and station, full strength in upper and lower extremities, normal sensation to light touch.  MUSCULOSKELETAL:  Upper extremities - Left shoulder dROM w/ int rotation. Right shoulder crepitus - unchanged.  Lower extremities - full range of motion, no tenderness, no swelling, no synovial thickening and no deformity of joints except for B/L knees crepitus (R>L)    LABS, RADIOLOGY AND PROCEDURES   Previous labs reviewed -Yes    ASSESSMENT  1. GCA/PMR -(has improved)- The patient has improved while on her current regimen. She should continue on Actemra SQ weekly and Methotrexate 15 mg PO weekly. She should continue on Prednisone 3 mg daily and taper as tolerated. Labs are not needed today. Follow up in 6 months.   2. Bone Health - Most  recent bone density (9/2017) showed osteopenia. The patient continues on Actonel. The patient will continue to taper Prednisone as tolerated. 3. Drug therapy monitoring for toxicity (Actemra, Methotrexate) - CBC, BUN, Cr, AST, ALT and albumin every 4 months    PLAN  1. Prednisone 3 mg daily; taper as tolerated  2. Actemra SQ weekly  3. Methotrexate 15 mg PO weekly  4. Return in 6 months    Shai Galarza MD  Adult and Pediatric Rheumatology     [facility], [address] [place] [address], Phone [phone], Fax [phone]     Visiting  of Pediatrics    Department of Pediatrics, [facility] of [address] [place] [address], [address] [place] [address], Phone [phone], Fax [phone]    There are no Patient Instructions on file for this visit.     cc:  MD Jennifer Bower (Ophthalmology)     Written by josué Man, as dictated by Dr. Joanie Dewey M.D.

## 2021-02-21 DIAGNOSIS — E11.42 TYPE 2 DIABETES MELLITUS WITH DIABETIC POLYNEUROPATHY, WITHOUT LONG-TERM CURRENT USE OF INSULIN (HCC): ICD-10-CM

## 2021-02-22 RX ORDER — FOLIC ACID 1 MG/1
1 TABLET ORAL DAILY
Qty: 60 TAB | Refills: 11 | Status: SHIPPED | OUTPATIENT
Start: 2021-02-22 | End: 2022-04-15

## 2021-02-22 RX ORDER — TOCILIZUMAB 180 MG/ML
INJECTION, SOLUTION SUBCUTANEOUS
Qty: 4 SYRINGE | Refills: 3 | Status: SHIPPED | OUTPATIENT
Start: 2021-02-22 | End: 2021-07-02

## 2021-02-22 RX ORDER — METFORMIN HYDROCHLORIDE 500 MG/1
500 TABLET ORAL 2 TIMES DAILY WITH MEALS
Qty: 180 TAB | Refills: 3 | Status: SHIPPED | OUTPATIENT
Start: 2021-02-22 | End: 2022-02-09

## 2021-02-24 ENCOUNTER — DOCUMENTATION ONLY (OUTPATIENT)
Dept: PHARMACY | Age: 66
End: 2021-02-24

## 2021-02-24 NOTE — PROGRESS NOTES
[facility] at [address] [facility] Update    Date: 02/24/21    Familia Leon MD 1955    Medication: Actemra 162 ml/0.9 ml syringe    Prior Authorization: 2/20/21 to 2/20/22    Prescription needs to be transferred to [facility] Smith  (phone: [phone]). Gregory Roy ([phone]) was notified that this specialty prescription needs to be transferred to the insurance mandated specialty pharmacy above.      [facility], [address] at [facility],  [place], [address]  phone: [phone]   fax: [phone]

## 2021-03-02 ENCOUNTER — TRANSCRIBE ORDER (OUTPATIENT)
Dept: REGISTRATION | Age: 66
End: 2021-03-02

## 2021-03-02 ENCOUNTER — HOSPITAL ENCOUNTER (OUTPATIENT)
Dept: MAMMOGRAPHY | Age: 66
Discharge: HOME | End: 2021-03-02
Attending: INTERNAL MEDICINE
Payer: COMMERCIAL

## 2021-03-02 DIAGNOSIS — Z12.31 VISIT FOR SCREENING MAMMOGRAM: Primary | ICD-10-CM

## 2021-03-02 DIAGNOSIS — Z12.31 VISIT FOR SCREENING MAMMOGRAM: ICD-10-CM

## 2021-03-02 PROCEDURE — 77067 SCR MAMMO BI INCL CAD: CPT

## 2021-03-14 DIAGNOSIS — D50.9 IRON DEFICIENCY ANEMIA, UNSPECIFIED IRON DEFICIENCY ANEMIA TYPE: ICD-10-CM

## 2021-03-14 RX ORDER — FERROUS SULFATE 325(65) MG
TABLET, DELAYED RELEASE (ENTERIC COATED) ORAL
Qty: 90 TAB | Refills: 3 | Status: SHIPPED | OUTPATIENT
Start: 2021-03-14 | End: 2021-10-26

## 2021-03-15 ENCOUNTER — TELEPHONE (OUTPATIENT)
Dept: RHEUMATOLOGY | Age: 66
End: 2021-03-15

## 2021-03-15 NOTE — TELEPHONE ENCOUNTER
Received fax from [facility] to fill 5 mg Prednisone 120 tablets 4 refills. This medication dose is not on current list.

## 2021-03-17 RX ORDER — PREDNISONE 5 MG/1
5 TABLET ORAL DAILY
Qty: 90 TAB | Refills: 1 | Status: SHIPPED | OUTPATIENT
Start: 2021-03-17 | End: 2021-03-18 | Stop reason: SDUPTHER

## 2021-03-19 RX ORDER — PREDNISONE 5 MG/1
5 TABLET ORAL DAILY
Qty: 90 TAB | Refills: 1 | Status: SHIPPED | OUTPATIENT
Start: 2021-03-19 | End: 2022-01-21

## 2021-05-21 ENCOUNTER — HOSPITAL ENCOUNTER (OUTPATIENT)
Dept: PREADMISSION TESTING | Age: 66
Discharge: HOME | End: 2021-05-21
Payer: COMMERCIAL

## 2021-05-21 ENCOUNTER — TRANSCRIBE ORDER (OUTPATIENT)
Dept: REGISTRATION | Age: 66
End: 2021-05-21

## 2021-05-21 DIAGNOSIS — Z01.812 PRE-PROCEDURE LAB EXAM: Primary | ICD-10-CM

## 2021-05-21 DIAGNOSIS — Z01.812 PRE-PROCEDURE LAB EXAM: ICD-10-CM

## 2021-05-21 PROCEDURE — U0005 INFEC AGEN DETEC AMPLI PROBE: HCPCS

## 2021-05-22 LAB — SARS-COV-2, COV2NT: NOT DETECTED

## 2021-05-25 ENCOUNTER — ANESTHESIA EVENT (OUTPATIENT)
Dept: ENDOSCOPY | Age: 66
End: 2021-05-25
Payer: COMMERCIAL

## 2021-05-25 ENCOUNTER — ANESTHESIA (OUTPATIENT)
Dept: ENDOSCOPY | Age: 66
End: 2021-05-25
Payer: COMMERCIAL

## 2021-05-25 ENCOUNTER — HOSPITAL ENCOUNTER (OUTPATIENT)
Age: 66
Setting detail: OUTPATIENT SURGERY
Discharge: HOME | End: 2021-05-25
Attending: INTERNAL MEDICINE | Admitting: INTERNAL MEDICINE
Payer: COMMERCIAL

## 2021-05-25 VITALS
BODY MASS INDEX: 30.45 KG/M2 | DIASTOLIC BLOOD PRESSURE: 84 MMHG | SYSTOLIC BLOOD PRESSURE: 135 MMHG | HEIGHT: 67 IN | HEART RATE: 79 BPM | WEIGHT: 194 LBS | RESPIRATION RATE: 19 BRPM | OXYGEN SATURATION: 95 % | TEMPERATURE: 97.1 F

## 2021-05-25 LAB
H PYLORI FROM TISSUE: NEGATIVE
KIT LOT NO., HCLOLOT: NORMAL
NEGATIVE CONTROL: NEGATIVE
POSITIVE CONTROL: POSITIVE

## 2021-05-25 PROCEDURE — 87077 CULTURE AEROBIC IDENTIFY: CPT | Performed by: INTERNAL MEDICINE

## 2021-05-25 PROCEDURE — 77030021593 HC FCPS BIOP ENDOSC BSC -A: Performed by: INTERNAL MEDICINE

## 2021-05-25 PROCEDURE — 76040000007: Performed by: INTERNAL MEDICINE

## 2021-05-25 PROCEDURE — 88305 TISSUE EXAM BY PATHOLOGIST: CPT

## 2021-05-25 PROCEDURE — 77030037345 HC NET FB ENDO RETVR RESCUNT DISP BSC -B: Performed by: INTERNAL MEDICINE

## 2021-05-25 PROCEDURE — 77030010936 HC CLP LIG BSC -C: Performed by: INTERNAL MEDICINE

## 2021-05-25 PROCEDURE — 2709999900 HC NON-CHARGEABLE SUPPLY: Performed by: INTERNAL MEDICINE

## 2021-05-25 PROCEDURE — 74011250636 HC RX REV CODE- 250/636: Performed by: NURSE ANESTHETIST, CERTIFIED REGISTERED

## 2021-05-25 PROCEDURE — 77030013992 HC SNR POLYP ENDOSC BSC -B: Performed by: INTERNAL MEDICINE

## 2021-05-25 PROCEDURE — 76060000032 HC ANESTHESIA 0.5 TO 1 HR: Performed by: INTERNAL MEDICINE

## 2021-05-25 PROCEDURE — 74011000250 HC RX REV CODE- 250: Performed by: NURSE ANESTHETIST, CERTIFIED REGISTERED

## 2021-05-25 RX ORDER — FENTANYL CITRATE 50 UG/ML
200 INJECTION, SOLUTION INTRAMUSCULAR; INTRAVENOUS
Status: DISCONTINUED | OUTPATIENT
Start: 2021-05-25 | End: 2021-05-25 | Stop reason: HOSPADM

## 2021-05-25 RX ORDER — PROPOFOL 10 MG/ML
INJECTION, EMULSION INTRAVENOUS AS NEEDED
Status: DISCONTINUED | OUTPATIENT
Start: 2021-05-25 | End: 2021-05-25 | Stop reason: HOSPADM

## 2021-05-25 RX ORDER — SODIUM CHLORIDE 9 MG/ML
150 INJECTION, SOLUTION INTRAVENOUS CONTINUOUS
Status: DISCONTINUED | OUTPATIENT
Start: 2021-05-25 | End: 2021-05-25 | Stop reason: HOSPADM

## 2021-05-25 RX ORDER — ATROPINE SULFATE 0.1 MG/ML
0.5 INJECTION INTRAVENOUS
Status: DISCONTINUED | OUTPATIENT
Start: 2021-05-25 | End: 2021-05-25 | Stop reason: HOSPADM

## 2021-05-25 RX ORDER — SODIUM CHLORIDE 0.9 % (FLUSH) 0.9 %
5-40 SYRINGE (ML) INJECTION AS NEEDED
Status: DISCONTINUED | OUTPATIENT
Start: 2021-05-25 | End: 2021-05-25 | Stop reason: HOSPADM

## 2021-05-25 RX ORDER — DEXTROMETHORPHAN/PSEUDOEPHED 2.5-7.5/.8
1.2 DROPS ORAL
Status: DISCONTINUED | OUTPATIENT
Start: 2021-05-25 | End: 2021-05-25 | Stop reason: HOSPADM

## 2021-05-25 RX ORDER — LIDOCAINE HYDROCHLORIDE 20 MG/ML
INJECTION, SOLUTION EPIDURAL; INFILTRATION; INTRACAUDAL; PERINEURAL AS NEEDED
Status: DISCONTINUED | OUTPATIENT
Start: 2021-05-25 | End: 2021-05-25 | Stop reason: HOSPADM

## 2021-05-25 RX ORDER — EPINEPHRINE 0.1 MG/ML
1 INJECTION INTRACARDIAC; INTRAVENOUS
Status: DISCONTINUED | OUTPATIENT
Start: 2021-05-25 | End: 2021-05-25 | Stop reason: HOSPADM

## 2021-05-25 RX ORDER — MIDAZOLAM HYDROCHLORIDE 1 MG/ML
.25-5 INJECTION, SOLUTION INTRAMUSCULAR; INTRAVENOUS
Status: DISCONTINUED | OUTPATIENT
Start: 2021-05-25 | End: 2021-05-25 | Stop reason: HOSPADM

## 2021-05-25 RX ORDER — SODIUM CHLORIDE 9 MG/ML
INJECTION, SOLUTION INTRAVENOUS
Status: DISCONTINUED | OUTPATIENT
Start: 2021-05-25 | End: 2021-05-25 | Stop reason: HOSPADM

## 2021-05-25 RX ORDER — SODIUM CHLORIDE 0.9 % (FLUSH) 0.9 %
5-40 SYRINGE (ML) INJECTION EVERY 8 HOURS
Status: DISCONTINUED | OUTPATIENT
Start: 2021-05-25 | End: 2021-05-25 | Stop reason: HOSPADM

## 2021-05-25 RX ADMIN — PROPOFOL 50 MG: 10 INJECTION, EMULSION INTRAVENOUS at 10:49

## 2021-05-25 RX ADMIN — PROPOFOL 50 MG: 10 INJECTION, EMULSION INTRAVENOUS at 10:28

## 2021-05-25 RX ADMIN — PROPOFOL 50 MG: 10 INJECTION, EMULSION INTRAVENOUS at 10:46

## 2021-05-25 RX ADMIN — PROPOFOL 50 MG: 10 INJECTION, EMULSION INTRAVENOUS at 10:14

## 2021-05-25 RX ADMIN — PROPOFOL 50 MG: 10 INJECTION, EMULSION INTRAVENOUS at 10:12

## 2021-05-25 RX ADMIN — PROPOFOL 50 MG: 10 INJECTION, EMULSION INTRAVENOUS at 10:32

## 2021-05-25 RX ADMIN — PROPOFOL 50 MG: 10 INJECTION, EMULSION INTRAVENOUS at 10:17

## 2021-05-25 RX ADMIN — PROPOFOL 50 MG: 10 INJECTION, EMULSION INTRAVENOUS at 10:30

## 2021-05-25 RX ADMIN — PROPOFOL 50 MG: 10 INJECTION, EMULSION INTRAVENOUS at 10:10

## 2021-05-25 RX ADMIN — PROPOFOL 50 MG: 10 INJECTION, EMULSION INTRAVENOUS at 10:26

## 2021-05-25 RX ADMIN — PROPOFOL 50 MG: 10 INJECTION, EMULSION INTRAVENOUS at 10:39

## 2021-05-25 RX ADMIN — PROPOFOL 50 MG: 10 INJECTION, EMULSION INTRAVENOUS at 10:20

## 2021-05-25 RX ADMIN — LIDOCAINE HYDROCHLORIDE 20 MG: 20 INJECTION, SOLUTION EPIDURAL; INFILTRATION; INTRACAUDAL; PERINEURAL at 10:09

## 2021-05-25 RX ADMIN — PROPOFOL 50 MG: 10 INJECTION, EMULSION INTRAVENOUS at 10:23

## 2021-05-25 RX ADMIN — PROPOFOL 50 MG: 10 INJECTION, EMULSION INTRAVENOUS at 10:22

## 2021-05-25 RX ADMIN — PROPOFOL 50 MG: 10 INJECTION, EMULSION INTRAVENOUS at 10:53

## 2021-05-25 RX ADMIN — SODIUM CHLORIDE: 900 INJECTION, SOLUTION INTRAVENOUS at 09:54

## 2021-05-25 NOTE — PROGRESS NOTES

## 2021-05-25 NOTE — H&P
[address] [facility], [address] [place] [address]          Pre-procedure History and Physical       NAME:  Lynn Ram MD   :   1955   MRN:   [id number]     CHIEF COMPLAINT/HPI: iron def anemia    PMH:  Past Medical History:   Diagnosis Date    Asthma     childhood    Atypical mole     Chronic pain     shoulders/knees    DDD (degenerative disc disease), cervical     Degenerative arthritis of right knee 2014    Dr. Duc Lind Ectopic atrial tachycardia ([address] 2020    Gastric nodule     gastric nodules on endoscopy 3/26/12    GERD (gastroesophageal reflux disease)     DINH (obstructive sleep apnea)     PUD (peptic ulcer disease)     Pulmonary emboli ([address]     s/p hysterectomy    Sleep apnea     uses cpap    SVT (supraventricular tachycardia) ([address]     Temporal arteritis ([address] 16    Dr. Keith Csatañeda Thoracic outlet syndrome     left, Dr. Renetta Mckeon Vitamin D deficiency     Vulvar high-grade squamous intraepithelial lesion (HGSIL)        PSH:  Past Surgical History:   Procedure Laterality Date    COLONOSCOPY N/A 2017    COLONOSCOPY performed by Trey Francis MD at [facility]      several breast biopsies (benign)    HX BREAST BIOPSY Bilateral , , ,    6-7 on R, 2 on L+all benign    HX BUNIONECTOMY      bilateral    HX  SECTION      x3    HX CHOLECYSTECTOMY  2009    HX COLONOSCOPY  2003    normal, f/u in 10 yrs    HX DILATION AND CURETTAGE      [id number], several D&C's    HX ENDOSCOPY      x3, h/o gastric polyp removal 3/2012    HX GI      cholecystectomy    HX GYN  2018    OZZIE    HX HEART CATHETERIZATION      x2, most recent 2012 was normal    HX KNEE ARTHROSCOPY Right     HX OTHER SURGICAL      lipoma removal    HX OTHER SURGICAL  16    L temporal artery bx (Dr. Renny Hinkle)    HX AZAEL AND BSO      secondary to endometrial hyperplasia with atypical changes    HX TONSILLECTOMY      T & A       Allergies: Allergies   Allergen Reactions    Chicken Flavor Hives     Elfego fruit not flavor    Alendronate Nausea and Vomiting    Morphine Other (comments)     Extreme epigastric pain    Valium [Diazepam] Nausea and Vomiting    Versed [Midazolam] Nausea and Vomiting       Home Medications:  Prior to Admission Medications   Prescriptions Last Dose Informant Patient Reported? Taking? Cholecalciferol, Vitamin D3, (Vitamin D3) 25 mcg (1,000 unit) chew 5/18/2021 at Unknown time  Yes Yes   Sig: Take 1 Cap by mouth daily. DULoxetine (CYMBALTA) 60 mg capsule 5/24/2021 at Unknown time  No Yes   Sig: TAKE 1 CAPSULE BY MOUTH EVERY DAY   SITagliptin (Januvia) 100 mg tablet 5/24/2021 at Unknown time  No Yes   Sig: Take 1 Tab by mouth daily. atorvastatin (LIPITOR) 80 mg tablet 5/24/2021 at Unknown time  No Yes   Sig: Take 1 Tab by mouth daily. Indications: high cholesterol   budesonide-formoteroL (Symbicort) 160-4.5 mcg/actuation HFAA 5/25/2021 at Unknown time  Yes Yes   Sig: Take 2 Puffs by inhalation two (2) times a day. estradiol (ESTRACE) 1 mg tablet 5/25/2021 at Unknown time  No Yes   Sig: Take 1 Tab by mouth daily. ferrous sulfate (IRON) 325 mg (65 mg iron) EC tablet Not Taking at Unknown time  No No   Sig: TAKE 1 TABLET BY MOUTH EVERY DAY BEFORE BREAKFAST   Patient not taking: Reported on 1/75/7791   folic acid (FOLVITE) 1 mg tablet 5/18/2021 at Unknown time  No Yes   Sig: Take 1 Tab by mouth daily. hydroCHLOROthiazide (HYDRODIURIL) 25 mg tablet 5/24/2021 at Unknown time  No Yes   Sig: Take 1 Tab by mouth daily. ibuprofen (MOTRIN) 200 mg tablet   Yes No   Sig: Take 600 mg by mouth as needed. metFORMIN (GLUCOPHAGE) 500 mg tablet Not Taking at Unknown time  No No   Sig: Take 1 Tab by mouth two (2) times daily (with meals). Patient not taking: Reported on 5/25/2021   methotrexate (RHEUMATREX) 2.5 mg tablet 5/23/2021  No No   Sig: Take 6 Tabs by mouth every Sunday. multivitamin (ONE A DAY) tablet 5/18/2021 at Unknown time  Yes Yes   Sig: Take 1 Tab by mouth daily. omega-3 fatty acids-vitamin e (FISH OIL) 1,000 mg cap 5/18/2021 at Unknown time  Yes Yes   Sig: Take 1 Cap by mouth two (2) times a day. omeprazole (PRILOSEC) 40 mg capsule 5/24/2021 at Unknown time  No Yes   Sig: TAKE 1 CAPSULE BY MOUTH EVERY DAY   predniSONE (DELTASONE) 1 mg tablet 5/25/2021 at Unknown time  No Yes   Sig: TAKE 4 TABLETS BY MOUTH DAILY   risedronate (ACTONEL) 150 mg tablet 4/25/2021 at Unknown time  No Yes   Sig: Take 1 Tab by mouth every thirty (30) days. tocilizumab (Actemra) 162 mg/0.9 mL syringe 5/18/2021 at Unknown time  No Yes   Sig: INJECT 1 SYRINGE UNDER THE SKIN EVERY 7 DAYS   valsartan (DIOVAN) 160 mg tablet 5/24/2021 at Unknown time  No Yes   Sig: Take 1 Tab by mouth daily. Indications: high blood pressure   verapamil ER (CALAN-SR) 120 mg tablet 5/25/2021 at Unknown time  Yes Yes   Sig: Take 120 mg by mouth daily.       Facility-Administered Medications: None       Hospital Medications:  Current Facility-Administered Medications   Medication Dose Route Frequency    0.9% sodium chloride infusion  150 mL/hr IntraVENous CONTINUOUS    sodium chloride (NS) flush 5-40 mL  5-40 mL IntraVENous Q8H    sodium chloride (NS) flush 5-40 mL  5-40 mL IntraVENous PRN    midazolam (VERSED) injection 0.25-5 mg  0.25-5 mg IntraVENous Multiple    fentaNYL citrate (PF) injection 200 mcg  200 mcg IntraVENous Multiple    simethicone (MYLICON) 86IG/4.1VR oral drops 80 mg  1.2 mL Oral Multiple    atropine injection 0.5 mg  0.5 mg IntraVENous ONCE PRN    EPINEPHrine (ADRENALIN) 0.1 mg/mL syringe 1 mg  1 mg Endoscopically ONCE PRN     Facility-Administered Medications Ordered in Other Encounters   Medication Dose Route Frequency    0.9% sodium chloride infusion   IntraVENous CONTINUOUS       Family History:  Family History   Problem Relation Age of Onset    Heart Disease Mother     Diabetes Mother  Cancer Mother 40        breast    Thyroid Disease Mother         hashimotos    Hypertension Mother     Osteoporosis Mother     Heart Surgery Mother         aortic valve replacement    Cancer Father         gastric    Heart Disease Father     Hypertension Father     Diabetes Paternal Grandmother     Heart Disease Brother     Thyroid Disease Sister         hashimotos    Other Sister         bipolar    Other Sister         depression    Other Brother         mental illness    Breast Cancer Maternal Aunt          from breast cancer    Breast Cancer Maternal Aunt 39        and recurred at 80    Osteoporosis Other         maternal aunts    Psychiatric Disorder Son         bipolar    Psychiatric Disorder Daughter         depression    Psychiatric Disorder Daughter         depression    Breast Cancer Maternal Aunt        Social History:  Social History     Tobacco Use    Smoking status: Never Smoker    Smokeless tobacco: Never Used   Substance Use Topics    Alcohol use: No     Alcohol/week: 0.0 standard drinks       The patient was counseled at length about the risks of sheila Covid-19 in the yee-operative and post-operative states including the recovery window of their procedure. The patient was made aware that sheila Covid-19 after a surgical procedure may worsen their prognosis for recovering from the virus and lend to a higher morbidity and or mortality risk. The patient was given the options of postponing their procedure. All of the risks, benefits, and alternatives were discussed. The patient does  wish to proceed with the procedure. PHYSICAL EXAM PRIOR TO SEDATION:  General: Alert, in no acute distress    Lungs:            CTA bilaterally  Heart:  Normal S1, S2    Abdomen: Soft, Non distended, Non tender. Normoactive bowel sounds. Assessment:   Stable for sedation administration.   Date of last colonoscopy: , Polyps  Yes    Plan:     · Endoscopic procedure with sedation     Signed By: Maxine Garcia MD     5/25/2021  10:11 AM

## 2021-05-25 NOTE — ANESTHESIA POSTPROCEDURE EVALUATION
Post-Anesthesia Evaluation and Assessment    Patient: Bebo Steiner MD MRN: [id number]  SSN: [id number]    YOB: 1955  Age: 77 y.o. Sex: female      I have evaluated the patient and they are stable and ready for discharge from the PACU. Cardiovascular Function/Vital Signs  Visit Vitals  /84   Pulse 79   Temp 36.2 °C (97.1 °F)   Resp 19   Ht 5' 7\" (1.702 m)   Wt 88 kg (194 lb)   SpO2 95%   Breastfeeding No   BMI 30.38 kg/m²       Patient is status post MAC anesthesia for Procedure(s):  COLONOSCOPY, EGD   :-  ESOPHAGOGASTRODUODENOSCOPY (EGD)  ESOPHAGOGASTRODUODENAL (EGD) BIOPSY  ENDOSCOPIC POLYPECTOMY  RESOLUTION CLIP  COLON BIOPSY. Nausea/Vomiting: None    Postoperative hydration reviewed and adequate. Pain:  Pain Scale 1: Visual (05/25/21 1100)  Pain Intensity 1: 0 (05/25/21 1100)   Managed    Neurological Status: At baseline    Mental Status, Level of Consciousness: Alert and  oriented to person, place, and time    Pulmonary Status:   O2 Device: None (Room air) (05/25/21 1100)   Adequate oxygenation and airway patent    Complications related to anesthesia: None    Post-anesthesia assessment completed. No concerns    Signed By: Je Wells MD     May 25, 2021              Procedure(s):  COLONOSCOPY, EGD   :-  ESOPHAGOGASTRODUODENOSCOPY (EGD)  ESOPHAGOGASTRODUODENAL (EGD) BIOPSY  ENDOSCOPIC POLYPECTOMY  RESOLUTION CLIP  COLON BIOPSY. MAC    <BSHSIANPOST>    INITIAL Post-op Vital signs:   Vitals Value Taken Time   /84 05/25/21 1110   Temp 36.2 °C (97.1 °F) 05/25/21 1100   Pulse 64 05/25/21 1115   Resp 21 05/25/21 1115   SpO2 99 % 05/25/21 1115   Vitals shown include unvalidated device data.

## 2021-05-25 NOTE — DISCHARGE INSTRUCTIONS
Patient Education   diverticulosisPatient Education   Patient Education        High-Fiber Diet: Care Instructions  Your Care Instructions     A high-fiber diet may help you relieve constipation and feel less bloated. Your doctor and dietitian will help you make a high-fiber eating plan based on your personal needs. The plan will include the things you like to eat. It will also make sure that you get 30 grams of fiber a day. Before you make changes to the way you eat, be sure to talk with your doctor or dietitian. Follow-up care is a key part of your treatment and safety. Be sure to make and go to all appointments, and call your doctor if you are having problems. It's also a good idea to know your test results and keep a list of the medicines you take. How can you care for yourself at home? · You can increase how much fiber you get if you eat more of certain foods. These foods include:  ? Whole-grain breads and cereals. ? Fruits, such as pears, apples, and peaches. Eat the skins, peels, and seeds, if you can.  ? Vegetables, such as broccoli, cabbage, spinach, carrots, asparagus, and squash. ? Starchy vegetables. These include potatoes with skins, kidney beans, and lima beans. · Take a fiber supplement every day if your doctor recommends it. Examples are Benefiber, Citrucel, FiberCon, and Metamucil. Ask your doctor how much to take. · Drink plenty of fluids. If you have kidney, heart, or liver disease and have to limit fluids, talk with your doctor before you increase the amount of fluids you drink. · Get some exercise every day. Exercise helps stool move through the colon. It also helps prevent constipation. · Keep a food diary. Try to notice and write down what foods cause gas, pain, or other symptoms. Then you can avoid these foods. Where can you learn more?   Go to http://www.gray.com/  Enter E359 in the search box to learn more about \"High-Fiber Diet: Care Instructions. \"  Current as of: December 17, 2020               Content Version: 12.8  © 1907-6356 [employer] Care instructions adapted under license by [employer] (which disclaims liability or warranty for this information). If you have questions about a medical condition or this instruction, always ask your healthcare professional. [address] any warranty or liability for your use of this information. Diverticulosis: Care Instructions  Your Care Instructions  In diverticulosis, pouches called diverticula form in the wall of the large intestine (colon). The pouches do not cause any pain or other symptoms. Most people who have diverticulosis do not know they have it. But the pouches sometimes bleed, and if they become infected, they can cause pain and other symptoms. When this happens, it is called diverticulitis. Diverticula form when pressure pushes the wall of the colon outward at certain weak points. A diet that is too low in fiber can cause diverticula. Follow-up care is a key part of your treatment and safety. Be sure to make and go to all appointments, and call your doctor if you are having problems. It's also a good idea to know your test results and keep a list of the medicines you take. How can you care for yourself at home? · Include fruits, leafy green vegetables, beans, and whole grains in your diet each day. These foods are high in fiber. · Take a fiber supplement, such as Citrucel or Metamucil, every day if needed. Read and follow all instructions on the label. · Drink plenty of fluids. If you have kidney, heart, or liver disease and have to limit fluids, talk with your doctor before you increase the amount of fluids you drink. · Get at least 30 minutes of exercise on most days of the week. Walking is a good choice. You also may want to do other activities, such as running, swimming, cycling, or playing tennis or team sports.   · Cut out foods that cause gas, pain, or other symptoms. When should you call for help? Call your doctor now or seek immediate medical care if:    · You have belly pain.     · You pass maroon or very bloody stools.     · You have a fever.     · You have nausea and vomiting.     · You have unusual changes in your bowel movements or abdominal swelling.     · You have burning pain when you urinate.     · You have abnormal vaginal discharge.     · You have shoulder pain.     · You have cramping pain that does not get better when you have a bowel movement or pass gas.     · You pass gas or stool from your urethra while urinating. Watch closely for changes in your health, and be sure to contact your doctor if you have any problems. Where can you learn more? Go to http://www.gray.com/  Enter [id number] in the search box to learn more about \"Diverticulosis: Care Instructions. \"  Current as of: April 15, 2020               Content Version: 12.8  © 2006-2021 [employer]. Care instructions adapted under license by [employer] (which disclaims liability or warranty for this information). If you have questions about a medical condition or this instruction, always ask your healthcare professional. [place] [address] any warranty or liability for your use of this information. Gastritis: Care Instructions  Your Care Instructions     Gastritis is a sore and upset stomach. It happens when something irritates the stomach lining. Many things can cause it. These include an infection such as the flu or something you ate or drank. Medicines or a sore on the lining of the stomach (ulcer) also can cause it. Your belly may bloat and ache. You may belch, vomit, and feel sick to your stomach. You should be able to relieve the problem by taking medicine. And it may help to change your diet. If gastritis lasts, your doctor may prescribe medicine.   Follow-up care is a key part of your treatment and safety. Be sure to make and go to all appointments, and call your doctor if you are having problems. It's also a good idea to know your test results and keep a list of the medicines you take. How can you care for yourself at home? · If your doctor prescribed antibiotics, take them as directed. Do not stop taking them just because you feel better. You need to take the full course of antibiotics. · Be safe with medicines. If your doctor prescribed medicine to decrease stomach acid, take it as directed. Call your doctor if you think you are having a problem with your medicine. · Do not take any other medicine, including over-the-counter pain relievers, without talking to your doctor first.  · If your doctor recommends over-the-counter medicine to reduce stomach acid, such as Pepcid AC (famotidine), Prilosec (omeprazole), or Tagamet HB (cimetidine) follow the directions on the label. · Drink plenty of fluids to prevent dehydration. Choose water and other caffeine-free clear liquids. If you have kidney, heart, or liver disease and have to limit fluids, talk with your doctor before you increase the amount of fluids you drink. · Limit how much alcohol you drink. · Avoid coffee, tea, cola drinks, chocolate, and other foods with caffeine. They increase stomach acid. When should you call for help? Call 911 anytime you think you may need emergency care. For example, call if:    · You vomit blood or what looks like coffee grounds.     · You pass maroon or very bloody stools. Call your doctor now or seek immediate medical care if:    · You start breathing fast and have not produced urine in the last 8 hours.     · You cannot keep fluids down. Watch closely for changes in your health, and be sure to contact your doctor if:    · You do not get better as expected. Where can you learn more?   Go to http://www.gray.com/  Enter Z536 in the search box to learn more about \"Gastritis: Care Instructions. \"  Current as of: April 15, 2020               Content Version: 12.8  © 2006-2021 [employer]. Care instructions adapted under license by [employer] (which disclaims liability or warranty for this information). If you have questions about a medical condition or this instruction, always ask your healthcare professional. [place] [address] any warranty or liability for your use of this information. Lola Ordoñez 912 Lori Chavez M.D.  611 [facility], [address]  [phone]                      EGD/COLONOSCOPY [address]ile[address] MD  [id number]  1955    DISCOMFORT:  Redness at IV site- apply warm compress to area; if redness or soreness persist- contact your physician  There may be a slight amount of blood passed from the rectum  Gaseous discomfort- walking, belching will help relieve any discomfort  You may not operate a vehicle for 12 hours  You may not  engage in an occupation involving machinery or appliances for rest of today  You may not  drink alcoholic beverages for at least 12 hours  Avoid making any critical decisions for at least 24 hour    DIET:   You may resume your normal diet, but some patients find that heavy or large  meals may lead to indigestion or vomiting. I suggest a light meal as first food  Intake. I recommend a whole food, plant-based diet for your overall health. ACTIVITY:  You may resume your normal daily activities. It is recommended that you spend the remainder of the day resting - avoid any strenuous activity. CALL M.D.   ANY SIGN OF   Increasing pain, nausea, vomiting  Abdominal distension (swelling)  New increased bleeding (oral or rectal)  Fever (chills)  Pain in chest area  Bloody discharge from nose or mouth  Shortness of breath    Additional Instructions:   Call Dr. Lori Chavez if any questions or problems at [phone]   Setup follow-up office visit in 6 weeks   Should have a repeat colonoscopy in 5 years. EGD showed hiatal hernia, stomach polyps removed. Colonoscopy showed mild diverticulosis. No NSAIDs for 2 days. Learning About Coronavirus [phone])  Coronavirus [phone]): Overview  What is coronavirus (COVID-19)? The coronavirus disease (COVID-19) is caused by a virus. It is an illness that was first found in [place], [place], in December 2019. It has since spread worldwide. The virus can cause fever, cough, and trouble breathing. In severe cases, it can cause pneumonia and make it hard to breathe without help. It can cause death. Coronaviruses are a large group of viruses. They cause the common cold. They also cause more serious illnesses like Middle East respiratory syndrome (MERS) and severe acute respiratory syndrome (SARS). COVID-19 is caused by a novel coronavirus. That means it's a new type that has not been seen in people before. This virus spreads person-to-person through droplets from coughing and sneezing. It can also spread when you are close to someone who is infected. And it can spread when you touch something that has the virus on it, such as a doorknob or a tabletop. What can you do to protect yourself from coronavirus (COVID-19)? The best way to protect yourself from getting sick is to:  · Avoid areas where there is an outbreak. · Avoid contact with people who may be infected. · Wash your hands often with soap or alcohol-based hand sanitizers. · Avoid crowds and try to stay at least 6 feet away from other people. · Wash your hands often, especially after you cough or sneeze. Use soap and water, and scrub for at least 20 seconds. If soap and water aren't available, use an alcohol-based hand . · Avoid touching your mouth, nose, and eyes. What can you do to avoid spreading the virus to others?   To help avoid spreading the virus to others:  · Cover your mouth with a tissue when you cough or sneeze. Then throw the tissue in the trash. · Use a disinfectant to clean things that you touch often. · Stay home if you are sick or have been exposed to the virus. Don't go to school, work, or public areas. And don't use public transportation. · If you are sick:  ? Leave your home only if you need to get medical care. But call the doctor's office first so they know you're coming. And wear a face mask, if you have one.  ? If you have a face mask, wear it whenever you're around other people. It can help stop the spread of the virus when you cough or sneeze. ? Clean and disinfect your home every day. Use household  and disinfectant wipes or sprays. Take special care to clean things that you grab with your hands. These include doorknobs, remote controls, phones, and handles on your refrigerator and microwave. And don't forget countertops, tabletops, bathrooms, and computer keyboards. When to call for help  Call 911 anytime you think you may need emergency care. For example, call if:  · You have severe trouble breathing. (You can't talk at all.)  · You have constant chest pain or pressure. · You are severely dizzy or lightheaded. · You are confused or can't think clearly. · Your face and lips have a blue color. · You pass out (lose consciousness) or are very hard to wake up. Call your doctor now if you develop symptoms such as:  · Shortness of breath. · Fever. · Cough. If you need to get care, call ahead to the doctor's office for instructions before you go. Make sure you wear a face mask, if you have one, to prevent exposing other people to the virus. Where can you get the latest information? The following health organizations are tracking and studying this virus. Their websites contain the most up-to-date information. Moraima Sequeira also learn what to do if you think you may have been exposed to the virus. · U.S. Centers for Disease Control and Prevention (CDC):  The CDC provides updated news about the disease and travel advice. The website also tells you how to prevent the spread of infection. www.cdc.gov  · World Health Organization [facility]): WHO offers information about the virus outbreaks. WHO also has travel advice. www.who.int  Current as of: April 1, 2020               Content Version: 12.4  © 1601-0337 [employer]. Care instructions adapted under license by your healthcare professional. If you have questions about a medical condition or this instruction, always ask your healthcare professional. [address] any warranty or liability for your use of this information.

## 2021-05-25 NOTE — PROCEDURES
[address] [place] [address] JENN Bermudez Navjot, 9 [facility]  [phone]               Colonoscopy Procedure Note    NAME: Brad Massey MD  :  1955  MRN:  [id number]    Indications:   Diarrhea     : Uzma Ramirez MD    Referring Provider:  Ravindra Morris MD    Staff: Endoscopy Candi Delacruzk: Mouna Rodarte  Endoscopy RN-1: Venkatesh Reyes RN    Prosthetic devices, grafts, tissues, transplant, or devices implanted: none    Medicines:  MAC anesthesia      Procedure Details:  After informed consent was obtained with all risks and benefits of the procedure explained and preprocedure exam completed, the patient was placed in the left lateral decubitus position. Universal protocol for patient identification was performed and documented in the nursing notes. Throughout the procedure, the patient's blood pressure was monitored at least every five minutes; pulse, and oxygen saturations were monitored continuously. All vital signs were documented in the nursing notes. A digital rectal exam was performed and was normal.  The Olympus videocolonoscope  was inserted in the rectum and carefully advanced to the terminal ileum. The colonoscope was slowly withdrawn with careful evaluation between folds. Retroflexion in the rectum was performed; findings and interventions are described below. Procedure start time, extent reached time/cecum time, and procedure end time are documented in the nursing notes. The quality of preparation was adequate. Findings:   Mild sigmoid diverticulosis. Rest of the colon and TI were normal s/p biopsies to evaluate for microscopic colitis.     Interventions:    biopsy of colon colon    Specimens:   ID Type Source Tests Collected by Time Destination   1 : Duodenum Bx Rule Out Celiac Disease Preservative Duodenum  Rosa Maria Cabral MD 2021 1016 Pathology   2 : Antral Polyps Preservative Stomach, Antrum  Rosa Maria Cabral MD 5/25/2021 1028 Pathology   3 : Antral Bx Rule Out H Pylori Preservative Stomach, Antrum  Barber Six, MD 5/25/2021 1028 Pathology   4 : Gastric Body Polyps Bx Preservative Stomach, Body  Barber Six, MD 5/25/2021 1030 Pathology   5 : Random Colon Bx Rule Out Microscopic Colitis Preservative Random colon  Barber Six, MD 5/25/2021 1049 Pathology     EBL:  None. Complications:   No immediate complications     Impression:  -See post-procedure diagnoses. Recommendations:   -Repeat colonoscopy in 5 years. If < 10 years, reason:  above average risk patient    Resume normal medication(s). Signed by:  Baldev Cullen MD          5/25/2021  11:12 AM

## 2021-05-25 NOTE — PROCEDURES
[place]ola [place] [address] Lori Chavez M.D.  [address] [place] [address]  [phone]               Esophagogastroduodenoscopy (EGD) Procedure Note    NAME: Naresh Lane MD  :  1955  MRN:  [id number]    Indications:  Iron deficiency anemia     : Imtiaz Martínez MD    Referring Provider:  Pedro Webster MD    Staff: Endoscopy Angie Sailors: Rufino Dominguez  Endoscopy RN-1: Anne Wilson RN    Prosthetic devices, grafts, tissues, transplant, or devices implanted: none    Medicine:  MAC anesthesia      Procedure Details:  After informed consent was obtained with all risks and benefits of the procedure explained and preprocedure exam completed, the patient was placed in the left lateral decubitus position. Universal protocol for patient identification was performed and documented in the nursing notes. Throughout the procedure, the patient's blood pressure was monitored at least every five minutes; pulse, and oxygen saturations were monitored continuously. All vital signs were documented in the nursing notes. The endoscope was inserted into the mouth and advanced under direct vision to second portion of the duodenum. A careful inspection was made as the gastroscope was withdrawn, including a retroflexed view of the proximal stomach; findings and interventions are described below.       Findings:   Esophagus:normal  Stomach: 4 cm hiatal hernia, several sessile polyps with greatest diameter of 7 mm in the body and fundus c/w fundic gland polyps s/p biopsies, linear nodular erythema in the antrum s/p biopsies, CLOtest biopsies also taken, 3 pedunculated polyps with greatest diameter of 10 mm (one was ulcerated) in the antrum s/p hot snare polypectomy and placement of prophylactic one hemoclip at each of the three sites  Duodenum: normal s/p biopsies for celiac disease    Interventions:    biopsy of stomach whole, snare in stomach    Specimens:   ID Type Source Tests Collected by Time Destination   1 : Duodenum Bx Rule Out Celiac Disease Preservative Duodenum  Fatoumata Wallis MD 5/25/2021 1016 Pathology   2 : Antral Polyps Preservative Stomach, Antrum  Fatoumata Wallis MD 5/25/2021 1028 Pathology   3 : Antral Bx Rule Out H Pylori Preservative Stomach, Antrum  Fatoumata Wallis MD 5/25/2021 1028 Pathology   4 : Gastric Body Polyps Bx Preservative Stomach, Body  Fatoumata Wallis MD 5/25/2021 1030 Pathology   5 : Random Colon Bx Rule Out Microscopic Colitis Preservative Random colon  Fatoumata Wallis MD 5/25/2021 1049 Pathology        EBL: None          Complications:     No immediate complications        Impression:  -See post-procedure diagnoses. Ulcerated polyp could be a cause of iron deficiency anemia. Recommendations:  -Await pathology. Signed by:  Linda Archibald MD         5/25/2021 11:00 AM

## 2021-05-25 NOTE — ANESTHESIA PREPROCEDURE EVALUATION
Anesthetic History   No history of anesthetic complications            Review of Systems / Medical History  Patient summary reviewed, nursing notes reviewed and pertinent labs reviewed    Pulmonary        Sleep apnea: CPAP    Asthma        Neuro/Psych         Psychiatric history     Cardiovascular    Hypertension        Dysrhythmias : SVT      Exercise tolerance: >4 METS     GI/Hepatic/Renal     GERD      PUD    Comments: C/o epigastric pains  Hx polyps Endo/Other    Diabetes    Arthritis     Other Findings              Physical Exam    Airway  Mallampati: II  TM Distance: > 6 cm  Neck ROM: normal range of motion   Mouth opening: Normal     Cardiovascular    Rhythm: regular  Rate: normal         Dental  No notable dental hx       Pulmonary  Breath sounds clear to auscultation               Abdominal         Other Findings            Anesthetic Plan    ASA: 2  Anesthesia type: MAC          Induction: Intravenous  Anesthetic plan and risks discussed with: Patient

## 2021-05-30 DIAGNOSIS — I10 BENIGN ESSENTIAL HYPERTENSION: ICD-10-CM

## 2021-05-31 RX ORDER — HYDROCHLOROTHIAZIDE 25 MG/1
TABLET ORAL
Qty: 90 TABLET | Refills: 1 | Status: SHIPPED | OUTPATIENT
Start: 2021-05-31 | End: 2021-11-22

## 2021-06-01 ENCOUNTER — TELEPHONE (OUTPATIENT)
Dept: INTERNAL MEDICINE CLINIC | Age: 66
End: 2021-06-01

## 2021-06-01 RX ORDER — PREDNISONE 1 MG/1
TABLET ORAL
Qty: 120 TABLET | Refills: 2 | Status: SHIPPED | OUTPATIENT
Start: 2021-06-01 | End: 2021-08-30

## 2021-06-01 NOTE — TELEPHONE ENCOUNTER
[facility] requesting Alternative for:     Omeprazole DR 40 MG Caps  Sig: Take 1 Capsule by mouth every day  #90     Alternative requested: PA required for quantity    Placed fax in prescription folder for review.

## 2021-06-04 ENCOUNTER — DOCUMENTATION ONLY (OUTPATIENT)
Dept: INTERNAL MEDICINE CLINIC | Age: 66
End: 2021-06-04

## 2021-06-04 NOTE — PROGRESS NOTES
LINDSAY Matamoros Reasoner - PA Case ID: [id number]  Outcome  Approved today  Your PA request has been approved.    Drug   Omeprazole 40MG dr capsules  Patient made aware and will follow up with pharmacy to fill medication

## 2021-07-02 RX ORDER — TOCILIZUMAB 180 MG/ML
INJECTION, SOLUTION SUBCUTANEOUS
Qty: 4 SYRINGE | Refills: 2 | Status: SHIPPED | OUTPATIENT
Start: 2021-07-02 | End: 2021-10-12

## 2021-07-08 DIAGNOSIS — E11.9 TYPE 2 DIABETES MELLITUS WITHOUT COMPLICATION, WITHOUT LONG-TERM CURRENT USE OF INSULIN (HCC): ICD-10-CM

## 2021-07-08 RX ORDER — SITAGLIPTIN 100 MG/1
TABLET, FILM COATED ORAL
Qty: 90 TABLET | Refills: 3 | Status: SHIPPED | OUTPATIENT
Start: 2021-07-08 | End: 2022-06-28

## 2021-07-17 DIAGNOSIS — E11.9 TYPE 2 DIABETES MELLITUS WITHOUT COMPLICATION, WITHOUT LONG-TERM CURRENT USE OF INSULIN (HCC): ICD-10-CM

## 2021-07-18 RX ORDER — ATORVASTATIN CALCIUM 80 MG/1
80 TABLET, FILM COATED ORAL DAILY
Qty: 90 TABLET | Refills: 3 | Status: SHIPPED | OUTPATIENT
Start: 2021-07-18 | End: 2021-09-20 | Stop reason: SDUPTHER

## 2021-08-06 DIAGNOSIS — I10 ESSENTIAL HYPERTENSION: ICD-10-CM

## 2021-08-06 RX ORDER — VALSARTAN 160 MG/1
TABLET ORAL
Qty: 90 TABLET | Refills: 1 | Status: SHIPPED | OUTPATIENT
Start: 2021-08-06 | End: 2022-01-18

## 2021-08-24 DIAGNOSIS — K21.9 GASTROESOPHAGEAL REFLUX DISEASE: ICD-10-CM

## 2021-08-24 RX ORDER — OMEPRAZOLE 40 MG/1
CAPSULE, DELAYED RELEASE ORAL
Qty: 90 CAPSULE | Refills: 3 | Status: SHIPPED | OUTPATIENT
Start: 2021-08-24 | End: 2022-09-30

## 2021-08-29 DIAGNOSIS — Z79.899 ENCOUNTER FOR LONG-TERM (CURRENT) USE OF MEDICATIONS: ICD-10-CM

## 2021-08-29 RX ORDER — DULOXETIN HYDROCHLORIDE 60 MG/1
CAPSULE, DELAYED RELEASE ORAL
Qty: 90 CAPSULE | Refills: 3 | Status: SHIPPED | OUTPATIENT
Start: 2021-08-29 | End: 2022-07-21 | Stop reason: SDUPTHER

## 2021-08-30 RX ORDER — PREDNISONE 1 MG/1
TABLET ORAL
Qty: 120 TABLET | Refills: 2 | Status: SHIPPED | OUTPATIENT
Start: 2021-08-30 | End: 2021-11-29

## 2021-09-07 NOTE — PROGRESS NOTES
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 Iron deficiency anemia, unspecified iron deficiency anemia type  Assessment & Plan:  8/17/20 - Ferritin of 10. Possibly diet related as well. Recommend increasing iron in her diet and starting iron supplements. Patient is to follow-up with GI to evaluate need for colonoscopy. She is due for 1 due to history of polyps    2/5/21 - Was only able to tolerate oral iron for a month. Felt medication made her GI symptoms worse including an upset stomach and diarrhea. Will recheck today. If still anemic and iron still low, consider heme-onc referral for iron infusions.  ======  Had an EGD and colonoscopy since last visit which showed gastritis and some polyps. Ferritin of 9 previously. We had discussed IV iron infusions because she was having difficulty tolerating p.o. iron. However, she was able to tolerate the medication for about 6 weeks. Anemia likely multifactorial including underlying iron deficiency. Recheck labs today. Orders:  -     CBC W/O DIFF; Future  -     IRON PROFILE; Future  -     FERRITIN; Future  2. Benign essential hypertension  Assessment & Plan:  3/13/20 - taking hydrochlorothiazide and ramipril without issues. Denies any chest pain, shortness of breath, lightheadedness, dizziness. Continue HCTZ and ramipril    2/5/21 - Reports blood pressure runs in the 140s-150s at home. Currently on hydrochlorothiazide 25 mg, verapamil 120 mg, and valsartan 80 mg.  Recommend increasing valsartan to 160 continuing other medications.  ====  Valsartan increased to 160 last visit. Patient reports blood pressure readings have been in the 150s and 160s. Has had a lot of stress with work, increased pain. Prednisone also possibly contributing. Increase verapamil to 180 mg daily. Continue hydrochlorothiazide 20, valsartan 160.   If still not well controlled, consider going up on valsartan  Orders:  -     verapamil ER (CALAN-SR) 180 mg CR tablet; Take 1 Tablet by mouth nightly. Indications: high blood pressure, Normal, Disp-90 Tablet, R-1  3. Epigastric pain  Assessment & Plan:  Reports a few month history of epigastric abdominal pain and back pain. Constant, always present. No alleviating or aggravating factors. Associated with nausea. Does not seem associated with food. She had an EGD and colonoscopy done in May which showed gastritis and polyps. She has since stopped NSAIDs. Pain feels different from her IBS pain. No fever, chills. Does note some increased sweating at night. History of cholecystectomy. Unclear etiology. Recommend CT abdomen pelvis to rule out acute processes and masses. If negative, consider increasing omeprazole to twice a day given gastritis noted on previous EGD  Orders:  -     CT ABD PELV W WO CONT; Future  4. Type 2 diabetes mellitus with diabetic polyneuropathy, without long-term current use of insulin [facility])  Assessment & Plan:  12/13/19 -  since her last A1c in the chart, she was started on metformin. Also on Januvia. Denies any side effects from the medications. Increase atorvastatin to 40 mg    3/13/20 - reports taking metformin 500 twice a day as she was having a lot of GI upset with higher dose. Taking Januvia. No other issues. 8/17/20 - A1c has been creeping up. She had only been on Januvia prior to her A1c. She has stopped metformin due to GI upset. However, she thinks she could tolerate metformin 500 twice daily so we will restart that. If not tolerated well, consider Jardiance. She is technically within goal for diabetic but closer to 7 is a reasonable goal.  On januvia 100mg daily    2/5/21 - A1c today is 7.5%. On Januvia 100 mg. Was started on Metformin 500 mg twice a day last visit. She is tolerating that medication well. We will continue.  ===  Reports her monitor at home is showing a hemoglobin A1c in the sixes.   Currently on Januvia 100 mg daily and Metformin 500 twice a day. Check A1c. Continue current medications at this time, no changes recommended  Orders:  -     HEMOGLOBIN A1C WITH EAG; Future  -     LIPID PANEL; Future  5. Hyperlipidemia, unspecified hyperlipidemia type  Assessment & Plan:  Atorvastatin previously increased to 80 mg due to LDL 95. Tolerating well, continue. Check panel. Goal less than 70 due to diabetes  6. Vitamin D deficiency  -     VITAMIN D, 25 HYDROXY; Future       Benefits, risks, possible drug interactions, and side effects of all new medications were reviewed with the patient. Pt verbalized understanding. Return to clinic:  4-6 weeks for BP, epigastric pain  Osteopenia - Previously followed by rheum  Atrial tachycardia - talked to cards about [facility]? Results of event monitor? An electronic signature was used to authenticate this note. Rupinder Braun MD  [facility]  9/8/2021    Future Appointments   Date Time Provider Aggie Jodi   9/20/2021  1:40 PM Jacob Rios MD [facility] BS AMB   87/4/1492  9:25 AM Trell Bella MD [facility] BS AMB      On this date 09/08/2021 I have spent >50 minutes reviewing previous notes, test results and face to face with the patient discussing the diagnosis and importance of compliance with the treatment plan as well as documenting on the day of the visit. Time spent fixing problem list  Objective   Vitals:       Visit Vitals  BP (!) 151/83 (BP 1 Location: Left upper arm, BP Patient Position: Sitting, BP Cuff Size: Adult)   Pulse 80   Temp 97.8 °F (36.6 °C) (Temporal)   Resp 14   Ht 5' 7\" (1.702 m)   Wt 200 lb (90.7 kg)   SpO2 98%   BMI 31.32 kg/m²        Physical Exam  Constitutional:       Appearance: Normal appearance. She is not ill-appearing. Cardiovascular:      Rate and Rhythm: Normal rate and regular rhythm. Heart sounds: No murmur heard. No friction rub. No gallop. Pulmonary:      Effort: No respiratory distress.       Breath sounds: Normal breath sounds. No wheezing, rhonchi or rales. Abdominal:      General: Bowel sounds are normal. There is no distension. Palpations: Abdomen is soft. There is no mass. Tenderness: There is abdominal tenderness (mild-mod RUQ, epigastric tenderness to palpation). There is no guarding. Neurological:      Mental Status: She is alert. Current Outpatient Medications   Medication Sig    verapamil ER (CALAN-SR) 180 mg CR tablet Take 1 Tablet by mouth nightly. Indications: high blood pressure    predniSONE (DELTASONE) 1 mg tablet TAKE 4 TABLETS BY MOUTH EVERY DAY    DULoxetine (CYMBALTA) 60 mg capsule TAKE 1 CAPSULE BY MOUTH EVERY DAY    omeprazole (PRILOSEC) 40 mg capsule TAKE 1 CAPSULE BY MOUTH EVERY DAY    valsartan (DIOVAN) 160 mg tablet TAKE 1 TABLET BY MOUTH EVERY DAY    atorvastatin (LIPITOR) 80 mg tablet Take 1 Tablet by mouth daily.  Januvia 100 mg tablet TAKE 1 TABLET BY MOUTH EVERY DAY    tocilizumab (Actemra) 162 mg/0.9 mL syringe INJECT ONE SYRINGE SUBCUTANEOUSLY EVERY 7 DAYS. KEEP REFRIGERATED.  hydroCHLOROthiazide (HYDRODIURIL) 25 mg tablet TAKE 1 TABLET BY MOUTH EVERY DAY    metFORMIN (GLUCOPHAGE) 500 mg tablet Take 1 Tab by mouth two (2) times daily (with meals).  folic acid (FOLVITE) 1 mg tablet Take 1 Tab by mouth daily.  methotrexate (RHEUMATREX) 2.5 mg tablet Take 6 Tabs by mouth every Sunday.  atorvastatin (LIPITOR) 80 mg tablet Take 1 Tab by mouth daily. Indications: high cholesterol    budesonide-formoteroL (Symbicort) 160-4.5 mcg/actuation HFAA Take 2 Puffs by inhalation two (2) times a day.  estradiol (ESTRACE) 1 mg tablet Take 1 Tab by mouth daily.  omega-3 fatty acids-vitamin e (FISH OIL) 1,000 mg cap Take 1 Cap by mouth two (2) times a day.  multivitamin (ONE A DAY) tablet Take 1 Tab by mouth daily.  colestipoL (COLESTID) 1 gram tablet TAKE 2 TABLETS BY MOUTH TWICE A DAY. TAKE 1 HOUR AFTER OR 4 HOURS BEFORE OTHER MEDS (Patient not taking: Reported on 9/8/2021)    ferrous sulfate (IRON) 325 mg (65 mg iron) EC tablet TAKE 1 TABLET BY MOUTH EVERY DAY BEFORE BREAKFAST (Patient not taking: Reported on 5/25/2021)    risedronate (ACTONEL) 150 mg tablet Take 1 Tab by mouth every thirty (30) days. (Patient not taking: Reported on 9/8/2021)    ibuprofen (MOTRIN) 200 mg tablet Take 600 mg by mouth as needed. (Patient not taking: Reported on 9/8/2021)    Cholecalciferol, Vitamin D3, (Vitamin D3) 25 mcg (1,000 unit) chew Take 1 Cap by mouth daily. (Patient not taking: Reported on 9/8/2021)     No current facility-administered medications for this visit.

## 2021-09-08 ENCOUNTER — OFFICE VISIT (OUTPATIENT)
Dept: INTERNAL MEDICINE CLINIC | Age: 66
End: 2021-09-08
Payer: COMMERCIAL

## 2021-09-08 VITALS
HEART RATE: 80 BPM | SYSTOLIC BLOOD PRESSURE: 151 MMHG | DIASTOLIC BLOOD PRESSURE: 83 MMHG | BODY MASS INDEX: 31.39 KG/M2 | OXYGEN SATURATION: 98 % | RESPIRATION RATE: 14 BRPM | TEMPERATURE: 97.8 F | WEIGHT: 200 LBS | HEIGHT: 67 IN

## 2021-09-08 DIAGNOSIS — R10.13 EPIGASTRIC PAIN: ICD-10-CM

## 2021-09-08 DIAGNOSIS — I10 BENIGN ESSENTIAL HYPERTENSION: ICD-10-CM

## 2021-09-08 DIAGNOSIS — E78.5 HYPERLIPIDEMIA, UNSPECIFIED HYPERLIPIDEMIA TYPE: ICD-10-CM

## 2021-09-08 DIAGNOSIS — D50.9 IRON DEFICIENCY ANEMIA, UNSPECIFIED IRON DEFICIENCY ANEMIA TYPE: Primary | ICD-10-CM

## 2021-09-08 DIAGNOSIS — E11.42 TYPE 2 DIABETES MELLITUS WITH DIABETIC POLYNEUROPATHY, WITHOUT LONG-TERM CURRENT USE OF INSULIN (HCC): ICD-10-CM

## 2021-09-08 DIAGNOSIS — E55.9 VITAMIN D DEFICIENCY: ICD-10-CM

## 2021-09-08 PROBLEM — R06.00 DYSPNEA: Status: ACTIVE | Noted: 2021-09-08

## 2021-09-08 PROBLEM — R87.69 VULVAR HIGH-GRADE SQUAMOUS INTRAEPITHELIAL LESION (HGSIL): Status: RESOLVED | Noted: 2018-01-01 | Resolved: 2021-09-08

## 2021-09-08 PROCEDURE — G8754 DIAS BP LESS 90: HCPCS | Performed by: INTERNAL MEDICINE

## 2021-09-08 PROCEDURE — 1101F PT FALLS ASSESS-DOCD LE1/YR: CPT | Performed by: INTERNAL MEDICINE

## 2021-09-08 PROCEDURE — G8427 DOCREV CUR MEDS BY ELIG CLIN: HCPCS | Performed by: INTERNAL MEDICINE

## 2021-09-08 PROCEDURE — G8399 PT W/DXA RESULTS DOCUMENT: HCPCS | Performed by: INTERNAL MEDICINE

## 2021-09-08 PROCEDURE — 1090F PRES/ABSN URINE INCON ASSESS: CPT | Performed by: INTERNAL MEDICINE

## 2021-09-08 PROCEDURE — 3017F COLORECTAL CA SCREEN DOC REV: CPT | Performed by: INTERNAL MEDICINE

## 2021-09-08 PROCEDURE — G9717 DOC PT DX DEP/BP F/U NT REQ: HCPCS | Performed by: INTERNAL MEDICINE

## 2021-09-08 PROCEDURE — 99215 OFFICE O/P EST HI 40 MIN: CPT | Performed by: INTERNAL MEDICINE

## 2021-09-08 PROCEDURE — G8417 CALC BMI ABV UP PARAM F/U: HCPCS | Performed by: INTERNAL MEDICINE

## 2021-09-08 PROCEDURE — G9899 SCRN MAM PERF RSLTS DOC: HCPCS | Performed by: INTERNAL MEDICINE

## 2021-09-08 PROCEDURE — G0463 HOSPITAL OUTPT CLINIC VISIT: HCPCS | Performed by: INTERNAL MEDICINE

## 2021-09-08 PROCEDURE — G8536 NO DOC ELDER MAL SCRN: HCPCS | Performed by: INTERNAL MEDICINE

## 2021-09-08 PROCEDURE — 2022F DILAT RTA XM EVC RTNOPTHY: CPT | Performed by: INTERNAL MEDICINE

## 2021-09-08 PROCEDURE — G8753 SYS BP > OR = 140: HCPCS | Performed by: INTERNAL MEDICINE

## 2021-09-08 RX ORDER — MONTELUKAST SODIUM 4 MG/1
TABLET, CHEWABLE ORAL
COMMUNITY
Start: 2021-08-27 | End: 2021-10-26

## 2021-09-08 RX ORDER — VERAPAMIL HYDROCHLORIDE 180 MG/1
180 TABLET, EXTENDED RELEASE ORAL
Qty: 90 TABLET | Refills: 1 | Status: SHIPPED | OUTPATIENT
Start: 2021-09-08 | End: 2022-03-07

## 2021-09-08 NOTE — ASSESSMENT & PLAN NOTE
8/17/20 - Ferritin of 10. Possibly diet related as well. Recommend increasing iron in her diet and starting iron supplements. Patient is to follow-up with GI to evaluate need for colonoscopy. She is due for 1 due to history of polyps    2/5/21 - Was only able to tolerate oral iron for a month. Felt medication made her GI symptoms worse including an upset stomach and diarrhea. Will recheck today. If still anemic and iron still low, consider heme-onc referral for iron infusions.  ======  Had an EGD and colonoscopy since last visit which showed gastritis and some polyps. Ferritin of 9 previously. We had discussed IV iron infusions because she was having difficulty tolerating p.o. iron. However, she was able to tolerate the medication for about 6 weeks. Anemia likely multifactorial including underlying iron deficiency. Recheck labs today.

## 2021-09-08 NOTE — ASSESSMENT & PLAN NOTE
12/13/19 - had initially presented with palpitations and lightheadedness. Had an event recorder that showed atrial fibrillation or atrial tachycardia. Was started on on Bystolic by cardiology. Symptoms have been much improved since starting Bystolic. Followed by cards. Chads vas score of 4. Unclear if her study showed A. fib or atrial tachycardia which would make a difference in her need for anticoagulation  Instructed patient to bring this up with cardiology when she sees them next month    3/13/20 - has been seen by cardiology and was switched to metoprolol due to price. Reports that she has been having more episodes of palpitations on metoprolol compared to Bystolic. Reports baseline heart rate at home is in the 60s. We will be making an appointment with EP discuss. Resting heart rate is in the 60s so I do not think there is much room to go up on her metoprolol. 8/17/20 - Has been having increased shortness of breath, dyspnea on exertion, tachycardia, dizziness. She is following up with Dr. Mahnaz Moctezuma with [facility] and Dr. Karrie Dempsey with cardiology. Has a sleep study planned and she currently has a 2-week event monitor on.   We will try to follow-up with those records

## 2021-09-08 NOTE — ASSESSMENT & PLAN NOTE
3/13/20 - taking hydrochlorothiazide and ramipril without issues. Denies any chest pain, shortness of breath, lightheadedness, dizziness. Continue HCTZ and ramipril    2/5/21 - Reports blood pressure runs in the 140s-150s at home. Currently on hydrochlorothiazide 25 mg, verapamil 120 mg, and valsartan 80 mg.  Recommend increasing valsartan to 160 continuing other medications.  ====  Valsartan increased to 160 last visit. Patient reports blood pressure readings have been in the 150s and 160s. Has had a lot of stress with work, increased pain. Prednisone also possibly contributing. Increase verapamil to 180 mg daily. Continue hydrochlorothiazide 20, valsartan 160.   If still not well controlled, consider going up on valsartan

## 2021-09-08 NOTE — ASSESSMENT & PLAN NOTE
12/13/19 - on chronic prednisone, methotrexate, actemra. Not currently in a flare. Has had issues trying to come off of prednisone and so she has been on prednisone for the last 4 years. Followed by rheumatology.   Continue medications as prescribed by them

## 2021-09-08 NOTE — ASSESSMENT & PLAN NOTE
8/17/20 - Has been having increased shortness of breath, dyspnea on exertion, tachycardia, dizziness. She is following up with Dr. Yen Mejia with [facility] and Dr. Devyn Wyatt with cardiology. Has a sleep study planned and she currently has a 2-week event monitor on. We will try to follow-up with those records    2/5/21 - Her exercise tolerance is better. Her chest pain and breathing is better with Symbicort.

## 2021-09-08 NOTE — ASSESSMENT & PLAN NOTE
12/13/19 -  since her last A1c in the chart, she was started on metformin. Also on Januvia. Denies any side effects from the medications. Increase atorvastatin to 40 mg    3/13/20 - reports taking metformin 500 twice a day as she was having a lot of GI upset with higher dose. Taking Januvia. No other issues. 8/17/20 - A1c has been creeping up. She had only been on Januvia prior to her A1c. She has stopped metformin due to GI upset. However, she thinks she could tolerate metformin 500 twice daily so we will restart that. If not tolerated well, consider Jardiance. She is technically within goal for diabetic but closer to 7 is a reasonable goal.  On januvia 100mg daily    2/5/21 - A1c today is 7.5%. On Januvia 100 mg. Was started on Metformin 500 mg twice a day last visit. She is tolerating that medication well. We will continue.  ===  Reports her monitor at home is showing a hemoglobin A1c in the sixes. Currently on Januvia 100 mg daily and Metformin 500 twice a day. Check A1c.   Continue current medications at this time, no changes recommended

## 2021-09-08 NOTE — ASSESSMENT & PLAN NOTE
12/13/19 - has been on Cymbalta. This also treats depression, anxiety. Does not feel that the Cymbalta works well for her neuropathy although she does feel like it does help with depression and anxiety.  -     gabapentin (NEURONTIN) 300 mg capsule; Take 1 Cap by mouth three (3) times daily for 30 days. Max Daily Amount: 900 mg. Recommend starting out with 300 at night then 300 twice a day then 300 3 times a day. Increase dose every week as tolerated. 3/13/20 - had been started on gabapentin during the last visit but patient reports that she had some side effects with the medicine.   Reports that symptoms have actually been better since last visit anyway

## 2021-09-08 NOTE — ASSESSMENT & PLAN NOTE
Reports a few month history of epigastric abdominal pain and back pain. Constant, always present. No alleviating or aggravating factors. Associated with nausea. Does not seem associated with food. She had an EGD and colonoscopy done in May which showed gastritis and polyps. She has since stopped NSAIDs. Pain feels different from her IBS pain. No fever, chills. Does note some increased sweating at night. History of cholecystectomy. Unclear etiology. Recommend CT abdomen pelvis to rule out acute processes and masses.   If negative, consider increasing omeprazole to twice a day given gastritis noted on previous EGD

## 2021-09-08 NOTE — ASSESSMENT & PLAN NOTE
Atorvastatin previously increased to 80 mg due to LDL 95. Tolerating well, continue. Check panel.   Goal less than 70 due to diabetes

## 2021-09-10 ENCOUNTER — HOSPITAL ENCOUNTER (OUTPATIENT)
Dept: CT IMAGING | Age: 66
Discharge: HOME | End: 2021-09-10
Attending: INTERNAL MEDICINE
Payer: COMMERCIAL

## 2021-09-10 DIAGNOSIS — R10.13 EPIGASTRIC PAIN: ICD-10-CM

## 2021-09-10 PROCEDURE — 74177 CT ABD & PELVIS W/CONTRAST: CPT

## 2021-09-10 PROCEDURE — 82565 ASSAY OF CREATININE: CPT

## 2021-09-10 PROCEDURE — 74011000636 HC RX REV CODE- 636: Performed by: STUDENT IN AN ORGANIZED HEALTH CARE EDUCATION/TRAINING PROGRAM

## 2021-09-10 RX ADMIN — IOPAMIDOL 100 ML: 755 INJECTION, SOLUTION INTRAVENOUS at 14:02

## 2021-09-13 LAB — CREAT BLD-MCNC: 0.9 MG/DL (ref 0.6–1.3)

## 2021-09-13 NOTE — PROGRESS NOTES
[employer] message sent. IMPRESSION     1. Duodenal diverticulum without evidence of diverticulitis. 2. Hepatic steatosis. 3. Rectus diastasis without bowel herniation.    =  Your CT scan did not show any concerning masses. It did show a fatty liver. It's possible your epigastric pain is related to the gastritis they noted on your EGD in May. I recommend increasing your omeprazole to twice a day. Please be sure to take the medication at least 30 minutes prior to eating - taking it with food will decrease the efficacy of the medication. Let's make that change then follow up as you are scheduled to see how you're doing.

## 2021-09-20 ENCOUNTER — OFFICE VISIT (OUTPATIENT)
Dept: RHEUMATOLOGY | Age: 66
End: 2021-09-20
Payer: MEDICARE

## 2021-09-20 VITALS
DIASTOLIC BLOOD PRESSURE: 90 MMHG | HEIGHT: 67 IN | BODY MASS INDEX: 31.39 KG/M2 | RESPIRATION RATE: 14 BRPM | WEIGHT: 200 LBS | SYSTOLIC BLOOD PRESSURE: 148 MMHG | OXYGEN SATURATION: 98 % | HEART RATE: 95 BPM

## 2021-09-20 DIAGNOSIS — M35.3 POLYMYALGIA RHEUMATICA (HCC): Primary | ICD-10-CM

## 2021-09-20 PROCEDURE — G8417 CALC BMI ABV UP PARAM F/U: HCPCS | Performed by: PEDIATRICS

## 2021-09-20 PROCEDURE — G8755 DIAS BP > OR = 90: HCPCS | Performed by: PEDIATRICS

## 2021-09-20 PROCEDURE — 3017F COLORECTAL CA SCREEN DOC REV: CPT | Performed by: PEDIATRICS

## 2021-09-20 PROCEDURE — G8427 DOCREV CUR MEDS BY ELIG CLIN: HCPCS | Performed by: PEDIATRICS

## 2021-09-20 PROCEDURE — G8753 SYS BP > OR = 140: HCPCS | Performed by: PEDIATRICS

## 2021-09-20 PROCEDURE — 1090F PRES/ABSN URINE INCON ASSESS: CPT | Performed by: PEDIATRICS

## 2021-09-20 PROCEDURE — G0463 HOSPITAL OUTPT CLINIC VISIT: HCPCS | Performed by: PEDIATRICS

## 2021-09-20 PROCEDURE — 1101F PT FALLS ASSESS-DOCD LE1/YR: CPT | Performed by: PEDIATRICS

## 2021-09-20 PROCEDURE — G9717 DOC PT DX DEP/BP F/U NT REQ: HCPCS | Performed by: PEDIATRICS

## 2021-09-20 PROCEDURE — G8536 NO DOC ELDER MAL SCRN: HCPCS | Performed by: PEDIATRICS

## 2021-09-20 PROCEDURE — 99214 OFFICE O/P EST MOD 30 MIN: CPT | Performed by: PEDIATRICS

## 2021-09-20 PROCEDURE — G9899 SCRN MAM PERF RSLTS DOC: HCPCS | Performed by: PEDIATRICS

## 2021-09-20 PROCEDURE — G8399 PT W/DXA RESULTS DOCUMENT: HCPCS | Performed by: PEDIATRICS

## 2021-09-20 NOTE — PROGRESS NOTES
Chief Complaint   Patient presents with    Follow-up     6 mo    Osteoarthritis    Myalgia     Polymyalgia rheumatica    Medication Evaluation     pt feels she's ready to come off of prednisone      Visit Vitals  BP (!) 148/90 (BP 1 Location: Left upper arm, BP Patient Position: Sitting)   Pulse 95   Resp 14   Ht 5' 7\" (1.702 m)   Wt 200 lb (90.7 kg)   SpO2 98%   BMI 31.32 kg/m²

## 2021-09-20 NOTE — PROGRESS NOTES
RHEUMATOLOGY PROBLEM LIST AND CHIEF COMPLAINT  1. GCA/PMR - HAs, left temporal artery discomfort, sudden loss of vision, arthralgia of shoulder, TMJ, hands, feet, response to steroids    Therapy History:  Current DMARDs: Actemra (2/2018, stopped for 1 month; 3/2018-current), Methotrexate (1/2017 - 4/2018, restarted 3/2019-current)   Covid-19 vaccination (+, Pfizer 12/21, 1/12, 9/21)    INTERVAL HISTORY  This is a 74 y.o.  female. Today, the patient complains of no pain in the joints. Location: generalized   Severity: 0 on a scale of 0-10   Timing: intermittent   Duration: 8 months  Context/Associated signs and symptoms: The patient's chief complaint is . She continues on Actemra SQ weekly, Methotrexate 15 mg PO weekly, and Prednisone 3 mg daily. She plans to taper Prednisone to 2 mg within the next week. She notes that she had tapered off steroids, but symptoms recurred and she restarted Prednisone at 5 mg daily with taper. Of note, she plans to obtain her third dose of a Covid vaccine tomorrow.      RHEUMATOLOGY REVIEW OF SYSTEMS   Positives as per history  Negatives as follows:  Delaney Mon:  Denies unexplained persistent fevers, weight change  HEAD/EYES:   Denies eye redness, blurry vision or sudden loss of vision, dry eyes, HA, temporal artery pain  ENT:    Denies oral/nasal ulcers, recurrent sinus infections, dry mouth  RESPIRATORY:  No pleuritic pain, history of pleural effusions, hemoptysis  CARDIOVASCULAR:  Denies chest pain, history of pericardial effusions  GASTRO:   Denies heartburn, esophageal dysmotility, abdominal pain, nausea, vomiting, diarrhea, blood in the stool  HEMATOLOGIC:  No easy bruising, purpura, swollen lymph nodes  SKIN:    Denies alopecia, ulcers, nodules, sun sensitivity, unexplained persistent rash   VASCULAR:   Denies cyanosis, raynaud phenomenon  NEUROLOGIC:  Denies specific muscle weakness, paresthesias   PSYCHIATRIC:  No sleep disturbance / snoring, depression, anxiety  MSK:    No morning stiffness >1 hour, SI joint pain    PAST MEDICAL HISTORY  Reviewed with patient, significant changes in medical history - no    PHYSICAL EXAM  Blood pressure (!) 148/90, pulse 95, resp. rate 14, height 5' 7\" (1.702 m), weight 200 lb (90.7 kg), SpO2 98 %. GENERAL APPEARANCE: Well-nourished adult in no acute distress. EYES: No scleral erythema, conjunctival injection. ENT: No oral ulcer, parotid enlargement  NECK: No adenopathy, thyroid enlargement. CARDIOVASCULAR: Heart rhythm is regular. No murmur, rub, gallop. CHEST: Normal vesicular breath sounds. No wheezes, rales, pleural friction rubs. ABDOMINAL: The abdomen is soft and nontender. Liver and spleen are nonpalpable. Bowel sounds are normal.  EXTREMITIES: There is no evidence of clubbing, cyanosis, edema. SKIN: No rash, palpable purpura, digital ulcer, abnormal thickening. NEUROLOGICAL: Normal gait and station, full strength in upper and lower extremities, normal sensation to light touch. MUSCULOSKELETAL:  Upper extremities - Left shoulder dROM w/ int rotation. Right shoulder crepitus - unchanged. Lower extremities - full range of motion, no tenderness, no swelling, no synovial thickening and no deformity of joints except for B/L knees crepitus (R>L)    LABS, RADIOLOGY AND PROCEDURES   Previous labs reviewed -Yes    ASSESSMENT  1. GCA/PMR -(is unchanged)- The patient continues to do well on her current regimen. She should continue on Actemra SQ weekly and Methotrexate 15 mg PO weekly. She should continue on Prednisone 3 mg daily and taper as tolerated. I will order labs today. We discussed the guidelines and recommendations set forth by the ACR and CDC in regards to further vaccination against Covid-19. Follow up in 6 months. 2. Bone Health - Most recent bone density (9/2017) showed osteopenia. The patient continues on Actonel. The patient will continue to taper Prednisone as tolerated. - unchanged  3.  Drug therapy monitoring for toxicity (Actemra, Methotrexate) - CBC, BUN, Cr, AST, ALT and albumin every 4 months    PLAN  1. Prednisone 3 mg daily; taper as tolerated  2. Actemra SQ weekly  3. Methotrexate 15 mg PO weekly  4. Check CMP   5. Return in 6 months    Shai Maldonado MD  Adult and Pediatric Rheumatology     [facility], [address] [place] [address], Phone [phone], Fax [phone]     Visiting  of Pediatrics    Department of Pediatrics, [facility] of [address] [place] [address], [address] [place] [address], Phone [phone], Fax [phone]    There are no Patient Instructions on file for this visit.     cc:  MD Tae Madrid (Ophthalmology)     Written by josué Cardozo, as dictated by Dr. Garry Abrams M.D.

## 2021-10-08 ENCOUNTER — TRANSCRIBE ORDER (OUTPATIENT)
Dept: SCHEDULING | Age: 66
End: 2021-10-08

## 2021-10-08 DIAGNOSIS — R14.0 ABDOMINAL BLOATING: Primary | ICD-10-CM

## 2021-10-08 DIAGNOSIS — R10.11 RUQ PAIN: ICD-10-CM

## 2021-10-08 DIAGNOSIS — D50.9 IDA (IRON DEFICIENCY ANEMIA): ICD-10-CM

## 2021-10-08 LAB
ALBUMIN SERPL-MCNC: 4.2 G/DL (ref 3.8–4.8)
ALBUMIN/GLOB SERPL: 2 {RATIO} (ref 1.2–2.2)
ALP SERPL-CCNC: 45 IU/L (ref 44–121)
ALT SERPL-CCNC: 25 IU/L (ref 0–32)
AST SERPL-CCNC: 24 IU/L (ref 0–40)
BILIRUB SERPL-MCNC: 0.3 MG/DL (ref 0–1.2)
BUN SERPL-MCNC: 19 MG/DL (ref 8–27)
BUN/CREAT SERPL: 20 (ref 12–28)
CALCIUM SERPL-MCNC: 8.4 MG/DL (ref 8.7–10.3)
CHLORIDE SERPL-SCNC: 101 MMOL/L (ref 96–106)
CHOLEST SERPL-MCNC: 154 MG/DL (ref 100–199)
CO2 SERPL-SCNC: 23 MMOL/L (ref 20–29)
CREAT SERPL-MCNC: 0.94 MG/DL (ref 0.57–1)
ERYTHROCYTE [DISTWIDTH] IN BLOOD BY AUTOMATED COUNT: 17.8 % (ref 11.7–15.4)
EST. AVERAGE GLUCOSE BLD GHB EST-MCNC: 186 MG/DL
FERRITIN SERPL-MCNC: 13 NG/ML (ref 15–150)
GLOBULIN SER CALC-MCNC: 2.1 G/DL (ref 1.5–4.5)
GLUCOSE SERPL-MCNC: 166 MG/DL (ref 65–99)
HBA1C MFR BLD: 8.1 % (ref 4.8–5.6)
HCT VFR BLD AUTO: 31.9 % (ref 34–46.6)
HDLC SERPL-MCNC: 46 MG/DL
HGB BLD-MCNC: 10.4 G/DL (ref 11.1–15.9)
IMP & REVIEW OF LAB RESULTS: NORMAL
IRON SATN MFR SERPL: 6 % (ref 15–55)
IRON SERPL-MCNC: 27 UG/DL (ref 27–139)
LDLC SERPL CALC-MCNC: 83 MG/DL (ref 0–99)
MCH RBC QN AUTO: 26.3 PG (ref 26.6–33)
MCHC RBC AUTO-ENTMCNC: 32.6 G/DL (ref 31.5–35.7)
MCV RBC AUTO: 81 FL (ref 79–97)
PLATELET # BLD AUTO: 383 X10E3/UL (ref 150–450)
POTASSIUM SERPL-SCNC: 3.8 MMOL/L (ref 3.5–5.2)
PROT SERPL-MCNC: 6.3 G/DL (ref 6–8.5)
RBC # BLD AUTO: 3.95 X10E6/UL (ref 3.77–5.28)
SODIUM SERPL-SCNC: 140 MMOL/L (ref 134–144)
TIBC SERPL-MCNC: 420 UG/DL (ref 250–450)
TRIGL SERPL-MCNC: 141 MG/DL (ref 0–149)
UIBC SERPL-MCNC: 393 UG/DL (ref 118–369)
VLDLC SERPL CALC-MCNC: 25 MG/DL (ref 5–40)
WBC # BLD AUTO: 4.8 X10E3/UL (ref 3.4–10.8)

## 2021-10-08 NOTE — PROGRESS NOTES
[employer] message sent. Hemoglobin 10.4 from 11.1 8 months ago. Baseline 9-10 for the past year. CBC otherwise normal.  LDL 83. Iron saturation low. A1c 8.1. Ferritin 13. Iron low although better. Recommend restarting oral iron if tolerated. If not, consider heme-onc referral for IV infusions. A1c elevated.   Unable to tolerate higher doses of Metformin in the past.  Consider other medications at next visit scheduled for 10/26

## 2021-10-11 DIAGNOSIS — D50.9 IRON DEFICIENCY ANEMIA, UNSPECIFIED IRON DEFICIENCY ANEMIA TYPE: Primary | ICD-10-CM

## 2021-10-12 RX ORDER — TOCILIZUMAB 180 MG/ML
INJECTION, SOLUTION SUBCUTANEOUS
Qty: 4 ML | Refills: 2 | Status: SHIPPED | OUTPATIENT
Start: 2021-10-12 | End: 2022-01-19

## 2021-10-21 ENCOUNTER — TELEPHONE (OUTPATIENT)
Dept: INTERNAL MEDICINE CLINIC | Age: 66
End: 2021-10-21

## 2021-10-21 NOTE — TELEPHONE ENCOUNTER
Agata[facility]juarez faxed medical record request from Dr. Karen Narvaez office for a copy of patient's most recent labs. Nurse faxed patient's most recent lab results to Medical Records at Dr. Karen Narvaez office using [facility] internal RightFax function. Fax result report shows that fax was successfully transmitted.

## 2021-10-22 ENCOUNTER — HOSPITAL ENCOUNTER (OUTPATIENT)
Dept: NUCLEAR MEDICINE | Age: 66
Discharge: HOME | End: 2021-10-22
Attending: INTERNAL MEDICINE
Payer: COMMERCIAL

## 2021-10-22 DIAGNOSIS — D50.9 IDA (IRON DEFICIENCY ANEMIA): ICD-10-CM

## 2021-10-22 DIAGNOSIS — R14.0 ABDOMINAL BLOATING: ICD-10-CM

## 2021-10-22 DIAGNOSIS — R10.11 RUQ PAIN: ICD-10-CM

## 2021-10-22 PROCEDURE — 78264 GASTRIC EMPTYING IMG STUDY: CPT

## 2021-10-22 RX ORDER — TECHNETIUM TC 99M SULFUR COLLOID 2 MG
0.31 KIT MISCELLANEOUS
Status: COMPLETED | OUTPATIENT
Start: 2021-10-22 | End: 2021-10-22

## 2021-10-22 RX ADMIN — TECHNETIUM TC 99M SULFUR COLLOID 0.31 MILLICURIE: KIT at 11:00

## 2021-10-26 ENCOUNTER — OFFICE VISIT (OUTPATIENT)
Dept: INTERNAL MEDICINE CLINIC | Age: 66
End: 2021-10-26
Payer: MEDICARE

## 2021-10-26 VITALS
SYSTOLIC BLOOD PRESSURE: 148 MMHG | TEMPERATURE: 97.1 F | RESPIRATION RATE: 14 BRPM | HEART RATE: 79 BPM | BODY MASS INDEX: 31.55 KG/M2 | HEIGHT: 67 IN | WEIGHT: 201 LBS | DIASTOLIC BLOOD PRESSURE: 79 MMHG | OXYGEN SATURATION: 99 %

## 2021-10-26 DIAGNOSIS — D50.9 IRON DEFICIENCY ANEMIA, UNSPECIFIED IRON DEFICIENCY ANEMIA TYPE: ICD-10-CM

## 2021-10-26 DIAGNOSIS — R53.83 FATIGUE, UNSPECIFIED TYPE: Primary | ICD-10-CM

## 2021-10-26 DIAGNOSIS — M35.3 POLYMYALGIA RHEUMATICA (HCC): ICD-10-CM

## 2021-10-26 DIAGNOSIS — R10.13 EPIGASTRIC PAIN: ICD-10-CM

## 2021-10-26 DIAGNOSIS — M89.9 DISORDER OF BONE, UNSPECIFIED: ICD-10-CM

## 2021-10-26 DIAGNOSIS — R68.89 OTHER GENERAL SYMPTOMS AND SIGNS: ICD-10-CM

## 2021-10-26 DIAGNOSIS — F33.1 MODERATE EPISODE OF RECURRENT MAJOR DEPRESSIVE DISORDER (HCC): ICD-10-CM

## 2021-10-26 PROCEDURE — 99215 OFFICE O/P EST HI 40 MIN: CPT | Performed by: INTERNAL MEDICINE

## 2021-10-26 PROCEDURE — G9717 DOC PT DX DEP/BP F/U NT REQ: HCPCS | Performed by: INTERNAL MEDICINE

## 2021-10-26 PROCEDURE — G0463 HOSPITAL OUTPT CLINIC VISIT: HCPCS | Performed by: INTERNAL MEDICINE

## 2021-10-26 PROCEDURE — G8754 DIAS BP LESS 90: HCPCS | Performed by: INTERNAL MEDICINE

## 2021-10-26 PROCEDURE — G8753 SYS BP > OR = 140: HCPCS | Performed by: INTERNAL MEDICINE

## 2021-10-26 PROCEDURE — G9899 SCRN MAM PERF RSLTS DOC: HCPCS | Performed by: INTERNAL MEDICINE

## 2021-10-26 PROCEDURE — G8427 DOCREV CUR MEDS BY ELIG CLIN: HCPCS | Performed by: INTERNAL MEDICINE

## 2021-10-26 PROCEDURE — 1090F PRES/ABSN URINE INCON ASSESS: CPT | Performed by: INTERNAL MEDICINE

## 2021-10-26 PROCEDURE — G8417 CALC BMI ABV UP PARAM F/U: HCPCS | Performed by: INTERNAL MEDICINE

## 2021-10-26 PROCEDURE — G8536 NO DOC ELDER MAL SCRN: HCPCS | Performed by: INTERNAL MEDICINE

## 2021-10-26 PROCEDURE — 1101F PT FALLS ASSESS-DOCD LE1/YR: CPT | Performed by: INTERNAL MEDICINE

## 2021-10-26 PROCEDURE — 3017F COLORECTAL CA SCREEN DOC REV: CPT | Performed by: INTERNAL MEDICINE

## 2021-10-26 PROCEDURE — G8399 PT W/DXA RESULTS DOCUMENT: HCPCS | Performed by: INTERNAL MEDICINE

## 2021-10-26 RX ORDER — BUPROPION HYDROCHLORIDE 150 MG/1
150 TABLET ORAL
Qty: 30 TABLET | Refills: 0 | Status: SHIPPED | OUTPATIENT
Start: 2021-10-26 | End: 2021-11-18 | Stop reason: SDUPTHER

## 2021-10-26 NOTE — ASSESSMENT & PLAN NOTE
Reports worsening depression. Denies any suicidal ideation. Going through a lot of stress with work. Reports her Cymbalta was initially started for chronic pain rather than depression. She reports a strong family history of mental health problems.   Denies a history of seizures  Continue Cymbalta 60 mg.  Recommend adding Wellbutrin

## 2021-10-26 NOTE — ASSESSMENT & PLAN NOTE
Currently on 10 mg of prednisone. Has been trying to wean her prednisone. Reports no worsening upper arm pain and upper thigh pain which she says is not usual for her PMR.   Recommend trial of increasing prednisone temporarily to see if that helps with symptoms

## 2021-10-26 NOTE — PROGRESS NOTES
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 Fatigue, unspecified type  Assessment & Plan:  Reports worsening fatigue and malaise. Does not feel like she has a lot of energy to do things. Has been off of work for a few days and still feels tired. Denies chest pain, shortness of breath, palpitations. Uses her CPAP for sleep apnea. Has also had worsening pain in her upper arms and upper thighs which is not usual for her polymyalgia rheumatica pain which is usually in her shoulders and hips. Reports sleeping okay. Reports worsening depression. 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  Likely multifactorial with underlying anemia, polymyalgia rheumatica, depression, underlying chronic inflammation, stress. Recommend treating possible underlying causes -has an appointment with heme-onc for iron infusions, try increasing prednisone, add Wellbutrin. She is also considering stopping work earlier than she planned. Orders:  -     TSH 3RD GENERATION; Future  -     T4, FREE; Future  -     VITAMIN D, 25 HYDROXY; Future  -     VITAMIN B12; Future  -     CK; Future  2. Moderate episode of recurrent major depressive disorder [facility])  Assessment & Plan:  Reports worsening depression. Denies any suicidal ideation. Going through a lot of stress with work. Reports her Cymbalta was initially started for chronic pain rather than depression. She reports a strong family history of mental health problems. Denies a history of seizures  Continue Cymbalta 60 mg.  Recommend adding Wellbutrin  Orders:  -     buPROPion XL (WELLBUTRIN XL) 150 mg tablet; Take 1 Tablet by mouth every morning., Normal, Disp-30 Tablet, R-0  3. Disorder of bone, unspecified   -     VITAMIN D, 25 HYDROXY; Future  4. Other general symptoms and signs   -     VITAMIN B12; Future  5.  Iron deficiency anemia, unspecified iron deficiency anemia type  Assessment & Plan:  Appointment scheduled with heme-onc for possible iron infusions  6. Epigastric pain  Assessment & Plan:  Since last visit, was seen by GI and is not sure of the cause of her abdominal pain. She had a gastric emptying study done which did show mild slowing. CT scan that we did was negative except for fatty liver and duodenal diverticulum, which is unlikely to be significant. Has also started noting constipation when she usually has diarrhea. Did have a colonoscopy done this year. Unclear etiology of symptoms. Follow-up with GI  7. Polymyalgia rheumatica (HCC)  Assessment & Plan:  Currently on 10 mg of prednisone. Has been trying to wean her prednisone. Reports no worsening upper arm pain and upper thigh pain which she says is not usual for her PMR. Recommend trial of increasing prednisone temporarily to see if that helps with symptoms       Benefits, risks, possible drug interactions, and side effects of all new medications were reviewed with the patient. Pt verbalized understanding. Return to clinic: 4-6 weeks for Wellbutrin    An electronic signature was used to authenticate this note. Tami Gottron, MD  [facility]  10/26/2021    Future Appointments   Date Time Provider Aggie Jodi   11/22/2021 11:00 AM Adrienne Champagne MD [facility] BS AMB   43/7/0702  8:53 PM Levi Leon MD [facility] BS AMB   3/21/2022  1:00 PM Lakesha Bond MD [facility] BS AMB      On this date 10/26/2021 I have spent 53 minutes reviewing previous notes, test results and face to face with the patient discussing the diagnosis and importance of compliance with the treatment plan as well as documenting on the day of the visit.   Objective   Vitals:       Visit Vitals  BP (!) 148/79 (BP 1 Location: Left upper arm, BP Patient Position: Sitting, BP Cuff Size: Adult)   Pulse 79   Temp 97.1 °F (36.2 °C) (Temporal)   Resp 14   Ht 5' 7\" (1.702 m)   Wt 201 lb (91.2 kg)   SpO2 99%   BMI 31.48 kg/m²        Physical Exam  Constitutional:       Appearance: Normal appearance. She is not ill-appearing. Cardiovascular:      Rate and Rhythm: Normal rate and regular rhythm. Heart sounds: No murmur heard. No friction rub. No gallop. Pulmonary:      Effort: No respiratory distress. Breath sounds: Normal breath sounds. No wheezing, rhonchi or rales. Neurological:      Mental Status: She is alert. Current Outpatient Medications   Medication Sig    buPROPion XL (WELLBUTRIN XL) 150 mg tablet Take 1 Tablet by mouth every morning.  Actemra 162 mg/0.9 mL syringe INJECT ONE SYRINGE SUBCUTANEOUSLY EVERY 7 DAYS. KEEP REFRIGERATED.  verapamil ER (CALAN-SR) 180 mg CR tablet Take 1 Tablet by mouth nightly. Indications: high blood pressure    predniSONE (DELTASONE) 1 mg tablet TAKE 4 TABLETS BY MOUTH EVERY DAY (Patient taking differently: 3 mg.)    DULoxetine (CYMBALTA) 60 mg capsule TAKE 1 CAPSULE BY MOUTH EVERY DAY    omeprazole (PRILOSEC) 40 mg capsule TAKE 1 CAPSULE BY MOUTH EVERY DAY    valsartan (DIOVAN) 160 mg tablet TAKE 1 TABLET BY MOUTH EVERY DAY    Januvia 100 mg tablet TAKE 1 TABLET BY MOUTH EVERY DAY    hydroCHLOROthiazide (HYDRODIURIL) 25 mg tablet TAKE 1 TABLET BY MOUTH EVERY DAY    metFORMIN (GLUCOPHAGE) 500 mg tablet Take 1 Tab by mouth two (2) times daily (with meals).  folic acid (FOLVITE) 1 mg tablet Take 1 Tab by mouth daily.  methotrexate (RHEUMATREX) 2.5 mg tablet Take 6 Tabs by mouth every Sunday.  atorvastatin (LIPITOR) 80 mg tablet Take 1 Tab by mouth daily. Indications: high cholesterol    budesonide-formoteroL (Symbicort) 160-4.5 mcg/actuation HFAA Take 2 Puffs by inhalation two (2) times a day.  risedronate (ACTONEL) 150 mg tablet Take 1 Tab by mouth every thirty (30) days.  estradiol (ESTRACE) 1 mg tablet Take 1 Tab by mouth daily.  omega-3 fatty acids-vitamin e (FISH OIL) 1,000 mg cap Take 1 Cap by mouth two (2) times a day.     multivitamin (ONE A DAY) tablet Take 1 Tab by mouth daily.  Cholecalciferol, Vitamin D3, (Vitamin D3) 25 mcg (1,000 unit) chew Take 1 Cap by mouth daily. (Patient not taking: Reported on 9/8/2021)     No current facility-administered medications for this visit.

## 2021-10-26 NOTE — ASSESSMENT & PLAN NOTE
Since last visit, was seen by GI and is not sure of the cause of her abdominal pain. She had a gastric emptying study done which did show mild slowing. CT scan that we did was negative except for fatty liver and duodenal diverticulum, which is unlikely to be significant. Has also started noting constipation when she usually has diarrhea. Did have a colonoscopy done this year. Unclear etiology of symptoms.   Follow-up with GI

## 2021-10-26 NOTE — ASSESSMENT & PLAN NOTE
Reports worsening fatigue and malaise. Does not feel like she has a lot of energy to do things. Has been off of work for a few days and still feels tired. Denies chest pain, shortness of breath, palpitations. Uses her CPAP for sleep apnea. Has also had worsening pain in her upper arms and upper thighs which is not usual for her polymyalgia rheumatica pain which is usually in her shoulders and hips. Reports sleeping okay. Reports worsening depression. 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  Likely multifactorial with underlying anemia, polymyalgia rheumatica, depression, underlying chronic inflammation, stress. Recommend treating possible underlying causes -has an appointment with heme-onc for iron infusions, try increasing prednisone, add Wellbutrin. She is also considering stopping work earlier than she planned.

## 2021-10-27 LAB
25(OH)D3 SERPL-MCNC: 44.2 NG/ML (ref 30–100)
CK SERPL-CCNC: 100 U/L (ref 26–192)
COMMENT, HOLDF: NORMAL
SAMPLES BEING HELD,HOLD: NORMAL
T4 FREE SERPL-MCNC: 1 NG/DL (ref 0.8–1.5)
TSH SERPL DL<=0.05 MIU/L-ACNC: 1.46 UIU/ML (ref 0.36–3.74)
VIT B12 SERPL-MCNC: 610 PG/ML (ref 193–986)

## 2021-11-18 DIAGNOSIS — F33.1 MODERATE EPISODE OF RECURRENT MAJOR DEPRESSIVE DISORDER (HCC): ICD-10-CM

## 2021-11-19 RX ORDER — BUPROPION HYDROCHLORIDE 150 MG/1
150 TABLET ORAL
Qty: 90 TABLET | Refills: 1 | Status: SHIPPED | OUTPATIENT
Start: 2021-11-19 | End: 2021-12-06 | Stop reason: DRUGHIGH

## 2021-11-22 ENCOUNTER — OFFICE VISIT (OUTPATIENT)
Dept: ONCOLOGY | Age: 66
End: 2021-11-22
Payer: MEDICARE

## 2021-11-22 VITALS
WEIGHT: 201 LBS | RESPIRATION RATE: 16 BRPM | SYSTOLIC BLOOD PRESSURE: 163 MMHG | OXYGEN SATURATION: 96 % | DIASTOLIC BLOOD PRESSURE: 85 MMHG | TEMPERATURE: 98 F | BODY MASS INDEX: 31.55 KG/M2 | HEART RATE: 97 BPM | HEIGHT: 67 IN

## 2021-11-22 DIAGNOSIS — Z80.3 FAMILY HISTORY OF BREAST CANCER: ICD-10-CM

## 2021-11-22 DIAGNOSIS — Z80.0 FAMILY HISTORY OF GASTRIC CANCER: ICD-10-CM

## 2021-11-22 DIAGNOSIS — E11.43 DIABETES MELLITUS WITH GASTROPARESIS (HCC): ICD-10-CM

## 2021-11-22 DIAGNOSIS — Z79.899 CURRENT USE OF ESTROGEN THERAPY: ICD-10-CM

## 2021-11-22 DIAGNOSIS — I10 BENIGN ESSENTIAL HYPERTENSION: ICD-10-CM

## 2021-11-22 DIAGNOSIS — D50.0 IRON DEFICIENCY ANEMIA DUE TO CHRONIC BLOOD LOSS: Primary | ICD-10-CM

## 2021-11-22 DIAGNOSIS — Z79.899 HIGH RISK MEDICATION USE: ICD-10-CM

## 2021-11-22 PROCEDURE — G8417 CALC BMI ABV UP PARAM F/U: HCPCS | Performed by: INTERNAL MEDICINE

## 2021-11-22 PROCEDURE — 2022F DILAT RTA XM EVC RTNOPTHY: CPT | Performed by: INTERNAL MEDICINE

## 2021-11-22 PROCEDURE — 1101F PT FALLS ASSESS-DOCD LE1/YR: CPT | Performed by: INTERNAL MEDICINE

## 2021-11-22 PROCEDURE — G8753 SYS BP > OR = 140: HCPCS | Performed by: INTERNAL MEDICINE

## 2021-11-22 PROCEDURE — G0463 HOSPITAL OUTPT CLINIC VISIT: HCPCS | Performed by: INTERNAL MEDICINE

## 2021-11-22 PROCEDURE — 1090F PRES/ABSN URINE INCON ASSESS: CPT | Performed by: INTERNAL MEDICINE

## 2021-11-22 PROCEDURE — G8536 NO DOC ELDER MAL SCRN: HCPCS | Performed by: INTERNAL MEDICINE

## 2021-11-22 PROCEDURE — 3017F COLORECTAL CA SCREEN DOC REV: CPT | Performed by: INTERNAL MEDICINE

## 2021-11-22 PROCEDURE — 3052F HG A1C>EQUAL 8.0%<EQUAL 9.0%: CPT | Performed by: INTERNAL MEDICINE

## 2021-11-22 PROCEDURE — G8399 PT W/DXA RESULTS DOCUMENT: HCPCS | Performed by: INTERNAL MEDICINE

## 2021-11-22 PROCEDURE — G9717 DOC PT DX DEP/BP F/U NT REQ: HCPCS | Performed by: INTERNAL MEDICINE

## 2021-11-22 PROCEDURE — 99204 OFFICE O/P NEW MOD 45 MIN: CPT | Performed by: INTERNAL MEDICINE

## 2021-11-22 PROCEDURE — G8754 DIAS BP LESS 90: HCPCS | Performed by: INTERNAL MEDICINE

## 2021-11-22 PROCEDURE — G9899 SCRN MAM PERF RSLTS DOC: HCPCS | Performed by: INTERNAL MEDICINE

## 2021-11-22 PROCEDURE — G8427 DOCREV CUR MEDS BY ELIG CLIN: HCPCS | Performed by: INTERNAL MEDICINE

## 2021-11-22 RX ORDER — HYDROCHLOROTHIAZIDE 25 MG/1
TABLET ORAL
Qty: 90 TABLET | Refills: 3 | Status: SHIPPED | OUTPATIENT
Start: 2021-11-22 | End: 2022-07-21 | Stop reason: SDUPTHER

## 2021-11-22 NOTE — PROGRESS NOTES
[facility] [place] [facility]  [address], [address] [facility] [address] [facility]  [place]: [phone]  F: [phone] Patient ID  Name: Pedro Beltran MD  YOB: 1955  MRN: [id number]  Referring Provider:   Stanford Morales MD  [address] [facility] [address]  [address]  [place],  [address] [place] [address]  Primary Care Provider:   Stanford Morales MD       HEMATOLOGY/MEDICAL ONCOLOGY  NOTE   Date of Visit: 11/22/21  Reason for Evaluation:     Chief Complaint   Patient presents with    New Patient     Rubi Banks is a pleasant 74year old woman who presents as a new patient for anemia. 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 F who presents for an initial evaluation for Iron Deficiency Anemia. She notes  One year after PMR and medication history. She reportedly has had ulcerated polyps on EGD/Colonosocpy. She had endoscopies earlier this year. She states that she responded to oral iron in the past. She notes difificulty more recently taking armaan iron with excaerbation of her IBS. She denies any prior history of IV Iron. She denies any hemoptysis, hematuria,hematochezia,melena. She has nvere been able to wean off her steroids completely. She had a flare earlier this fall. She notes that she cannot wean below prednisone 5mg. She continues methotrexate. She continues on PPI therapy as prophylaxis. Denies any vaginal bleeding; has followed with Gyn for vulvar lesion int he past.  She denies any autoimmune skin diseease such as psoriasis or hidradenitisi suppuritiva. She is on estradiol; continues to proceed with annual mammogram.   She denies any ice cravings (but acknowledges chewing ice)  but notes some paresthesias in her hands.       Past Medical History:   Diagnosis Date    Asthma     childhood    Atypical mole     Chronic pain     shoulders/knees    DDD (degenerative disc disease), cervical     Degenerative arthritis of right knee 2014    Dr. Karis Acosta Ectopic atrial tachycardia ([facility] 2020    Gastric nodule     gastric nodules on endoscopy 3/26/12    GERD (gastroesophageal reflux disease)     PUD (peptic ulcer disease)     Sleep apnea     uses cpap    SVT (supraventricular tachycardia) ([facility]     Temporal arteritis ([facility] [address] 16    Dr. Mary Leong Thoracic outlet syndrome     left, Dr. Rome Jordan Vitamin D deficiency     Vulvar high-grade squamous intraepithelial lesion (HGSIL)       Past Surgical History:   Procedure Laterality Date    COLONOSCOPY N/A 2017    COLONOSCOPY performed by Haile Uribe MD at [facility]. N/A 2021    COLONOSCOPY, EGD   :- performed by Juan East MD at [facility]      several breast biopsies (benign)    HX BREAST BIOPSY Bilateral , , ,    6-7 on R, 2 on L+all benign    HX BUNIONECTOMY      bilateral    HX  SECTION      x3    HX CHOLECYSTECTOMY  2009    HX COLONOSCOPY      normal, f/u in 10 yrs    HX DILATION AND CURETTAGE      [id number], several D&C's    HX ENDOSCOPY      x3, h/o gastric polyp removal 3/2012    HX GI      cholecystectomy    HX GYN  2018    OZZIE    HX HEART CATHETERIZATION      x2, most recent 2012 was normal    HX KNEE ARTHROSCOPY Right     HX OTHER SURGICAL      lipoma removal    HX OTHER SURGICAL  16    L temporal artery bx (Dr. Casinao Mt)    HX AZAEL AND BSO      secondary to endometrial hyperplasia with atypical changes    HX TONSILLECTOMY      T & A      Social History     Tobacco Use    Smoking status: Never Smoker    Smokeless tobacco: Never Used   Substance Use Topics    Alcohol use: No     Alcohol/week: 0.0 standard drinks      Family History   Problem Relation Age of Onset    Heart Disease Mother     Diabetes Mother     Cancer Mother 40        breast    Thyroid Disease Mother         hashimotos    Hypertension Mother  Osteoporosis Mother     Heart Surgery Mother         aortic valve replacement    Cancer Father         gastric    Heart Disease Father     Hypertension Father     Diabetes Paternal Grandmother     Heart Disease Brother     Thyroid Disease Sister         hashimotos    Other Sister         bipolar    Other Sister         depression    Other Brother         mental illness    Breast Cancer Maternal Aunt          from breast cancer    Breast Cancer Maternal Aunt 39        and recurred at 80    Osteoporosis Other         maternal aunts    Psychiatric Disorder Son         bipolar    Psychiatric Disorder Daughter         depression    Psychiatric Disorder Daughter         depression    Breast Cancer Maternal Aunt      Current Outpatient Medications   Medication Sig    hydroCHLOROthiazide (HYDRODIURIL) 25 mg tablet TAKE 1 TABLET BY MOUTH EVERY DAY    buPROPion XL (WELLBUTRIN XL) 150 mg tablet Take 1 Tablet by mouth every morning.  Actemra 162 mg/0.9 mL syringe INJECT ONE SYRINGE SUBCUTANEOUSLY EVERY 7 DAYS. KEEP REFRIGERATED.  verapamil ER (CALAN-SR) 180 mg CR tablet Take 1 Tablet by mouth nightly. Indications: high blood pressure    predniSONE (DELTASONE) 1 mg tablet TAKE 4 TABLETS BY MOUTH EVERY DAY (Patient taking differently: 5 mg.)    DULoxetine (CYMBALTA) 60 mg capsule TAKE 1 CAPSULE BY MOUTH EVERY DAY    omeprazole (PRILOSEC) 40 mg capsule TAKE 1 CAPSULE BY MOUTH EVERY DAY    valsartan (DIOVAN) 160 mg tablet TAKE 1 TABLET BY MOUTH EVERY DAY    Januvia 100 mg tablet TAKE 1 TABLET BY MOUTH EVERY DAY    metFORMIN (GLUCOPHAGE) 500 mg tablet Take 1 Tab by mouth two (2) times daily (with meals).  folic acid (FOLVITE) 1 mg tablet Take 1 Tab by mouth daily.  methotrexate (RHEUMATREX) 2.5 mg tablet Take 6 Tabs by mouth every .  atorvastatin (LIPITOR) 80 mg tablet Take 1 Tab by mouth daily.  Indications: high cholesterol    budesonide-formoteroL (Symbicort) 160-4.5 mcg/actuation HFAA Take 2 Puffs by inhalation two (2) times a day.  risedronate (ACTONEL) 150 mg tablet Take 1 Tab by mouth every thirty (30) days.  estradiol (ESTRACE) 1 mg tablet Take 1 Tab by mouth daily.  omega-3 fatty acids-vitamin e (FISH OIL) 1,000 mg cap Take 1 Cap by mouth two (2) times a day.  multivitamin (ONE A DAY) tablet Take 1 Tab by mouth daily.  Cholecalciferol, Vitamin D3, (Vitamin D3) 25 mcg (1,000 unit) chew Take 1 Cap by mouth daily. (Patient not taking: Reported on 9/8/2021)     No current facility-administered medications for this visit. Allergies   Allergen Reactions    Elfego Flavor Hives     [place] fruit not flavor    Alendronate Nausea and Vomiting    Morphine Other (comments)     Extreme epigastric pain    Valium [Diazepam] Nausea and Vomiting    Versed [Midazolam] Nausea and Vomiting        Review of Systems provided by: patient  General: denies fever, denies night sweats and reports fatigue  HEENT: denies epistaxis and denies trouble swallowing  Eyes: denies any acute vision loss and denies any eye pain. Has a history of temporal arteritis but this has resolved with the use of high dose of steroids. Cardio: denies any chest pain and denies any leg swelling  Resp: denies any shortness of breath and denies any hemoptysis  Abdomen: denies any abdominal pain, denies any nausea, denies any vomiting; has a history of IBS with more diarrheal symptoms. Lymph: denies any lymph node enlargement and denies any lymph node tenderness  Skin: denies any rash and denies any itching  MSK: denies any myalgias and reports a history of arthritis (Shoulders); hx of PMR  Neuro: reports a history of headaches with Temporal Arteritis. Psych: denies anxiety, denies suicidal ideations and reports depression: denies any suicidal ideations.      Objective:     Visit Vitals  BP (!) 163/85   Pulse 97   Temp 98 °F (36.7 °C)   Resp 16   Ht 5' 7\" (1.702 m)   Wt 201 lb (91.2 kg)   SpO2 96%   BMI 31.48 kg/m²     ECOG PS: 1- Restricted in physically strenuous activity but ambulatory and able to carry out work of a light or sedentary nature, e.g., light house work, office work. Physical Exam  Constitutional: No acute distress. , Non-toxic appearance. and Non-diaphoretic. HENT: Normocephalic and atraumatic head. Eyes: Palpebral Conjunctivae with Pallor. and Anicteric sclerae. Cardiovascular: Normal heart sounds., No pitting edema., No friction rub. and No gallops auscultated. Pulmonary: Normal Respiratory Effort., No wheezing., No rhonchi. and No rales. Abdominal: Normal bowel sounds. , Soft Abdomen to palpation. , No abdominal tenderness. and No rebound tenderness. Skin: No jaundice. and No rash. Musculoskeletal: No muscle pain on palpation. No temporal muscle wasting on inspection. Lymph: No cervical, supraclavicular,axillary, or inguinal lymph node enlargement or tenderness. Neurological: Alert and oriented to person, place, and time. Mental status is at baseline. and No tremor on inspection. Psychiatric: mood normal. normal speech rate and normal affect    Results:   I personally reviewed Epic EHR labs/results below:   Lab Results   Component Value Date/Time    WBC 4.8 10/07/2021 10:57 AM    HGB 10.4 (L) 10/07/2021 10:57 AM    HCT 31.9 (L) 10/07/2021 10:57 AM    PLATELET 170 23/60/9483 10:57 AM    MCV 81 10/07/2021 10:57 AM    ABS.  NEUTROPHILS 4.9 07/31/2020 07:30 PM    Hemoglobin (POC) 13.9 07/25/2014 11:52 PM    Hematocrit (POC) 41 07/25/2014 11:52 PM     Lab Results   Component Value Date/Time    Sodium 140 10/07/2021 11:00 AM    Potassium 3.8 10/07/2021 11:00 AM    Chloride 101 10/07/2021 11:00 AM    CO2 23 10/07/2021 11:00 AM    Glucose 166 (H) 10/07/2021 11:00 AM    BUN 19 10/07/2021 11:00 AM    Creatinine 0.94 10/07/2021 11:00 AM    GFR est AA 72 10/07/2021 11:00 AM    GFR est non-AA 63 10/07/2021 11:00 AM    Calcium 8.4 (L) 10/07/2021 11:00 AM    Sodium (POC) 140 07/25/2014 11:52 PM Potassium (POC) 3.3 (L) 07/25/2014 11:52 PM    Chloride (POC) 100 07/25/2014 11:52 PM    Glucose (POC) 135 (H) 05/17/2017 12:29 PM    BUN (POC) 17 07/25/2014 11:52 PM    Creatinine (POC) 0.90 09/10/2021 01:56 PM    Calcium, ionized (POC) 1.16 07/25/2014 11:52 PM     Lab Results   Component Value Date/Time    Bilirubin, total 0.3 10/07/2021 11:00 AM    ALT (SGPT) 25 10/07/2021 11:00 AM    Alk. phosphatase 45 10/07/2021 11:00 AM    Protein, total 6.3 10/07/2021 11:00 AM    Albumin 4.2 10/07/2021 11:00 AM    Globulin 3.7 07/31/2020 07:30 PM     I personally reviewed pertinent referral notes:   \"Stomach: 4 cm hiatal hernia, several sessile polyps with greatest diameter of 7 mm in the body and fundus c/w fundic gland polyps s/p biopsies, linear nodular erythema in the antrum s/p biopsies, CLOtest biopsies also taken, 3 pedunculated polyps with greatest diameter of 10 mm (one was ulcerated) in the antrum s/p hot snare polypectomy and placement of prophylactic one hemoclip at each of the three sites\"    \"Ulcerated polyp could be a cause of iron deficiency anemia. Recommendations:  -Await pathology. Signed by: Catrachito Christy MD                    5/25/2021 11:00 AM\"  RELEVANT PATHOLOGY:  · 5/26/21  \"* * *FINAL PATHOLOGIC DIAGNOSIS* * *     1. Duodenum, biopsy:        Unremarkable duodenal mucosa   Negative for villous blunting or increased intraepithelial lymphocytes     2. Stomach, antral polyps, polypectomy:        Hyperplastic polyps with ulceration and reactive changes   Negative for dysplasia or malignancy     3. Stomach, antrum, biopsy:        Reactive gastropathy   Negative for increased inflammation     4. Gastric body, polyps, polypectomy:        Fundic gland polyps     5. Colon, random, biopsy:        Incidental hyperplastic polyp, inflamed   Negative for microscopic colitis\"    · 7/25/17  * * *FINAL PATHOLOGIC DIAGNOSIS* * *     1.   Duodenum, biopsy        Mildly increased villous lymphocytes See comment     2. Stomach, antrum, biopsy        Mild chronic superficial gastritis        H. pylori stain pending     3. Gastric polyps, biopsies        Fundic gland polyp, two fragments     * * *Comment* * *   Some of the villi show a mild increase in intraepithelial lymphocytes,   most likely reflecting a nonspecific duodenitis. Since this can less   commonly be seen as early finding in celiac disease, correlation with   clinical findings and serologies is recommended.   [facility]/7/26/2017   *Electronically Signed Out By Kylee Mohamud. Holli Gonzalez M.D.*     · 5/17/17  Collect: 5/17/2017    Rec'd: 5/17/2017      Reported: 5/18/2017     Physician(s):   JACQUELYN Ruiz     ==========================================================================                   * * *SURGICAL PATHOLOGY REPORT* * *   ==========================================================================   * * *CLINICAL HISTORY* * *   Screening for colon   ==========================================================================   * * *FINAL PATHOLOGIC DIAGNOSIS* * *   1. Large bowel, descending, biopsy:        Hyperplastic polyp. 2. Large bowel, sigmoid, biopsy:        Tubular adenoma. 3. Large bowel, rectum, biopsy:        Tubular adenoma. [facility]/5/18/2017   *Electronically Signed Out By Jannette Goltz M.D.*\"    Assessment and Recommendations:     Diagnoses and all orders for this visit:    1. Iron deficiency anemia due to chronic blood loss  Today, we discussed that we will proceed with IV iron repletion. We discussed options and it seems that Injectafer may best suit patient's goals of less time in the infusion center. We discussed that all IV iron preparations can carry the risk of anaphylaxis. We discussed that typically skin testing is not conducted unless prior issues with IV iron receipt.   However, patient has not had any reported history of IV iron in the past.  We discussed that understanding the root cause of iron deficiency is the most important issue other than improving symptoms. She has followed with gastroenterology and earlier this year was found to have an ulcerated polyp which was felt to possibly be a source of her iron deficiency. We discussed that if she still has iron deficiency despite planned repletion efforts then other etiologies must be evaluated. However, upfront she does not seem to start any issues such as owen bleeding, skin disorder with high skin cell turnover. She does not have any significant dietary restrictions either. 2. High risk medication use  Today, we reviewed the risks associated with Injectafer. We discussed that GI symptoms as well as anaphylactic reactions can occur. With any type of injection there is a risk of bleeding and pain and infection. However, these risks are typically mitigated per nursing protocols. We discussed that she would be monitored in infusion center with therapy. 3. Current use of estrogen therapy  Patient aware of the use of estrogen in the risk of breast cancer. This is very important especially with her family history of breast cancer. Defer management to prescribing provider. 4. Family history of breast cancer  Patient surely should be undergoing mammography testing. She reports 3 maternal aunts with breast cancer and notes that her mom had breast cancer at age 40. This would likely meet criteria for genetic testing for inherited breast cancer syndrome. This is a consideration for her to further discuss in primary care. Surely we can refer her for genetic testing here at our center. She can also have this done likely at [facility].    5. Family history of gastric cancer  Patient reports that her father had a history of gastric cancer. She is at risk of steroid-induced ulceration with use of steroids. She is following with gastroenterology.     6. Diabetes mellitus with gastroparesis ([address]  Patient notes oral iron exacerbates her IBS as well as potentially complicating any gastroparesis. Plan will be to move forward with IV iron. Follow-up and Dispositions    · Return in about 3 months (around 2/22/2022).          Signed By:   Whitney Osullivan MD

## 2021-11-22 NOTE — LETTER
11/30/2021    Patient: Yazmin Francois MD   YOB: 1955   Date of Visit: 11/22/2021     Neelam Gould, [address] [place]  [address]  [address] [place] [address]  [place]    Dear Neelam Gould MD,      Thank you for referring Ms. Yonathan Don to [address] [facility] for evaluation. My notes for this consultation are attached. If you have questions, please do not hesitate to call me. I look forward to following your patient along with you.       Sincerely,    Angel Ma MD

## 2021-11-29 RX ORDER — PREDNISONE 1 MG/1
TABLET ORAL
Qty: 120 TABLET | Refills: 2 | Status: SHIPPED | OUTPATIENT
Start: 2021-11-29 | End: 2022-03-21

## 2021-12-02 RX ORDER — ACETAMINOPHEN 325 MG/1
650 TABLET ORAL AS NEEDED
Status: CANCELLED
Start: 2021-12-10

## 2021-12-02 RX ORDER — ONDANSETRON 2 MG/ML
8 INJECTION INTRAMUSCULAR; INTRAVENOUS AS NEEDED
Status: CANCELLED | OUTPATIENT
Start: 2021-12-17

## 2021-12-02 RX ORDER — SODIUM CHLORIDE 9 MG/ML
10 INJECTION INTRAMUSCULAR; INTRAVENOUS; SUBCUTANEOUS AS NEEDED
Status: CANCELLED | OUTPATIENT
Start: 2021-12-10

## 2021-12-02 RX ORDER — SODIUM CHLORIDE 9 MG/ML
25 INJECTION, SOLUTION INTRAVENOUS CONTINUOUS
Status: CANCELLED | OUTPATIENT
Start: 2021-12-17

## 2021-12-02 RX ORDER — HYDROCORTISONE SODIUM SUCCINATE 100 MG/2ML
100 INJECTION, POWDER, FOR SOLUTION INTRAMUSCULAR; INTRAVENOUS AS NEEDED
Status: CANCELLED | OUTPATIENT
Start: 2021-12-17

## 2021-12-02 RX ORDER — SODIUM CHLORIDE 0.9 % (FLUSH) 0.9 %
10 SYRINGE (ML) INJECTION AS NEEDED
Status: CANCELLED | OUTPATIENT
Start: 2021-12-17

## 2021-12-02 RX ORDER — ALBUTEROL SULFATE 0.83 MG/ML
2.5 SOLUTION RESPIRATORY (INHALATION) AS NEEDED
Status: CANCELLED
Start: 2021-12-10

## 2021-12-02 RX ORDER — SODIUM CHLORIDE 9 MG/ML
25 INJECTION, SOLUTION INTRAVENOUS CONTINUOUS
Status: CANCELLED | OUTPATIENT
Start: 2021-12-10

## 2021-12-02 RX ORDER — SODIUM CHLORIDE 0.9 % (FLUSH) 0.9 %
10 SYRINGE (ML) INJECTION AS NEEDED
Status: CANCELLED | OUTPATIENT
Start: 2021-12-10

## 2021-12-02 RX ORDER — EPINEPHRINE 1 MG/ML
0.3 INJECTION, SOLUTION, CONCENTRATE INTRAVENOUS AS NEEDED
Status: CANCELLED | OUTPATIENT
Start: 2021-12-17

## 2021-12-02 RX ORDER — DIPHENHYDRAMINE HYDROCHLORIDE 50 MG/ML
50 INJECTION, SOLUTION INTRAMUSCULAR; INTRAVENOUS AS NEEDED
Status: CANCELLED
Start: 2021-12-17

## 2021-12-02 RX ORDER — HEPARIN 100 UNIT/ML
300-500 SYRINGE INTRAVENOUS AS NEEDED
Status: CANCELLED
Start: 2021-12-10

## 2021-12-02 RX ORDER — HEPARIN 100 UNIT/ML
300-500 SYRINGE INTRAVENOUS AS NEEDED
Status: CANCELLED
Start: 2021-12-17

## 2021-12-02 RX ORDER — DIPHENHYDRAMINE HYDROCHLORIDE 50 MG/ML
50 INJECTION, SOLUTION INTRAMUSCULAR; INTRAVENOUS AS NEEDED
Status: CANCELLED
Start: 2021-12-10

## 2021-12-02 RX ORDER — EPINEPHRINE 1 MG/ML
0.3 INJECTION, SOLUTION, CONCENTRATE INTRAVENOUS AS NEEDED
Status: CANCELLED | OUTPATIENT
Start: 2021-12-10

## 2021-12-02 RX ORDER — ALBUTEROL SULFATE 0.83 MG/ML
2.5 SOLUTION RESPIRATORY (INHALATION) AS NEEDED
Status: CANCELLED
Start: 2021-12-17

## 2021-12-02 RX ORDER — HYDROCORTISONE SODIUM SUCCINATE 100 MG/2ML
100 INJECTION, POWDER, FOR SOLUTION INTRAMUSCULAR; INTRAVENOUS AS NEEDED
Status: CANCELLED | OUTPATIENT
Start: 2021-12-10

## 2021-12-02 RX ORDER — DIPHENHYDRAMINE HYDROCHLORIDE 50 MG/ML
25 INJECTION, SOLUTION INTRAMUSCULAR; INTRAVENOUS AS NEEDED
Status: CANCELLED
Start: 2021-12-17

## 2021-12-02 RX ORDER — ONDANSETRON 2 MG/ML
8 INJECTION INTRAMUSCULAR; INTRAVENOUS AS NEEDED
Status: CANCELLED | OUTPATIENT
Start: 2021-12-10

## 2021-12-02 RX ORDER — DIPHENHYDRAMINE HYDROCHLORIDE 50 MG/ML
25 INJECTION, SOLUTION INTRAMUSCULAR; INTRAVENOUS AS NEEDED
Status: CANCELLED
Start: 2021-12-10

## 2021-12-02 RX ORDER — ACETAMINOPHEN 325 MG/1
650 TABLET ORAL AS NEEDED
Status: CANCELLED
Start: 2021-12-17

## 2021-12-02 RX ORDER — SODIUM CHLORIDE 9 MG/ML
10 INJECTION INTRAMUSCULAR; INTRAVENOUS; SUBCUTANEOUS AS NEEDED
Status: CANCELLED | OUTPATIENT
Start: 2021-12-17

## 2021-12-06 ENCOUNTER — APPOINTMENT (OUTPATIENT)
Dept: INFUSION THERAPY | Age: 66
End: 2021-12-06
Payer: COMMERCIAL

## 2021-12-06 ENCOUNTER — VIRTUAL VISIT (OUTPATIENT)
Dept: INTERNAL MEDICINE CLINIC | Age: 66
End: 2021-12-06
Payer: MEDICARE

## 2021-12-06 DIAGNOSIS — E11.51 TYPE 2 DIABETES MELLITUS WITH PERIPHERAL VASCULAR DISEASE (HCC): ICD-10-CM

## 2021-12-06 DIAGNOSIS — R53.83 FATIGUE, UNSPECIFIED TYPE: Primary | ICD-10-CM

## 2021-12-06 DIAGNOSIS — F33.1 MODERATE EPISODE OF RECURRENT MAJOR DEPRESSIVE DISORDER (HCC): ICD-10-CM

## 2021-12-06 DIAGNOSIS — M35.3 POLYMYALGIA RHEUMATICA (HCC): ICD-10-CM

## 2021-12-06 DIAGNOSIS — M85.88 OSTEOPENIA OF OTHER SITE: ICD-10-CM

## 2021-12-06 DIAGNOSIS — I47.1 ECTOPIC ATRIAL TACHYCARDIA (HCC): ICD-10-CM

## 2021-12-06 DIAGNOSIS — D50.9 IRON DEFICIENCY ANEMIA, UNSPECIFIED IRON DEFICIENCY ANEMIA TYPE: ICD-10-CM

## 2021-12-06 DIAGNOSIS — R10.13 EPIGASTRIC PAIN: ICD-10-CM

## 2021-12-06 PROCEDURE — G9717 DOC PT DX DEP/BP F/U NT REQ: HCPCS | Performed by: INTERNAL MEDICINE

## 2021-12-06 PROCEDURE — 3017F COLORECTAL CA SCREEN DOC REV: CPT | Performed by: INTERNAL MEDICINE

## 2021-12-06 PROCEDURE — G8536 NO DOC ELDER MAL SCRN: HCPCS | Performed by: INTERNAL MEDICINE

## 2021-12-06 PROCEDURE — G9899 SCRN MAM PERF RSLTS DOC: HCPCS | Performed by: INTERNAL MEDICINE

## 2021-12-06 PROCEDURE — G0463 HOSPITAL OUTPT CLINIC VISIT: HCPCS | Performed by: INTERNAL MEDICINE

## 2021-12-06 PROCEDURE — 99214 OFFICE O/P EST MOD 30 MIN: CPT | Performed by: INTERNAL MEDICINE

## 2021-12-06 PROCEDURE — 1101F PT FALLS ASSESS-DOCD LE1/YR: CPT | Performed by: INTERNAL MEDICINE

## 2021-12-06 PROCEDURE — G8417 CALC BMI ABV UP PARAM F/U: HCPCS | Performed by: INTERNAL MEDICINE

## 2021-12-06 PROCEDURE — G8399 PT W/DXA RESULTS DOCUMENT: HCPCS | Performed by: INTERNAL MEDICINE

## 2021-12-06 PROCEDURE — 2022F DILAT RTA XM EVC RTNOPTHY: CPT | Performed by: INTERNAL MEDICINE

## 2021-12-06 PROCEDURE — G8756 NO BP MEASURE DOC: HCPCS | Performed by: INTERNAL MEDICINE

## 2021-12-06 PROCEDURE — 1090F PRES/ABSN URINE INCON ASSESS: CPT | Performed by: INTERNAL MEDICINE

## 2021-12-06 PROCEDURE — G8427 DOCREV CUR MEDS BY ELIG CLIN: HCPCS | Performed by: INTERNAL MEDICINE

## 2021-12-06 RX ORDER — BUPROPION HYDROCHLORIDE 300 MG/1
300 TABLET ORAL
Qty: 90 TABLET | Refills: 1 | Status: SHIPPED | OUTPATIENT
Start: 2021-12-06 | End: 2022-03-12

## 2021-12-06 NOTE — ASSESSMENT & PLAN NOTE
Physical symptoms get better with temporary higher dose of prednisone.   She has since weaned down herself to 5 mg  Continue prednisone 5 mg daily

## 2021-12-06 NOTE — PROGRESS NOTES
Consent: Angelica Deutsch MD, who was seen by synchronous (real-time) audio-video technology, and/or her healthcare decision maker, is aware that this patient-initiated, Telehealth encounter on 12/6/2021 is a billable service, with coverage as determined by her insurance carrier. She is aware that she may receive a bill and has provided verbal consent to proceed: Yes. I was in the office while conducting this encounter. Patient identification was verified at the start of the visit: YES  This visit was done with doxy. 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 Fatigue, unspecified type  Assessment & Plan:  Gave notice that leaving in 2 months  Last week very stressful, hoping work load will lighten soon  Better since last visit - less stressed, more energy   Steroids weaned back down to 5mg after going to 10 - going up did help with physical symptoms  Increase Wellbutrin, planning for iron infusion soon, continue prednisone at 5 mg  2. Moderate episode of recurrent major depressive disorder [facility])  Assessment & Plan:  Emotionally better  Planning to start counseling    No side effcts with wellbutrin   Not fully optimal yet   Increase Wellbutrin to 300 mg daily. Continue Cymbalta 60 daily  Orders:  -     buPROPion XL (WELLBUTRIN XL) 300 mg XL tablet; Take 1 Tablet by mouth every morning., Normal, Disp-90 Tablet, R-1  3. Iron deficiency anemia, unspecified iron deficiency anemia type  Assessment & Plan:  Seen by heme-onc since last visit. Planning for iron infusions  4. Osteopenia of other site  Assessment & Plan:  Previously on risedronate. Reports that she was told to stop the medication after her last DEXA looked good. Discussed potentially getting DEXA after new year  5. Epigastric pain  Assessment & Plan:  Resolved since last visit  monitor  6.  Polymyalgia rheumatica ([facility] [address]  Assessment & Plan:  Physical symptoms get better with temporary higher dose of prednisone. She has since weaned down herself to 5 mg  Continue prednisone 5 mg daily  7. Type 2 diabetes mellitus with peripheral vascular disease ([address]  Assessment & Plan:   at goal, continue current medications  8. Ectopic atrial tachycardia (HCC)  Assessment & Plan:   monitored by specialist. No acute findings meriting change in the plan       We discussed the expected course, resolution and complications of the diagnosis(es) in detail. Medication risks, benefits, costs, interactions, and alternatives were discussed as indicated. I advised her to contact the office if her condition worsens, changes or fails to improve as anticipated. She expressed understanding with the diagnosis(es) and plan. Return to clinic:  2 months for fatigue, depression, iron deficiency    Hermila Herrera MD  [facility]  12/6/2021    Future Appointments   Date Time Provider Aggie Zapata   12/10/2021  3:00 PM SS INF2 CH4 <2H [facility]   12/17/2021  3:00 PM SS INF1 CH4 <2H [facility]   2/21/2022 10:00 AM Rm Petersen MD [facility] BS AMB   3/21/2022  1:00 PM Terrance Spurling, MD [facility] BS AMB        Objective   Vitals:       Patient-Reported Vitals 12/6/2021   Patient-Reported Weight 195lb   Patient-Reported Height -   Patient-Reported Pulse -   Patient-Reported Temperature -   Patient-Reported SpO2 -   Patient-Reported Systolic  -   Patient-Reported Diastolic -   Patient-Reported Peak Flow -   Patient-Reported LMP -                 Physical Exam  Constitutional:       Appearance: Normal appearance. She is not ill-appearing. Pulmonary:      Effort: No respiratory distress. Neurological:      Mental Status: She is alert. Current Outpatient Medications   Medication Sig    buPROPion XL (WELLBUTRIN XL) 300 mg XL tablet Take 1 Tablet by mouth every morning.     predniSONE (DELTASONE) 1 mg tablet TAKE 4 TABLETS BY MOUTH EVERY DAY  hydroCHLOROthiazide (HYDRODIURIL) 25 mg tablet TAKE 1 TABLET BY MOUTH EVERY DAY    Actemra 162 mg/0.9 mL syringe INJECT ONE SYRINGE SUBCUTANEOUSLY EVERY 7 DAYS. KEEP REFRIGERATED.  verapamil ER (CALAN-SR) 180 mg CR tablet Take 1 Tablet by mouth nightly. Indications: high blood pressure    DULoxetine (CYMBALTA) 60 mg capsule TAKE 1 CAPSULE BY MOUTH EVERY DAY    omeprazole (PRILOSEC) 40 mg capsule TAKE 1 CAPSULE BY MOUTH EVERY DAY    valsartan (DIOVAN) 160 mg tablet TAKE 1 TABLET BY MOUTH EVERY DAY    Januvia 100 mg tablet TAKE 1 TABLET BY MOUTH EVERY DAY    metFORMIN (GLUCOPHAGE) 500 mg tablet Take 1 Tab by mouth two (2) times daily (with meals).  folic acid (FOLVITE) 1 mg tablet Take 1 Tab by mouth daily.  methotrexate (RHEUMATREX) 2.5 mg tablet Take 6 Tabs by mouth every Sunday.  atorvastatin (LIPITOR) 80 mg tablet Take 1 Tab by mouth daily. Indications: high cholesterol    budesonide-formoteroL (Symbicort) 160-4.5 mcg/actuation HFAA Take 2 Puffs by inhalation two (2) times a day.  estradiol (ESTRACE) 1 mg tablet Take 1 Tab by mouth daily.  omega-3 fatty acids-vitamin e (FISH OIL) 1,000 mg cap Take 1 Cap by mouth two (2) times a day.  multivitamin (ONE A DAY) tablet Take 1 Tab by mouth daily.  Cholecalciferol, Vitamin D3, (Vitamin D3) 25 mcg (1,000 unit) chew Take 1 Cap by mouth daily. (Patient not taking: Reported on 9/8/2021)     No current facility-administered medications for this visit. Trisha Rodney MD is a 74 y.o. female being evaluated by a video visit encounter for concerns as above. A caregiver was present when appropriate. Due to this being a TeleHealth encounter (During PPODL-90 public health emergency), evaluation of the following organ systems was limited: Vitals/Constitutional/EENT/Resp/CV/GI//MS/Neuro/Skin/Heme-Lymph-Imm.   Pursuant to the emergency declaration under the [facility]5 waiver authority and the Coronavirus Preparedness and Response Supplemental Appropriations Act, this Virtual  Visit was conducted, with patient's (and/or legal guardian's) consent, to reduce the patient's risk of exposure to COVID-19 and provide necessary medical care. Services were provided through a video synchronous discussion virtually to substitute for in-person clinic visit.

## 2021-12-06 NOTE — ASSESSMENT & PLAN NOTE
Gave notice that leaving in 2 months  Last week very stressful, hoping work load will lighten soon  Better since last visit - less stressed, more energy   Steroids weaned back down to 5mg after going to 10 - going up did help with physical symptoms  Increase Wellbutrin, planning for iron infusion soon, continue prednisone at 5 mg

## 2021-12-06 NOTE — ASSESSMENT & PLAN NOTE
Previously on risedronate. Reports that she was told to stop the medication after her last DEXA looked good.   Discussed potentially getting DEXA after new year

## 2021-12-06 NOTE — ASSESSMENT & PLAN NOTE
Emotionally better  Planning to start counseling    No side effcts with wellbutrin   Not fully optimal yet   Increase Wellbutrin to 300 mg daily.   Continue Cymbalta 60 daily

## 2021-12-10 ENCOUNTER — HOSPITAL ENCOUNTER (OUTPATIENT)
Dept: INFUSION THERAPY | Age: 66
Discharge: HOME | End: 2021-12-10
Payer: COMMERCIAL

## 2021-12-10 VITALS
OXYGEN SATURATION: 98 % | BODY MASS INDEX: 30.61 KG/M2 | HEART RATE: 76 BPM | WEIGHT: 195 LBS | SYSTOLIC BLOOD PRESSURE: 146 MMHG | HEIGHT: 67 IN | TEMPERATURE: 97.2 F | RESPIRATION RATE: 16 BRPM | DIASTOLIC BLOOD PRESSURE: 73 MMHG

## 2021-12-10 DIAGNOSIS — D50.0 IRON DEFICIENCY ANEMIA DUE TO CHRONIC BLOOD LOSS: Primary | ICD-10-CM

## 2021-12-10 LAB
FERRITIN SERPL-MCNC: 9 NG/ML (ref 26–388)
HGB BLD-MCNC: 10.8 G/DL (ref 11.5–16)
IRON SERPL-MCNC: 44 UG/DL (ref 35–150)

## 2021-12-10 PROCEDURE — 96365 THER/PROPH/DIAG IV INF INIT: CPT

## 2021-12-10 PROCEDURE — 85018 HEMOGLOBIN: CPT

## 2021-12-10 PROCEDURE — 82728 ASSAY OF FERRITIN: CPT

## 2021-12-10 PROCEDURE — 74011250636 HC RX REV CODE- 250/636: Performed by: INTERNAL MEDICINE

## 2021-12-10 PROCEDURE — 83540 ASSAY OF IRON: CPT

## 2021-12-10 PROCEDURE — 84466 ASSAY OF TRANSFERRIN: CPT

## 2021-12-10 PROCEDURE — 36415 COLL VENOUS BLD VENIPUNCTURE: CPT

## 2021-12-10 RX ADMIN — SODIUM CHLORIDE 750 MG: 900 INJECTION, SOLUTION INTRAVENOUS at 15:32

## 2021-12-10 NOTE — PROGRESS NOTES
Outpatient Infusion Center Progress Note        Date: December 10, 2021    Name: Cristino Abreu MD    MRN: [id number]         : 1955    1455  Ms. Walker Arrived ambulatory and in no distress for Injectafer Regimen. Assessment was completed, no acute issues at this time, no new complaints voiced. 24G PIV placed in right [facility] [place], operated by [facility] without difficulty, labs drawn and sent for processing. 1. Do you have any symptoms of COVID-19? SOB, coughing, fever, or generally not feeling well NO    2. Have you been exposed to COVID-19 recently? NO    3. Have you had any recent contact with family/friend that has a pending COVID test? NO           Ms. Jennyfer Luque vitals were reviewed. Patient Vitals for the past 12 hrs:   Temp Pulse Resp BP SpO2   12/10/21 1636 97.2 °F (36.2 °C) 76 -- (!) 146/73 --   12/10/21 1456 98 °F (36.7 °C) 81 16 134/67 98 %         Lab results were obtained and reviewed. Recent Results (from the past 12 hour(s))   HEMOGLOBIN    Collection Time: 12/10/21  3:14 PM   Result Value Ref Range    HGB 10.8 (L) 11.5 - 16.0 g/dL       Medications received:  Medications Administered     ferric carboxymaltose (INJECTAFER) 750 mg in 0.9% sodium chloride 250 mL, overfill volume 25 mL IVPB     Admin Date  12/10/2021 Action  New Bag Dose  750 mg Rate  870 mL/hr Route  IntraVENous Administered By  Lisette Kelly RN               Patient monitored for 30 minutes post infusion, no signs or symptoms of a reaction noted. Provided patient with written discharge instructions as well as the signs and symptoms of a delayed reaction. Ms. Lb Dickens tolerated treatment well and was discharged from [place] [address] in stable condition at 1640. She is to return on  2021 at 1500 for her next appointment.     Herlinda Sahni RN  December 10, 2021    Future Appointments:  Future Appointments   Date Time Provider Aggie Zapata   2021  3:00 PM SS INF1 CH4 <2H [facility]   2022 10:00 AM Negrito Pearson MD [facility] BS AMB   3/21/2022  1:00 PM Aidee De La Cruz MD [facility] BS AMB

## 2021-12-10 NOTE — PROGRESS NOTES
OUTPATIENT INFUSION CENTER    DISCHARGE INSTRUCTIONS FOR:    IRON INFUSIONS - INCLUDING VENOFER, FERRLECIT, AND INFED    You should continue to take your usual home medications unless otherwise instructed by your physician. Drink plenty of fluids and eat your usual diet. All medications have the potential to cause side effects. Your physician can instruct you regarding any necessary treatment for side effects. Some possible side effects of iron infusions may include the following:     - Urinary changes;   - Mild muscle cramping;   - Mild nausea, stomach pain, diarrhea or constipation;   - Mild skin itching, mild pain at IV site. Signs/Symptoms of an allergic reaction may require immediate medical attention. These may include one or more of the following:       Skin redness, itching, swelling, blistering, weeping, crusting, rash or hives. Wheezing, chest tightness, cough, or shortness of breath;   Swelling of the face, eyelids, lips, tongue, or throat;  Severe headache, seizures or tremor;  Stuffy nose, runny nose, sneezing;   Red (bloodshot), itchy, swollen, or watery eyes;  Stomach  pain, nausea, vomiting, diarrhea or bloody diarrhea; Chest pain or tightness, increased heart rate, palpitations, changes in blood pressure which can cause dizziness, unusual feelings of weakness or fatigue;  Back ache or pain around waist;  Painful urination, increase or decrease in amount of urine, blood in urine. Contact your physicians office with any questions or concerns regarding your treatment.     Lamont Mondragon MD, Signature: _____________________________________ 12/10/2021  Angelique Preciado RN

## 2021-12-12 LAB — TRANSFERRIN SERPL-MCNC: 364 MG/DL (ref 192–364)

## 2021-12-17 ENCOUNTER — HOSPITAL ENCOUNTER (OUTPATIENT)
Dept: INFUSION THERAPY | Age: 66
Discharge: HOME | End: 2021-12-17
Payer: COMMERCIAL

## 2021-12-17 VITALS
TEMPERATURE: 97.9 F | RESPIRATION RATE: 16 BRPM | SYSTOLIC BLOOD PRESSURE: 130 MMHG | DIASTOLIC BLOOD PRESSURE: 73 MMHG | HEART RATE: 93 BPM

## 2021-12-17 DIAGNOSIS — D50.0 IRON DEFICIENCY ANEMIA DUE TO CHRONIC BLOOD LOSS: Primary | ICD-10-CM

## 2021-12-17 PROCEDURE — 74011250636 HC RX REV CODE- 250/636: Performed by: INTERNAL MEDICINE

## 2021-12-17 PROCEDURE — 96365 THER/PROPH/DIAG IV INF INIT: CPT

## 2021-12-17 RX ORDER — SODIUM CHLORIDE 0.9 % (FLUSH) 0.9 %
10 SYRINGE (ML) INJECTION AS NEEDED
Status: DISCONTINUED | OUTPATIENT
Start: 2021-12-17 | End: 2021-12-18 | Stop reason: HOSPADM

## 2021-12-17 RX ORDER — SODIUM CHLORIDE 9 MG/ML
25 INJECTION, SOLUTION INTRAVENOUS CONTINUOUS
Status: DISCONTINUED | OUTPATIENT
Start: 2021-12-17 | End: 2021-12-18 | Stop reason: HOSPADM

## 2021-12-17 RX ORDER — SODIUM CHLORIDE 9 MG/ML
10 INJECTION INTRAMUSCULAR; INTRAVENOUS; SUBCUTANEOUS AS NEEDED
Status: DISCONTINUED | OUTPATIENT
Start: 2021-12-17 | End: 2021-12-18 | Stop reason: HOSPADM

## 2021-12-17 RX ORDER — HEPARIN 100 UNIT/ML
300-500 SYRINGE INTRAVENOUS AS NEEDED
Status: DISCONTINUED | OUTPATIENT
Start: 2021-12-17 | End: 2021-12-18 | Stop reason: HOSPADM

## 2021-12-17 RX ADMIN — FERRIC CARBOXYMALTOSE INJECTION 750 MG: 50 INJECTION, SOLUTION INTRAVENOUS at 15:37

## 2021-12-17 RX ADMIN — SODIUM CHLORIDE 25 ML/HR: 9 INJECTION, SOLUTION INTRAVENOUS at 15:35

## 2021-12-17 NOTE — PROGRESS NOTES
[facility] Progress Note    Date: 2021    Name: Yazmin Francois MD    MRN: [id number]         : 1955    Ms. Walker Arrived ambulatory and in no distress for Dose 2 of 2  for Injectafer. Assessment was completed, no acute issues at this time, no new complaints voiced. 24 G PIV established to R arm without difficulty. Ms. Loly Ledezma vitals were reviewed. Visit Vitals  /73   Pulse 93   Temp 97.9 °F (36.6 °C)   Resp 16       Medications:  Medications Administered     0.9% sodium chloride infusion     Admin Date  2021 Action  New Bag Dose  25 mL/hr Rate  25 mL/hr Route  IntraVENous Administered By  Leopoldo Martinez RN          ferric carboxymaltose (INJECTAFER) 750 mg in 0.9% sodium chloride 250 mL, overfill volume 25 mL IVPB     Admin Date  2021 Action  New Bag Dose  750 mg Rate  870 mL/hr Route  IntraVENous Administered By  Leopoldo Martinez RN                    Ms. Javier Carrizales tolerated treatment well and was discharged from [place] [address] in stable condition . PIV flushed & removed. Discharge instructions reviewed with patient, verbalized understanding. Patient politely declined to stay 30 minutes post infusion for monitoring.    Himanshu Goldstein RN  2021

## 2022-01-18 DIAGNOSIS — I10 ESSENTIAL HYPERTENSION: ICD-10-CM

## 2022-01-18 RX ORDER — VALSARTAN 160 MG/1
160 TABLET ORAL DAILY
Qty: 90 TABLET | Refills: 3 | Status: SHIPPED | OUTPATIENT
Start: 2022-01-18 | End: 2022-07-21 | Stop reason: SDUPTHER

## 2022-01-19 RX ORDER — TOCILIZUMAB 180 MG/ML
INJECTION, SOLUTION SUBCUTANEOUS
Qty: 3.6 ML | Refills: 3 | Status: SHIPPED | OUTPATIENT
Start: 2022-01-19 | End: 2022-02-24 | Stop reason: SDUPTHER

## 2022-01-21 RX ORDER — PREDNISONE 5 MG/1
TABLET ORAL
Qty: 90 TABLET | Refills: 1 | Status: SHIPPED | OUTPATIENT
Start: 2022-01-21 | End: 2022-06-30 | Stop reason: SDUPTHER

## 2022-02-08 DIAGNOSIS — E11.42 TYPE 2 DIABETES MELLITUS WITH DIABETIC POLYNEUROPATHY, WITHOUT LONG-TERM CURRENT USE OF INSULIN (HCC): ICD-10-CM

## 2022-02-09 RX ORDER — METFORMIN HYDROCHLORIDE 500 MG/1
TABLET ORAL
Qty: 180 TABLET | Refills: 3 | Status: SHIPPED | OUTPATIENT
Start: 2022-02-09 | End: 2022-07-21 | Stop reason: SDUPTHER

## 2022-02-23 DIAGNOSIS — Z00.00 WELLNESS EXAMINATION: Primary | ICD-10-CM

## 2022-02-23 DIAGNOSIS — Z00.00 WELLNESS EXAMINATION: ICD-10-CM

## 2022-02-24 ENCOUNTER — OFFICE VISIT (OUTPATIENT)
Dept: ONCOLOGY | Age: 67
End: 2022-02-24
Payer: COMMERCIAL

## 2022-02-24 VITALS
WEIGHT: 199.8 LBS | SYSTOLIC BLOOD PRESSURE: 162 MMHG | RESPIRATION RATE: 16 BRPM | BODY MASS INDEX: 31.36 KG/M2 | HEIGHT: 67 IN | DIASTOLIC BLOOD PRESSURE: 85 MMHG | OXYGEN SATURATION: 98 % | HEART RATE: 91 BPM

## 2022-02-24 DIAGNOSIS — Z79.899 HIGH RISK MEDICATION USE: ICD-10-CM

## 2022-02-24 DIAGNOSIS — Z80.3 FAMILY HISTORY OF BREAST CANCER: ICD-10-CM

## 2022-02-24 DIAGNOSIS — D50.0 IRON DEFICIENCY ANEMIA DUE TO CHRONIC BLOOD LOSS: Primary | ICD-10-CM

## 2022-02-24 DIAGNOSIS — E11.43 DIABETES MELLITUS WITH GASTROPARESIS (HCC): ICD-10-CM

## 2022-02-24 LAB
ERYTHROCYTE [DISTWIDTH] IN BLOOD BY AUTOMATED COUNT: 18.6 % (ref 11.5–14.5)
HCT VFR BLD AUTO: 42.2 % (ref 35–47)
HGB BLD-MCNC: 13.8 G/DL (ref 11.5–16)
MCH RBC QN AUTO: 31.4 PG (ref 26–34)
MCHC RBC AUTO-ENTMCNC: 32.7 G/DL (ref 30–36.5)
MCV RBC AUTO: 95.9 FL (ref 80–99)
NRBC # BLD: 0 K/UL (ref 0–0.01)
NRBC BLD-RTO: 0 PER 100 WBC
PLATELET # BLD AUTO: 313 K/UL (ref 150–400)
PMV BLD AUTO: 11.4 FL (ref 8.9–12.9)
RBC # BLD AUTO: 4.4 M/UL (ref 3.8–5.2)
WBC # BLD AUTO: 6.7 K/UL (ref 3.6–11)

## 2022-02-24 PROCEDURE — G9899 SCRN MAM PERF RSLTS DOC: HCPCS | Performed by: INTERNAL MEDICINE

## 2022-02-24 PROCEDURE — 3046F HEMOGLOBIN A1C LEVEL >9.0%: CPT | Performed by: INTERNAL MEDICINE

## 2022-02-24 PROCEDURE — 1101F PT FALLS ASSESS-DOCD LE1/YR: CPT | Performed by: INTERNAL MEDICINE

## 2022-02-24 PROCEDURE — 3017F COLORECTAL CA SCREEN DOC REV: CPT | Performed by: INTERNAL MEDICINE

## 2022-02-24 PROCEDURE — 99214 OFFICE O/P EST MOD 30 MIN: CPT | Performed by: INTERNAL MEDICINE

## 2022-02-24 PROCEDURE — G8417 CALC BMI ABV UP PARAM F/U: HCPCS | Performed by: INTERNAL MEDICINE

## 2022-02-24 PROCEDURE — G8753 SYS BP > OR = 140: HCPCS | Performed by: INTERNAL MEDICINE

## 2022-02-24 PROCEDURE — G0463 HOSPITAL OUTPT CLINIC VISIT: HCPCS | Performed by: INTERNAL MEDICINE

## 2022-02-24 PROCEDURE — G8399 PT W/DXA RESULTS DOCUMENT: HCPCS | Performed by: INTERNAL MEDICINE

## 2022-02-24 PROCEDURE — 2022F DILAT RTA XM EVC RTNOPTHY: CPT | Performed by: INTERNAL MEDICINE

## 2022-02-24 PROCEDURE — 1090F PRES/ABSN URINE INCON ASSESS: CPT | Performed by: INTERNAL MEDICINE

## 2022-02-24 PROCEDURE — G8536 NO DOC ELDER MAL SCRN: HCPCS | Performed by: INTERNAL MEDICINE

## 2022-02-24 PROCEDURE — G8427 DOCREV CUR MEDS BY ELIG CLIN: HCPCS | Performed by: INTERNAL MEDICINE

## 2022-02-24 PROCEDURE — G8754 DIAS BP LESS 90: HCPCS | Performed by: INTERNAL MEDICINE

## 2022-02-24 PROCEDURE — G9717 DOC PT DX DEP/BP F/U NT REQ: HCPCS | Performed by: INTERNAL MEDICINE

## 2022-02-24 RX ORDER — MONTELUKAST SODIUM 4 MG/1
TABLET, CHEWABLE ORAL
COMMUNITY
Start: 2022-02-22

## 2022-02-24 NOTE — PROGRESS NOTES
[facility] at [address] [facility]: [phone]  F: [phone] Patient ID  Name: Holli Valderrama MD  YOB: 1955  MRN: [id number]  Referring Provider:   No referring provider defined for this encounter. Primary Care Provider:   Donaldo Rader MD       HEMATOLOGY/MEDICAL ONCOLOGY  NOTE   Date of Visit: 02/24/22  Reason for Evaluation:     Chief Complaint   Patient presents with    Follow-up     Zulema Ames is a pleasant 74year old woman who presents as a follow up. 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 She is s/p Injectafer on 12/10/21 and 12/17/21. She reports that she tolerated Injectafer well. She notes that her baseline IBS has been poorly controlled. Otherwise, she notes that she is doing well. She notes that she wonders if she has some anemia related to her IBS. She states that she has had multiple endoscopies to work up her history of iron deficiency episodes that occur every 1-2 year episodes since 2016. She reports no positive urinalysis for blood loss. She reports negative tesing for celiac disease. She reports that her H pylori that was negative.        Diarrhea:Grade 2  Fatigue:Grade 1  Hair Loss:Grade 1  Insomnia:Grade 1  Pain:joints,abdomen  2  Cough:Grade 1  Shortness of Breath:Grade 1  Headache:Grade 1      Past Medical History:   Diagnosis Date    Asthma     childhood    Atypical mole     Chronic pain     shoulders/knees    DDD (degenerative disc disease), cervical     Degenerative arthritis of right knee 2/2014    Dr. Chely Florez Ectopic atrial tachycardia ([facility] [address] 08/2020    Gastric nodule     gastric nodules on endoscopy 3/26/12    GERD (gastroesophageal reflux disease)     PUD (peptic ulcer disease)     Sleep apnea     uses cpap    SVT [facility]ra[facility]ricular tachycardia) ([facility] [address]     Temporal arteritis ([facility] [address] 16    Dr. Beaulieu Class Thoracic outlet syndrome     left, Dr. Martin Daughters Vitamin D deficiency     Vulvar high-grade squamous intraepithelial lesion (HGSIL)       Past Surgical History:   Procedure Laterality Date    COLONOSCOPY N/A 2017    COLONOSCOPY performed [facility]efer [facility] at [facility] ENDOSCOPY    COLONOSCOPY N/A 2021    COLONOSCOPY, EGD   :- performed by Alyce Early MD at [place] [address]      several breast biopsies (benign)    HX BREAST BIOPSY Bilateral , , ,    6-7 on R, 2 on L+all benign    HX BUNIONECTOMY      bilateral    HX  SECTION      x3    HX CHOLECYSTECTOMY      HX COLONOSCOPY      normal, f/u in 10 yrs    HX DILATION AND CURETTAGE      [id number], several D&C's    HX ENDOSCOPY      x3, h/o gastric polyp removal 3/2012    HX GI      cholecystectomy    HX GYN  2018    OZZIE    HX HEART CATHETERIZATION      x2, most recent 2012 was normal    HX KNEE ARTHROSCOPY Right     HX OTHER SURGICAL      lipoma removal    HX OTHER SURGICAL  16    L temporal artery bx (Dr. Brit Palomino)    HX AZAEL AND BSO      secondary to endometrial hyperplasia with atypical changes    HX TONSILLECTOMY      T & A      Social History     Tobacco Use    Smoking status: Never Smoker    Smokeless tobacco: Never Used   Substance Use Topics    Alcohol use: No     Alcohol/week: 0.0 standard drinks      Family History   Problem Relation Age of Onset    Heart Disease Mother     Diabetes Mother     Cancer Mother 40        breast    Thyroid Disease Mother         hashimotos    Hypertension Mother     Osteoporosis Mother     Heart Surgery Mother         aortic valve replacement    Cancer Father 79        gastric    Heart Disease Father     Hypertension Father     Diabetes Paternal Grandmother     Heart Disease Brother     Thyroid Disease Sister         hashimotos    Other Sister         bipolar    Other Sister         depression    Other Brother         mental illness    Breast Cancer Maternal Aunt 44         from breast cancer    Breast Cancer Maternal Aunt 39        and recurred at 80    Osteoporosis Other         maternal aunts    Psychiatric Disorder Son         bipolar    Psychiatric Disorder Daughter         depression    Psychiatric Disorder Daughter         depression    Breast Cancer Maternal Aunt     Ovarian Cancer Neg Hx     Pancreatic Cancer Neg Hx     Prostate Cancer Neg Hx      Current Outpatient Medications   Medication Sig    hydroCHLOROthiazide (HYDRODIURIL) 25 mg tablet TAKE 1 TABLET BY MOUTH EVERY DAY    buPROPion XL (WELLBUTRIN XL) 150 mg tablet Take 1 Tablet by mouth every morning.  Actemra 162 mg/0.9 mL syringe INJECT ONE SYRINGE SUBCUTANEOUSLY EVERY 7 DAYS. KEEP REFRIGERATED.  verapamil ER (CALAN-SR) 180 mg CR tablet Take 1 Tablet by mouth nightly. Indications: high blood pressure    predniSONE (DELTASONE) 1 mg tablet TAKE 4 TABLETS BY MOUTH EVERY DAY (Patient taking differently: 5 mg.)    DULoxetine (CYMBALTA) 60 mg capsule TAKE 1 CAPSULE BY MOUTH EVERY DAY    omeprazole (PRILOSEC) 40 mg capsule TAKE 1 CAPSULE BY MOUTH EVERY DAY    valsartan (DIOVAN) 160 mg tablet TAKE 1 TABLET BY MOUTH EVERY DAY    Januvia 100 mg tablet TAKE 1 TABLET BY MOUTH EVERY DAY    metFORMIN (GLUCOPHAGE) 500 mg tablet Take 1 Tab by mouth two (2) times daily (with meals).  folic acid (FOLVITE) 1 mg tablet Take 1 Tab by mouth daily.  methotrexate (RHEUMATREX) 2.5 mg tablet Take 6 Tabs by mouth every .  atorvastatin (LIPITOR) 80 mg tablet Take 1 Tab by mouth daily. Indications: high cholesterol    budesonide-formoteroL (Symbicort) 160-4.5 mcg/actuation HFAA Take 2 Puffs by inhalation two (2) times a day.  risedronate (ACTONEL) 150 mg tablet Take 1 Tab by mouth every thirty (30) days.     estradiol (ESTRACE) 1 mg tablet Take 1 Tab by mouth daily.  omega-3 fatty acids-vitamin e (FISH OIL) 1,000 mg cap Take 1 Cap by mouth two (2) times a day.  multivitamin (ONE A DAY) tablet Take 1 Tab by mouth daily.  Cholecalciferol, Vitamin D3, (Vitamin D3) 25 mcg (1,000 unit) chew Take 1 Cap by mouth daily. (Patient not taking: Reported on 9/8/2021)     No current facility-administered medications for this visit. Allergies   Allergen Reactions    Elfego Flavor Hives     Harcourt fruit not flavor    Alendronate Nausea and Vomiting    Morphine Other (comments)     Extreme epigastric pain    Valium [Diazepam] Nausea and Vomiting    Versed [Midazolam] Nausea and Vomiting      Review of Systems Provided by:  Patient  Review of Systems: A complete review of systems was obtained, reviewed. Pertinent findings reviewed above. Objective:     Visit Vitals  BP (!) 162/85   Pulse 91   Resp 16   Ht 5' 7\" (1.702 m)   Wt 199 lb 12.8 oz (90.6 kg)   SpO2 98%   BMI 31.29 kg/m²     ECOG PS: 1- Restricted in physically strenuous activity but ambulatory and able to carry out work of a light or sedentary nature, e.g., light house work, office work. Physical Exam  Constitutional: No acute distress. , Non-toxic appearance. and Non-diaphoretic. HENT: Normocephalic and atraumatic head. and Wearing a mask during COVID-19 precautions. Eyes: Normal Conjunctivae. Anicteric sclerae. Cardiovascular: S1,S2 auscultated. No pitting edema. Pulmonary: Normal Respiratory Effort. No wheezing. No rhonchi. No rales. Abdominal: Normal bowel sounds. Soft Abdomen to palpation. No abdominal tenderness. Skin: No jaundice. No rash. Musculoskeletal: No muscle pain on palpation. No temporal muscle wasting on inspection. Neurological: Alert and oriented. No tremor on inspection. Normal Gait.   Psychiatric: mood normal. normal speech rate normal affect      Results:   I personally reviewed Epic EHR labs/results below:   Lab Results   Component Value Date/Time    WBC 6.7 02/23/2022 03:34 PM    HGB 13.8 02/23/2022 03:34 PM    HCT 42.2 02/23/2022 03:34 PM    PLATELET 312 97/71/4868 03:34 PM    MCV 95.9 02/23/2022 03:34 PM    ABS. NEUTROPHILS 4.9 07/31/2020 07:30 PM    Hemoglobin (POC) 13.9 07/25/2014 11:52 PM    Hematocrit (POC) 41 07/25/2014 11:52 PM     Lab Results   Component Value Date/Time    Sodium 140 10/07/2021 11:00 AM    Potassium 3.8 10/07/2021 11:00 AM    Chloride 101 10/07/2021 11:00 AM    CO2 23 10/07/2021 11:00 AM    Glucose 166 (H) 10/07/2021 11:00 AM    BUN 19 10/07/2021 11:00 AM    Creatinine 0.94 10/07/2021 11:00 AM    GFR est AA 72 10/07/2021 11:00 AM    GFR est non-AA 63 10/07/2021 11:00 AM    Calcium 8.4 (L) 10/07/2021 11:00 AM    Sodium (POC) 140 07/25/2014 11:52 PM    Potassium (POC) 3.3 (L) 07/25/2014 11:52 PM    Chloride (POC) 100 07/25/2014 11:52 PM    Glucose (POC) 135 (H) 05/17/2017 12:29 PM    BUN (POC) 17 07/25/2014 11:52 PM    Creatinine (POC) 0.90 09/10/2021 01:56 PM    Calcium, ionized (POC) 1.16 07/25/2014 11:52 PM     Lab Results   Component Value Date/Time    Bilirubin, total 0.3 10/07/2021 11:00 AM    ALT (SGPT) 25 10/07/2021 11:00 AM    Alk. phosphatase 45 10/07/2021 11:00 AM    Protein, total 6.3 10/07/2021 11:00 AM    Albumin 4.2 10/07/2021 11:00 AM    Globulin 3.7 07/31/2020 07:30 PM     ANEMIA LABS:  Hemoglobin:  Iron Studies:    Iron   Date Value Ref Range Status   12/10/2021 44 35 - 150 ug/dL Final     Lab Results   Component Value Date/Time    Iron 44 12/10/2021 03:14 PM    TIBC 420 10/07/2021 10:57 AM    Iron % saturation 6 (LL) 10/07/2021 10:57 AM    Ferritin 9 (L) 12/10/2021 03:14 PM       Vitamin B12:   Vitamin B12   Date Value Ref Range Status   10/26/2021 610 193 - 986 pg/mL Final     Folate: Folate   Date Value Ref Range Status   12/20/2017 >20.0 >3.0 ng/mL Final     Comment:     A serum folate concentration of less than 3.1 ng/mL is  considered to represent clinical deficiency.          Assessment and Recommendations: Diagnoses and all orders for this visit:    Iron deficiency anemia due to chronic blood loss   -Patient status post Injectafer. We discussed that she will check her iron studies in about 4 weeks.  -Encouraged her to follow-up with her GI provider. She wonders if her IBS could be contributing to iron deficiency. I asked her to discuss with GI but we also discussed perhaps maybe she should consider talking to her provider about the use of probiotics to see if that helps with her bowel habit issues. High risk medication use  -Discussed with patient that if we need to give her any more injectafer the that would be no problem. However, we discussed root cause analysis regarding her anemia. Family history of breast cancer  -I counseled patient today about her family history of breast cancer. With 3 direct family members having breast cancer I believe she would qualify for inherited breast ovarian cancer genetic testing. We discussed that the plan would be for her to come back the day she is going to have her iron labs drawn to meet with our nursing staff to provide consent for Inv[facility]ae genetic testing. I let her know that there is a possibility of false positives and false negatives. We discussed that also she could be found to have a variant of unknown significance. We discussed that this can cause some anxiety about what the proper management would be as well as a potential for it to turn into a pathogenic variant. She still would like to proceed with genetic testing. Diabetes mellitus with gastroparesis ([facility] [address]  -Patient not able to tolerate oral iron due to her gastroparesis. I advised patient to discuss with Dr. Idalia Hollingsworth regarding work-up for her voice hoarseness which may include a direct ENT visualization as well as thyroid ultrasound evaluation. We discussed also do chest imaging may be indicated if there is any pressure on the recurrent laryngeal nerve.  I will defer this work-up to her primary provider. Follow-up and Dispositions    · Return in about 4 months (around 6/24/2022).        NURSE VISIT in 4 weeks  MD visit in 4 months    Jo-Ann Presley MD  Hematology/Medical Oncology Provider  [facility]  P: [phone]      Signed By:   Rubens Rodriguez MD

## 2022-02-24 NOTE — PROGRESS NOTES
Chief Complaint   Patient presents with    Follow-up     Bhaskar Elkins is a pleasant 74year old woman who presents as a follow up.  She reports joint pain

## 2022-02-24 NOTE — PATIENT INSTRUCTIONS
Recommend you discuss with Barry Maxwell MD  About your voice changes; consider referral to ENT and thyroid ultrasound.

## 2022-02-24 NOTE — LETTER
2/24/2022    Patient: Dashawn Metcalf MD   YOB: 1955   Date of Visit: 2/24/2022     Braden Billings, [address] [place]  [address]  [address] [facility]  [place]    Dear Braden Billings MD,      Thank you for referring Ms. Kristen Judge to [facility] for evaluation. My notes for this consultation are attached. If you have questions, please do not hesitate to call me. I look forward to following your patient along with you.       Sincerely,    Nathalie Spears MD

## 2022-02-25 RX ORDER — TOCILIZUMAB 180 MG/ML
162 INJECTION, SOLUTION SUBCUTANEOUS
Qty: 3.6 ML | Refills: 3 | Status: SHIPPED | OUTPATIENT
Start: 2022-02-25 | End: 2022-03-01 | Stop reason: SDUPTHER

## 2022-03-01 ENCOUNTER — DOCUMENTATION ONLY (OUTPATIENT)
Dept: PHARMACY | Age: 67
End: 2022-03-01

## 2022-03-01 RX ORDER — TOCILIZUMAB 180 MG/ML
162 INJECTION, SOLUTION SUBCUTANEOUS
Qty: 3.6 ML | Refills: 3 | Status: SHIPPED | OUTPATIENT
Start: 2022-03-01 | End: 2022-03-01 | Stop reason: SDUPTHER

## 2022-03-01 RX ORDER — TOCILIZUMAB 180 MG/ML
162 INJECTION, SOLUTION SUBCUTANEOUS
Qty: 3.6 ML | Refills: 3 | Status: SHIPPED | OUTPATIENT
Start: 2022-03-01 | End: 2022-03-21 | Stop reason: SDUPTHER

## 2022-03-01 NOTE — PROGRESS NOTES
[facility] at [address] [facility] Update    Date: 03/01/22    Jarret Urban MD 1955    Medication: Actemra 162 mg/0.9 ml syringes    Prior Authorization: through 2/28/23    Prescription needs to be transferred to [facility]  (phone: [phone]). Gregory Roy ([phone]) was notified that this specialty prescription needs to be transferred to the insurance mandated specialty pharmacy above.      Ana Saleem, [address] at [facility], [facility], [address]  phone: [phone]   fax: [phone]

## 2022-03-05 DIAGNOSIS — I10 BENIGN ESSENTIAL HYPERTENSION: ICD-10-CM

## 2022-03-07 RX ORDER — VERAPAMIL HYDROCHLORIDE 180 MG/1
TABLET, EXTENDED RELEASE ORAL
Qty: 90 TABLET | Refills: 3 | Status: SHIPPED | OUTPATIENT
Start: 2022-03-07 | End: 2022-07-21 | Stop reason: SDUPTHER

## 2022-03-11 ENCOUNTER — TELEPHONE (OUTPATIENT)
Dept: RHEUMATOLOGY | Age: 67
End: 2022-03-11

## 2022-03-11 NOTE — TELEPHONE ENCOUNTER
Received call from Anju Phillips  From the specialty Pharmacy stating patient RX Actemta copay assistance information is stating invalid and need more information from the physician please call [phone]. sdh

## 2022-03-12 RX ORDER — BUPROPION HYDROCHLORIDE 150 MG/1
TABLET ORAL
Qty: 11 TABLET | Refills: 0 | Status: SHIPPED | OUTPATIENT
Start: 2022-03-12 | End: 2022-03-26

## 2022-03-15 RX ORDER — METHOTREXATE 2.5 MG/1
15 TABLET ORAL
Qty: 72 TABLET | Refills: 3 | Status: SHIPPED | OUTPATIENT
Start: 2022-03-20 | End: 2022-07-21 | Stop reason: SDUPTHER

## 2022-03-18 PROBLEM — F41.9 ANXIETY AND DEPRESSION: Status: ACTIVE | Noted: 2019-12-13

## 2022-03-18 PROBLEM — F32.A ANXIETY AND DEPRESSION: Status: ACTIVE | Noted: 2019-12-13

## 2022-03-18 PROBLEM — D50.0 IRON DEFICIENCY ANEMIA DUE TO CHRONIC BLOOD LOSS: Status: ACTIVE | Noted: 2021-11-22

## 2022-03-18 PROBLEM — F33.1 MODERATE EPISODE OF RECURRENT MAJOR DEPRESSIVE DISORDER (HCC): Status: ACTIVE | Noted: 2021-10-26

## 2022-03-18 PROBLEM — E11.51 TYPE 2 DIABETES MELLITUS WITH PERIPHERAL VASCULAR DISEASE (HCC): Status: ACTIVE | Noted: 2019-10-07

## 2022-03-19 PROBLEM — D50.9 IRON DEFICIENCY ANEMIA: Status: ACTIVE | Noted: 2017-07-13

## 2022-03-19 PROBLEM — I47.1 SVT (SUPRAVENTRICULAR TACHYCARDIA) (HCC): Status: ACTIVE | Noted: 2020-03-13

## 2022-03-19 PROBLEM — E11.43 DIABETES MELLITUS WITH GASTROPARESIS (HCC): Status: ACTIVE | Noted: 2021-11-22

## 2022-03-19 PROBLEM — G89.29 CHRONIC PAIN: Status: ACTIVE | Noted: 2019-12-13

## 2022-03-19 PROBLEM — Z90.710 HISTORY OF HYSTERECTOMY: Status: ACTIVE | Noted: 2019-12-13

## 2022-03-19 PROBLEM — R06.00 DYSPNEA: Status: ACTIVE | Noted: 2021-09-08

## 2022-03-19 PROBLEM — E11.42 POLYNEUROPATHY IN DIABETES (HCC): Status: ACTIVE | Noted: 2021-09-08

## 2022-03-19 PROBLEM — Z80.0 FAMILY HISTORY OF GASTRIC CANCER: Status: ACTIVE | Noted: 2021-11-22

## 2022-03-19 PROBLEM — I47.10 SVT (SUPRAVENTRICULAR TACHYCARDIA): Status: ACTIVE | Noted: 2020-03-13

## 2022-03-19 PROBLEM — Z79.899 HIGH RISK MEDICATION USE: Status: ACTIVE | Noted: 2021-11-22

## 2022-03-19 PROBLEM — D22.9 ATYPICAL MOLE: Status: ACTIVE | Noted: 2019-12-13

## 2022-03-19 PROBLEM — Z80.3 FAMILY HISTORY OF BREAST CANCER: Status: ACTIVE | Noted: 2021-11-22

## 2022-03-20 PROBLEM — I47.1 ECTOPIC ATRIAL TACHYCARDIA (HCC): Status: ACTIVE | Noted: 2020-08-01

## 2022-03-20 PROBLEM — K58.2 IRRITABLE BOWEL SYNDROME WITH BOTH CONSTIPATION AND DIARRHEA: Status: ACTIVE | Noted: 2019-12-13

## 2022-03-20 PROBLEM — I47.19 ECTOPIC ATRIAL TACHYCARDIA: Status: ACTIVE | Noted: 2020-08-01

## 2022-03-20 PROBLEM — Z79.899 CURRENT USE OF ESTROGEN THERAPY: Status: ACTIVE | Noted: 2021-11-22

## 2022-03-20 PROBLEM — Z79.818 CURRENT USE OF ESTROGEN THERAPY: Status: ACTIVE | Noted: 2021-11-22

## 2022-03-20 PROBLEM — M35.3 POLYMYALGIA RHEUMATICA (HCC): Status: ACTIVE | Noted: 2019-12-13

## 2022-03-20 PROBLEM — R53.83 FATIGUE, UNSPECIFIED TYPE: Status: ACTIVE | Noted: 2021-10-26

## 2022-03-21 ENCOUNTER — OFFICE VISIT (OUTPATIENT)
Dept: RHEUMATOLOGY | Age: 67
End: 2022-03-21
Payer: COMMERCIAL

## 2022-03-21 VITALS
WEIGHT: 197 LBS | OXYGEN SATURATION: 96 % | TEMPERATURE: 98.3 F | BODY MASS INDEX: 30.85 KG/M2 | SYSTOLIC BLOOD PRESSURE: 166 MMHG | RESPIRATION RATE: 16 BRPM | DIASTOLIC BLOOD PRESSURE: 84 MMHG | HEART RATE: 97 BPM

## 2022-03-21 DIAGNOSIS — M35.3 POLYMYALGIA RHEUMATICA (HCC): Primary | ICD-10-CM

## 2022-03-21 PROCEDURE — G9899 SCRN MAM PERF RSLTS DOC: HCPCS | Performed by: PEDIATRICS

## 2022-03-21 PROCEDURE — G8399 PT W/DXA RESULTS DOCUMENT: HCPCS | Performed by: PEDIATRICS

## 2022-03-21 PROCEDURE — 3017F COLORECTAL CA SCREEN DOC REV: CPT | Performed by: PEDIATRICS

## 2022-03-21 PROCEDURE — G9717 DOC PT DX DEP/BP F/U NT REQ: HCPCS | Performed by: PEDIATRICS

## 2022-03-21 PROCEDURE — G8754 DIAS BP LESS 90: HCPCS | Performed by: PEDIATRICS

## 2022-03-21 PROCEDURE — 99214 OFFICE O/P EST MOD 30 MIN: CPT | Performed by: PEDIATRICS

## 2022-03-21 PROCEDURE — G8536 NO DOC ELDER MAL SCRN: HCPCS | Performed by: PEDIATRICS

## 2022-03-21 PROCEDURE — G8417 CALC BMI ABV UP PARAM F/U: HCPCS | Performed by: PEDIATRICS

## 2022-03-21 PROCEDURE — 1101F PT FALLS ASSESS-DOCD LE1/YR: CPT | Performed by: PEDIATRICS

## 2022-03-21 PROCEDURE — G8427 DOCREV CUR MEDS BY ELIG CLIN: HCPCS | Performed by: PEDIATRICS

## 2022-03-21 PROCEDURE — G8753 SYS BP > OR = 140: HCPCS | Performed by: PEDIATRICS

## 2022-03-21 PROCEDURE — 1090F PRES/ABSN URINE INCON ASSESS: CPT | Performed by: PEDIATRICS

## 2022-03-21 RX ORDER — TOCILIZUMAB 180 MG/ML
162 INJECTION, SOLUTION SUBCUTANEOUS
Qty: 3.6 ML | Refills: 3 | Status: SHIPPED | OUTPATIENT
Start: 2022-03-21 | End: 2022-04-12 | Stop reason: SDUPTHER

## 2022-03-21 RX ORDER — PREDNISONE 1 MG/1
TABLET ORAL
Qty: 120 TABLET | Refills: 2 | Status: SHIPPED | OUTPATIENT
Start: 2022-03-21 | End: 2022-06-07

## 2022-03-21 NOTE — PROGRESS NOTES
RHEUMATOLOGY PROBLEM LIST AND CHIEF COMPLAINT  1. GCA/PMR - HAs, left temporal artery discomfort, sudden loss of vision, arthralgia of shoulder, TMJ, hands, feet, response to steroids    Therapy History:  Current DMARDs: Actemra (2/2018, stopped for 1 month; 3/2018-current), Methotrexate (1/2017 - 4/2018, restarted 3/2019-current)   Covid-19 vaccination (+, Pfizer 12/21, 1/12, 9/21)    INTERVAL HISTORY  This is a 74 y.o.  female. Today, the patient complains of no pain in the joints. Location: generalized   Severity: 0 on a scale of 0-10   Timing: intermittent   Duration: 6 months  Context/Associated signs and symptoms: The patient reports doing well today. She continues on Actemra SQ weekly, Methotrexate 15 mg PO weekly, and Prednisone 4 mg daily. She does not plan to taper Prednisone until she retires. She notes that her copay for Actemra has increased from $5 to $1200. Denies new medications or medical issues.      RHEUMATOLOGY REVIEW OF SYSTEMS   Positives as per history  Negatives as follows:  Silver Mcclelland:  Denies unexplained persistent fevers, weight change  HEAD/EYES:   Denies eye redness, blurry vision or sudden loss of vision, dry eyes, HA, temporal artery pain  ENT:    Denies oral/nasal ulcers, recurrent sinus infections, dry mouth  RESPIRATORY:  No pleuritic pain, history of pleural effusions, hemoptysis  CARDIOVASCULAR:  Denies chest pain, history of pericardial effusions  GASTRO:   Denies heartburn, esophageal dysmotility, abdominal pain, nausea, vomiting, diarrhea, blood in the stool  HEMATOLOGIC:  No easy bruising, purpura, swollen lymph nodes  SKIN:    Denies alopecia, ulcers, nodules, sun sensitivity, unexplained persistent rash   VASCULAR:   Denies cyanosis, raynaud phenomenon  NEUROLOGIC:  Denies specific muscle weakness, paresthesias   PSYCHIATRIC:  No sleep disturbance / snoring, depression, anxiety  MSK:    No morning stiffness >1 hour, SI joint pain    PAST MEDICAL HISTORY  Reviewed with patient, significant changes in medical history - no    PHYSICAL EXAM  Blood pressure (!) 166/84, pulse 97, temperature 98.3 °F (36.8 °C), temperature source Oral, resp. rate 16, weight 197 lb (89.4 kg), SpO2 96 %. GENERAL APPEARANCE: Well-nourished adult in no acute distress. EYES: No scleral erythema, conjunctival injection. ENT: No oral ulcer, parotid enlargement  NECK: No adenopathy, thyroid enlargement. CARDIOVASCULAR: Heart rhythm is regular. No murmur, rub, gallop. CHEST: Normal vesicular breath sounds. No wheezes, rales, pleural friction rubs. ABDOMINAL: The abdomen is soft and nontender. Liver and spleen are nonpalpable. Bowel sounds are normal.  EXTREMITIES: There is no evidence of clubbing, cyanosis, edema. SKIN: No rash, palpable purpura, digital ulcer, abnormal thickening. NEUROLOGICAL: Normal gait and station, full strength in upper and lower extremities, normal sensation to light touch. MUSCULOSKELETAL:  Upper extremities - Left shoulder dROM w/ int rotation. Right shoulder crepitus - unchanged. Lower extremities - full range of motion, no tenderness, no swelling, no synovial thickening and no deformity of joints except for B/L knees crepitus (R>L)    LABS, RADIOLOGY AND PROCEDURES   Previous labs reviewed -Yes    ASSESSMENT  1. GCA/PMR -(is unchanged)- The patient continues to do well on her current regimen. She should continue on Actemra SQ weekly and Methotrexate 15 mg PO weekly. We will consider switching patient to Actemra infusion is necessary to have the cost of this medication covered. She should continue on Prednisone 4 mg daily and taper as tolerated once she retires. I will order a TB test today. Follow up in 4-5 months. 2. Bone Health - Most recent bone density (9/2019) showed normal done density with an improvement in lumbar spine. The patient continues on Actonel. The patient will continue to taper Prednisone as tolerated.  Remains managed by primary care provider. - unchanged  3. Drug therapy monitoring for toxicity (Actemra, Methotrexate) - CBC, BUN, Cr, AST, ALT and albumin every 4 months    PLAN  1. Prednisone 4 mg daily; taper as tolerated  2. Actemra SQ weekly  3. Methotrexate 15 mg PO weekly  4. Check quantiferon TB   5. Return in 4-5 months    Shai Jung MD  Adult and Pediatric Rheumatology     [facility], [address] [place] [address], Phone [phone], Fax [phone]     Visiting  of Pediatrics    Department of Pediatrics, [facility] of [address] [place] [address], [address] [place] [address], Phone [phone], Fax [phone]    There are no Patient Instructions on file for this visit.     cc:  MD Mansi Prado (Ophthalmology)     Written by josué Mcneal, as dictated by Dr. Lauro Ch M.D.

## 2022-03-21 NOTE — PROGRESS NOTES
Chief Complaint   Patient presents with    Joint Pain     1. Have you been to the ER, urgent care clinic since your last visit? Hospitalized since your last visit? No    2. Have you seen or consulted any other health care providers outside of the [address] [place] [address] since your last visit? Include any pap smears or colon screening.  No

## 2022-04-04 ENCOUNTER — TELEPHONE (OUTPATIENT)
Dept: ONCOLOGY | Age: 67
End: 2022-04-04

## 2022-04-04 DIAGNOSIS — E78.5 HYPERLIPIDEMIA, UNSPECIFIED HYPERLIPIDEMIA TYPE: ICD-10-CM

## 2022-04-04 DIAGNOSIS — D50.0 IRON DEFICIENCY ANEMIA DUE TO CHRONIC BLOOD LOSS: ICD-10-CM

## 2022-04-04 LAB
BASOPHILS # BLD: 0 K/UL (ref 0–0.1)
BASOPHILS NFR BLD: 1 % (ref 0–1)
DIFFERENTIAL METHOD BLD: ABNORMAL
EOSINOPHIL # BLD: 0.1 K/UL (ref 0–0.4)
EOSINOPHIL NFR BLD: 2 % (ref 0–7)
ERYTHROCYTE [DISTWIDTH] IN BLOOD BY AUTOMATED COUNT: 14 % (ref 11.5–14.5)
FERRITIN SERPL-MCNC: 356 NG/ML (ref 8–252)
HCT VFR BLD AUTO: 40 % (ref 35–47)
HGB BLD-MCNC: 13.3 G/DL (ref 11.5–16)
IMM GRANULOCYTES # BLD AUTO: 0.1 K/UL (ref 0–0.04)
IMM GRANULOCYTES NFR BLD AUTO: 1 % (ref 0–0.5)
IRON SATN MFR SERPL: 57 % (ref 20–50)
IRON SERPL-MCNC: 188 UG/DL (ref 35–150)
LYMPHOCYTES # BLD: 2 K/UL (ref 0.8–3.5)
LYMPHOCYTES NFR BLD: 38 % (ref 12–49)
MCH RBC QN AUTO: 32 PG (ref 26–34)
MCHC RBC AUTO-ENTMCNC: 33.3 G/DL (ref 30–36.5)
MCV RBC AUTO: 96.4 FL (ref 80–99)
MONOCYTES # BLD: 0.6 K/UL (ref 0–1)
MONOCYTES NFR BLD: 12 % (ref 5–13)
NEUTS SEG # BLD: 2.4 K/UL (ref 1.8–8)
NEUTS SEG NFR BLD: 46 % (ref 32–75)
NRBC # BLD: 0 K/UL (ref 0–0.01)
NRBC BLD-RTO: 0 PER 100 WBC
PLATELET # BLD AUTO: 321 K/UL (ref 150–400)
PMV BLD AUTO: 10.8 FL (ref 8.9–12.9)
RBC # BLD AUTO: 4.15 M/UL (ref 3.8–5.2)
TIBC SERPL-MCNC: 330 UG/DL (ref 250–450)
WBC # BLD AUTO: 5.2 K/UL (ref 3.6–11)

## 2022-04-04 RX ORDER — ATORVASTATIN CALCIUM 80 MG/1
80 TABLET, FILM COATED ORAL DAILY
Qty: 90 TABLET | Refills: 3 | Status: SHIPPED | OUTPATIENT
Start: 2022-04-04 | End: 2022-05-23

## 2022-04-04 NOTE — TELEPHONE ENCOUNTER
[address] at [facility]  [phone]    04/04/22 2:15 PM - [facility] [facility] Pick-up scheduled for patient's Inv[facility]ae test.  Confirmation #[id number]      [address] at [facility]  [phone]    04/04/22 4:24 PM - Gueydan Leriche order submitted and confirmed.   #[id number]

## 2022-04-12 RX ORDER — TOCILIZUMAB 180 MG/ML
162 INJECTION, SOLUTION SUBCUTANEOUS
Qty: 3.6 ML | Refills: 3 | Status: SHIPPED | OUTPATIENT
Start: 2022-04-12 | End: 2022-04-18 | Stop reason: SDUPTHER

## 2022-04-15 RX ORDER — FOLIC ACID 1 MG/1
TABLET ORAL
Qty: 90 TABLET | Refills: 7 | Status: SHIPPED | OUTPATIENT
Start: 2022-04-15 | End: 2022-07-21 | Stop reason: SDUPTHER

## 2022-04-18 RX ORDER — TOCILIZUMAB 180 MG/ML
162 INJECTION, SOLUTION SUBCUTANEOUS
Qty: 3.6 ML | Refills: 3 | Status: SHIPPED | OUTPATIENT
Start: 2022-04-18 | End: 2022-05-04 | Stop reason: SDUPTHER

## 2022-04-21 ENCOUNTER — TELEPHONE (OUTPATIENT)
Dept: RHEUMATOLOGY | Age: 67
End: 2022-04-21

## 2022-04-21 NOTE — TELEPHONE ENCOUNTER
Emerald from [employer] called to advised the patient medication Actemra needs Prior Authorization. [phone]. ..fax

## 2022-05-01 LAB
GAMMA INTERFERON BACKGROUND BLD IA-ACNC: 0.06 IU/ML
M TB IFN-G BLD-IMP: NEGATIVE
M TB IFN-G CD4+ BCKGRND COR BLD-ACNC: 0.03 IU/ML
MITOGEN IGNF BLD-ACNC: >10 IU/ML
QUANTIFERON INCUBATION, QF1T: NORMAL
QUANTIFERON TB2 AG: 0.02 IU/ML
SERVICE CMNT-IMP: NORMAL

## 2022-05-04 ENCOUNTER — DOCUMENTATION ONLY (OUTPATIENT)
Dept: PHARMACY | Age: 67
End: 2022-05-04

## 2022-05-04 RX ORDER — TOCILIZUMAB 180 MG/ML
162 INJECTION, SOLUTION SUBCUTANEOUS
Qty: 3.6 ML | Refills: 3 | Status: SHIPPED | OUTPATIENT
Start: 2022-05-04 | End: 2022-08-23

## 2022-05-04 NOTE — PROGRESS NOTES
[facility] at [address] [facility] Update    Date: 05/04/22    Marina Shields 1955    Medication: Actemra 162 mg/0.9 ml syringes    Prior Authorization: through 5/3/2023    Prescription needs to be transferred to [address] [place] [address] (phone: [phone]). [facility] ([phone]) was notified that this specialty prescription needs to be transferred to the insurance mandated specialty pharmacy above.      Kassie Washburn, [address] at [employer] [place] [address] Hermila Hilliard, [address]  phone: [phone]   fax: [phone]

## 2022-05-06 ENCOUNTER — TELEPHONE (OUTPATIENT)
Dept: RHEUMATOLOGY | Age: 67
End: 2022-05-06

## 2022-05-06 NOTE — TELEPHONE ENCOUNTER
iRch Ying from [employer] called advising they need the patient allergy information and also the ICD 10 code for Actemra.     Fax [phone]

## 2022-05-06 NOTE — TELEPHONE ENCOUNTER
Spoke to Rohan Meza [employer]) informed Rohan Meza of pt medication allergies and ICD 10 code for the Actemra.  Also informed Chantel the pt information was also faxed over to [employer], chantel verbally acknowledged understanding

## 2022-05-23 DIAGNOSIS — E78.5 HYPERLIPIDEMIA, UNSPECIFIED HYPERLIPIDEMIA TYPE: Primary | ICD-10-CM

## 2022-05-23 RX ORDER — EZETIMIBE 10 MG/1
10 TABLET ORAL DAILY
Qty: 90 TABLET | Refills: 1 | Status: SHIPPED | OUTPATIENT
Start: 2022-05-23 | End: 2022-09-30 | Stop reason: SDUPTHER

## 2022-06-07 RX ORDER — PREDNISONE 1 MG/1
TABLET ORAL
Qty: 120 TABLET | Refills: 2 | Status: SHIPPED | OUTPATIENT
Start: 2022-06-07

## 2022-06-27 DIAGNOSIS — E11.9 TYPE 2 DIABETES MELLITUS WITHOUT COMPLICATION, WITHOUT LONG-TERM CURRENT USE OF INSULIN (HCC): ICD-10-CM

## 2022-06-28 RX ORDER — SITAGLIPTIN 100 MG/1
TABLET, FILM COATED ORAL
Qty: 90 TABLET | Refills: 3 | Status: SHIPPED | OUTPATIENT
Start: 2022-06-28 | End: 2022-07-21 | Stop reason: SDUPTHER

## 2022-06-30 ENCOUNTER — OFFICE VISIT (OUTPATIENT)
Dept: ONCOLOGY | Age: 67
End: 2022-06-30
Payer: COMMERCIAL

## 2022-06-30 VITALS
HEART RATE: 99 BPM | DIASTOLIC BLOOD PRESSURE: 70 MMHG | SYSTOLIC BLOOD PRESSURE: 130 MMHG | WEIGHT: 194 LBS | RESPIRATION RATE: 18 BRPM | OXYGEN SATURATION: 97 % | HEIGHT: 67 IN | BODY MASS INDEX: 30.45 KG/M2 | TEMPERATURE: 97.5 F

## 2022-06-30 DIAGNOSIS — D50.0 IRON DEFICIENCY ANEMIA DUE TO CHRONIC BLOOD LOSS: Primary | ICD-10-CM

## 2022-06-30 PROCEDURE — 1123F ACP DISCUSS/DSCN MKR DOCD: CPT | Performed by: INTERNAL MEDICINE

## 2022-06-30 PROCEDURE — 99213 OFFICE O/P EST LOW 20 MIN: CPT | Performed by: INTERNAL MEDICINE

## 2022-06-30 PROCEDURE — G0463 HOSPITAL OUTPT CLINIC VISIT: HCPCS | Performed by: INTERNAL MEDICINE

## 2022-06-30 NOTE — LETTER
7/5/2022    Patient: Sean Stern   YOB: 1955   Date of Visit: 6/30/2022     Hermila Herrera, [address] [place]  [address]  [address] [facility]  [place]    Dear Hermila Herrera MD,      Thank you for referring Ms. Luis Manuel Rhodes to [facility] for evaluation. My notes for this consultation are attached. If you have questions, please do not hesitate to call me. I look forward to following your patient along with you.       Sincerely,    Danika Pierre MD

## 2022-06-30 NOTE — PROGRESS NOTES
Chief Complaint   Patient presents with    Follow-up     4 month f/u    Anemia       Visit Vitals  /70   Pulse 99   Temp 97.5 °F (36.4 °C)   Resp 18   Ht 5' 7\" (1.702 m)   Wt 194 lb (88 kg)   SpO2 97%   BMI 30.38 kg/m²

## 2022-06-30 NOTE — PROGRESS NOTES
[facility] [place] [address] [address] [facility] [place]: [phone]  F: [phone] Patient ID  Name: Liliya Mejía  YOB: 1955  MRN: [id number]  Referring Provider:   No referring provider defined for this encounter. Primary Care Provider:   Karson Tilley MD       HEMATOLOGY/MEDICAL ONCOLOGY  NOTE   Date of Visit: 06/30/22  Reason for Evaluation:     Chief Complaint   Patient presents with    Follow-up     4 month f/u    Anemia     Subjective:     History of Present Illness:     Liliya Mejía is a 74 y.o. F who presents for a follow-up evaluation for iron deficiency anemia. Patient overall reports feeling stable. Diarrhea:Grade 2 (IBS)   Fatigue:Grade 2  Insomnia:Grade 1  Pain: left lower abdominal pain and knees: 4/10. Weight Changes: lost 7 lbs. (intentionally)  Shortness of Breath:Grade 1  Rapid Heartbeat:Grade 1  Itching:Grade 1  Skin Rash:Grade 1  Swelling:Grade 1  Headache:Grade 1  New Medications since last visit: Zetia.  (myalgias improved off statin therapy)    Past Medical History:   Diagnosis Date    Asthma     childhood    Atypical mole     Chronic pain     shoulders/knees    DDD (degenerative disc disease), cervical     Degenerative arthritis of right knee 2/2014    Dr. Clarisse Friedman Ectopic atrial tachycardia ([address] 08/2020    Gastric nodule     gastric nodules on endoscopy 3/26/12    GERD (gastroesophageal reflux disease)     PUD (peptic ulcer disease)     Sleep apnea     uses cpap    SVT (supraventricular tachycardia) ([address]     Temporal arteritis ([address] 4/29/16    Dr. Gladys Obregon Thoracic outlet syndrome     left, Dr. Abbie Ramon Vitamin D deficiency     Vulvar high-grade squamous intraepithelial lesion (HGSIL) 2018      Past Surgical History:   Procedure Laterality Date    COLONOSCOPY N/A 5/17/2017    COLONOSCOPY performed by Ami Knapp MD at [facility]. N/A 5/25/2021    COLONOSCOPY, EGD   :- performed by Richelle Calderon MD at [address] HX BREAST BIOPSY      several breast biopsies (benign)    HX BREAST BIOPSY Bilateral , , ,2009    6-7 on R, 2 on L+all benign    HX BUNIONECTOMY      bilateral    HX  SECTION      x3    HX CHOLECYSTECTOMY  2009    HX COLONOSCOPY  2003    normal, f/u in 10 yrs    HX DILATION AND CURETTAGE      [id number], several D&C's    HX ENDOSCOPY      x3, h/o gastric polyp removal 3/2012    HX GI      cholecystectomy    HX GYN  2018    OZZIE    HX HEART CATHETERIZATION      x2, most recent 2012 was normal    HX KNEE ARTHROSCOPY Right     HX OTHER SURGICAL      lipoma removal    HX OTHER SURGICAL  16    L temporal artery bx (Dr. Olive Mahan)    HX AZAEL AND BSO      secondary to endometrial hyperplasia with atypical changes    HX TONSILLECTOMY      T & A      Social History     Tobacco Use    Smoking status: Never Smoker    Smokeless tobacco: Never Used   Substance Use Topics    Alcohol use: No     Alcohol/week: 0.0 standard drinks      Family History   Problem Relation Age of Onset    Heart Disease Mother     Diabetes Mother     Cancer Mother 40        breast    Thyroid Disease Mother         hashimotos    Hypertension Mother     Osteoporosis Mother     Heart Surgery Mother         aortic valve replacement    Cancer Father 79        gastric    Heart Disease Father     Hypertension Father     Diabetes Paternal Grandmother     Heart Disease Brother     Thyroid Disease Sister         hashimotos    Other Sister         bipolar    Other Sister         depression    Other Brother         mental illness    Breast Cancer Maternal Aunt 40         from breast cancer    Breast Cancer Maternal Aunt 39        and recurred at 80    Osteoporosis Other         maternal aunts    Psychiatric Disorder Son         bipolar    Psychiatric Disorder Daughter         depression    Psychiatric Disorder Daughter depression    Breast Cancer Maternal Aunt     Ovarian Cancer Neg Hx     Pancreatic Cancer Neg Hx     Prostate Cancer Neg Hx      Current Outpatient Medications   Medication Sig    hydroCHLOROthiazide (HYDRODIURIL) 25 mg tablet TAKE 1 TABLET BY MOUTH EVERY DAY    buPROPion XL (WELLBUTRIN XL) 150 mg tablet Take 1 Tablet by mouth every morning.  Actemra 162 mg/0.9 mL syringe INJECT ONE SYRINGE SUBCUTANEOUSLY EVERY 7 DAYS. KEEP REFRIGERATED.  verapamil ER (CALAN-SR) 180 mg CR tablet Take 1 Tablet by mouth nightly. Indications: high blood pressure    predniSONE (DELTASONE) 1 mg tablet TAKE 4 TABLETS BY MOUTH EVERY DAY (Patient taking differently: 5 mg.)    DULoxetine (CYMBALTA) 60 mg capsule TAKE 1 CAPSULE BY MOUTH EVERY DAY    omeprazole (PRILOSEC) 40 mg capsule TAKE 1 CAPSULE BY MOUTH EVERY DAY    valsartan (DIOVAN) 160 mg tablet TAKE 1 TABLET BY MOUTH EVERY DAY    Januvia 100 mg tablet TAKE 1 TABLET BY MOUTH EVERY DAY    metFORMIN (GLUCOPHAGE) 500 mg tablet Take 1 Tab by mouth two (2) times daily (with meals).  folic acid (FOLVITE) 1 mg tablet Take 1 Tab by mouth daily.  methotrexate (RHEUMATREX) 2.5 mg tablet Take 6 Tabs by mouth every Sunday.  atorvastatin (LIPITOR) 80 mg tablet Take 1 Tab by mouth daily. Indications: high cholesterol    budesonide-formoteroL (Symbicort) 160-4.5 mcg/actuation HFAA Take 2 Puffs by inhalation two (2) times a day.  risedronate (ACTONEL) 150 mg tablet Take 1 Tab by mouth every thirty (30) days.  estradiol (ESTRACE) 1 mg tablet Take 1 Tab by mouth daily.  omega-3 fatty acids-vitamin e (FISH OIL) 1,000 mg cap Take 1 Cap by mouth two (2) times a day.  multivitamin (ONE A DAY) tablet Take 1 Tab by mouth daily.  Cholecalciferol, Vitamin D3, (Vitamin D3) 25 mcg (1,000 unit) chew Take 1 Cap by mouth daily. (Patient not taking: Reported on 9/8/2021)     No current facility-administered medications for this visit.       Allergies   Allergen Reactions    Elfego Flavor Hives     [place] fruit not flavor    Alendronate Nausea and Vomiting    Morphine Other (comments)     Extreme epigastric pain    Statins-Hmg-Coa Reductase Inhibitors Other (comments)     Pt stated muscle injuries.  Valium [Diazepam] Nausea and Vomiting    Versed [Midazolam] Nausea and Vomiting      Review of Systems Provided by:  Patient  Review of Systems: A complete review of systems was obtained, reviewed. Pertinent findings reviewed above. Objective:     Visit Vitals  /70   Pulse 99   Temp 97.5 °F (36.4 °C)   Resp 18   Ht 5' 7\" (1.702 m)   Wt 194 lb (88 kg)   SpO2 97%   BMI 30.38 kg/m²     ECOG PS: 1- Restricted in physically strenuous activity but ambulatory and able to carry out work of a light or sedentary nature, e.g., light house work, office work. Physical Exam  Constitutional: No acute distress. and Non-toxic appearance. HENT: Normocephalic and atraumatic head. Eyes: Normal Conjunctivae. Anicteric sclerae. Cardiovascular: S1,S2 auscultated. No pitting edema. Pulmonary: Normal Respiratory Effort. No wheezing. No rhonchi. No rales. Abdominal: Normal bowel sounds. Soft Abdomen to palpation. No abdominal tenderness. Skin: No jaundice. No rash. Musculoskeletal: No muscle pain on palpation. No temporal muscle wasting on inspection. Neurological: Alert and oriented. No tremor on inspection. Normal Gait. Psychiatric: mood normal. normal speech rate normal affect. Results:   I personally reviewed Epic EHR labs/results below:   Lab Results   Component Value Date/Time    WBC 5.2 04/04/2022 10:59 AM    HGB 13.3 04/04/2022 10:59 AM    HCT 40.0 04/04/2022 10:59 AM    PLATELET 465 58/24/1177 10:59 AM    MCV 96.4 04/04/2022 10:59 AM    ABS.  NEUTROPHILS 2.4 04/04/2022 10:59 AM    Hemoglobin (POC) 13.9 07/25/2014 11:52 PM    Hematocrit (POC) 41 07/25/2014 11:52 PM         ANEMIA LABS:  Hemoglobin:  Iron Studies:    Iron   Date Value Ref Range Status 04/04/2022 188 (H) 35 - 150 ug/dL Final     Lab Results   Component Value Date/Time    Iron 188 (H) 04/04/2022 10:59 AM    TIBC 330 04/04/2022 10:59 AM    Iron % saturation 57 (H) 04/04/2022 10:59 AM    Ferritin 356 (H) 04/04/2022 10:59 AM       Vitamin B12:   Vitamin B12   Date Value Ref Range Status   10/26/2021 610 193 - 986 pg/mL Final     Folate: Folate   Date Value Ref Range Status   12/20/2017 >20.0 >3.0 ng/mL Final     Comment:     A serum folate concentration of less than 3.1 ng/mL is  considered to represent clinical deficiency. Assessment and Recommendations:     1. Iron deficiency anemia due to chronic blood loss  -We will repeat her iron studies. Discussed with her normal that we will further follow-up on an as-needed basis. - FERRITIN; Future  - IRON PROFILE; Future  - CBC WITH AUTOMATED DIFF; Future       Follow-up and Dispositions    · Return if symptoms worsen or fail to improve.          Myranda Golden MD  Hematology/Medical Oncology Provider  [facility]  P: [phone]      Signed By:   Keon Powers MD

## 2022-07-06 RX ORDER — PREDNISONE 5 MG/1
5 TABLET ORAL DAILY
Qty: 90 TABLET | Refills: 1 | Status: SHIPPED | OUTPATIENT
Start: 2022-07-06 | End: 2022-07-21 | Stop reason: SDUPTHER

## 2022-07-14 PROBLEM — R79.89 ABNORMAL CBC: Status: ACTIVE | Noted: 2022-07-14

## 2022-07-21 DIAGNOSIS — M35.3 POLYMYALGIA RHEUMATICA (HCC): Primary | ICD-10-CM

## 2022-07-21 NOTE — TELEPHONE ENCOUNTER
CBC done 6/30/22, TB done 4/27/22. Last visit was 3/21/22, and has a upcoming appointment on 7/27/22.

## 2022-07-22 RX ORDER — FOLIC ACID 1 MG/1
1 TABLET ORAL DAILY
Qty: 90 TABLET | Refills: 7 | Status: SHIPPED | OUTPATIENT
Start: 2022-07-22

## 2022-07-22 RX ORDER — PREDNISONE 5 MG/1
5 TABLET ORAL DAILY
Qty: 90 TABLET | Refills: 1 | Status: SHIPPED | OUTPATIENT
Start: 2022-07-22 | End: 2022-11-01

## 2022-07-22 RX ORDER — METHOTREXATE 2.5 MG/1
15 TABLET ORAL
Qty: 72 TABLET | Refills: 3 | Status: SHIPPED | OUTPATIENT
Start: 2022-07-24

## 2022-07-27 ENCOUNTER — OFFICE VISIT (OUTPATIENT)
Dept: RHEUMATOLOGY | Age: 67
End: 2022-07-27
Payer: COMMERCIAL

## 2022-07-27 VITALS
BODY MASS INDEX: 29.76 KG/M2 | HEART RATE: 94 BPM | SYSTOLIC BLOOD PRESSURE: 149 MMHG | TEMPERATURE: 98.1 F | WEIGHT: 190 LBS | DIASTOLIC BLOOD PRESSURE: 77 MMHG | RESPIRATION RATE: 16 BRPM

## 2022-07-27 DIAGNOSIS — M35.3 POLYMYALGIA RHEUMATICA (HCC): Primary | ICD-10-CM

## 2022-07-27 PROCEDURE — G9899 SCRN MAM PERF RSLTS DOC: HCPCS | Performed by: PEDIATRICS

## 2022-07-27 PROCEDURE — G8417 CALC BMI ABV UP PARAM F/U: HCPCS | Performed by: PEDIATRICS

## 2022-07-27 PROCEDURE — G8754 DIAS BP LESS 90: HCPCS | Performed by: PEDIATRICS

## 2022-07-27 PROCEDURE — G0463 HOSPITAL OUTPT CLINIC VISIT: HCPCS | Performed by: PEDIATRICS

## 2022-07-27 PROCEDURE — 1101F PT FALLS ASSESS-DOCD LE1/YR: CPT | Performed by: PEDIATRICS

## 2022-07-27 PROCEDURE — 3017F COLORECTAL CA SCREEN DOC REV: CPT | Performed by: PEDIATRICS

## 2022-07-27 PROCEDURE — 99214 OFFICE O/P EST MOD 30 MIN: CPT | Performed by: PEDIATRICS

## 2022-07-27 PROCEDURE — G8399 PT W/DXA RESULTS DOCUMENT: HCPCS | Performed by: PEDIATRICS

## 2022-07-27 PROCEDURE — G8753 SYS BP > OR = 140: HCPCS | Performed by: PEDIATRICS

## 2022-07-27 PROCEDURE — 1123F ACP DISCUSS/DSCN MKR DOCD: CPT | Performed by: PEDIATRICS

## 2022-07-27 PROCEDURE — G9717 DOC PT DX DEP/BP F/U NT REQ: HCPCS | Performed by: PEDIATRICS

## 2022-07-27 PROCEDURE — G8536 NO DOC ELDER MAL SCRN: HCPCS | Performed by: PEDIATRICS

## 2022-07-27 PROCEDURE — G8427 DOCREV CUR MEDS BY ELIG CLIN: HCPCS | Performed by: PEDIATRICS

## 2022-07-27 PROCEDURE — 1090F PRES/ABSN URINE INCON ASSESS: CPT | Performed by: PEDIATRICS

## 2022-07-27 NOTE — PROGRESS NOTES
RHEUMATOLOGY PROBLEM LIST AND CHIEF COMPLAINT  1. GCA/PMR - HAs, left temporal artery discomfort, sudden loss of vision, arthralgia of shoulder, TMJ, hands, feet, response to steroids    Therapy History:  Current DMARDs: Actemra (2/2018, stopped for 1 month; 3/2018-current), Methotrexate (1/2017 - 4/2018, restarted 3/2019-current)   Covid-19 vaccination (+, Pfizer 12/21, 1/12, 9/21)    INTERVAL HISTORY  This is a 74 y.o.  female. Today, the patient complains of no pain in the joints. Location: generalized   Severity: 0 on a scale of 0-10   Timing: intermittent   Duration: 4 months  Context/Associated signs and symptoms: The patient reports doing well overall. She tried weaning off prednisone for 1 month, but then developed severe shoulder pain. The pain resolved after she restarted prednisone 4 mg daily. She also continues on Actemra subQ weekly, and methotrexate 15 mg PO weekly. She mentions that she was started on a statin, but then developed myalgias in her thighs. She stopped the statin and myalgias improved. Reviewed labs which were normal, except nucleated RBC's.      RHEUMATOLOGY REVIEW OF SYSTEMS   Positives as per history  Negatives as follows:  Li Cox:  Denies unexplained persistent fevers, weight change  HEAD/EYES:   Denies eye redness, blurry vision or sudden loss of vision, dry eyes, HA, temporal artery pain  ENT:    Denies oral/nasal ulcers, recurrent sinus infections, dry mouth  RESPIRATORY:  No pleuritic pain, history of pleural effusions, hemoptysis  CARDIOVASCULAR:  Denies chest pain, history of pericardial effusions  GASTRO:   Denies heartburn, esophageal dysmotility, abdominal pain, nausea, vomiting, diarrhea, blood in the stool  HEMATOLOGIC:  No easy bruising, purpura, swollen lymph nodes  SKIN:    Denies alopecia, ulcers, nodules, sun sensitivity, unexplained persistent rash   VASCULAR:   Denies cyanosis, raynaud phenomenon  NEUROLOGIC:  Denies specific muscle weakness, paresthesias   PSYCHIATRIC:  No sleep disturbance / snoring, depression, anxiety  MSK:    No morning stiffness >1 hour, SI joint pain    PAST MEDICAL HISTORY  Reviewed with patient, significant changes in medical history - no    PHYSICAL EXAM  Blood pressure (!) 149/77, pulse 94, temperature 98.1 °F (36.7 °C), temperature source Oral, resp. rate 16, weight 190 lb (86.2 kg). GENERAL APPEARANCE: Well-nourished adult in no acute distress. EYES: No scleral erythema, conjunctival injection. ENT: No oral ulcer, parotid enlargement  NECK: No adenopathy, thyroid enlargement. CARDIOVASCULAR: Heart rhythm is regular. No murmur, rub, gallop. CHEST: Normal vesicular breath sounds. No wheezes, rales, pleural friction rubs. ABDOMINAL: The abdomen is soft and nontender. Liver and spleen are nonpalpable. Bowel sounds are normal.  EXTREMITIES: There is no evidence of clubbing, cyanosis, edema. SKIN: No rash, palpable purpura, digital ulcer, abnormal thickening. NEUROLOGICAL: Normal gait and station, full strength in upper and lower extremities, normal sensation to light touch. MUSCULOSKELETAL:  Upper extremities -  Right shoulder crepitus - unchanged. Lower extremities - full range of motion, no tenderness, no swelling, no synovial thickening and no deformity of joints except for B/L knees crepitus (R>L)    LABS, RADIOLOGY AND PROCEDURES   Previous labs reviewed -Yes    ASSESSMENT  1. GCA/PMR -(is unchanged)- The patient continues to do well on her current regimen. She should continue on Actemra SQ weekly and methotrexate 15 mg PO weekly. She should continue on Prednisone 4 mg daily and taper as tolerated. Follow up in 6 months. 2. Bone Health - Most recent bone density (9/2019) showed normal done density with an improvement in lumbar spine. The patient continues on Actonel. The patient will continue to taper Prednisone as tolerated. Remains managed by primary care provider. - unchanged  3.  Drug therapy monitoring for toxicity (Actemra, Methotrexate) - CBC, BUN, Cr, AST, ALT and albumin every 4 months    PLAN  1. Prednisone 4 mg daily; taper as tolerated  2. Actemra SQ weekly  3. Methotrexate 15 mg PO weekly  4. Check CBC   5. Return in 6 months    Shai Guerrero MD  Adult and Pediatric Rheumatology     [facility], [address] [facility][1], Phone [phone], Fax [phone]     Visiting  of Pediatrics    Department of Pediatrics[address], [address] [place] [address], Phone [phone], Fax [phone]    There are no Patient Instructions on file for this visit.     cc:  MD Sinai Vallejo (Ophthalmology)     Written by josué Rossi, as dictated by Dr. Chester Luna M.D.

## 2022-07-27 NOTE — PROGRESS NOTES
Chief Complaint   Patient presents with    Joint Pain     1. Have you been to the ER, urgent care clinic since your last visit? Hospitalized since your last visit? No    2. Have you seen or consulted any other health care providers outside of the [address] [place] [address] since your last visit? Include any pap smears or colon screening.  No

## 2022-08-02 ENCOUNTER — TRANSCRIBE ORDER (OUTPATIENT)
Dept: MAMMOGRAPHY | Age: 67
End: 2022-08-02

## 2022-08-02 ENCOUNTER — HOSPITAL ENCOUNTER (OUTPATIENT)
Dept: MAMMOGRAPHY | Age: 67
Discharge: HOME | End: 2022-08-02
Payer: COMMERCIAL

## 2022-08-02 DIAGNOSIS — Z12.31 VISIT FOR SCREENING MAMMOGRAM: Primary | ICD-10-CM

## 2022-08-02 DIAGNOSIS — Z12.31 VISIT FOR SCREENING MAMMOGRAM: ICD-10-CM

## 2022-08-02 PROCEDURE — 77063 BREAST TOMOSYNTHESIS BI: CPT

## 2022-08-23 RX ORDER — TOCILIZUMAB 180 MG/ML
INJECTION, SOLUTION SUBCUTANEOUS
Qty: 3.6 ML | Refills: 3 | Status: SHIPPED | OUTPATIENT
Start: 2022-08-23

## 2022-09-30 ENCOUNTER — OFFICE VISIT (OUTPATIENT)
Dept: INTERNAL MEDICINE CLINIC | Age: 67
End: 2022-09-30
Payer: COMMERCIAL

## 2022-09-30 VITALS
SYSTOLIC BLOOD PRESSURE: 135 MMHG | BODY MASS INDEX: 29.51 KG/M2 | OXYGEN SATURATION: 98 % | RESPIRATION RATE: 16 BRPM | HEART RATE: 86 BPM | HEIGHT: 67 IN | WEIGHT: 188 LBS | TEMPERATURE: 98 F | DIASTOLIC BLOOD PRESSURE: 78 MMHG

## 2022-09-30 DIAGNOSIS — E11.42 TYPE 2 DIABETES MELLITUS WITH DIABETIC POLYNEUROPATHY, WITHOUT LONG-TERM CURRENT USE OF INSULIN (HCC): ICD-10-CM

## 2022-09-30 DIAGNOSIS — M85.88 OSTEOPENIA OF OTHER SITE: ICD-10-CM

## 2022-09-30 DIAGNOSIS — M35.3 POLYMYALGIA RHEUMATICA (HCC): ICD-10-CM

## 2022-09-30 DIAGNOSIS — E78.5 HYPERLIPIDEMIA, UNSPECIFIED HYPERLIPIDEMIA TYPE: Primary | ICD-10-CM

## 2022-09-30 DIAGNOSIS — E66.3 OVERWEIGHT: ICD-10-CM

## 2022-09-30 DIAGNOSIS — E11.51 TYPE 2 DIABETES MELLITUS WITH PERIPHERAL VASCULAR DISEASE (HCC): ICD-10-CM

## 2022-09-30 DIAGNOSIS — F33.1 MODERATE EPISODE OF RECURRENT MAJOR DEPRESSIVE DISORDER (HCC): ICD-10-CM

## 2022-09-30 DIAGNOSIS — K21.9 GASTROESOPHAGEAL REFLUX DISEASE WITHOUT ESOPHAGITIS: ICD-10-CM

## 2022-09-30 DIAGNOSIS — I10 ESSENTIAL HYPERTENSION: ICD-10-CM

## 2022-09-30 DIAGNOSIS — R53.83 FATIGUE, UNSPECIFIED TYPE: ICD-10-CM

## 2022-09-30 DIAGNOSIS — R79.89 ABNORMAL CBC: ICD-10-CM

## 2022-09-30 DIAGNOSIS — I47.1 ECTOPIC ATRIAL TACHYCARDIA (HCC): ICD-10-CM

## 2022-09-30 DIAGNOSIS — Z23 ENCOUNTER FOR IMMUNIZATION: ICD-10-CM

## 2022-09-30 PROBLEM — H25.13 AGE-RELATED NUCLEAR CATARACT OF BOTH EYES: Status: ACTIVE | Noted: 2017-12-04

## 2022-09-30 PROBLEM — E11.9 TYPE 2 DIABETES MELLITUS WITHOUT RETINOPATHY (HCC): Status: ACTIVE | Noted: 2017-05-30

## 2022-09-30 PROCEDURE — G9899 SCRN MAM PERF RSLTS DOC: HCPCS | Performed by: INTERNAL MEDICINE

## 2022-09-30 PROCEDURE — G8752 SYS BP LESS 140: HCPCS | Performed by: INTERNAL MEDICINE

## 2022-09-30 PROCEDURE — G9717 DOC PT DX DEP/BP F/U NT REQ: HCPCS | Performed by: INTERNAL MEDICINE

## 2022-09-30 PROCEDURE — G8427 DOCREV CUR MEDS BY ELIG CLIN: HCPCS | Performed by: INTERNAL MEDICINE

## 2022-09-30 PROCEDURE — 1090F PRES/ABSN URINE INCON ASSESS: CPT | Performed by: INTERNAL MEDICINE

## 2022-09-30 PROCEDURE — 2022F DILAT RTA XM EVC RTNOPTHY: CPT | Performed by: INTERNAL MEDICINE

## 2022-09-30 PROCEDURE — G8417 CALC BMI ABV UP PARAM F/U: HCPCS | Performed by: INTERNAL MEDICINE

## 2022-09-30 PROCEDURE — G8399 PT W/DXA RESULTS DOCUMENT: HCPCS | Performed by: INTERNAL MEDICINE

## 2022-09-30 PROCEDURE — 90732 PPSV23 VACC 2 YRS+ SUBQ/IM: CPT | Performed by: INTERNAL MEDICINE

## 2022-09-30 PROCEDURE — G8754 DIAS BP LESS 90: HCPCS | Performed by: INTERNAL MEDICINE

## 2022-09-30 PROCEDURE — G8536 NO DOC ELDER MAL SCRN: HCPCS | Performed by: INTERNAL MEDICINE

## 2022-09-30 PROCEDURE — 3017F COLORECTAL CA SCREEN DOC REV: CPT | Performed by: INTERNAL MEDICINE

## 2022-09-30 PROCEDURE — G0463 HOSPITAL OUTPT CLINIC VISIT: HCPCS | Performed by: INTERNAL MEDICINE

## 2022-09-30 PROCEDURE — 3046F HEMOGLOBIN A1C LEVEL >9.0%: CPT | Performed by: INTERNAL MEDICINE

## 2022-09-30 PROCEDURE — 1101F PT FALLS ASSESS-DOCD LE1/YR: CPT | Performed by: INTERNAL MEDICINE

## 2022-09-30 PROCEDURE — 99214 OFFICE O/P EST MOD 30 MIN: CPT | Performed by: INTERNAL MEDICINE

## 2022-09-30 RX ORDER — PHENOL/SODIUM PHENOLATE
AEROSOL, SPRAY (ML) MUCOUS MEMBRANE
COMMUNITY

## 2022-09-30 RX ORDER — METFORMIN HYDROCHLORIDE 500 MG/1
500 TABLET ORAL 2 TIMES DAILY WITH MEALS
Qty: 180 TABLET | Refills: 3 | Status: SHIPPED | OUTPATIENT
Start: 2022-09-30

## 2022-09-30 RX ORDER — VALSARTAN 160 MG/1
160 TABLET ORAL DAILY
Qty: 90 TABLET | Refills: 3 | Status: SHIPPED | OUTPATIENT
Start: 2022-09-30

## 2022-09-30 RX ORDER — HYDROCHLOROTHIAZIDE 25 MG/1
25 TABLET ORAL DAILY
Qty: 90 TABLET | Refills: 3 | Status: SHIPPED | OUTPATIENT
Start: 2022-09-30

## 2022-09-30 RX ORDER — EZETIMIBE 10 MG/1
10 TABLET ORAL DAILY
Qty: 90 TABLET | Refills: 1 | Status: SHIPPED | OUTPATIENT
Start: 2022-09-30

## 2022-09-30 RX ORDER — DULOXETIN HYDROCHLORIDE 60 MG/1
60 CAPSULE, DELAYED RELEASE ORAL DAILY
Qty: 90 CAPSULE | Refills: 3 | Status: SHIPPED | OUTPATIENT
Start: 2022-09-30

## 2022-09-30 NOTE — ASSESSMENT & PLAN NOTE
Did speech therapy for dysphonia felt secondary to GERD  ENT had increased to omeprazole to twice a day due to erythema on vocal cords  Getting better  Currently takes Omeprazole takes 20mg in the morning and 40 in the evening, cont no changes

## 2022-09-30 NOTE — ASSESSMENT & PLAN NOTE
Previously on atorvastatin 80 but was causing muscle aches  Tried Zetia shortly after stopping atorvastatin but stopped medication due to still having symptoms  Willing to retry Zetia  Zetia sent to pharmacy  Previous medications: Atorvastatin (80, muscle aches)

## 2022-09-30 NOTE — ASSESSMENT & PLAN NOTE
unclear control, continue current medications pending work up below   Check A1c today  Has lost weight since her last visit  Continue Januvia 100, metformin 500 twice a day  Counseled on good foot care today

## 2022-09-30 NOTE — ASSESSMENT & PLAN NOTE
Nucleated RBCs noted in previous CBC.   Rechecked  Per heme-onc, refer back if abnormal.  Bone marrow biopsy of pancytopenia

## 2022-09-30 NOTE — PROGRESS NOTES
Patient present for routine immunizations. [id number]. Pt denies any symptoms , reactions or allergies that would exclude them from being immunized today. Risks and adverse reactions were discussed and the VIS was given to them. All questions were addressed. Pt was observed for 10 min post injection. There were no reactions observed.     Amanda Ridley

## 2022-09-30 NOTE — ASSESSMENT & PLAN NOTE
Last visit had increased Wellbutrin  Doesn't think wellbutrin did much and then started sweating so she thought it was wellbutrin so stopped  Doing better now that she has retired and does not feel she needs any extra medication  Continue Cymbalta 60 daily

## 2022-09-30 NOTE — PROGRESS NOTES
Note   Chief Complaint   Follow-up    Velma Riggins is a 69 y.o. female     1. Hyperlipidemia, unspecified hyperlipidemia type  Assessment & Plan:  Previously on atorvastatin 80 but was causing muscle aches  Tried Zetia shortly after stopping atorvastatin but stopped medication due to still having symptoms  Willing to retry Zetia  Zetia sent to pharmacy  Previous medications: Atorvastatin (80, muscle aches)  Orders:  -     LIPID PANEL; Future  -     MICROALBUMIN, UR, RAND W/ MICROALB/CREAT RATIO; Future  -     ezetimibe (ZETIA) 10 mg tablet; Take 1 Tablet by mouth daily. , Normal, Disp-90 Tablet, R-1  2. Moderate episode of recurrent major depressive disorder [facility])  Assessment & Plan:  Last visit had increased Wellbutrin  Doesn't think wellbutrin did much and then started sweating so she thought it was wellbutrin so stopped  Doing better now that she has retired and does not feel she needs any extra medication  Continue Cymbalta 60 daily  Orders:  -     DULoxetine (CYMBALTA) 60 mg capsule; Take 1 Capsule by mouth daily. , Normal, Disp-90 Capsule, R-3  3. Type 2 diabetes mellitus with peripheral vascular disease ([facility]  Assessment & Plan:  See diabetes note above  Orders:  -     HEMOGLOBIN A1C WITH EAG; Future  4. Ectopic atrial tachycardia (HCC)  Assessment & Plan:   monitored by specialist. No acute findings meriting change in the plan  5. Type 2 diabetes mellitus with diabetic polyneuropathy, without long-term current use of insulin ([facility]  Assessment & Plan:   unclear control, continue current medications pending work up below   Check A1c today  Has lost weight since her last visit  Continue Januvia 100, metformin 500 twice a day  Counseled on good foot care today  Orders:  -     metFORMIN (GLUCOPHAGE) 500 mg tablet; Take 1 Tablet by mouth two (2) times daily (with meals). , Normal, Disp-180 Tablet, R-3  -     SITagliptin (Januvia) 100 mg tablet; Take 1 Tablet by mouth daily. , Normal, Disp-90 Tablet, R-3  6.  Essential hypertension  Assessment & Plan:   well controlled, continue current medications   Verapamil 180, hydrochlorothiazide 20, valsartan 160  Orders:  -     METABOLIC PANEL, COMPREHENSIVE; Future  -     hydroCHLOROthiazide (HYDRODIURIL) 25 mg tablet; Take 1 Tablet by mouth daily. Indications: high blood pressure, Normal, Disp-90 Tablet, R-3  -     valsartan (DIOVAN) 160 mg tablet; Take 1 Tablet by mouth daily. Indications: high blood pressure, Normal, Disp-90 Tablet, R-3  7. Encounter for immunization  -     PNEUMOCOCCAL, PPSV23, (AGE 2 YRS+), SC/IM  -     ADMIN PNEUMOCOCCAL VACCINE  8. Abnormal CBC  Assessment & Plan:  Nucleated RBCs noted in previous CBC. Rechecked  Per heme-onc, refer back if abnormal.  Bone marrow biopsy of pancytopenia  Orders:  -     CBC W/O DIFF; Future  -     IRON PROFILE; Future  -     FERRITIN; Future  9. Osteopenia of other site  Assessment & Plan:  Osteopenia  Had stoped cholecalciferol because of verapamil  Not on actonel  Check DEXA. Okay to continue vitamin D. Check vitamin D level  Orders:  -     VITAMIN D, 25 HYDROXY; Future  -     DEXA BONE DENSITY STUDY AXIAL; Future  10. Gastroesophageal reflux disease without esophagitis  Assessment & Plan:   Did speech therapy for dysphonia felt secondary to GERD  ENT had increased to omeprazole to twice a day due to erythema on vocal cords  Getting better  Currently takes Omeprazole takes 20mg in the morning and 40 in the evening, cont no changes  11. Overweight  Assessment & Plan:   Using noom   Has lost 20 lbs   Continue working on healthy habits  12. Fatigue, unspecified type  Assessment & Plan:  Better since group home  15. Polymyalgia rheumatica (HCC)  Assessment & Plan:   monitored by specialist. No acute findings meriting change in the plan   On Actemra, methotrexate, prednisone  Dr. Bhavana Edmondson, risks, possible drug interactions, and side effects of all new medications were reviewed with the patient.  Pt verbalized understanding. 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 Douglas Wilkinson MD  [facility]  9/30/2022    Future Appointments   Date Time Provider Aggie Zapata   1/30/2023  2:00 PM Vanessa Kerr MD [facility] BS AMB   5/68/6581  5:05 PM Richelle Millan MD [facility] BS AMB        Objective   Vitals:       Visit Vitals  /78 (BP 1 Location: Left upper arm, BP Patient Position: Sitting, BP Cuff Size: Adult)   Pulse 86   Temp 98 °F (36.7 °C) (Oral)   Resp 16   Ht 5' 7\" (1.702 m)   Wt 188 lb (85.3 kg)   SpO2 98%   BMI 29.44 kg/m²        Physical Exam  Constitutional:       Appearance: Normal appearance. She is not ill-appearing. Cardiovascular:      Rate and Rhythm: Normal rate and regular rhythm. Pulses:           Dorsalis pedis pulses are 2+ on the right side and 2+ on the left side. Posterior tibial pulses are 2+ on the right side and 2+ on the left side. Heart sounds: No murmur heard. No friction rub. No gallop. Pulmonary:      Effort: No respiratory distress. Breath sounds: Normal breath sounds. No wheezing, rhonchi or rales. Musculoskeletal:      Right foot: Normal range of motion. No deformity. Left foot: Normal range of motion. No deformity. Feet:      Right foot:      Protective Sensation: 4 sites tested. 4 sites sensed. Skin integrity: Skin integrity normal.      Toenail Condition: Right toenails are normal.      Left foot:      Protective Sensation: 4 sites tested. 4 sites sensed. Skin integrity: Skin integrity normal.      Toenail Condition: Left toenails are normal.   Neurological:      Mental Status: She is alert. Current Outpatient Medications   Medication Sig    Omeprazole delayed release (PRILOSEC D/R) 20 mg tablet Take  by mouth.  20mg am, 40mg pm    ezetimibe (ZETIA) 10 mg tablet Take 1 Tablet by mouth daily. hydroCHLOROthiazide (HYDRODIURIL) 25 mg tablet Take 1 Tablet by mouth daily. Indications: high blood pressure    metFORMIN (GLUCOPHAGE) 500 mg tablet Take 1 Tablet by mouth two (2) times daily (with meals). SITagliptin (Januvia) 100 mg tablet Take 1 Tablet by mouth daily. DULoxetine (CYMBALTA) 60 mg capsule Take 1 Capsule by mouth daily. valsartan (DIOVAN) 160 mg tablet Take 1 Tablet by mouth daily. Indications: high blood pressure    Actemra 162 mg/0.9 mL syringe Give 1 injection (162mg) subcutaneously every 7 days. methotrexate (RHEUMATREX) 2.5 mg tablet Take 6 Tablets by mouth every Sunday. folic acid (FOLVITE) 1 mg tablet Take 1 Tablet by mouth in the morning. predniSONE (DELTASONE) 5 mg tablet Take 1 Tablet by mouth in the morning. verapamil ER (CALAN-SR) 180 mg CR tablet Take 1 Tablet by mouth nightly. predniSONE (DELTASONE) 1 mg tablet TAKE 4 TABLETS BY MOUTH EVERY DAY    colestipoL (COLESTID) 1 gram tablet     budesonide-formoteroL (SYMBICORT) 160-4.5 mcg/actuation HFAA Take 2 Puffs by inhalation two (2) times a day. estradiol (ESTRACE) 1 mg tablet Take 1 Tab by mouth daily. omega-3 fatty acids-vitamin e 1,000 mg cap Take 1 Cap by mouth two (2) times a day. multivitamin (ONE A DAY) tablet Take 1 Tab by mouth daily. Cholecalciferol, Vitamin D3, 25 mcg (1,000 unit) chew Take 1 Cap by mouth daily. (Patient not taking: No sig reported)     No current facility-administered medications for this visit.

## 2022-09-30 NOTE — ASSESSMENT & PLAN NOTE
well controlled, continue current medications   Verapamil 180, hydrochlorothiazide 20, valsartan 160

## 2022-09-30 NOTE — ASSESSMENT & PLAN NOTE
monitored by specialist. No acute findings meriting change in the plan   On Actemra, methotrexate, prednisone  Dr. Tang Self

## 2022-10-01 LAB
25(OH)D3 SERPL-MCNC: 32.7 NG/ML (ref 30–100)
ALBUMIN SERPL-MCNC: 4.1 G/DL (ref 3.5–5)
ALBUMIN/GLOB SERPL: 1.4 {RATIO} (ref 1.1–2.2)
ALP SERPL-CCNC: 44 U/L (ref 45–117)
ALT SERPL-CCNC: 42 U/L (ref 12–78)
ANION GAP SERPL CALC-SCNC: 8 MMOL/L (ref 5–15)
AST SERPL-CCNC: 25 U/L (ref 15–37)
BILIRUB SERPL-MCNC: 0.4 MG/DL (ref 0.2–1)
BUN SERPL-MCNC: 18 MG/DL (ref 6–20)
BUN/CREAT SERPL: 23 (ref 12–20)
CALCIUM SERPL-MCNC: 9.6 MG/DL (ref 8.5–10.1)
CHLORIDE SERPL-SCNC: 100 MMOL/L (ref 97–108)
CHOLEST SERPL-MCNC: 324 MG/DL
CO2 SERPL-SCNC: 28 MMOL/L (ref 21–32)
CREAT SERPL-MCNC: 0.77 MG/DL (ref 0.55–1.02)
CREAT UR-MCNC: 119 MG/DL
ERYTHROCYTE [DISTWIDTH] IN BLOOD BY AUTOMATED COUNT: 14.2 % (ref 11.5–14.5)
EST. AVERAGE GLUCOSE BLD GHB EST-MCNC: 146 MG/DL
FERRITIN SERPL-MCNC: 226 NG/ML (ref 26–388)
GLOBULIN SER CALC-MCNC: 3 G/DL (ref 2–4)
GLUCOSE SERPL-MCNC: 156 MG/DL (ref 65–100)
HBA1C MFR BLD: 6.7 % (ref 4–5.6)
HCT VFR BLD AUTO: 41 % (ref 35–47)
HDLC SERPL-MCNC: 49 MG/DL
HDLC SERPL: 6.6 {RATIO} (ref 0–5)
HGB BLD-MCNC: 14.2 G/DL (ref 11.5–16)
IRON SATN MFR SERPL: 48 % (ref 20–50)
IRON SERPL-MCNC: 155 UG/DL (ref 35–150)
LDLC SERPL CALC-MCNC: 211 MG/DL (ref 0–100)
MCH RBC QN AUTO: 33.3 PG (ref 26–34)
MCHC RBC AUTO-ENTMCNC: 34.6 G/DL (ref 30–36.5)
MCV RBC AUTO: 96 FL (ref 80–99)
MICROALBUMIN UR-MCNC: 1.02 MG/DL
MICROALBUMIN/CREAT UR-RTO: 9 MG/G (ref 0–30)
NRBC # BLD: 0 K/UL (ref 0–0.01)
NRBC BLD-RTO: 0 PER 100 WBC
PLATELET # BLD AUTO: 328 K/UL (ref 150–400)
PMV BLD AUTO: 10.7 FL (ref 8.9–12.9)
POTASSIUM SERPL-SCNC: 4 MMOL/L (ref 3.5–5.1)
PROT SERPL-MCNC: 7.1 G/DL (ref 6.4–8.2)
RBC # BLD AUTO: 4.27 M/UL (ref 3.8–5.2)
SODIUM SERPL-SCNC: 136 MMOL/L (ref 136–145)
TIBC SERPL-MCNC: 323 UG/DL (ref 250–450)
TRIGL SERPL-MCNC: 320 MG/DL (ref ?–150)
VLDLC SERPL CALC-MCNC: 64 MG/DL
WBC # BLD AUTO: 4.3 K/UL (ref 3.6–11)

## 2022-10-03 NOTE — PROGRESS NOTES
[employer] message sent. Ferritin 226. Vitamin D32.7. No microalbuminuria. . A1c 6.7.   CMP normal.  CBC normal    Zetia started during visit  A1c at goal

## 2022-11-01 DIAGNOSIS — M35.3 POLYMYALGIA RHEUMATICA (HCC): ICD-10-CM

## 2022-11-01 RX ORDER — PREDNISONE 5 MG/1
TABLET ORAL
Qty: 90 TABLET | Refills: 1 | Status: SHIPPED | OUTPATIENT
Start: 2022-11-01

## 2022-11-10 ENCOUNTER — TELEPHONE (OUTPATIENT)
Dept: INTERNAL MEDICINE CLINIC | Age: 67
End: 2022-11-10

## 2022-11-10 NOTE — TELEPHONE ENCOUNTER
Nurse called and lvm to replay follow message regarding scheduling dexa. Left number to scheduling for patient to call. PROVIDER FEEDBACK LOOP CALLED 3X  Patient:Genia Aragon  : 1955  Referring Provider: Amy Jordan  Referral Type: Radiology Services  Procedures:  [id number] - DEXA BONE DENSITY STUDY AXIAL  Date Service Ordered 2022  [facility] ordered by your office after 3 call  attempts. Please call your patient to explain significance of ordered test and direct  patient to call Central Scheduling to re-schedule test at their earliest convenience. Please complete one of the following actions from Quick Actions buttons:  Route to Provider: Route message to ordering provider to seek next steps in care plan. Telephone Encounter: Telephone encounter will open.  Call patient to explain  significance of ordered test and direct patient to call Central Scheduling to schedule test

## 2022-12-21 ENCOUNTER — HOSPITAL ENCOUNTER (OUTPATIENT)
Dept: MAMMOGRAPHY | Age: 67
Discharge: HOME | End: 2022-12-21
Attending: INTERNAL MEDICINE
Payer: COMMERCIAL

## 2022-12-21 DIAGNOSIS — M85.88 OSTEOPENIA OF OTHER SITE: ICD-10-CM

## 2022-12-21 PROCEDURE — 77080 DXA BONE DENSITY AXIAL: CPT

## 2023-01-17 DIAGNOSIS — I10 BENIGN ESSENTIAL HYPERTENSION: ICD-10-CM

## 2023-01-18 RX ORDER — VERAPAMIL HYDROCHLORIDE 180 MG/1
180 TABLET, EXTENDED RELEASE ORAL
Qty: 90 TABLET | Refills: 3 | Status: SHIPPED | OUTPATIENT
Start: 2023-01-18

## 2023-01-24 RX ORDER — TOCILIZUMAB 180 MG/ML
INJECTION, SOLUTION SUBCUTANEOUS
Qty: 4 ML | Refills: 3 | Status: ACTIVE | OUTPATIENT
Start: 2023-01-24

## 2023-01-24 RX ORDER — TOCILIZUMAB 180 MG/ML
INJECTION, SOLUTION SUBCUTANEOUS
Qty: 1.8 ML | Refills: 3 | Status: SHIPPED | OUTPATIENT
Start: 2023-01-24 | End: 2023-01-24 | Stop reason: SDUPTHER

## 2023-01-24 NOTE — TELEPHONE ENCOUNTER
Last visit 07/27/22  Last Lab  Lab Results   Component Value Date/Time    Sodium 136 09/30/2022 03:48 PM    Potassium 4.0 09/30/2022 03:48 PM    Chloride 100 09/30/2022 03:48 PM    CO2 28 09/30/2022 03:48 PM    Anion gap 8 09/30/2022 03:48 PM    Glucose 156 (H) 09/30/2022 03:48 PM    BUN 18 09/30/2022 03:48 PM    Creatinine 0.77 09/30/2022 03:48 PM    BUN/Creatinine ratio 23 (H) 09/30/2022 03:48 PM    GFR est AA >60 09/30/2022 03:48 PM    GFR est non-AA >60 09/30/2022 03:48 PM    Calcium 9.6 09/30/2022 03:48 PM    Bilirubin, total 0.4 09/30/2022 03:48 PM    Alk.  phosphatase 44 (L) 09/30/2022 03:48 PM    Protein, total 7.1 09/30/2022 03:48 PM    Albumin 4.1 09/30/2022 03:48 PM    Globulin 3.0 09/30/2022 03:48 PM    A-G Ratio 1.4 09/30/2022 03:48 PM    ALT (SGPT) 42 09/30/2022 03:48 PM    AST (SGOT) 25 09/30/2022 03:48 PM     Lab Results   Component Value Date/Time    WBC 4.3 09/30/2022 03:48 PM    Hemoglobin (POC) 13.9 07/25/2014 11:52 PM    HGB 14.2 09/30/2022 03:48 PM    Hematocrit (POC) 41 07/25/2014 11:52 PM    HCT 41.0 09/30/2022 03:48 PM    PLATELET 018 64/00/5723 03:48 PM    MCV 96.0 09/30/2022 03:48 PM

## 2023-01-24 NOTE — PROGRESS NOTES
[facility] Update    Date: 01/24/23    Approved [facility]. Pharmacy will inform patient and provide them with dispensing pharmacy's phone number. Please call us with any questions at  [phone].

## 2023-01-30 ENCOUNTER — OFFICE VISIT (OUTPATIENT)
Dept: RHEUMATOLOGY | Age: 68
End: 2023-01-30
Payer: MEDICARE

## 2023-01-30 VITALS
DIASTOLIC BLOOD PRESSURE: 79 MMHG | RESPIRATION RATE: 16 BRPM | SYSTOLIC BLOOD PRESSURE: 165 MMHG | WEIGHT: 191 LBS | HEART RATE: 95 BPM | BODY MASS INDEX: 29.91 KG/M2 | OXYGEN SATURATION: 96 % | TEMPERATURE: 98.5 F

## 2023-01-30 DIAGNOSIS — M31.6 TEMPORAL ARTERITIS (HCC): ICD-10-CM

## 2023-01-30 DIAGNOSIS — M35.3 POLYMYALGIA RHEUMATICA (HCC): Primary | ICD-10-CM

## 2023-01-30 PROCEDURE — 96372 THER/PROPH/DIAG INJ SC/IM: CPT | Performed by: PEDIATRICS

## 2023-01-30 PROCEDURE — 3074F SYST BP LT 130 MM HG: CPT | Performed by: PEDIATRICS

## 2023-01-30 PROCEDURE — 1090F PRES/ABSN URINE INCON ASSESS: CPT | Performed by: PEDIATRICS

## 2023-01-30 PROCEDURE — 3078F DIAST BP <80 MM HG: CPT | Performed by: PEDIATRICS

## 2023-01-30 PROCEDURE — G8399 PT W/DXA RESULTS DOCUMENT: HCPCS | Performed by: PEDIATRICS

## 2023-01-30 PROCEDURE — 1101F PT FALLS ASSESS-DOCD LE1/YR: CPT | Performed by: PEDIATRICS

## 2023-01-30 PROCEDURE — 99214 OFFICE O/P EST MOD 30 MIN: CPT | Performed by: PEDIATRICS

## 2023-01-30 PROCEDURE — 3017F COLORECTAL CA SCREEN DOC REV: CPT | Performed by: PEDIATRICS

## 2023-01-30 PROCEDURE — G8417 CALC BMI ABV UP PARAM F/U: HCPCS | Performed by: PEDIATRICS

## 2023-01-30 PROCEDURE — G9899 SCRN MAM PERF RSLTS DOC: HCPCS | Performed by: PEDIATRICS

## 2023-01-30 PROCEDURE — G0463 HOSPITAL OUTPT CLINIC VISIT: HCPCS | Performed by: PEDIATRICS

## 2023-01-30 PROCEDURE — G8536 NO DOC ELDER MAL SCRN: HCPCS | Performed by: PEDIATRICS

## 2023-01-30 PROCEDURE — G8427 DOCREV CUR MEDS BY ELIG CLIN: HCPCS | Performed by: PEDIATRICS

## 2023-01-30 PROCEDURE — G9717 DOC PT DX DEP/BP F/U NT REQ: HCPCS | Performed by: PEDIATRICS

## 2023-01-30 PROCEDURE — 1123F ACP DISCUSS/DSCN MKR DOCD: CPT | Performed by: PEDIATRICS

## 2023-01-30 RX ORDER — CHOLECALCIFEROL (VITAMIN D3) 50 MCG
CAPSULE ORAL
COMMUNITY

## 2023-01-30 RX ORDER — TRIAMCINOLONE ACETONIDE 40 MG/ML
80 INJECTION, SUSPENSION INTRA-ARTICULAR; INTRAMUSCULAR ONCE
Status: COMPLETED | OUTPATIENT
Start: 2023-01-30 | End: 2023-02-01

## 2023-01-30 NOTE — PROGRESS NOTES
RHEUMATOLOGY PROBLEM LIST AND CHIEF COMPLAINT  1. GCA/PMR - HAs, left temporal artery discomfort, sudden loss of vision, arthralgia of shoulder, TMJ, hands, feet, response to steroids    Therapy History:  Current DMARDs: Methotrexate (1/2017 - 4/2018, restarted 3/2019-current), Altamease Panfilo (ordered 1/2023)  Prior DMARDs: Actemra (2/2018, stopped for 1 month; 3/2018-1/2023)  Covid-19 vaccination (+, Pfizer 12/21, 1/12, 9/21)    INTERVAL HISTORY  This is a 69 y.o.  female. Today, the patient complains of no pain in the joints. Location: generalized   Severity: 7 on a scale of 0-10   Timing: intermittent   Duration: 2 months  Context/Associated signs and symptoms: In November the patient started to have severe burning pain in her shoulders, hands, hips, knees, and feet. She was also have difficulty walking and with fine motor activity. Her hands felt swollen and feet were tender to touch. She then increased prednisone to 10 mg which did not help with symptoms. She increased up to 20 mg which did help to improve pain. She stayed on 20 mg daily for 3 days and then went back down to 10 mg daily. She is currently taking prednisone 7 mg daily. She is also on methotrexate 15 mg PO weekly and Actemra subQ weekly. She notes that she missed a couple doses of Actemra in November. She states that the day after taking Actemra she feels like her joint pain is the worst. She continues to have pain and swelling in her toes and has had to only wear certain shoes due to pain.      RHEUMATOLOGY REVIEW OF SYSTEMS   Positives as per history  Negatives as follows:  Johnney Seip:  Denies unexplained persistent fevers, weight change  HEAD/EYES:   Denies eye redness, blurry vision or sudden loss of vision, dry eyes, HA, temporal artery pain  ENT:    Denies oral/nasal ulcers, recurrent sinus infections, dry mouth  RESPIRATORY:  No pleuritic pain, history of pleural effusions, hemoptysis  CARDIOVASCULAR:  Denies chest pain, history of pericardial effusions  GASTRO:   Denies heartburn, esophageal dysmotility, abdominal pain, nausea, vomiting, diarrhea, blood in the stool  HEMATOLOGIC:  No easy bruising, purpura, swollen lymph nodes  SKIN:    Denies alopecia, ulcers, nodules, sun sensitivity, unexplained persistent rash   VASCULAR:   Denies cyanosis, raynaud phenomenon  NEUROLOGIC:  Denies specific muscle weakness, paresthesias   PSYCHIATRIC:  No sleep disturbance / snoring, depression, anxiety  MSK:    No morning stiffness >1 hour, SI joint pain    PAST MEDICAL HISTORY  Reviewed with patient, significant changes in medical history - no    PHYSICAL EXAM  Blood pressure (!) 165/79, pulse 95, temperature 98.5 °F (36.9 °C), temperature source Oral, resp. rate 16, weight 191 lb (86.6 kg), SpO2 96 %. GENERAL APPEARANCE: Well-nourished adult in no acute distress. EYES: No scleral erythema, conjunctival injection. ENT: No oral ulcer, parotid enlargement  NECK: No adenopathy, thyroid enlargement. CARDIOVASCULAR: Heart rhythm is regular. No murmur, rub, gallop. CHEST: Normal vesicular breath sounds. No wheezes, rales, pleural friction rubs. ABDOMINAL: The abdomen is soft and nontender. Liver and spleen are nonpalpable. Bowel sounds are normal.  EXTREMITIES: There is no evidence of clubbing, cyanosis, edema. SKIN: No rash, palpable purpura, digital ulcer, abnormal thickening. NEUROLOGICAL: Normal gait and station, full strength in upper and lower extremities, normal sensation to light touch. MUSCULOSKELETAL:  Upper extremities -  Right shoulder crepitus - unchanged. Lower extremities - full range of motion, no tenderness, no swelling, no synovial thickening and no deformity of joints except for B/L knees crepitus (R>L). Positive MTP squeeze. Some puffiness of her toes where they are flexed. LABS, RADIOLOGY AND PROCEDURES   Previous labs reviewed -Yes    ASSESSMENT  1.  Rheumatoid arthritis with GCA/PMR-(is unchanged)- The patient notes worsening joint pain mainly in her feet. We discussed the possibility that she formed antibodies to Actemra since she feels worse the day after taking medication. We will seek approval for her to switch to Kevzara subQ 200 mg q2 weeks. I also gave her an injection of 80 mg Kenalog IM to help reduce current inflammation. She should continue with methotrexate 15 mg PO weekly. Labs not needed today. 2. Bone Health - Most recent bone density (9/2019) showed normal done density with an improvement in lumbar spine. The patient continues on Actonel. The patient will continue to taper Prednisone as tolerated. Remains managed by primary care provider. - unchanged  3. Drug therapy monitoring for toxicity (Actemra, Methotrexate) - CBC, BUN, Cr, AST, ALT and albumin every 4 months    PLAN  1. Seek approval for Kevzara subQ 200 mg q2 weeks  2. Stop Actemra SQ weekly  3. Methotrexate 15 mg PO weekly  4. 80 mg kenalog IM  5. Return in 2 months    Shai Emery MD  Adult and Pediatric Rheumatology     [facility], [address] [facility] [facility], Phone [phone], Fax [phone]     Visiting  of Pediatrics    Department of Pediatrics, [facility] of [address] [place] [address], [address] [place] [address], Phone [phone], Fax [phone]    There are no Patient Instructions on file for this visit. cc:  MD Jewel Adame (Ophthalmology)     Vasyl Osullivan MD, personally performed the services described in the documentation as scribed by Deejay Nascimento in my presence and have reviewed and agree with the note as scribed.      [facility]  OFFICE PROCEDURE PROGRESS NOTE        Chart reviewed for the following:   Vasyl Osullivan MD, have reviewed the History, Physical and updated the Allergic reactions for [address] performed immediately prior to start of procedure:   Vasyl Osullivan MD, have performed the following reviews on Aquilino Rodrigues prior to the start of the procedure:            * Patient was identified by name and date of birth   * Agreement on procedure being performed was verified  * Risks and Benefits explained to the patient  * Procedure site verified and marked as necessary  * Patient was positioned for comfort  * Consent was signed and verified     Time: 2:35 PM       Date of procedure: 1/30/2023    Procedure performed by: Sparkle Lima MD    Provider assisted by: None     Patient assisted by: Self    How tolerated by patient: Tolerated the procedure well with no complaints    Comments: none    PROCEDURE  After consent was obtained, using sterile technique the left deltoid was prepped and Kenalog 80 mg was then injected and the needle withdrawn. The procedure was well tolerated. The patient is asked to continue to rest for 24 hours before resuming regular activities. It may be more painful for the first 1-2 days. Watch for fever, or increased swelling or persistent pain. Call or return to clinic prn if such symptoms occur.

## 2023-01-30 NOTE — PROGRESS NOTES
Chief Complaint   Patient presents with    Joint Pain     1. Have you been to the ER, urgent care clinic since your last visit? Hospitalized since your last visit? No    2. Have you seen or consulted any other health care providers outside of the [address] [place] [address] since your last visit? Include any pap smears or colon screening.  No

## 2023-02-01 ENCOUNTER — APPOINTMENT (OUTPATIENT)
Dept: ULTRASOUND IMAGING | Age: 68
End: 2023-02-01
Attending: EMERGENCY MEDICINE
Payer: COMMERCIAL

## 2023-02-01 ENCOUNTER — APPOINTMENT (OUTPATIENT)
Dept: GENERAL RADIOLOGY | Age: 68
End: 2023-02-01
Attending: EMERGENCY MEDICINE
Payer: COMMERCIAL

## 2023-02-01 ENCOUNTER — HOSPITAL ENCOUNTER (EMERGENCY)
Age: 68
Discharge: HOME | End: 2023-02-01
Attending: EMERGENCY MEDICINE
Payer: COMMERCIAL

## 2023-02-01 VITALS
RESPIRATION RATE: 20 BRPM | TEMPERATURE: 98.4 F | SYSTOLIC BLOOD PRESSURE: 143 MMHG | OXYGEN SATURATION: 95 % | HEIGHT: 67 IN | WEIGHT: 191.8 LBS | HEART RATE: 89 BPM | BODY MASS INDEX: 30.1 KG/M2 | DIASTOLIC BLOOD PRESSURE: 66 MMHG

## 2023-02-01 DIAGNOSIS — M25.559 HIP PAIN: Primary | ICD-10-CM

## 2023-02-01 PROCEDURE — 99284 EMERGENCY DEPT VISIT MOD MDM: CPT

## 2023-02-01 PROCEDURE — 74011250637 HC RX REV CODE- 250/637: Performed by: EMERGENCY MEDICINE

## 2023-02-01 PROCEDURE — 73502 X-RAY EXAM HIP UNI 2-3 VIEWS: CPT

## 2023-02-01 PROCEDURE — 93971 EXTREMITY STUDY: CPT

## 2023-02-01 RX ORDER — HYDROCODONE BITARTRATE AND ACETAMINOPHEN 5; 325 MG/1; MG/1
1 TABLET ORAL
Qty: 12 TABLET | Refills: 0 | Status: SHIPPED | OUTPATIENT
Start: 2023-02-01 | End: 2023-02-01 | Stop reason: SDUPTHER

## 2023-02-01 RX ORDER — HYDROCODONE BITARTRATE AND ACETAMINOPHEN 5; 325 MG/1; MG/1
1 TABLET ORAL
Status: COMPLETED | OUTPATIENT
Start: 2023-02-01 | End: 2023-02-01

## 2023-02-01 RX ORDER — HYDROCODONE BITARTRATE AND ACETAMINOPHEN 5; 325 MG/1; MG/1
1 TABLET ORAL
Qty: 12 TABLET | Refills: 0 | Status: SHIPPED | OUTPATIENT
Start: 2023-02-01 | End: 2023-02-04

## 2023-02-01 RX ADMIN — HYDROCODONE BITARTRATE AND ACETAMINOPHEN 1 TABLET: 5; 325 TABLET ORAL at 17:06

## 2023-02-01 RX ADMIN — TRIAMCINOLONE ACETONIDE 80 MG: 40 SUSPENSION INTRA-ARTERIAL; INTRAMUSCULAR at 09:25

## 2023-02-01 NOTE — DISCHARGE INSTRUCTIONS
Thank you for allowing us to provide you with medical care today. We realize that you have many choices for your emergency care needs. We thank you for choosing [facility]. Please choose us in the future for any continued health care needs. The exam and treatment you received in the Emergency Department were for an emergent problem and are not intended as complete care. It is important that you follow up with a doctor, nurse practitioner, or physician's assistant for ongoing care. If your symptoms worsen or you do not improve as expected and you are unable to reach your usual health care provider, you should return to the Emergency Department. We are available 24 hours a day. Please make an appointment with your health care provider(s) for follow up of your Emergency Department visit. Take this sheet with you when you go to your follow-up visit.

## 2023-02-01 NOTE — ED PROVIDER NOTES
55-year-old female with a history of rheumatoid arthritis presents with right hip pain radiating down to the thigh. She denies trauma. She was seen by her rheumatologist and started on high-dose steroids. Pain is worse with ambulation.        Past Medical History:   Diagnosis Date    Asthma     childhood    Atypical mole     Chronic pain     shoulders/knees    DDD (degenerative disc disease), cervical     Degenerative arthritis of right knee 2014    Dr. King Hem    Ectopic atrial tachycardia ([address] 2020    Gastric nodule     gastric nodules on endoscopy 3/26/12    GERD (gastroesophageal reflux disease)     PUD (peptic ulcer disease)     Sleep apnea     uses cpap    SVT (supraventricular tachycardia) ([address]     Temporal arteritis ([address] 16    Dr. Geno Wick    Thoracic outlet syndrome     left, Dr. Noemí Koo    Vitamin D deficiency     Vulvar high-grade squamous intraepithelial lesion (HGSIL)        Past Surgical History:   Procedure Laterality Date    COLONOSCOPY N/A 2017    COLONOSCOPY performed by Ermelinda Khoury MD at [facility] ENDOSCOPY    COLONOSCOPY N/A 2021    COLONOSCOPY, EGD   :- performed by Lola Campbell MD at [facility] ENDOSCOPY    [address] [facility]      several breast biopsies (benign)    HX BREAST BIOPSY Bilateral , , ,    6-7 on R, 2 on L+all benign    HX BUNIONECTOMY      bilateral    HX  SECTION      x3    HX CHOLECYSTECTOMY  2009    HX COLONOSCOPY      normal, f/u in 10 yrs    HX DILATION AND CURETTAGE      [id number], several D&C's    HX ENDOSCOPY      x3, h/o gastric polyp removal 3/2012    HX GI      cholecystectomy    HX GYN  2018    OZZIE    HX HEART CATHETERIZATION      x2, most recent 2012 was normal    HX KNEE ARTHROSCOPY Right     HX OTHER SURGICAL      lipoma removal    HX OTHER SURGICAL  16    L temporal artery bx (Dr. Magda Turk)    HX AZAEL AND BSO      secondary to endometrial hyperplasia with atypical changes    HX TONSILLECTOMY T & A         Family History:   Problem Relation Age of Onset    Heart Disease Mother     Diabetes Mother     Cancer Mother 40        breast    Thyroid Disease Mother         hashimotos    Hypertension Mother     Osteoporosis Mother     Heart Surgery Mother         aortic valve replacement    Kidney Disease Mother     Gout Mother     Cancer Father 79        gastric    Heart Disease Father     Hypertension Father     Thyroid Disease Sister         hashimotos    Other Sister         bipolar    Other Sister         depression    Heart Disease Brother     Other Brother         mental illness    Breast Cancer Maternal Aunt 44         from breast cancer    Breast Cancer Maternal Aunt 39        and recurred at 80    Breast Cancer Maternal Aunt     Diabetes Paternal Grandmother     Psychiatric Disorder Daughter         depression    Psychiatric Disorder Daughter         depression    Psychiatric Disorder Son         bipolar    Osteoporosis Other         maternal aunts    Ovarian Cancer Neg Hx     Pancreatic Cancer Neg Hx     Prostate Cancer Neg Hx        Social History     Socioeconomic History    Marital status:      Spouse name: Not on file    Number of children: Not on file    Years of education: Not on file    Highest education level: Not on file   Occupational History    Not on file   Tobacco Use    Smoking status: Never    Smokeless tobacco: Never   Vaping Use    Vaping Use: Never used   Substance and Sexual Activity    Alcohol use: No     Alcohol/week: 0.0 standard drinks    Drug use: No    Sexual activity: Yes     Partners: Male   Other Topics Concern    Not on file   Social History Narrative    Not on file     Social Determinants of Health     Financial Resource Strain: Not on file   Food Insecurity: Not on file   Transportation Needs: Not on file   Physical Activity: Not on file   Stress: Not on file   Social Connections: Not on file   Intimate Partner Violence: Not on file   Housing Stability: Not on file         ALLERGIES: Elfego flavor, Alendronate, Morphine, Statins-hmg-coa reductase inhibitors, Valium [diazepam], and Versed [midazolam]    Review of Systems   Constitutional:  Negative for fever. Musculoskeletal:  Positive for arthralgias. There were no vitals filed for this visit. Physical Exam  Vitals and nursing note reviewed. Constitutional:       General: She is not in acute distress. Appearance: Normal appearance. She is not ill-appearing, toxic-appearing or diaphoretic. HENT:      Head: Normocephalic and atraumatic. Eyes:      Extraocular Movements: Extraocular movements intact. Cardiovascular:      Rate and Rhythm: Normal rate. Pulses: Normal pulses. Pulmonary:      Effort: Pulmonary effort is normal. No respiratory distress. Abdominal:      General: There is no distension. Musculoskeletal:         General: Normal range of motion. Cervical back: Normal range of motion. Skin:     General: Skin is dry. Neurological:      Mental Status: She is alert and oriented to person, place, and time. Psychiatric:         Mood and Affect: Mood normal.        Medical Decision Making  Patient presents with right hip pain. Differential include but not limited to musculoskeletal pain, arthritis, DVT. Duplex shows no DVT. I have independently reviewed the obtained radiographic images and note no fracture on the right hip x-ray. Will await formal radiology read. Radiology reads right hip. X-ray has multifocal arthritis. No fracture. Patient treated with 1 dose of oral Norco.  Will discharge with short course of [place] and recommend orthopedic surgery follow-up. Discussed my clinical impression(s), any labs and/or radiology results with the patient. I answered any questions and addressed any concerns. Discussed the importance of following up with their primary care physician and/or specialist(s).  Discussed signs or symptoms that would warrant return back to the ER for further evaluation. The patient is agreeable with discharge. Amount and/or Complexity of Data Reviewed  Radiology: ordered. ECG/medicine tests: ordered. Risk  Prescription drug management.            Procedures

## 2023-02-01 NOTE — ED TRIAGE NOTES
Pt reports right hip pain beginning on Monday. Pt has joint pain from rheumatoid arthritis, saw her rheumatologist yesterday and was given a Kenolog injection in her shoulder for overall flareup. Pt reports continuing hip pain.  Pt denies known injury

## 2023-02-07 ENCOUNTER — TELEPHONE (OUTPATIENT)
Dept: RHEUMATOLOGY | Age: 68
End: 2023-02-07

## 2023-02-07 NOTE — TELEPHONE ENCOUNTER
Spoke to Pharmacist Shi Tan at [facility] gave a verbal for the patient Monica Wright verbally acknowledged understanding and stated that she will process the script

## 2023-02-10 ENCOUNTER — OFFICE VISIT (OUTPATIENT)
Dept: ORTHOPEDIC SURGERY | Age: 68
End: 2023-02-10
Payer: MEDICARE

## 2023-02-10 VITALS — HEIGHT: 67 IN | BODY MASS INDEX: 29.98 KG/M2 | WEIGHT: 191 LBS

## 2023-02-10 DIAGNOSIS — M54.16 LUMBAR RADICULOPATHY, RIGHT: Primary | ICD-10-CM

## 2023-02-10 DIAGNOSIS — F33.1 MODERATE EPISODE OF RECURRENT MAJOR DEPRESSIVE DISORDER (HCC): Primary | ICD-10-CM

## 2023-02-10 PROCEDURE — G9717 DOC PT DX DEP/BP F/U NT REQ: HCPCS | Performed by: ORTHOPAEDIC SURGERY

## 2023-02-10 PROCEDURE — 3017F COLORECTAL CA SCREEN DOC REV: CPT | Performed by: ORTHOPAEDIC SURGERY

## 2023-02-10 PROCEDURE — G8536 NO DOC ELDER MAL SCRN: HCPCS | Performed by: ORTHOPAEDIC SURGERY

## 2023-02-10 PROCEDURE — 1090F PRES/ABSN URINE INCON ASSESS: CPT | Performed by: ORTHOPAEDIC SURGERY

## 2023-02-10 PROCEDURE — 99204 OFFICE O/P NEW MOD 45 MIN: CPT | Performed by: ORTHOPAEDIC SURGERY

## 2023-02-10 PROCEDURE — G9899 SCRN MAM PERF RSLTS DOC: HCPCS | Performed by: ORTHOPAEDIC SURGERY

## 2023-02-10 PROCEDURE — G8417 CALC BMI ABV UP PARAM F/U: HCPCS | Performed by: ORTHOPAEDIC SURGERY

## 2023-02-10 PROCEDURE — 1123F ACP DISCUSS/DSCN MKR DOCD: CPT | Performed by: ORTHOPAEDIC SURGERY

## 2023-02-10 PROCEDURE — G8399 PT W/DXA RESULTS DOCUMENT: HCPCS | Performed by: ORTHOPAEDIC SURGERY

## 2023-02-10 PROCEDURE — G8427 DOCREV CUR MEDS BY ELIG CLIN: HCPCS | Performed by: ORTHOPAEDIC SURGERY

## 2023-02-10 PROCEDURE — 1101F PT FALLS ASSESS-DOCD LE1/YR: CPT | Performed by: ORTHOPAEDIC SURGERY

## 2023-02-10 RX ORDER — BUPROPION HYDROCHLORIDE 150 MG/1
150 TABLET ORAL
Qty: 90 TABLET | Refills: 1 | Status: SHIPPED | OUTPATIENT
Start: 2023-02-10

## 2023-02-20 ENCOUNTER — HOSPITAL ENCOUNTER (OUTPATIENT)
Dept: PHYSICAL THERAPY | Age: 68
Discharge: HOME | End: 2023-02-20
Payer: MEDICARE

## 2023-02-20 PROCEDURE — 97110 THERAPEUTIC EXERCISES: CPT

## 2023-02-20 PROCEDURE — 97162 PT EVAL MOD COMPLEX 30 MIN: CPT

## 2023-02-20 NOTE — THERAPY EVALUATION
Physical Therapy at [facility],   a part of [address]  [address] [facility] [address]  Phone: [phone]  Fax: [phone]    Plan of Care/Statement of Necessity for Physical Therapy Services  2-15    Patient name: Areli Porras  : 1955  Provider#: [id number]  Referral source: Craig Kearney PA-C      Medical/Treatment Diagnosis: Radiculopathy, lumbar region [M54.16]     Prior Hospitalization: see medical history     Comorbidities:  polymyalgia rheumatica, RA, DM2, HTN, Asthma, arthritis in spine. Prior Level of Function: see eval  Medications: Verified on Patient Summary List  Start of Care: 21      Onset Date: 3 weeks ago   The Plan of Care and following information is based on the information from the initial evaluation. Assessment/ key information: Pt is a pleasant 69yo F presenting to OP PT with c/o LBP with pain and paresthesia into her R LE consistent with R sided lumbar radiculopathy. PT reports most pain with sitting or standing in one place for more than 15-20 minutes and improvement in symptoms when moving. Pt reports she is unable to sleep because of her symptoms with pain increased with bending activity. Pt demonstrates decreased lumbar AROM with pain, decreased LE MMT, and abnormal gait. Pt would benefit from skilled PT services to address above deficits and improve pain to return to PLOF without restriction. Evaluation Complexity History HIGH Complexity :3+ comorbidities / personal factors will impact the outcome/ POC ; Examination MEDIUM Complexity : 3 Standardized tests and measures addressing body structure, function, activity limitation and / or participation in recreation  ;Presentation MEDIUM Complexity : Evolving with changing characteristics  ; Clinical Decision Making MEDIUM Complexity : FOTO score of 26-74  Overall Complexity Rating: MEDIUM    Problem List: pain affecting function, decrease ROM, decrease strength, impaired gait/ balance, decrease ADL/ functional abilitiies, decrease activity tolerance, and decrease transfer abilities   Treatment Plan may include any combination of the following: Therapeutic exercise, Neuromuscular reeducation, Manual therapy, Therapeutic activity, Self care/home management, Electric stim unattended , Vasopneumatic device, Gait training, and Mechanical traction  Patient / Family readiness to learn indicated by: asking questions and trying to perform skills  Persons(s) to be included in education: patient (P)  Barriers to Learning/Limitations: None  Patient Goal (s): To decrease pain  Patient Self Reported Health Status: fair  Rehabilitation Potential: good    Short Term Goals: To be accomplished in 4 weeks:  Pt will be independent in HEP to promote return to general wellness program.  Pt will be able to sleep without interruption from LBP to allow for proper rest.  Pt will increase lumbar R rotation ROM to 50% without pain to promote normal bed mobility and transfer ability. Long Term Goals: To be accomplished in 12 weeks:  Pt will increase R PF/DF MMT to 5/5 to promote stability during ambulation. Pt will be able to sit for 30 minutes without increase of LBP to allow for regular transportation to appointments without limitation. Pt will be able to  10# object from floor level without increase in LBP to allow for household cleaning. Frequency / Duration: Patient to be seen 1-2 times per week for 12 weeks. Patient/ Caregiver education and instruction: self care, activity modification, and exercises    [x]  Plan of care has been reviewed with PTA    Certification Period: 2/20/23-5/20/23    Cecy Lerner PT, DPT 2/20/2023     ________________________________________________________________________    I certify that the above Therapy Services are being furnished while the patient is under my care.  I agree with the treatment plan and certify that this therapy is necessary.     Physician's Signature:____________________  Date:____________Time: _________         Author Bharti PA-C

## 2023-02-20 NOTE — PROGRESS NOTES
PT INITIAL EVALUATION NOTE - [facility] -15    Patient Name: Peter Mediate  Date:2023  : 1955  [x]  Patient  Verified  Payor: Earl Briones / Plan: Milan Ortiz PPO / Product Type: PPO /    In time: 11:10a  Out time: 1200p  Total Treatment Time (min): 50  Total Timed Codes (min): 20  1:1 Treatment Time ( only): 20  Visit #: 1     Treatment Area: Radiculopathy, lumbar region [M54.16]    SUBJECTIVE  Pain Level (0-10 scale): 6-7  Any medication changes, allergies to medications, adverse drug reactions, diagnosis change, or new procedure performed?: [] No    [x] Yes (see summary sheet for update)  Subjective:    3 weeks ago woke up with R hip and LE pain into R foot and toes with numbness. Couldn't stand or walk. Most discomfort with sitting and standing still. Walking is least painful. More pain with laying on sides. Wakes up at night and hard to sleep. Stairs are okay. Unable to sit for more than 20 minutes. Able to drive but sitting aspect hurts. Bending does not make pain worse. Pain at plantar surface of 4th and 5th toes. PLOF: walking 4-5k per day. Mechanism of Injury: unknown  Previous Treatment/Compliance: tylenol, motrin, heat, ice  PMHx/Surgical Hx: polymyalgia rheumatica, RA, DM2, HTN, Asthma, arthritis in spine. Work Hx: Part time pediatrician   Living Situation: stairs present  Pt Goals: For the pain to decrease. Barriers: none  Motivation: motivated  Substance use: none  Cognition: A & O x 4        OBJECTIVE/EXAMINATION  Posture:  hip IR  Gait and Functional Mobility: decreased chris, antalgic gait. Palpation: tTTP R lumbar paraspinals and R SI  Neurological: Reflexes / Sensations: paresthesia into 4th and 5th digits on R foot        Lumbar AROM:          R  L    Flexion    75% p!  Worse going down      Extension   50%      Side Bending   100%P  100%    Rotation   25%P!  100%      LOWER QUARTER MUSCLE STRENGTH    R  L  Psoas  3p  3+p  Quads  4-p  5  Tib Ant  4-  5  EHL  3+  4  PFs  4-  5  Hams  4  4  Hip ext  NT  NT  Hip ABD 4  4  Hip ADD 4p  4p       Special Tests:    Trendelenberg: +    FABERS: -   Forward Bend: +    Slump: +R   H.S. SLR: 80d     Piriformis Ext: +   SI Compression/Distraction: +   SLS unable on R    Modality rationale: decrease pain and increase tissue extensibility to improve the patients ability to sit with less pain   Min Type Additional Details    [] Estim: []Att   []Unatt        []TENS instruct                  []IFC  []Premod   []NMES                     []Other:  []w/US   []w/ice   []w/heat  Position:  Location:    []  Traction: [] Cervical       []Lumbar                       [] Prone          []Supine                       []Intermittent   []Continuous Lbs:  [] before manual  [] after manual  []w/heat    []  Ultrasound: []Continuous   [] Pulsed at:                           []1MHz   []3MHz Location:  W/cm2:    [] Paraffin         Location:   []w/heat   5 []  Ice     [x]  Heat  []  Ice massage Position: hooklying  Location: back    []  Laser  []  Other: Position:  Location:      []  Vasopneumatic Device Pressure:       [] lo [] med [] hi   Temperature:      [x] Skin assessment post-treatment:  [x]intact []redness- no adverse reaction    []redness - adverse reaction:     20 min Therapeutic Exercise:  [x] See flow sheet :   Rationale: increase ROM, increase strength, and improve coordination to improve the patients ability to sit and stand with less pain.           With   [] TE   [] TA   [] Neuro   [] SC   [] other: Patient Education: [x] Review HEP    [] Progressed/Changed HEP based on:   [] positioning   [x] body mechanics   [x] transfers   [x] heat/ice application    [] other: log roll, exhale with effort     Other Objective/Functional Measures: FOTO Functional Measure: 47/100    Pain Level (0-10 scale) post treatment: 6    ASSESSMENT/Changes in Function:     [x]  See Plan of [address] Jaime Webber, PT, DPT 2/20/2023

## 2023-02-21 DIAGNOSIS — M35.3 POLYMYALGIA RHEUMATICA (HCC): ICD-10-CM

## 2023-02-21 RX ORDER — PREDNISONE 5 MG/1
20 TABLET ORAL DAILY
Qty: 120 TABLET | Refills: 1 | Status: SHIPPED | OUTPATIENT
Start: 2023-02-21

## 2023-02-22 RX ORDER — TOCILIZUMAB 180 MG/ML
INJECTION, SOLUTION SUBCUTANEOUS
Qty: 4 ML | Refills: 3 | Status: SHIPPED | OUTPATIENT
Start: 2023-02-22

## 2023-02-22 NOTE — TELEPHONE ENCOUNTER
Last visit was 1/30/23  Follow up scheduled for 5/2/23    Lab Results   Component Value Date/Time    Sodium 136 09/30/2022 03:48 PM    Potassium 4.0 09/30/2022 03:48 PM    Chloride 100 09/30/2022 03:48 PM    CO2 28 09/30/2022 03:48 PM    Anion gap 8 09/30/2022 03:48 PM    Glucose 156 (H) 09/30/2022 03:48 PM    BUN 18 09/30/2022 03:48 PM    Creatinine 0.77 09/30/2022 03:48 PM    BUN/Creatinine ratio 23 (H) 09/30/2022 03:48 PM    GFR est AA >60 09/30/2022 03:48 PM    GFR est non-AA >60 09/30/2022 03:48 PM    Calcium 9.6 09/30/2022 03:48 PM    Bilirubin, total 0.4 09/30/2022 03:48 PM    Alk.  phosphatase 44 (L) 09/30/2022 03:48 PM    Protein, total 7.1 09/30/2022 03:48 PM    Albumin 4.1 09/30/2022 03:48 PM    Globulin 3.0 09/30/2022 03:48 PM    A-G Ratio 1.4 09/30/2022 03:48 PM    ALT (SGPT) 42 09/30/2022 03:48 PM    AST (SGOT) 25 09/30/2022 03:48 PM     Lab Results   Component Value Date/Time    WBC 4.3 09/30/2022 03:48 PM    Hemoglobin (POC) 13.9 07/25/2014 11:52 PM    HGB 14.2 09/30/2022 03:48 PM    Hematocrit (POC) 41 07/25/2014 11:52 PM    HCT 41.0 09/30/2022 03:48 PM    PLATELET 095 71/43/7029 03:48 PM    MCV 96.0 09/30/2022 03:48 PM

## 2023-02-23 ENCOUNTER — TELEPHONE (OUTPATIENT)
Dept: RHEUMATOLOGY | Age: 68
End: 2023-02-23

## 2023-02-23 NOTE — PROGRESS NOTES
[facility] Update    Date: 02/23/23    Lu Gary was routed to the mandated specialty pharmacy [facility]. Prior authorization has been approved. Pharmacy will inform patient and provide them with dispensing pharmacy's phone number. Please call us with any questions at  [phone].

## 2023-02-23 NOTE — TELEPHONE ENCOUNTER
Spoke to [employer] at [facility], informed [facility] that the script for Actemra was sent on yesterday. Katiana verbally acknowledged understanding and asked if the patient was going to still be taking the James Josiah was informed that the medication was denied and being appealed.

## 2023-02-27 NOTE — PROGRESS NOTES
Lizette Ulloa (: 1955) is a 69 y.o. female patient, here for evaluation of the following chief complaint(s):  Hip Pain (Right hip pain/)       ASSESSMENT/PLAN:  Below is the assessment and plan developed based on review of pertinent history, physical exam, labs, studies, and medications. 68-year-old female comes in today for evaluation of right hip pain. Pain is located in her lateral leg buttock area is noted to radiate down her leg. No anterior groin pain. No overt mechanism of injury. Has been bothering her some to her for the last 5 days. Bilateral hip joint spaces overall relatively well-preserved, mild arthritic changes noted. Lumbar spine x-ray today shows severe degenerative changes noted from L5-S1 moderate noted from L3-L4 and L4-L5. Symptoms most likely consistent with lumbar radiculopathy. Discussed treatment options with patient. We will start her on physical therapy to work on strengthening conditioning. She baseline is on methotrexate for rheumatoid arthritis as well as prednisone daily. Plans to continue with this. Plans to monitor symptoms, if no improvement discussed obtaining an MRI of her lumbar spine for possible epidural steroid injections. We will contact the office as needed. If needs to follow-up we will plan to follow-up with our spine team for further evaluation and treatment as needed. 1. Lumbar radiculopathy, right  -     REFERRAL TO PHYSICAL THERAPY  -     XR SPINE LUMB 2 OR 3 V; Future      Encounter Diagnosis   Name Primary? Lumbar radiculopathy, right Yes        No follow-ups on file. SUBJECTIVE/OBJECTIVE:  Lizette Ulloa (: 1955) is a 69 y.o. female who presents today for the following:  Chief Complaint   Patient presents with    Hip Pain     Right hip pain         68-year-old female comes in today for evaluation of right hip pain. Pain is located in her lateral leg buttock area is noted to radiate down her leg. No anterior groin pain.   No overt mechanism of injury. Has been bothering her some to her for the last 5 days. IMAGING:  XR Results (most recent):  Results from Appointment encounter on 02/10/23    XR SPINE LUMB 2 OR 3 V    Narrative  AP and lateral lumbar spine x-rays ordered and personally reviewed. Mild lumbar scoliosis present. Moderate to severe degenerative changes noted from L5-S1. Moderate noted from L3-L4 L4-L5. Small osteophyte formation present. Allergies   Allergen Reactions    Arkwright Flavor Hives     Elfego fruit not flavor    Alendronate Nausea and Vomiting and Other (comments)     Reaction Type: Allergy; Reaction(s): severe nausea, vomiting    Morphine Other (comments)     Extreme epigastric pain    Statins-Hmg-Coa Reductase Inhibitors Other (comments)     Pt stated muscle injuries. Valium [Diazepam] Nausea and Vomiting    Versed [Midazolam] Nausea and Vomiting       Current Outpatient Medications   Medication Sig    sarilumab (Kevzara) 200 mg/1.14 mL syrg subcutaneous syringe 200 mg by SubCUTAneous route every fourteen (14) days. B.animalis,bifid,infantis,long (Probiotic 4X) 10-15 mg TbEC Take  by mouth.    verapamil ER (CALAN-SR) 180 mg CR tablet Take 1 tablet by mouth nightly    Omeprazole delayed release (PRILOSEC D/R) 20 mg tablet Take  by mouth. 20mg am, 40mg pm    ezetimibe (ZETIA) 10 mg tablet Take 1 Tablet by mouth daily. hydroCHLOROthiazide (HYDRODIURIL) 25 mg tablet Take 1 Tablet by mouth daily. Indications: high blood pressure    metFORMIN (GLUCOPHAGE) 500 mg tablet Take 1 Tablet by mouth two (2) times daily (with meals). SITagliptin (Januvia) 100 mg tablet Take 1 Tablet by mouth daily. DULoxetine (CYMBALTA) 60 mg capsule Take 1 Capsule by mouth daily. valsartan (DIOVAN) 160 mg tablet Take 1 Tablet by mouth daily. Indications: high blood pressure    methotrexate (RHEUMATREX) 2.5 mg tablet Take 6 Tablets by mouth every Sunday.     folic acid (FOLVITE) 1 mg tablet Take 1 Tablet by mouth in the morning. predniSONE (DELTASONE) 1 mg tablet TAKE 4 TABLETS BY MOUTH EVERY DAY    colestipoL (COLESTID) 1 gram tablet     budesonide-formoteroL (SYMBICORT) 160-4.5 mcg/actuation HFAA Take 2 Puffs by inhalation two (2) times a day. estradiol (ESTRACE) 1 mg tablet Take 1 Tab by mouth daily. omega-3 fatty acids-vitamin e 1,000 mg cap Take 1 Cap by mouth two (2) times a day. Cholecalciferol, Vitamin D3, 25 mcg (1,000 unit) chew Take 1 Capsule by mouth daily. multivitamin (ONE A DAY) tablet Take 1 Tab by mouth daily. tocilizumab (Actemra) 162 mg/0.9 mL syringe Inject 162mg subcutaneously every 7 days    predniSONE (DELTASONE) 5 mg tablet Take 4 Tablets by mouth daily. buPROPion XL (WELLBUTRIN XL) 150 mg tablet Take 1 Tablet by mouth every morning. No current facility-administered medications for this visit.        Past Medical History:   Diagnosis Date    Asthma     childhood    Atypical mole     Chronic pain     shoulders/knees    DDD (degenerative disc disease), cervical     Degenerative arthritis of right knee 2/2014    Dr. Madden [facility]    Ectopic atrial tachycardia ([address] 08/2020    Gastric nodule     gastric nodules on endoscopy 3/26/12    GERD (gastroesophageal reflux disease)     PUD (peptic ulcer disease)     Sleep apnea     uses cpap    SVT (supraventricular tachycardia) ([address]     Temporal arteritis ([address] 4/29/16    Dr. Gale Navarrete    Thoracic outlet syndrome     left, Dr. Sharron Villela    Vitamin D deficiency     Vulvar high-grade squamous intraepithelial lesion (HGSIL) 2018        Past Surgical History:   Procedure Laterality Date    COLONOSCOPY N/A 5/17/2017    COLONOSCOPY performed by Keshia Zepeda MD at [facility] ENDOSCOPY    COLONOSCOPY N/A 5/25/2021    COLONOSCOPY, EGD   :- performed by Julia Jimenez MD at [facility]      several breast biopsies (benign)    HX BREAST BIOPSY Bilateral 1975, 1999, 2008,2009    6-7 on R, 2 on L+all benign    HX BUNIONECTOMY bilateral    HX  SECTION      x3    HX CHOLECYSTECTOMY  2009    HX COLONOSCOPY      normal, f/u in 10 yrs    HX DILATION AND CURETTAGE      [id number], several D&C's    HX ENDOSCOPY      x3, h/o gastric polyp removal 3/2012    HX GI      cholecystectomy    HX GYN  2018    OZZIE    HX HEART CATHETERIZATION      x2, most recent 2012 was normal    HX KNEE ARTHROSCOPY Right     HX OTHER SURGICAL      lipoma removal    HX OTHER SURGICAL  16    L temporal artery bx (Dr. Yen Sexton)    HX AZAEL AND BSO      secondary to endometrial hyperplasia with atypical changes    HX TONSILLECTOMY      T & A       Family History   Problem Relation Age of Onset    Heart Disease Mother     Diabetes Mother     Cancer Mother 40        breast    Thyroid Disease Mother         hashimotos    Hypertension Mother     Osteoporosis Mother     Heart Surgery Mother         aortic valve replacement    Kidney Disease Mother     Gout Mother     Cancer Father 79        gastric    Heart Disease Father     Hypertension Father     Thyroid Disease Sister         hashimotos    Other Sister         bipolar    Other Sister         depression    Heart Disease Brother     Other Brother         mental illness    Breast Cancer Maternal Aunt 44         from breast cancer    Breast Cancer Maternal Aunt 40        and recurred at 80    Breast Cancer Maternal Aunt     Diabetes Paternal Grandmother     Psychiatric Disorder Daughter         depression    Psychiatric Disorder Daughter         depression    Psychiatric Disorder Son         bipolar    Osteoporosis Other         maternal aunts    Ovarian Cancer Neg Hx     Pancreatic Cancer Neg Hx     Prostate Cancer Neg Hx         Social History     Tobacco Use    Smoking status: Never    Smokeless tobacco: Never   Substance Use Topics    Alcohol use: No     Alcohol/week: 0.0 standard drinks        All systems reviewed x 12 and were negative with the exception of None      No flowsheet data found.       Vitals:  Ht 5' 7\" (1.702 m)   Wt 191 lb (86.6 kg)   BMI 29.91 kg/m²    Body mass index is 29.91 kg/m². Physical Exam    General: NAD, well developed, well nourished. Cardiac: Extremities well perfused. Respiratory: Nonlabored breathing. RLE: Mild antalgic gait. Negative stinchfield. 0-100 degrees of flexion. >20 degrees internal rotation, >30 degrees external rotation. Negative TONI. No pain with flexion adduction and internal rotation. .  Mild discomfort with straight leg raise. Motor strength grossly intact. LLE: No antalgic gait. Negative stinchfield. 0-100 degrees of flexion. >20 degrees internal rotation, >30 degrees external rotation. Negative TONI. No pain with flexion adduction and internal rotation. .  Motor strength grossly intact. Skin: Warm well perfused. Vascular: Palpable pedal pulses bilaterally. Equal. Capillary refill less than 2 seconds. Katherine Monreal M.D. was available for immediate consultation as the supervising physician. An electronic signature was used to authenticate this note.   -- Cheryle Mattock, PA-C

## 2023-03-01 ENCOUNTER — HOSPITAL ENCOUNTER (OUTPATIENT)
Dept: PHYSICAL THERAPY | Age: 68
Discharge: HOME | End: 2023-03-01
Payer: MEDICARE

## 2023-03-01 PROCEDURE — 97110 THERAPEUTIC EXERCISES: CPT

## 2023-03-01 PROCEDURE — 97112 NEUROMUSCULAR REEDUCATION: CPT

## 2023-03-01 NOTE — PROGRESS NOTES
PT DAILY TREATMENT NOTE - [facility] 2-15    Patient Name: Babatunde Wyman  Date:3/1/2023  : 1955  [x]  Patient  Verified  Payor: [place] MEDICARE / Plan: [place] MEDICARE PART A & B / Product Type: Medicare /    In time: 1:45p  Out time: 2:30p  Total Treatment Time (min): 45  Total Timed Codes (min): 45  1:1 Treatment Time ( [address] only): 39   Visit #:  2    Treatment Area: Radiculopathy, lumbar region [M54.16]    SUBJECTIVE  Pain Level (0-10 scale): 3/10 lower back across to back of R hip  Any medication changes, allergies to medications, adverse drug reactions, diagnosis change, or new procedure performed?: [x] No    [] Yes (see summary sheet for update)  Subjective functional status/changes:   [] No changes reported  Sore after last visit but not taking medication as much (now 2x/day). Much easier to sleep and find a good position to sleep. Doing exercises daily, step count up to 6k daily which is more than usual. Sitting still is still the most painful. Has only used hot pack once. OBJECTIVE    25 min Therapeutic Exercise:  [x] See flow sheet :   Rationale: increase ROM and increase strength to improve the patients ability to lift and sit with less pain. 20 min Neuromuscular Re-education:  [x]  See flow sheet :   Rationale: increase strength, improve coordination, and increase proprioception  to improve the patients ability to lift with less pain. With   [] TE   [] TA   [] Neuro   [] SC   [] other: Patient Education: [x] Review HEP    [] Progressed/Changed HEP based on:   [] positioning   [] body mechanics   [] transfers   [] heat/ice application    [] other: bridges, step ups, piriformis stretch, heel raises     Other Objective/Functional Measures: VIOLETA at and above umbilicus: 3 fingers. Mod verbal and tactile cues for TA engagement with breath to prevent doming. More tightness with R piriformis and HS. Improved stability with bosu lunges progress to no US support.  Lateral step ups more difficult on R than L. Muscle fatigue reported at end of session with elimination of pain    Pain Level (0-10 scale) post treatment: 0    ASSESSMENT/Changes in Function:     Patient will continue to benefit from skilled PT services to modify and progress therapeutic interventions, address functional mobility deficits, address ROM deficits, address strength deficits, analyze and address soft tissue restrictions, analyze and modify body mechanics/ergonomics, and instruct in home and community integration to attain remaining goals. []  See Plan of Care  []  See progress note/recertification  []  See Discharge Summary         Progress towards goals / Updated goals:  Good potential to reach goals with significant improvements in frequency and intensity of symptoms. Progress stabilization and TA activity as able to prevent doming. Plan to cancel fridays appt and continue 1x/week.     PLAN  []  Upgrade activities as tolerated     []  Continue plan of care  []  Update interventions per flow sheet       []  Discharge due to:_  []  Other:_      Antoine Mccarthy, PT, DPT 3/1/2023

## 2023-03-03 ENCOUNTER — APPOINTMENT (OUTPATIENT)
Dept: PHYSICAL THERAPY | Age: 68
End: 2023-03-03
Payer: MEDICARE

## 2023-03-08 ENCOUNTER — APPOINTMENT (OUTPATIENT)
Dept: PHYSICAL THERAPY | Age: 68
End: 2023-03-08
Payer: MEDICARE

## 2023-03-15 ENCOUNTER — HOSPITAL ENCOUNTER (OUTPATIENT)
Dept: PHYSICAL THERAPY | Age: 68
Discharge: HOME | End: 2023-03-15
Payer: MEDICARE

## 2023-03-15 PROCEDURE — 97112 NEUROMUSCULAR REEDUCATION: CPT

## 2023-03-15 PROCEDURE — 97110 THERAPEUTIC EXERCISES: CPT

## 2023-03-15 NOTE — PROGRESS NOTES
PT DAILY TREATMENT NOTE - [facility] 2-15    Patient Name: Rm Mustafa  Date:3/15/2023  : 1955  [x]  Patient  Verified  Payor: [place] MEDICARE / Plan: [place] MEDICARE PART A & B / Product Type: Medicare /    In time: 1:00p  Out time: 1:45p  Total Treatment Time (min): 45  Total Timed Codes (min): 45  1:1 Treatment Time ( [address] only): 39   Visit #:  3    Treatment Area: Radiculopathy, lumbar region [M54.16]    SUBJECTIVE  Pain Level (0-10 scale): 3/10 lower back across to back of R hip  Any medication changes, allergies to medications, adverse drug reactions, diagnosis change, or new procedure performed?: [x] No    [] Yes (see summary sheet for update)  Subjective functional status/changes:   [] No changes reported  Everything has gotten a bit better and strength improved. Balancing on R leg is still difficulty. Not able to do exercises last week bc her daughter was in the hospital.     OBJECTIVE    20 min Therapeutic Exercise:  [x] See flow sheet :   Rationale: increase ROM and increase strength to improve the patients ability to lift and sit with less pain. 25 min Neuromuscular Re-education:  [x]  See flow sheet :   Rationale: increase strength, improve coordination, and increase proprioception  to improve the patients ability to lift with less pain. With   [] TE   [] TA   [] Neuro   [] SC   [] other: Patient Education: [x] Review HEP    [] Progressed/Changed HEP based on:   [] positioning   [] body mechanics   [] transfers   [] heat/ice application    [] other:     Other Objective/Functional Measures: Mod verbal and tactile cues for TA engagement with breath to prevent doming. Improved stability with bosu lunges progress to no UE support. Several minor LoB with SLS practice. Muscle fatigue reported at end of session with more soreness at R hip.      Pain Level (0-10 scale) post treatment: \"better\"    ASSESSMENT/Changes in Function:     Patient will continue to benefit from skilled PT services to modify and progress therapeutic interventions, address functional mobility deficits, address ROM deficits, address strength deficits, analyze and address soft tissue restrictions, analyze and modify body mechanics/ergonomics, and instruct in home and community integration to attain remaining goals. []  See Plan of Care  []  See progress note/recertification  []  See Discharge Summary         Progress towards goals / Updated goals:  Good potential to reach goals with significant improvements in frequency and intensity of symptoms. Progress stabilization and TA activity as able to prevent doming. PN next visit.     PLAN  [x]  Upgrade activities as tolerated     [x]  Continue plan of care  [x]  Update interventions per flow sheet       []  Discharge due to:_  []  Other:_      Rona Zarate, PT, DPT 3/15/2023

## 2023-03-22 ENCOUNTER — HOSPITAL ENCOUNTER (OUTPATIENT)
Dept: PHYSICAL THERAPY | Age: 68
Discharge: HOME | End: 2023-03-22
Payer: MEDICARE

## 2023-03-22 DIAGNOSIS — E78.5 HYPERLIPIDEMIA, UNSPECIFIED HYPERLIPIDEMIA TYPE: ICD-10-CM

## 2023-03-22 PROCEDURE — 97112 NEUROMUSCULAR REEDUCATION: CPT

## 2023-03-22 PROCEDURE — 97110 THERAPEUTIC EXERCISES: CPT

## 2023-03-22 RX ORDER — EZETIMIBE 10 MG/1
10 TABLET ORAL DAILY
Qty: 90 TABLET | Refills: 3 | Status: SHIPPED | OUTPATIENT
Start: 2023-03-22

## 2023-03-22 NOTE — PROGRESS NOTES
Physical Therapy at [facility],   a part of  [facility]  [place]  [facility], [address]  Phone: [phone]      Fax:  [phone]    Progress Note    Name: Shermon Schwab   : 1955   MD: Ravi Chowdary, PAAlkaC     Treatment Diagnosis: Radiculopathy, lumbar region [M54.16]  Start of Care: 23    Visits from Start of Care: 4  Missed Visits: 0    Summary of Care: Pt has been seen for 4 PT visit to address R sided lumbar radiculopathy including patient education, therapeutic exercise, and neuromuscular re-education. Pt has made good initial progress towards goals with decreased pain intensity and frequency. FOTO score improved from 47 to 67. Pt demonstrates improved LE strength and lumbar AROM but is still limited by pain with bending and lifting with impaired balance. Pt would benefit from continued PT services to address remaining goals and improve ease with household tasks such as laundry. Assessment / Recommendations:     Short Term Goals: To be accomplished in 4 weeks:  Pt will be independent in HEP to promote return to general wellness program.- IN PROGRESS  Pt will be able to sleep without interruption from LBP to allow for proper rest.- MET  Pt will increase lumbar R rotation ROM to 50% without pain to promote normal bed mobility and transfer ability. - IN PROGRESS (50% met, painful)     Long Term Goals:  To be accomplished in 12 weeks:  Pt will increase R PF/DF MMT to 5/5 to promote stability during ambulation.- MET  Pt will be able to sit for 30 minutes without increase of LBP to allow for regular transportation to appointments without limitation.- MET  Pt will be able to  10# object from floor level without increase in LBP to allow for household cleaning. - IN PROGRESS (able but painful)    NEW GOAL:  Pt will be able to hold SLS 30 sec without UE support to demonstrate improved balance to allow for stair navigation without reported fear of falling in home.      Yelena Marie, PT, DPT 3/22/2023

## 2023-03-22 NOTE — PROGRESS NOTES
PT DAILY TREATMENT NOTE - [facility] 2-15    Patient Name: Bandar Seek  Date:3/22/2023  : 1955  [x]  Patient  Verified  [place]or: [place] / Plan: [place] MED[place]ARE PART A & B / Product Type: Medicare /    In time: 1:01p  Out time: 1:45p  Total Treatment Time (min): 44  Total Timed Codes (min): 44  1:1 Treatment Time ([address] only): 40  Visit #:  4    Treatment Area: Radiculopathy, lumbar region [M54.16]    SUBJECTIVE  Pain Level (0-10 scale): 2-3/10 lower back   Any medication changes, allergies to medications, adverse drug reactions, diagnosis change, or new procedure performed?: [x] No    [] Yes (see summary sheet for update)  Subjective functional status/changes:   [] No changes reported  Has been feeling good since last visit. Really has to think about her pain now as it is less prominent. OBJECTIVE    30 min Therapeutic Exercise:  [x] See flow sheet :   Rationale: increase ROM and increase strength to improve the patients ability to lift and sit with less pain. 15 min Neuromuscular Re-education:  [x]  See flow sheet :   Rationale: increase strength, improve coordination, and increase proprioception  to improve the patients ability to lift with less pain. With   [] TE   [] TA   [] Neuro   [] SC   [] other: Patient Education: [x] Review HEP    [] Progressed/Changed HEP based on:   [] positioning   [] body mechanics   [] transfers   [] heat/ice application    [] other: HEP Updated with standing therex     Other Objective/Functional Measures: Tolerated sitting stretches well- increased tightness reported on R>L. No increase in symptoms with increased resistance. Mod verbal and tactile cues for TA engagement with breath to prevent doming. Improved stability with bosu lunges progress without UE support. Several minor LoB with SLS practice- posterior LoB.      Muscle fatigue reported at end of session    Pain Level (0-10 scale) post treatment: 1-2    ASSESSMENT/Changes in Function:     Patient will continue to benefit from skilled PT services to modify and progress therapeutic interventions, address functional mobility deficits, address ROM deficits, address strength deficits, analyze and address soft tissue restrictions, analyze and modify body mechanics/ergonomics, and instruct in home and community integration to attain remaining goals. []  See Plan of Care  [x]  See progress note/recertification  []  See Discharge Summary         Progress towards goals / Updated goals:  Good potential to reach goals with significant improvements in frequency and intensity of symptoms. Progress stabilization and TA activity as able to prevent doming. Continue LE strengthening.      PLAN  [x]  Upgrade activities as tolerated     [x]  Continue plan of care  [x]  Update interventions per flow sheet       []  Discharge due to:_  []  Other:_      Sharon Cifuentes, PT, DPT 3/22/2023

## 2023-03-28 ENCOUNTER — OFFICE VISIT (OUTPATIENT)
Dept: INTERNAL MEDICINE CLINIC | Age: 68
End: 2023-03-28
Payer: MEDICARE

## 2023-03-28 VITALS
RESPIRATION RATE: 14 BRPM | OXYGEN SATURATION: 99 % | BODY MASS INDEX: 30.32 KG/M2 | WEIGHT: 193.2 LBS | DIASTOLIC BLOOD PRESSURE: 77 MMHG | HEIGHT: 67 IN | SYSTOLIC BLOOD PRESSURE: 136 MMHG | HEART RATE: 84 BPM

## 2023-03-28 DIAGNOSIS — K21.9 GASTROESOPHAGEAL REFLUX DISEASE WITHOUT ESOPHAGITIS: ICD-10-CM

## 2023-03-28 DIAGNOSIS — E78.5 HYPERLIPIDEMIA, UNSPECIFIED HYPERLIPIDEMIA TYPE: ICD-10-CM

## 2023-03-28 DIAGNOSIS — K58.2 IRRITABLE BOWEL SYNDROME WITH BOTH CONSTIPATION AND DIARRHEA: ICD-10-CM

## 2023-03-28 DIAGNOSIS — F33.1 MODERATE EPISODE OF RECURRENT MAJOR DEPRESSIVE DISORDER (HCC): ICD-10-CM

## 2023-03-28 DIAGNOSIS — M54.16 LUMBAR RADICULOPATHY: ICD-10-CM

## 2023-03-28 DIAGNOSIS — I47.1 ECTOPIC ATRIAL TACHYCARDIA (HCC): ICD-10-CM

## 2023-03-28 DIAGNOSIS — E11.42 TYPE 2 DIABETES MELLITUS WITH DIABETIC POLYNEUROPATHY, WITHOUT LONG-TERM CURRENT USE OF INSULIN (HCC): ICD-10-CM

## 2023-03-28 DIAGNOSIS — Z00.00 MEDICARE ANNUAL WELLNESS VISIT, SUBSEQUENT: Primary | ICD-10-CM

## 2023-03-28 DIAGNOSIS — E11.51 TYPE 2 DIABETES MELLITUS WITH PERIPHERAL VASCULAR DISEASE (HCC): ICD-10-CM

## 2023-03-28 LAB — HBA1C MFR BLD HPLC: 8.5 %

## 2023-03-28 PROCEDURE — 2022F DILAT RTA XM EVC RTNOPTHY: CPT | Performed by: INTERNAL MEDICINE

## 2023-03-28 PROCEDURE — 3017F COLORECTAL CA SCREEN DOC REV: CPT | Performed by: INTERNAL MEDICINE

## 2023-03-28 PROCEDURE — G8427 DOCREV CUR MEDS BY ELIG CLIN: HCPCS | Performed by: INTERNAL MEDICINE

## 2023-03-28 PROCEDURE — 1090F PRES/ABSN URINE INCON ASSESS: CPT | Performed by: INTERNAL MEDICINE

## 2023-03-28 PROCEDURE — 1101F PT FALLS ASSESS-DOCD LE1/YR: CPT | Performed by: INTERNAL MEDICINE

## 2023-03-28 PROCEDURE — G8536 NO DOC ELDER MAL SCRN: HCPCS | Performed by: INTERNAL MEDICINE

## 2023-03-28 PROCEDURE — G9899 SCRN MAM PERF RSLTS DOC: HCPCS | Performed by: INTERNAL MEDICINE

## 2023-03-28 PROCEDURE — G8399 PT W/DXA RESULTS DOCUMENT: HCPCS | Performed by: INTERNAL MEDICINE

## 2023-03-28 PROCEDURE — G9717 DOC PT DX DEP/BP F/U NT REQ: HCPCS | Performed by: INTERNAL MEDICINE

## 2023-03-28 PROCEDURE — 83036 HEMOGLOBIN GLYCOSYLATED A1C: CPT | Performed by: INTERNAL MEDICINE

## 2023-03-28 PROCEDURE — G0463 HOSPITAL OUTPT CLINIC VISIT: HCPCS | Performed by: INTERNAL MEDICINE

## 2023-03-28 PROCEDURE — 99214 OFFICE O/P EST MOD 30 MIN: CPT | Performed by: INTERNAL MEDICINE

## 2023-03-28 PROCEDURE — G0439 PPPS, SUBSEQ VISIT: HCPCS | Performed by: INTERNAL MEDICINE

## 2023-03-28 PROCEDURE — G8417 CALC BMI ABV UP PARAM F/U: HCPCS | Performed by: INTERNAL MEDICINE

## 2023-03-28 NOTE — ASSESSMENT & PLAN NOTE
On high dose of prednisone because of PMR flare, sugars increased  She increased her metformin to 2 tabs twice a day about a month ago  On januvia 100   A1c 8.5% today from 6.7 last check. Likely related to increased prednisone dose. Discussed options. Initially discussed GLP-1 but she had a gastric emptying study that did show mild mildly delayed gastric emptying. Therefore recommend starting Jardiance. Start at 10 mg.   If morning sugars still greater than 250 after 2 weeks, recommend doubling dose  Continue Januvia 100 and metformin 1000 twice a day  Contraindicated: GLP-1 (mild gastroparesis)

## 2023-03-28 NOTE — ASSESSMENT & PLAN NOTE
Seen by ortho 2/2023 for right lumbar radiculopathy, PT  Still has pain, but better and mobility is better   Has appt with spine surgery in may, continue PT

## 2023-03-28 NOTE — PATIENT INSTRUCTIONS
Medicare Wellness Visit, Female     The best way to live healthy is to have a lifestyle where you eat a well-balanced diet, exercise regularly, limit alcohol use, and quit all forms of tobacco/nicotine, if applicable. Regular preventive services are another way to keep healthy. Preventive services (vaccines, screening tests, monitoring & exams) can help personalize your care plan, which helps you manage your own care. Screening tests can find health problems at the earliest stages, when they are easiest to treat. [facility] follows the current, evidence-based guidelines published by the [facility] ([facility]) when recommending preventive services for our patients. Because we follow these guidelines, sometimes recommendations change over time as research supports it. (For example, mammograms used to be recommended annually. Even though Medicare will still pay for an annual mammogram, the newer guidelines recommend a mammogram every two years for women of average risk). Of course, you and your doctor may decide to screen more often for some diseases, based on your risk and your co-morbidities (chronic disease you are already diagnosed with). Preventive services for you include:  - Medicare offers their members a free annual wellness visit, which is time for you and your primary care provider to discuss and plan for your preventive service needs.  Take advantage of this benefit every year!    -Over the age of 72 should receive the recommended pneumonia vaccines.    -All adults should have a flu vaccine yearly.  -All adults should have a tetanus vaccine every 10 years.   -Over the age 48 should receive the shingles vaccines.        -All adults should be screened once for Hepatitis C.  -All adults age 38-68 who are overweight should have a diabetes screening test once every three years.   -Other screening tests and preventive services for persons with diabetes include: an eye exam to screen for diabetic retinopathy, a kidney function test, a foot exam, and stricter control over your cholesterol.   -Cardiovascular screening for adults with routine risk involves an electrocardiogram (ECG) at intervals determined by your doctor.     -Colorectal cancer screenings should be done for adults age 39-70 with no increased risk factors for colorectal cancer. There are a number of acceptable methods of screening for this type of cancer. Each test has its own benefits and drawbacks. Discuss with your doctor what is most appropriate for you during your annual wellness visit. The different tests include: colonoscopy (considered the best screening method), a fecal occult blood test, a fecal DNA test, and sigmoidoscopy.    -Lung cancer screening is recommended annually with a low dose CT scan for adults between age 54 and 68, who have smoked at least 30 pack years (equivalent of 1 pack per day for 30 days), and who is a current smoker or quit less than 15 years ago.    -A bone mass density test is recommended when a woman turns 65 to screen for osteoporosis. This test is only recommended one time, as a screening. Some providers will use this same test as a disease monitoring tool if you already have osteoporosis. -Breast cancer screenings are recommended every other year for women of normal risk, age 54-69.    -Cervical cancer screenings for women over age 72 are only recommended with certain risk factors.      Here is a list of your current Health Maintenance items (your personalized list of preventive services) with a due date:  Health Maintenance Due   Topic Date Due    Diabetic Foot Care  12/13/2020

## 2023-03-28 NOTE — PROGRESS NOTES
Assessment and Plan     1. Medicare annual wellness visit, subsequent  2. Hyperlipidemia, unspecified hyperlipidemia type  Assessment & Plan:   Zetia - tolerating   Check labs  Previous medications: Atorvastatin (80, muscle aches)  Orders:  -     LIPID PANEL; Future  3. Type 2 diabetes mellitus with diabetic polyneuropathy, without long-term current use of insulin ([facility] [address]  Assessment & Plan: On high dose of prednisone because of PMR flare, sugars increased  She increased her metformin to 2 tabs twice a day about a month ago  On januvia 100   A1c 8.5% today from 6.7 last check. Likely related to increased prednisone dose. Discussed options. Initially discussed GLP-1 but she had a gastric emptying study that did show mild mildly delayed gastric emptying. Therefore recommend starting Jardiance. Start at 10 mg. If morning sugars still greater than 250 after 2 weeks, recommend doubling dose  Continue Januvia 100 and metformin 1000 twice a day  Contraindicated: GLP-1 (mild gastroparesis)  Orders:  -     AMB POC HEMOGLOBIN A1C  -     empagliflozin (JARDIANCE) 10 mg tablet; Take 1 Tablet by mouth daily. Indications: type 2 diabetes mellitus, Normal, Disp-90 Tablet, R-0  4. Lumbar radiculopathy  Assessment & Plan:   Seen by ortho 2/2023 for right lumbar radiculopathy, PT  Still has pain, but better and mobility is better   Has appt with spine surgery in may, continue PT  5. Irritable bowel syndrome with both constipation and diarrhea  Assessment & Plan:   Was given xifaxin, helped symptoms although symptoms still present  6. Moderate episode of recurrent major depressive disorder [facility])  Assessment & Plan:   Depression  Marital issues   She requested to go back on Wellbutrin over MyChart  Wasn't able to start Wellbutrin until beginning of march   Feels better with wellbutrin   No SI   Continue Wellbutrin 150 XL daily and Cymbalta 60  daily. Has side effects with higher dose of Wellbutrin so likely stay at this dose  7. Gastroesophageal reflux disease without esophagitis  Assessment & Plan:   well controlled, continue current medications   Omeprazole 20/40   8. Type 2 diabetes mellitus with peripheral vascular disease (HCC)  9. Ectopic atrial tachycardia ([address]  Assessment & Plan:   monitored by specialist. No acute findings meriting change in the plan     Benefits, risks, possible drug interactions, and side effects of all new medications were reviewed with the patient. Pt verbalized understanding. 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 Haley Jensen MD  [facility]  3/28/2023    Future Appointments   Date Time Provider Aggie Haynesi   3/29/2023  1:30 PM Leah De Oliveira, PT [facility]   4/5/2023  1:00 PM Yinka Almeida, REGINA [facility]   5/2/2023  9:50 AM Miller Sargent MD TOS BS AMB   5/2/2023  3:30 PM Modesta Judge MD AOCR BS AMB   0/33/1246  5:86 PM Tika Wilson MD [facility] BS AMB        History of Present Illness   Chief Complaint   Medicare wellness    Netta Lea is a 69 y.o. female         Review of Systems   Constitutional:  Negative for chills and fever. HENT:  Negative for hearing loss. Eyes:  Negative for blurred vision. Respiratory:  Negative for shortness of breath. Cardiovascular:  Negative for chest pain. Gastrointestinal:  Negative for abdominal pain, blood in stool, constipation, diarrhea, melena, nausea and vomiting. Genitourinary:  Negative for dysuria and hematuria. Musculoskeletal:  Negative for joint pain. Skin:  Negative for rash. Neurological:  Negative for headaches. Past Medical History     Allergies   Allergen Reactions    [place] Flavor Hives     [place] fruit not flavor    Alendronate Nausea and Vomiting and Other (comments)     Reaction Type: Allergy;  Reaction(s): severe nausea, vomiting    Morphine Other (comments)     Extreme epigastric pain    Statins-Hmg-Coa Reductase Inhibitors Other (comments)     Pt stated muscle injuries. Valium [Diazepam] Nausea and Vomiting    Versed [Midazolam] Nausea and Vomiting        Current Outpatient Medications   Medication Sig    empagliflozin (JARDIANCE) 10 mg tablet Take 1 Tablet by mouth daily. Indications: type 2 diabetes mellitus    ezetimibe (ZETIA) 10 mg tablet Take 1 tablet by mouth daily    predniSONE (DELTASONE) 5 mg tablet Take 4 Tablets by mouth daily. buPROPion XL (WELLBUTRIN XL) 150 mg tablet Take 1 Tablet by mouth every morning. sarilumab (Kevzara) 200 mg/1.14 mL syrg subcutaneous syringe 200 mg by SubCUTAneous route every fourteen (14) days. B.animalis,bifid,infantis,long (Probiotic 4X) 10-15 mg TbEC Take  by mouth.    verapamil ER (CALAN-SR) 180 mg CR tablet Take 1 tablet by mouth nightly    Omeprazole delayed release (PRILOSEC D/R) 20 mg tablet Take  by mouth. 20mg am, 40mg pm    hydroCHLOROthiazide (HYDRODIURIL) 25 mg tablet Take 1 Tablet by mouth daily. Indications: high blood pressure    metFORMIN (GLUCOPHAGE) 500 mg tablet Take 1 Tablet by mouth two (2) times daily (with meals). SITagliptin (Januvia) 100 mg tablet Take 1 Tablet by mouth daily. DULoxetine (CYMBALTA) 60 mg capsule Take 1 Capsule by mouth daily. valsartan (DIOVAN) 160 mg tablet Take 1 Tablet by mouth daily. Indications: high blood pressure    methotrexate (RHEUMATREX) 2.5 mg tablet Take 6 Tablets by mouth every Sunday. folic acid (FOLVITE) 1 mg tablet Take 1 Tablet by mouth in the morning. predniSONE (DELTASONE) 1 mg tablet TAKE 4 TABLETS BY MOUTH EVERY DAY    colestipoL (COLESTID) 1 gram tablet     budesonide-formoteroL (SYMBICORT) 160-4.5 mcg/actuation HFAA Take 2 Puffs by inhalation two (2) times a day. estradiol (ESTRACE) 1 mg tablet Take 1 Tab by mouth daily.     omega-3 fatty acids-vitamin e 1,000 mg cap Take 1 Cap by mouth two (2) times a day. Cholecalciferol, Vitamin D3, 25 mcg (1,000 unit) chew Take 1 Capsule by mouth daily. multivitamin (ONE A DAY) tablet Take 1 Tab by mouth daily. No current facility-administered medications for this visit.           Patient Active Problem List   Diagnosis Code    PUD (peptic ulcer disease) K27.9    GERD (gastroesophageal reflux disease) K21.9    OA (osteoarthritis) M19.90    H/O cardiac catheterization Z98.890    DDD (degenerative disc disease), cervical M50.30    Vitamin D deficiency E55.9    Degenerative arthritis of right knee M17.11    Thoracic outlet syndrome G54.0    Fibrocystic disease of breast N60.19    Essential hypertension I10    Hyperlipidemia E78.5    Temporal arteritis (HCC) M31.6    Type 2 diabetes mellitus with diabetic polyneuropathy, without long-term current use of insulin (HCC) E11.42    Current chronic use of systemic steroids Z79.52    Osteopenia M85.80    Iron deficiency anemia D50.9    Type 2 diabetes mellitus with peripheral vascular disease (HCC) E11.51    Polymyalgia rheumatica ([facility]) M35.3    History of pulmonary embolus (PE) Z86.711    Chronic pain G89.29    Irritable bowel syndrome with both constipation and diarrhea K58.2    History of hysterectomy Z90.710    Atypical mole D22.9    Anxiety and depression F41.9, F32.A    SVT (supraventricular tachycardia) ([facility]) I47.1    Ectopic atrial tachycardia ([facility]) I47.1    Polyneuropathy in diabetes ([facility]) E11.42    Dyspnea R06.00    Sleep apnea G47.30    Fatigue, unspecified type R53.83    Moderate episode of recurrent major depressive disorder ([facility]) F33.1    Iron deficiency anemia due to chronic blood loss D50.0    High risk medication use Z79.899    Current use of estrogen therapy Z79.899    Family history of breast cancer Z80.3    Family history of gastric cancer Z80.0    Diabetes mellitus with gastroparesis (HCC) E11.43    Abnormal CBC R79.89    Type 2 diabetes mellitus without retinopathy (HCC) E11.9    Laryngopharyngeal reflux (LPR) K21.9    Age-related nuclear cataract of both eyes H25.13    Overweight E66.3    Lumbar radiculopathy M54.16     Past Surgical History:   Procedure Laterality Date    COLONOSCOPY N/A 2017    COLONOSCOPY performed by Daniela Vitale MD at [facility] ENDOSCOPY    COLONOSCOPY N/A 2021    COLONOSCOPY, EGD   :- performed by Jesse Monzon MD at [facility] ENDOSCOPY    [address] [facility]      several breast biopsies (benign)    HX BREAST BIOPSY Bilateral , , ,    6-7 on R, 2 on L+all benign    HX BUNIONECTOMY      bilateral    HX  SECTION      x3    HX CHOLECYSTECTOMY  2009    HX COLONOSCOPY      normal, f/u in 10 yrs    HX DILATION AND CURETTAGE      [id number], several D&C's    HX ENDOSCOPY      x3, h/o gastric polyp removal 3/2012    HX GI      cholecystectomy    HX GYN  2018    OZZIE    HX HEART CATHETERIZATION      x2, most recent 2012 was normal    HX KNEE ARTHROSCOPY Right     HX OTHER SURGICAL      lipoma removal    HX OTHER SURGICAL  16    L temporal artery bx (Dr. Ann Marshall)    HX AZAEL AND BSO  2006    secondary to endometrial hyperplasia with atypical changes    HX TONSILLECTOMY      T & A      Social History     Tobacco Use    Smoking status: Never    Smokeless tobacco: Never   Substance Use Topics    Alcohol use: No     Alcohol/week: 0.0 standard drinks      Family History   Problem Relation Age of Onset    Heart Disease Mother     Diabetes Mother     Cancer Mother 40        breast    Thyroid Disease Mother         hashimotos    Hypertension Mother     Osteoporosis Mother     Heart Surgery Mother         aortic valve replacement    Kidney Disease Mother     Gout Mother     Cancer Father 79        gastric    Heart Disease Father     Hypertension Father     Thyroid Disease Sister         hashimotos    Other Sister         bipolar    Other Sister         depression    Heart Disease Brother     Other Brother         mental illness    Breast Cancer Maternal Aunt 44  from breast cancer    Breast Cancer Maternal Aunt 39        and recurred at 80    Breast Cancer Maternal Aunt     Diabetes Paternal Grandmother     Psychiatric Disorder Daughter         depression    Psychiatric Disorder Daughter         depression    Psychiatric Disorder Son         bipolar    Osteoporosis Other         maternal aunts    Ovarian Cancer Neg Hx     Pancreatic Cancer Neg Hx     Prostate Cancer Neg Hx         Physical Exam   Vitals:       Visit Vitals  /77 (BP 1 Location: Left upper arm, BP Patient Position: Sitting, BP Cuff Size: Small adult)   Pulse 84   Resp 14   Ht 5' 7\" (1.702 m)   Wt 193 lb 3.2 oz (87.6 kg)   SpO2 99%   BMI 30.26 kg/m²        Physical Exam  Constitutional:       General: She is not in acute distress. Appearance: She is well-developed. HENT:      Right Ear: Tympanic membrane, ear canal and external ear normal.      Left Ear: Tympanic membrane, ear canal and external ear normal.      Mouth/Throat:      Mouth: Mucous membranes are moist.      Pharynx: No posterior oropharyngeal erythema. Eyes:      Extraocular Movements: Extraocular movements intact. Conjunctiva/sclera: Conjunctivae normal.   Cardiovascular:      Rate and Rhythm: Normal rate and regular rhythm. Pulses: Normal pulses. Heart sounds: No murmur heard. No friction rub. No gallop. Pulmonary:      Effort: No respiratory distress. Breath sounds: No wheezing, rhonchi or rales. Abdominal:      General: Bowel sounds are normal. There is no distension. Palpations: Abdomen is soft. There is no hepatomegaly, splenomegaly or mass. Tenderness: There is no abdominal tenderness. There is no guarding. Musculoskeletal:      Cervical back: Neck supple. Right lower leg: No edema. Left lower leg: No edema. Lymphadenopathy:      Cervical: No cervical adenopathy. Skin:     General: Skin is warm. Findings: No rash.    Neurological:      Mental Status: She is alert. This is the Subsequent Medicare Annual Wellness Exam, performed 12 months or more after the Initial AWV or the last Subsequent AWV    I have reviewed the patient's medical history in detail and updated the computerized patient record. Assessment/Plan   Education and counseling provided:  Are appropriate based on today's review and evaluation    1. Medicare annual wellness visit, subsequent  2. Hyperlipidemia, unspecified hyperlipidemia type  Assessment & Plan:   Zetia - tolerating   Check labs  Previous medications: Atorvastatin (80, muscle aches)  Orders:  -     LIPID PANEL; Future  3. Type 2 diabetes mellitus with diabetic polyneuropathy, without long-term current use of insulin ([facility] [address]  Assessment & Plan: On high dose of prednisone because of PMR flare, sugars increased  She increased her metformin to 2 tabs twice a day about a month ago  On januvia 100   A1c 8.5% today from 6.7 last check. Likely related to increased prednisone dose. Discussed options. Initially discussed GLP-1 but she had a gastric emptying study that did show mild mildly delayed gastric emptying. Therefore recommend starting Jardiance. Start at 10 mg. If morning sugars still greater than 250 after 2 weeks, recommend doubling dose  Continue Januvia 100 and metformin 1000 twice a day  Contraindicated: GLP-1 (mild gastroparesis)  Orders:  -     AMB POC HEMOGLOBIN A1C  -     empagliflozin (JARDIANCE) 10 mg tablet; Take 1 Tablet by mouth daily. Indications: type 2 diabetes mellitus, Normal, Disp-90 Tablet, R-0  4. Lumbar radiculopathy  Assessment & Plan:   Seen by ortho 2/2023 for right lumbar radiculopathy, PT  Still has pain, but better and mobility is better   Has appt with spine surgery in may, continue PT  5. Irritable bowel syndrome with both constipation and diarrhea  Assessment & Plan:   Was given xifaxin, helped symptoms although symptoms still present  6.  Moderate episode of recurrent major depressive disorder [facility])  Assessment & Plan:   Depression  Marital issues   She requested to go back on Wellbutrin over MyChart  Wasn't able to start Wellbutrin until beginning of march   Feels better with wellbutrin   No SI   Continue Wellbutrin 150 XL daily and Cymbalta 60  daily. Has side effects with higher dose of Wellbutrin so likely stay at this dose  7. Gastroesophageal reflux disease without esophagitis  Assessment & Plan:   well controlled, continue current medications   Omeprazole 20/40   8. Type 2 diabetes mellitus with peripheral vascular disease (HCC)  9. Ectopic atrial tachycardia ([address]  Assessment & Plan:   monitored by specialist. No acute findings meriting change in the plan       Depression Risk Factor Screening     3 most recent PHQ Screens 3/28/2023   Little interest or pleasure in doing things Several days   Feeling down, depressed, irritable, or hopeless Several days   Total Score PHQ 2 2   Trouble falling or staying asleep, or sleeping too much -   Feeling tired or having little energy -   Poor appetite, weight loss, or overeating -   Feeling bad about yourself - or that you are a failure or have let yourself or your family down -   Trouble concentrating on things such as school, work, reading, or watching TV -   Moving or speaking so slowly that other people could have noticed; or the opposite being so fidgety that others notice -   Thoughts of being better off dead, or hurting yourself in some way -   PHQ 9 Score -   How difficult have these problems made it for you to do your work, take care of your home and get along with others -       Alcohol & Drug Abuse Risk Screen   Do you average more than 1 drink per night or more than 7 drinks a week?: (P) No  On any one occasion in the past three months have you had more than 3 drinks containing alcohol?: (P) No            Functional Ability and Level of Safety   Hearing:  Hearing: (P) Patient reports hearing is good      Activities of Daily Living:   The home contains: (P) rugs  Functional ADLs: (P) Patient does total self care     Ambulation:  Patient ambulates: (P) with mild difficulty  How far the patient can walk with difficulty: (P) 4miles  Walking is difficult due to: (P) pain     Fall Risk:  Fall Risk Assessment, last 12 mths 3/28/2023   Able to walk? Yes   Fall in past 12 months? 1   Do you feel unsteady? 0   Are you worried about falling 0   Is the gait abnormal? 0   Number of falls in past 12 months 1   Fall with injury? 0     Abuse Screen:  Do you ever feel afraid of your partner?: (P) No  Are you in a relationship with someone who physically or mentally threatens you?: (P) No  Is it safe for you to go home?: (P) Yes        Cognitive Screening   Has your family/caregiver stated any concerns about your memory?: (P) No         Health Maintenance Due   There are no preventive care reminders to display for this patient.       Patient Care Team   Patient Care Team:  Nidia Ulloa MD as PCP - General (Internal Medicine Physician)  Nidia Ulloa MD as PCP - [facility] Empaneled Provider  Sheri Savage MD (Surgery General)  Lucy Delgado MD as Physician (Endocrinology Physician)  Liudmila Puckett MD (Ophthalmology)  Omega Rhodes, RN  Eva Talbot MD (Cardiovascular Disease Physician)  Robbie Oliveira MD (Rheumatology Internal Medicine)  Shana Grossman MD (Gastroenterology)    History     Patient Active Problem List   Diagnosis Code    PUD (peptic ulcer disease) K27.9    GERD (gastroesophageal reflux disease) K21.9    OA (osteoarthritis) M19.90    H/O cardiac catheterization Z98.890    DDD (degenerative disc disease), cervical M50.30    Vitamin D deficiency E55.9    Degenerative arthritis of right knee M17.11    Thoracic outlet syndrome G54.0    Fibrocystic disease of breast N60.19    Essential hypertension I10    Hyperlipidemia E78.5    Temporal arteritis ([facility] [address] M31.6    Type 2 diabetes mellitus with diabetic polyneuropathy, without long-term current use of insulin (HCC) E11.42    Current chronic use of systemic steroids Z79.52    Osteopenia M85.80    Iron deficiency anemia D50.9    Type 2 diabetes mellitus with peripheral vascular disease (HCC) E11.51    Polymyalgia rheumatica (HCC) M35.3    History of pulmonary embolus (PE) Z86.711    Chronic pain G89.29    Irritable bowel syndrome with both constipation and diarrhea K58.2    History of hysterectomy Z90.710    Atypical mole D22.9    Anxiety and depression F41.9, F32.A    SVT (supraventricular tachycardia) ([facility]) I47.1    Ectopic atrial tachycardia (HCC) I47.1    Polyneuropathy in diabetes ([address] E11.42    Dyspnea R06.00    Sleep apnea G47.30    Fatigue, unspecified type R53.83    Moderate episode of recurrent major depressive disorder (HCC) F33.1    Iron deficiency anemia due to chronic blood loss D50.0    High risk medication use Z79.899    Current use of estrogen therapy Z79.899    Family history of breast cancer Z80.3    Family history of gastric cancer Z80.0    Diabetes mellitus with gastroparesis (HCC) E11.43    Abnormal CBC R79.89    Type 2 diabetes mellitus without retinopathy (HCC) E11.9    Laryngopharyngeal reflux (LPR) K21.9    Age-related nuclear cataract of both eyes H25.13    Overweight E66.3    Lumbar radiculopathy M54.16     Past Medical History:   Diagnosis Date    Asthma     childhood    Atypical mole     Chronic pain     shoulders/knees    DDD (degenerative disc disease), cervical     Degenerative arthritis of right knee 2/2014    Dr. Chung Manning    Ectopic atrial tachycardia ([facility] [address] 08/2020    Gastric nodule     gastric nodules on endoscopy 3/26/12    GERD (gastroesophageal reflux disease)     PUD (peptic ulcer disease)     Sleep apnea     uses cpap    SVT (supraventricular tachycardia) ([address]     Temporal arteritis ([address] 4/29/16    Dr. Joslyn Vasquez    Thoracic outlet syndrome     left, Dr. [place]    Vitamin D deficiency     Vulvar high-grade squamous intraepithelial lesion (HGSIL)       Past Surgical History:   Procedure Laterality Date    COLONOSCOPY N/A 2017    COLONOSCOPY performed by Serenity Rogers MD at [facility] ENDOSCOPY    COLONOSCOPY N/A 2021    COLONOSCOPY, EGD   :- performed by Jade Camacho MD at [facility] ENDOSCOPY    [address] [facility]      several breast biopsies (benign)    HX BREAST BIOPSY Bilateral , , ,    6-7 on R, 2 on L+all benign    HX BUNIONECTOMY      bilateral    HX  SECTION      x3    HX CHOLECYSTECTOMY  2009    HX COLONOSCOPY      normal, f/u in 10 yrs    HX DILATION AND CURETTAGE      [id number], several D&C's    HX ENDOSCOPY      x3, h/o gastric polyp removal 3/2012    HX GI      cholecystectomy    HX GYN  2018    OZZIE    HX HEART CATHETERIZATION      x2, most recent 2012 was normal    HX KNEE ARTHROSCOPY Right     HX OTHER SURGICAL      lipoma removal    HX OTHER SURGICAL  16    L temporal artery bx (Dr. Veena Mac)    HX AZAEL AND BSO      secondary to endometrial hyperplasia with atypical changes    HX TONSILLECTOMY      T & A     Current Outpatient Medications   Medication Sig Dispense Refill    empagliflozin (JARDIANCE) 10 mg tablet Take 1 Tablet by mouth daily. Indications: type 2 diabetes mellitus 90 Tablet 0    ezetimibe (ZETIA) 10 mg tablet Take 1 tablet by mouth daily 90 Tablet 3    predniSONE (DELTASONE) 5 mg tablet Take 4 Tablets by mouth daily. 120 Tablet 1    buPROPion XL (WELLBUTRIN XL) 150 mg tablet Take 1 Tablet by mouth every morning. 90 Tablet 1    sarilumab (Kevzara) 200 mg/1.14 mL syrg subcutaneous syringe 200 mg by SubCUTAneous route every fourteen (14) days. 2 Syringe 3    B.animalis,bifid,infantis,long (Probiotic 4X) 10-15 mg TbEC Take  by mouth.      verapamil ER (CALAN-SR) 180 mg CR tablet Take 1 tablet by mouth nightly 90 Tablet 3    Omeprazole delayed release (PRILOSEC D/R) 20 mg tablet Take  by mouth.  20mg am, 40mg pm      hydroCHLOROthiazide (HYDRODIURIL) 25 mg tablet Take 1 Tablet by mouth daily. Indications: high blood pressure 90 Tablet 3    metFORMIN (GLUCOPHAGE) 500 mg tablet Take 1 Tablet by mouth two (2) times daily (with meals). 180 Tablet 3    SITagliptin (Januvia) 100 mg tablet Take 1 Tablet by mouth daily. 90 Tablet 3    DULoxetine (CYMBALTA) 60 mg capsule Take 1 Capsule by mouth daily. 90 Capsule 3    valsartan (DIOVAN) 160 mg tablet Take 1 Tablet by mouth daily. Indications: high blood pressure 90 Tablet 3    methotrexate (RHEUMATREX) 2.5 mg tablet Take 6 Tablets by mouth every Sunday. 72 Tablet 3    folic acid (FOLVITE) 1 mg tablet Take 1 Tablet by mouth in the morning. 90 Tablet 7    predniSONE (DELTASONE) 1 mg tablet TAKE 4 TABLETS BY MOUTH EVERY  Tablet 2    colestipoL (COLESTID) 1 gram tablet       budesonide-formoteroL (SYMBICORT) 160-4.5 mcg/actuation HFAA Take 2 Puffs by inhalation two (2) times a day. estradiol (ESTRACE) 1 mg tablet Take 1 Tab by mouth daily. 90 Tab 3    omega-3 fatty acids-vitamin e 1,000 mg cap Take 1 Cap by mouth two (2) times a day. Cholecalciferol, Vitamin D3, 25 mcg (1,000 unit) chew Take 1 Capsule by mouth daily. multivitamin (ONE A DAY) tablet Take 1 Tab by mouth daily. Allergies   Allergen Reactions    [place] Flavor Hives     [place] fruit not flavor    Alendronate Nausea and Vomiting and Other (comments)     Reaction Type: Allergy; Reaction(s): severe nausea, vomiting    Morphine Other (comments)     Extreme epigastric pain    Statins-Hmg-Coa Reductase Inhibitors Other (comments)     Pt stated muscle injuries.      Valium [Diazepam] Nausea and Vomiting    Versed [Midazolam] Nausea and Vomiting       Family History   Problem Relation Age of Onset    Heart Disease Mother     Diabetes Mother     Cancer Mother 40        breast    Thyroid Disease Mother         hashimotos    Hypertension Mother     Osteoporosis Mother     Heart Surgery Mother         aortic valve replacement    Kidney Disease Mother     Gout Mother     Cancer Father 79        gastric    Heart Disease Father     Hypertension Father     Thyroid Disease Sister         hashimotos    Other Sister         bipolar    Other Sister         depression    Heart Disease Brother     Other Brother         mental illness    Breast Cancer Maternal Aunt 44         from breast cancer    Breast Cancer Maternal Aunt 40        and recurred at 80    Breast Cancer Maternal Aunt     Diabetes Paternal Grandmother     Psychiatric Disorder Daughter         depression    Psychiatric Disorder Daughter         depression    Psychiatric Disorder Son         bipolar    Osteoporosis Other         maternal aunts    Ovarian Cancer Neg Hx     Pancreatic Cancer Neg Hx     Prostate Cancer Neg Hx      Social History     Tobacco Use    Smoking status: Never    Smokeless tobacco: Never   Substance Use Topics    Alcohol use: No     Alcohol/week: 0.0 standard drinks         Chyu Corbin MD

## 2023-03-28 NOTE — ASSESSMENT & PLAN NOTE
Depression  Marital issues   She requested to go back on Wellbutrin over MyChart  Wasn't able to start Wellbutrin until beginning of march   Feels better with wellbutrin   No SI   Continue Wellbutrin 150 XL daily and Cymbalta 60  daily.   Has side effects with higher dose of Wellbutrin so likely stay at this dose

## 2023-03-29 ENCOUNTER — HOSPITAL ENCOUNTER (OUTPATIENT)
Dept: PHYSICAL THERAPY | Age: 68
Discharge: HOME | End: 2023-03-29
Payer: MEDICARE

## 2023-03-29 PROCEDURE — 97110 THERAPEUTIC EXERCISES: CPT | Performed by: PHYSICAL THERAPIST

## 2023-03-29 PROCEDURE — 97112 NEUROMUSCULAR REEDUCATION: CPT | Performed by: PHYSICAL THERAPIST

## 2023-03-29 NOTE — PROGRESS NOTES
3PT DAILY TREATMENT NOTE - [facility] 2-15    Patient Name: Julio Clark  Date:3/29/2023  : 1955  [x]  Patient  Verified  [place]or[place] MED[place] / Plan: [place] MEDICARE PART [place] & B / Product Type: Medicare /    In time: 138p  Out time: 215 p  Total Treatment Time (min): 37  Total Timed Codes (min): 37  1:1 Treatment Time ( [address] only): 35  Visit #:  5    Treatment Area: Radiculopathy, lumbar region [M54.16]    SUBJECTIVE  Pain Level (0-10 scale): 3/10 lower back   Any medication changes, allergies to medications, adverse drug reactions, diagnosis change, or new procedure performed?: [x] No    [] Yes (see summary sheet for update)  Subjective functional status/changes:   [] No changes reported  Pt reports that she had a fall following previous visit landing on her R side and has been having some knee pain from this. OBJECTIVE    28 min Therapeutic Exercise:  [x] See flow sheet :   Rationale: increase ROM and increase strength to improve the patients ability to lift and sit with less pain. 10 min Neuromuscular Re-education:  [x]  See flow sheet :   Rationale: increase strength, improve coordination, and increase proprioception  to improve the patients ability to lift with less pain.           With   [] TE   [] TA   [] Neuro   [] SC   [] other: Patient Education: [x] Review HEP    [] Progressed/Changed HEP based on:   [] positioning   [] body mechanics   [] transfers   [] heat/ice application    [] other: HEP Updated with standing therex     Other Objective/Functional Measures:   Cueing throughout for TA engagement with standing activities    Pain Level (0-10 scale) post treatment: 2/10    ASSESSMENT/Changes in Function:     Patient will continue to benefit from skilled PT services to modify and progress therapeutic interventions, address functional mobility deficits, address ROM deficits, address strength deficits, analyze and address soft tissue restrictions, analyze and modify body mechanics/ergonomics, and instruct in home and community integration to attain remaining goals. []  See Plan of Care  [x]  See progress note/recertification  []  See Discharge Summary         Progress towards goals / Updated goals:  Good potential to reach goals with significant improvements in frequency and intensity of symptoms. Progress stabilization and TA activity as able to prevent doming. Continue LE strengthening.      PLAN  [x]  Upgrade activities as tolerated     [x]  Continue plan of care  [x]  Update interventions per flow sheet       []  Discharge due to:_  []  Other:_      Jose Nicole, PT, DPT 3/29/2023

## 2023-04-05 ENCOUNTER — HOSPITAL ENCOUNTER (OUTPATIENT)
Dept: PHYSICAL THERAPY | Age: 68
End: 2023-04-05
Payer: MEDICARE

## 2023-04-05 PROCEDURE — 97112 NEUROMUSCULAR REEDUCATION: CPT

## 2023-04-05 PROCEDURE — 97110 THERAPEUTIC EXERCISES: CPT

## 2023-04-05 NOTE — PROGRESS NOTES
3PT DAILY TREATMENT NOTE - [facility] 2-15    Patient Name: Myke Campbell  Date:2023  : 1955  [x]  Patient  Verified  Payor: [place] MEDICARE / Plan: [place] MEDICARE PART A & B / Product Type: Medicare /    In time: 101p  Out time: 1:44p  Total Treatment Time (min): 43  Total Timed Codes (min): 43  1:1 Treatment Time ([address] only): 37  Visit #:  6    Treatment Area: Radiculopathy, lumbar region [M54.16]    SUBJECTIVE  Pain Level (0-10 scale): 2/10 lower back   Any medication changes, allergies to medications, adverse drug reactions, diagnosis change, or new procedure performed?: [x] No    [] Yes (see summary sheet for update)  Subjective functional status/changes:   [] No changes reported  Pt reports that her R knee is still bothering her after the fall. Back has been feeling good and HEP is going well. Using 2# ankle weights. Felt better after last visit. OBJECTIVE    33 min Therapeutic Exercise:  [x] See flow sheet :   Rationale: increase ROM and increase strength to improve the patients ability to lift and sit with less pain. 10 min Neuromuscular Re-education:  [x]  See flow sheet :   Rationale: increase strength, improve coordination, and increase proprioception  to improve the patients ability to lift with less pain. With   [] TE   [] TA   [] Neuro   [] SC   [] other: Patient Education: [x] Review HEP    [] Progressed/Changed HEP based on:   [] positioning   [] body mechanics   [] transfers   [] heat/ice application    [] other: HEP Updated with standing therex     Other Objective/Functional Measures:   R sided LE reported tighter than L. Several minor LoB with SLS training. Muscle fatigue noted with standing therex. Knee pain limited STS tolerance. Agreeable to trial 2 week HEP and return week of  for follow up.      Pain Level (0-10 scale) post treatment: 1-2/10 back    ASSESSMENT/Changes in Function:     Patient will continue to benefit from skilled PT services to modify and progress therapeutic interventions, address functional mobility deficits, address ROM deficits, address strength deficits, analyze and address soft tissue restrictions, analyze and modify body mechanics/ergonomics, and instruct in home and community integration to attain remaining goals. []  See Plan of Care  []  See progress note/recertification  []  See Discharge Summary         Progress towards goals / Updated goals:  Good potential to reach goals with significant improvements in frequency and intensity of symptoms. Progress stabilization and TA activity as able to prevent doming. Continue LE strengthening. Consider d/c or continue every other week.      PLAN  [x]  Upgrade activities as tolerated     [x]  Continue plan of care  [x]  Update interventions per flow sheet       []  Discharge due to:_  []  Other:_      Alanna Rojas, PT, DPT 4/5/2023

## 2023-04-15 DIAGNOSIS — F33.1 MODERATE EPISODE OF RECURRENT MAJOR DEPRESSIVE DISORDER (HCC): ICD-10-CM

## 2023-04-17 DIAGNOSIS — F33.1 MODERATE EPISODE OF RECURRENT MAJOR DEPRESSIVE DISORDER (HCC): ICD-10-CM

## 2023-04-17 RX ORDER — BUPROPION HYDROCHLORIDE 150 MG/1
150 TABLET ORAL
Qty: 90 TABLET | Refills: 3 | Status: SHIPPED | OUTPATIENT
Start: 2023-04-17

## 2023-04-17 RX ORDER — BUPROPION HYDROCHLORIDE 150 MG/1
150 TABLET ORAL
Qty: 90 TABLET | Refills: 0 | Status: SHIPPED | OUTPATIENT
Start: 2023-04-17 | End: 2023-04-17 | Stop reason: SDUPTHER

## 2023-04-17 NOTE — TELEPHONE ENCOUNTER
Rx request received from pts mail order pharmacy asking if we could please send over a 90 day supply of Wellbutrin.

## 2023-04-18 ENCOUNTER — HOSPITAL ENCOUNTER (OUTPATIENT)
Dept: PHYSICAL THERAPY | Age: 68
Discharge: HOME | End: 2023-04-18
Payer: MEDICARE

## 2023-04-18 PROCEDURE — 97112 NEUROMUSCULAR REEDUCATION: CPT

## 2023-04-18 PROCEDURE — 97110 THERAPEUTIC EXERCISES: CPT

## 2023-04-18 NOTE — PROGRESS NOTES
3PT DAILY TREATMENT NOTE - [facility] 2-15    Patient Name: Nabor Loco  Date:2023  : 1955  [x]  Patient  Verified  Payor: [place] MEDICARE / Plan: [place] MEDICARE PART A & B / Product Type: Medicare /    In time: 4:03p  Out time: p  Total Treatment Time (min): 45  Total Timed Codes (min): 45  1:1 Treatment Time ( [address] only): 39  Visit #:  7    Treatment Area: Radiculopathy, lumbar region [M54.16]    SUBJECTIVE  Pain Level (0-10 scale): 4/10 lower back, 4 R knee  Any medication changes, allergies to medications, adverse drug reactions, diagnosis change, or new procedure performed?: [x] No    [] Yes (see summary sheet for update)  Subjective functional status/changes:   [] No changes reported  Pt reports that her R knee is still bothering her after the fall. Back has been feeling good and HEP is going well. Using 2# ankle weights. Felt better after last visit. Likes coming in every other week. OBJECTIVE    30 min Therapeutic Exercise:  [x] See flow sheet :   Rationale: increase ROM and increase strength to improve the patients ability to lift and sit with less pain. 15 min Neuromuscular Re-education:  [x]  See flow sheet :   Rationale: increase strength, improve coordination, and increase proprioception  to improve the patients ability to lift with less pain. With   [] TE   [] TA   [] Neuro   [] SC   [] other: Patient Education: [x] Review HEP    [] Progressed/Changed HEP based on:   [] positioning   [] body mechanics   [] transfers   [] heat/ice application    [] other: HEP Updated with standing therex     Other Objective/Functional Measures:   A few minor LoB with SLS training but improved steadiness since last visit. Muscle fatigue noted with standing therex. Knee pain limited STS tolerance.     Pain Level (0-10 scale) post treatment: 1-2/10 back    ASSESSMENT/Changes in Function:     Patient will continue to benefit from skilled PT services to modify and progress therapeutic interventions, address functional mobility deficits, address ROM deficits, address strength deficits, analyze and address soft tissue restrictions, analyze and modify body mechanics/ergonomics, and instruct in home and community integration to attain remaining goals. []  See Plan of Care  [x]  See progress note/recertification  []  See Discharge Summary         Progress towards goals / Updated goals:  Good potential to reach goals with significant improvements in frequency and intensity of symptoms. Progress stabilization and TA activity as able to prevent doming. Continue LE strengthening. Continue every other week. Short Term Goals: To be accomplished in 4 weeks:  Pt will be independent in HEP to promote return to general wellness program.- MET  Pt will be able to sleep without interruption from LBP to allow for proper rest.- MET  Pt will increase lumbar R rotation ROM to 50% without pain to promote normal bed mobility and transfer ability. - MET     Long Term Goals: To be accomplished in 12 weeks:  Pt will increase R PF/DF MMT to 5/5 to promote stability during ambulation.- MET  Pt will be able to sit for 30 minutes without increase of LBP to allow for regular transportation to appointments without limitation.- MET  Pt will be able to  10# object from floor level without increase in LBP to allow for household cleaning. - IN PROGRESS (able but decreased pain compared to last visit)  Pt will be able to hold SLS 30 sec without UE support to demonstrate improved balance to allow for stair navigation without reported fear of falling in home. - IN PROGRESS (20 seconds)     NEW GOAL:  Pt will increase lumbar R rotation ROM to 100% without pain to promote normal trunk mobility to look behind her when backing up her car.      PLAN  [x]  Upgrade activities as tolerated     [x]  Continue plan of care  [x]  Update interventions per flow sheet       []  Discharge due to:_  []  Other:_      Aruna Ruiz, PT, DPT 4/18/2023

## 2023-04-18 NOTE — PROGRESS NOTES
Physical Therapy at [facility],   a part of  [facility] [facility]  [address] [facility] Faby Farmer  Phone: [phone]      Fax:  [phone]    Progress Note    Name: Juliocesar Bower   : 1955   MD: Phani Grimes, PA-C     Treatment Diagnosis: Radiculopathy, lumbar region [M54.16]  Start of Care: 23    Visits from Start of Care: 7  Missed Visits: 0    Summary of Care: Pt has been seen for 7 PT visit to address R sided lumbar radiculopathy including patient education, therapeutic exercise, and neuromuscular re-education. Pt has made slow but steady progress towards goals with improved functional mobility. FOTO score improved from 47 to 67 since evaluation. Pt demonstrates improved LE strength and lumbar AROM but is still limited by pain with bending and lifting with impaired balance. Recent progress and increased pain levels dt fall on 3/22/23. Pt would benefit from continued PT services to address remaining goals and improve ease with household tasks such as laundry. Assessment / Recommendations:     Short Term Goals: To be accomplished in 4 weeks:  Pt will be independent in HEP to promote return to general wellness program.- MET  Pt will be able to sleep without interruption from LBP to allow for proper rest.- MET  Pt will increase lumbar R rotation ROM to 50% without pain to promote normal bed mobility and transfer ability. - MET     Long Term Goals:  To be accomplished in 12 weeks:  Pt will increase R PF/DF MMT to 5/5 to promote stability during ambulation.- MET  Pt will be able to sit for 30 minutes without increase of LBP to allow for regular transportation to appointments without limitation.- MET  Pt will be able to  10# object from floor level without increase in LBP to allow for household cleaning. - IN PROGRESS (able but decreased pain compared to last visit)  Pt will be able to hold SLS 30 sec without UE support to demonstrate improved balance to allow for stair navigation without reported fear of falling in home. - IN PROGRESS (20 seconds)    NEW GOAL:  Pt will increase lumbar R rotation ROM to 100% without pain to promote normal trunk mobility to look behind her when backing up her car.      Donte Stuart, PT, DPT 4/18/2023

## 2023-04-27 DIAGNOSIS — E11.42 TYPE 2 DIABETES MELLITUS WITH DIABETIC POLYNEUROPATHY, WITHOUT LONG-TERM CURRENT USE OF INSULIN (HCC): ICD-10-CM

## 2023-04-27 RX ORDER — METFORMIN HYDROCHLORIDE 500 MG/1
1000 TABLET ORAL 2 TIMES DAILY WITH MEALS
Qty: 360 TABLET | Refills: 3 | Status: SHIPPED | OUTPATIENT
Start: 2023-04-27

## 2023-05-01 ENCOUNTER — APPOINTMENT (OUTPATIENT)
Facility: HOSPITAL | Age: 68
End: 2023-05-01
Payer: MEDICARE

## 2023-05-02 ENCOUNTER — OFFICE VISIT (OUTPATIENT)
Dept: RHEUMATOLOGY | Age: 68
End: 2023-05-02
Payer: MEDICARE

## 2023-05-02 ENCOUNTER — OFFICE VISIT (OUTPATIENT)
Dept: ORTHOPEDIC SURGERY | Age: 68
End: 2023-05-02

## 2023-05-02 VITALS
WEIGHT: 189 LBS | DIASTOLIC BLOOD PRESSURE: 85 MMHG | OXYGEN SATURATION: 98 % | SYSTOLIC BLOOD PRESSURE: 160 MMHG | TEMPERATURE: 97.8 F | HEART RATE: 86 BPM | RESPIRATION RATE: 16 BRPM | BODY MASS INDEX: 29.6 KG/M2

## 2023-05-02 VITALS — HEIGHT: 67 IN | WEIGHT: 185 LBS | BODY MASS INDEX: 29.03 KG/M2

## 2023-05-02 DIAGNOSIS — M35.3 POLYMYALGIA RHEUMATICA (HCC): Primary | ICD-10-CM

## 2023-05-02 DIAGNOSIS — M54.16 LUMBAR RADICULOPATHY, RIGHT: Primary | ICD-10-CM

## 2023-05-02 DIAGNOSIS — E78.5 HYPERLIPIDEMIA, UNSPECIFIED HYPERLIPIDEMIA TYPE: ICD-10-CM

## 2023-05-02 DIAGNOSIS — G89.29 CHRONIC BILATERAL LOW BACK PAIN WITH RIGHT-SIDED SCIATICA: ICD-10-CM

## 2023-05-02 DIAGNOSIS — M54.41 CHRONIC BILATERAL LOW BACK PAIN WITH RIGHT-SIDED SCIATICA: ICD-10-CM

## 2023-05-02 PROCEDURE — 1123F ACP DISCUSS/DSCN MKR DOCD: CPT | Performed by: PEDIATRICS

## 2023-05-02 PROCEDURE — G8417 CALC BMI ABV UP PARAM F/U: HCPCS | Performed by: PEDIATRICS

## 2023-05-02 PROCEDURE — 1101F PT FALLS ASSESS-DOCD LE1/YR: CPT | Performed by: PEDIATRICS

## 2023-05-02 PROCEDURE — G8399 PT W/DXA RESULTS DOCUMENT: HCPCS | Performed by: PEDIATRICS

## 2023-05-02 PROCEDURE — 3017F COLORECTAL CA SCREEN DOC REV: CPT | Performed by: PEDIATRICS

## 2023-05-02 PROCEDURE — 99214 OFFICE O/P EST MOD 30 MIN: CPT | Performed by: PEDIATRICS

## 2023-05-02 PROCEDURE — 3078F DIAST BP <80 MM HG: CPT | Performed by: PEDIATRICS

## 2023-05-02 PROCEDURE — G8427 DOCREV CUR MEDS BY ELIG CLIN: HCPCS | Performed by: PEDIATRICS

## 2023-05-02 PROCEDURE — 3074F SYST BP LT 130 MM HG: CPT | Performed by: PEDIATRICS

## 2023-05-02 PROCEDURE — 1090F PRES/ABSN URINE INCON ASSESS: CPT | Performed by: PEDIATRICS

## 2023-05-02 PROCEDURE — G8536 NO DOC ELDER MAL SCRN: HCPCS | Performed by: PEDIATRICS

## 2023-05-02 PROCEDURE — G9717 DOC PT DX DEP/BP F/U NT REQ: HCPCS | Performed by: PEDIATRICS

## 2023-05-02 PROCEDURE — G9899 SCRN MAM PERF RSLTS DOC: HCPCS | Performed by: PEDIATRICS

## 2023-05-02 PROCEDURE — G0463 HOSPITAL OUTPT CLINIC VISIT: HCPCS | Performed by: PEDIATRICS

## 2023-05-02 RX ORDER — FOLIC ACID 1 MG/1
1 TABLET ORAL DAILY
Qty: 90 TABLET | Refills: 7 | Status: SHIPPED | OUTPATIENT
Start: 2023-05-02

## 2023-05-02 RX ORDER — PREGABALIN 50 MG/1
50 CAPSULE ORAL 2 TIMES DAILY
Qty: 60 CAPSULE | Refills: 0 | Status: SHIPPED | OUTPATIENT
Start: 2023-05-02

## 2023-05-02 RX ORDER — PREDNISONE 1 MG/1
TABLET ORAL
Qty: 360 TABLET | Refills: 1 | Status: SHIPPED | OUTPATIENT
Start: 2023-05-02

## 2023-05-02 RX ORDER — ACETAMINOPHEN 325 MG/1
TABLET ORAL
COMMUNITY

## 2023-05-02 RX ORDER — EZETIMIBE 10 MG/1
10 TABLET ORAL DAILY
Qty: 90 TABLET | Refills: 3 | Status: SHIPPED | OUTPATIENT
Start: 2023-05-02

## 2023-05-02 RX ORDER — PREDNISONE 5 MG/1
10 TABLET ORAL DAILY
Qty: 180 TABLET | Refills: 1 | Status: SHIPPED | OUTPATIENT
Start: 2023-05-02 | End: 2023-07-31

## 2023-05-02 RX ORDER — METHOTREXATE 2.5 MG/1
15 TABLET ORAL
Qty: 72 TABLET | Refills: 3 | Status: SHIPPED | OUTPATIENT
Start: 2023-05-07

## 2023-05-03 ENCOUNTER — TRANSCRIBE ORDERS (OUTPATIENT)
Facility: HOSPITAL | Age: 68
End: 2023-05-03

## 2023-05-03 DIAGNOSIS — G89.29 CHRONIC BILATERAL LOW BACK PAIN WITH RIGHT-SIDED SCIATICA: ICD-10-CM

## 2023-05-03 DIAGNOSIS — M54.16 LUMBAR RADICULOPATHY, RIGHT: Primary | ICD-10-CM

## 2023-05-03 DIAGNOSIS — M54.41 CHRONIC BILATERAL LOW BACK PAIN WITH RIGHT-SIDED SCIATICA: ICD-10-CM

## 2023-05-05 ENCOUNTER — HOSPITAL ENCOUNTER (OUTPATIENT)
Dept: PHYSICAL THERAPY | Age: 68
Discharge: HOME | End: 2023-05-05
Payer: MEDICARE

## 2023-05-05 LAB
ALBUMIN SERPL-MCNC: 3.9 G/DL (ref 3.5–5)
ALBUMIN/GLOB SERPL: 1.5 (ref 1.1–2.2)
ALP SERPL-CCNC: 38 U/L (ref 45–117)
ALT SERPL-CCNC: 31 U/L (ref 12–78)
ANION GAP SERPL CALC-SCNC: 8 MMOL/L (ref 5–15)
AST SERPL-CCNC: 18 U/L (ref 15–37)
BASOPHILS # BLD: 0.1 K/UL (ref 0–0.1)
BASOPHILS NFR BLD: 1 % (ref 0–1)
BILIRUB SERPL-MCNC: 0.2 MG/DL (ref 0.2–1)
BUN SERPL-MCNC: 18 MG/DL (ref 6–20)
BUN/CREAT SERPL: 19 (ref 12–20)
CALCIUM SERPL-MCNC: 9.2 MG/DL (ref 8.5–10.1)
CHLORIDE SERPL-SCNC: 99 MMOL/L (ref 97–108)
CO2 SERPL-SCNC: 29 MMOL/L (ref 21–32)
CREAT SERPL-MCNC: 0.95 MG/DL (ref 0.55–1.02)
DIFFERENTIAL METHOD BLD: ABNORMAL
EOSINOPHIL # BLD: 0.1 K/UL (ref 0–0.4)
EOSINOPHIL NFR BLD: 1 % (ref 0–7)
ERYTHROCYTE [DISTWIDTH] IN BLOOD BY AUTOMATED COUNT: 13.9 % (ref 11.5–14.5)
GLOBULIN SER CALC-MCNC: 2.6 G/DL (ref 2–4)
GLUCOSE SERPL-MCNC: 193 MG/DL (ref 65–100)
HCT VFR BLD AUTO: 41.3 % (ref 35–47)
HGB BLD-MCNC: 13.2 G/DL (ref 11.5–16)
IMM GRANULOCYTES # BLD AUTO: 0.1 K/UL (ref 0–0.04)
IMM GRANULOCYTES NFR BLD AUTO: 1 % (ref 0–0.5)
LYMPHOCYTES # BLD: 3.3 K/UL (ref 0.8–3.5)
LYMPHOCYTES NFR BLD: 58 % (ref 12–49)
MCH RBC QN AUTO: 32.4 PG (ref 26–34)
MCHC RBC AUTO-ENTMCNC: 32 G/DL (ref 30–36.5)
MCV RBC AUTO: 101.5 FL (ref 80–99)
MONOCYTES # BLD: 0.6 K/UL (ref 0–1)
MONOCYTES NFR BLD: 10 % (ref 5–13)
NEUTS SEG # BLD: 1.6 K/UL (ref 1.8–8)
NEUTS SEG NFR BLD: 29 % (ref 32–75)
NRBC # BLD: 0 K/UL (ref 0–0.01)
NRBC BLD-RTO: 0 PER 100 WBC
PLATELET # BLD AUTO: 333 K/UL (ref 150–400)
PMV BLD AUTO: 10.5 FL (ref 8.9–12.9)
POTASSIUM SERPL-SCNC: 3.4 MMOL/L (ref 3.5–5.1)
PROT SERPL-MCNC: 6.5 G/DL (ref 6.4–8.2)
RBC # BLD AUTO: 4.07 M/UL (ref 3.8–5.2)
SODIUM SERPL-SCNC: 136 MMOL/L (ref 136–145)
WBC # BLD AUTO: 5.7 K/UL (ref 3.6–11)

## 2023-05-05 PROCEDURE — 9990 CHARGE CONVERSION

## 2023-05-05 PROCEDURE — 97110 THERAPEUTIC EXERCISES: CPT

## 2023-05-16 ENCOUNTER — TELEPHONE (OUTPATIENT)
Age: 68
End: 2023-05-16

## 2023-05-16 DIAGNOSIS — E78.2 MIXED HYPERLIPIDEMIA: Primary | ICD-10-CM

## 2023-05-16 RX ORDER — BEMPEDOIC ACID AND EZETIMIBE 180; 10 MG/1; MG/1
1 TABLET, FILM COATED ORAL DAILY
Qty: 90 TABLET | Refills: 3 | Status: SHIPPED | OUTPATIENT
Start: 2023-05-16

## 2023-05-16 NOTE — TELEPHONE ENCOUNTER
----- Message from Edwin Alegre MD sent at 1/67/9545  4:44 PM EDT -----  Regarding: call  I sent this message in the old system because her labs results came back in the old system. Not isaac e if she saw it. Please call: Your test results are normal except for the following: Your cholesterol is elevated, but significantly better than where it was the last time we checked.  There's a medication called nexlizet which is a combination of ezetimibe and bempedoic acid.  This might be give another booster to lower your cholesterol (ideally, your LDL would be less than 70). The main limiting factor with this medication would be coverage.  Constipation and runny nose are the most common side effects with this medication.  Do you want to try switching to this medication if it's covered by your insurance?

## 2023-05-16 NOTE — TELEPHONE ENCOUNTER
Nurse have attempted without success to contact this patient by phone nurse  left a voice mail for a call back to discuss recommendations and results  from provider.  [place] message also sent

## 2023-05-18 ENCOUNTER — HOSPITAL ENCOUNTER (OUTPATIENT)
Facility: HOSPITAL | Age: 68
Discharge: HOME | End: 2023-05-18
Payer: COMMERCIAL

## 2023-05-18 DIAGNOSIS — M54.41 CHRONIC BILATERAL LOW BACK PAIN WITH RIGHT-SIDED SCIATICA: ICD-10-CM

## 2023-05-18 DIAGNOSIS — G89.29 CHRONIC BILATERAL LOW BACK PAIN WITH RIGHT-SIDED SCIATICA: ICD-10-CM

## 2023-05-18 DIAGNOSIS — M54.16 LUMBAR RADICULOPATHY, RIGHT: ICD-10-CM

## 2023-05-18 PROCEDURE — 72148 MRI LUMBAR SPINE W/O DYE: CPT

## 2023-05-19 ENCOUNTER — APPOINTMENT (OUTPATIENT)
Dept: PHYSICAL THERAPY | Age: 68
End: 2023-05-19
Payer: MEDICARE

## 2023-05-19 ENCOUNTER — TELEPHONE (OUTPATIENT)
Age: 68
End: 2023-05-19

## 2023-05-19 ENCOUNTER — HOSPITAL ENCOUNTER (OUTPATIENT)
Facility: HOSPITAL | Age: 68
Setting detail: RECURRING SERIES
Discharge: HOME | End: 2023-05-22
Payer: MEDICARE

## 2023-05-19 DIAGNOSIS — M35.3 POLYMYALGIA RHEUMATICA (HCC): Primary | ICD-10-CM

## 2023-05-19 PROCEDURE — 97112 NEUROMUSCULAR REEDUCATION: CPT

## 2023-05-19 PROCEDURE — 97110 THERAPEUTIC EXERCISES: CPT

## 2023-05-19 RX ORDER — CELECOXIB 200 MG/1
200 CAPSULE ORAL 2 TIMES DAILY PRN
Qty: 60 CAPSULE | Refills: 5 | Status: SHIPPED | OUTPATIENT
Start: 2023-05-19

## 2023-05-22 ENCOUNTER — TELEPHONE (OUTPATIENT)
Age: 68
End: 2023-05-22

## 2023-05-22 DIAGNOSIS — M35.3 POLYMYALGIA RHEUMATICA (HCC): ICD-10-CM

## 2023-05-22 RX ORDER — CELECOXIB 200 MG/1
200 CAPSULE ORAL 2 TIMES DAILY PRN
Qty: 180 CAPSULE | Refills: 3 | Status: SHIPPED | OUTPATIENT
Start: 2023-05-22

## 2023-05-22 RX ORDER — CELECOXIB 200 MG/1
200 CAPSULE ORAL 2 TIMES DAILY PRN
Qty: 180 CAPSULE | Refills: 5 | Status: CANCELLED | OUTPATIENT
Start: 2023-05-22

## 2023-05-30 ENCOUNTER — HOSPITAL ENCOUNTER (OUTPATIENT)
Facility: HOSPITAL | Age: 68
Setting detail: RECURRING SERIES
Discharge: HOME | End: 2023-06-02
Payer: MEDICARE

## 2023-05-30 PROCEDURE — 97112 NEUROMUSCULAR REEDUCATION: CPT

## 2023-05-30 PROCEDURE — 97110 THERAPEUTIC EXERCISES: CPT

## 2023-05-30 NOTE — PROGRESS NOTES
PHYSICAL THERAPY - MEDICARE DAILY TREATMENT NOTE (updated 3/23)    Date: 2023        Patient Name:  Jessica Cordero :  1955   Medical   Diagnosis:  Radiculopathy, lumbar region [M54.16] Treatment Diagnosis:  M54.41  LUMBAGO WITH SCIATICA, RIGHT SIDE    Referral Source:  Samir Peoples PA-C Insurance:   Payor: MEDICARE / Plan: MEDICARE PART A AND B / Product Type: *No Product type* /                   Patient  verified yes     Visit #   Current  / Total 10 21   Time   In / Out 315a 400p   Total Treatment Time 45   Total Timed Codes 45   1:1 Treatment Time 39      [facility] Totals Reminder:  bill using total billable   min of TIMED therapeutic procedures and modalities. 8-22 min = 1 unit; 23-37 min = 2 units; 38-52 min = 3 units; 53-67 min = 4 units; 68-82 min = 5 units        SUBJECTIVE    Pain Level (0-10 scale): 6    Any medication changes, allergies to medications, adverse drug reactions, diagnosis change, or new procedure performed?: [x] No    [] Yes (see summary sheet for update)  Medications: Verified on Patient Summary List    Subjective functional status/changes:     More pain in lower back and R knee today. Feet are feeling better. Is doing injections soon. Felt good after last visit but hurt more this weekend. Started wearing inserts last Friday and feet feel better. Doing sets of 15 reps for HEP without a problem. OBJECTIVE    Therapeutic Procedures: Tx Min Billable or 1:1 Min (if diff from Tx Min) Procedure, Rationale, Specifics   30 30 09000 Therapeutic Exercise (timed):  increase ROM, strength, coordination, balance, and proprioception to improve patient's ability to progress to PLOF and address remaining functional goals.  (see flow sheet as applicable)     Details if applicable:     15 59 34021 Neuromuscular Re-Education (timed):  improve balance, coordination, kinesthetic sense, posture, core stability and proprioception to improve patient's ability to develop conscious control of

## 2023-06-09 ENCOUNTER — HOSPITAL ENCOUNTER (OUTPATIENT)
Facility: HOSPITAL | Age: 68
Discharge: HOME | End: 2023-06-09
Attending: RADIOLOGY | Admitting: RADIOLOGY
Payer: MEDICARE

## 2023-06-09 VITALS
TEMPERATURE: 98.6 F | SYSTOLIC BLOOD PRESSURE: 137 MMHG | OXYGEN SATURATION: 97 % | HEART RATE: 78 BPM | DIASTOLIC BLOOD PRESSURE: 92 MMHG | WEIGHT: 180 LBS | HEIGHT: 67 IN | BODY MASS INDEX: 28.25 KG/M2

## 2023-06-09 DIAGNOSIS — M48.062 SPINAL STENOSIS OF LUMBAR REGION WITH NEUROGENIC CLAUDICATION: ICD-10-CM

## 2023-06-09 PROCEDURE — 6360000004 HC RX CONTRAST MEDICATION: Performed by: RADIOLOGY

## 2023-06-09 PROCEDURE — 6360000002 HC RX W HCPCS: Performed by: RADIOLOGY

## 2023-06-09 PROCEDURE — 2500000003 HC RX 250 WO HCPCS: Performed by: RADIOLOGY

## 2023-06-09 PROCEDURE — 62323 NJX INTERLAMINAR LMBR/SAC: CPT

## 2023-06-09 RX ORDER — LIDOCAINE HYDROCHLORIDE 10 MG/ML
INJECTION, SOLUTION EPIDURAL; INFILTRATION; INTRACAUDAL; PERINEURAL PRN
Status: DISCONTINUED | OUTPATIENT
Start: 2023-06-09 | End: 2023-06-09 | Stop reason: HOSPADM

## 2023-06-09 RX ORDER — DEXAMETHASONE SODIUM PHOSPHATE 10 MG/ML
INJECTION, SOLUTION INTRAMUSCULAR; INTRAVENOUS PRN
Status: DISCONTINUED | OUTPATIENT
Start: 2023-06-09 | End: 2023-06-09 | Stop reason: HOSPADM

## 2023-06-09 RX ADMIN — LIDOCAINE HYDROCHLORIDE 12 ML: 10 INJECTION, SOLUTION EPIDURAL; INFILTRATION; INTRACAUDAL; PERINEURAL at 15:20

## 2023-06-09 RX ADMIN — DEXAMETHASONE SODIUM PHOSPHATE 15 MG: 10 INJECTION, SOLUTION INTRAMUSCULAR; INTRAVENOUS at 15:23

## 2023-06-09 RX ADMIN — IOHEXOL 5 ML: 180 INJECTION INTRAVENOUS at 15:21

## 2023-06-09 ASSESSMENT — PAIN DESCRIPTION - DESCRIPTORS: DESCRIPTORS: BURNING;SHARP;ACHING

## 2023-06-09 ASSESSMENT — PAIN DESCRIPTION - LOCATION: LOCATION: BACK;HIP

## 2023-06-09 ASSESSMENT — PAIN DESCRIPTION - PAIN TYPE: TYPE: CHRONIC PAIN

## 2023-06-09 ASSESSMENT — PAIN DESCRIPTION - ORIENTATION: ORIENTATION: RIGHT

## 2023-06-09 ASSESSMENT — PAIN SCALES - GENERAL: PAINLEVEL_OUTOF10: 6

## 2023-06-09 NOTE — DISCHARGE INSTRUCTIONS
Infectious Disease your treatment and safety. Be sure to make and go to all appointments, and call your doctor if you are having problems. It's also a good idea to know your test results and keep a list of the medicines you take. When should you call for help? Call 911 anytime you think you may need emergency care. For example, call if:    You passed out (lost consciousness). You have trouble breathing. Call your doctor now or seek immediate medical care if:    You have new pain, or your pain gets worse. You have new numbness in your buttocks or legs. You have new weakness in your buttocks or legs. You have a severe headache. You have new loss of bowel or bladder control. You have signs of infection, such as: Increased pain, swelling, warmth, or redness. A fever. Watch closely for any changes in your health, and be sure to contact your doctor if:    You do not get better as expected. Current as of: November 9, 2022               Content Version: 13.6  © 2006-2023 [employer]. Care instructions adapted under license by [facility] ([facility]). If you have questions about a medical condition or this instruction, always ask your healthcare professional. [place] [address] any warranty or liability for your use of this information.

## 2023-06-09 NOTE — PROGRESS NOTES
Pt arrives ambulatory to angio department for [facility]  procedure. All assessments completed and consent was reviewed. Education given was regarding procedure, local anesthetic, post-procedure care and  management/follow-up. Opportunity for questions was provided and all questions and concerns were addressed.

## 2023-06-09 NOTE — PROGRESS NOTES
Pt ambulated around nursing unit without incident. Pt denies pain, numbness, tingling or other discomforts. Pt was able to lift and rotate bilateral extremities and sit without pain. Pt bandaid was clean, dry and intact. ELROY Pt discharge instructions were given to pt by RN. Opportunities for questions and concerns were provided and addressed. Pt verbalized understanding of medication use and follow-up. Pt wheeled off unit in no signs of distress, steady gait noted. Pt placed In private vehicle by RN.

## 2023-06-20 ENCOUNTER — HOSPITAL ENCOUNTER (OUTPATIENT)
Facility: HOSPITAL | Age: 68
Setting detail: RECURRING SERIES
Discharge: HOME | End: 2023-06-23
Payer: MEDICARE

## 2023-06-20 PROCEDURE — 97110 THERAPEUTIC EXERCISES: CPT

## 2023-06-20 PROCEDURE — 97112 NEUROMUSCULAR REEDUCATION: CPT

## 2023-06-20 NOTE — PROGRESS NOTES
PHYSICAL THERAPY - MEDICARE DAILY TREATMENT NOTE (updated 3/23)    Date: 2023        Patient Name:  Kiersten Pham :  1955   Medical   Diagnosis:  Radiculopathy, lumbar region [M54.16] Treatment Diagnosis:  M54.41  LUMBAGO WITH SCIATICA, RIGHT SIDE    Referral Source:  Nelda Marquez PA-C Insurance:   Payor: MEDICARE / Plan: MEDICARE PART A AND B / Product Type: *No Product type* /                   Patient  verified yes     Visit #   Current  / Total 12 90   Time   In / Out 400p 445p   Total Treatment Time 45   Total Timed Codes 45   1:1 Treatment Time 39      [facility] BC Totals Reminder:  bill using total billable   min of TIMED therapeutic procedures and modalities. 8-22 min = 1 unit; 23-37 min = 2 units; 38-52 min = 3 units; 53-67 min = 4 units; 68-82 min = 5 units        SUBJECTIVE    Pain Level (0-10 scale): 4/10 in feet R>L, 2/10 in lower back    Any medication changes, allergies to medications, adverse drug reactions, diagnosis change, or new procedure performed?: [x] No    [] Yes (see summary sheet for update)  Medications: Verified on Patient Summary List    Subjective functional status/changes:     Was a little sore after last visit but went away within a day. Has done some HEP. \"Luz Marina been sleeping well and a lot\". \"Its much easier to put my shoes on\"    OBJECTIVE    Therapeutic Procedures: Tx Min Billable or 1:1 Min (if diff from Tx Min) Procedure, Rationale, Specifics   30 30 95535 Therapeutic Exercise (timed):  increase ROM, strength, coordination, balance, and proprioception to improve patient's ability to progress to PLOF and address remaining functional goals.  (see flow sheet as applicable)     Details if applicable:     15 15 24416 Neuromuscular Re-Education (timed):  improve balance, coordination, kinesthetic sense, posture, core stability and proprioception to improve patient's ability to develop conscious control of individual muscles and awareness of position of extremities in order

## 2023-06-20 NOTE — PROGRESS NOTES
Physical Therapy at [facility],   a part of  [facility]  [address] [facility] Lisa Howard  Phone: [phone]  Fax: [phone]  PHYSICAL THERAPY PROGRESS NOTE  Patient Name:  Phoebe Rooney :  1955   Treatment/Medical Diagnosis: Radiculopathy, lumbar region [M54.16]   Referral Source:  Yobany Blake     Date of Initial Visit:  23 Attended Visits:  12 Missed Visits:  0     SUMMARY OF TREATMENT/ASSESSMENT:    Pt has been seen for 12 PT visits to address R sided lumbar radiculopathy including patient education, therapeutic exercise, and neuromuscular re-education. Pt continues to make steady progress towards goals with improved functional mobility and reported improved sleep quality. Pt demonstrates improved LE strength and lumbar AROM with improved activity tolerance since injection. Pt continues to have mild (2-3/10) lower back pain with lifting and would benefit from continued PT to reach remaining goals. CURRENT STATUS    Pt will be able to hold SLS 30 sec without UE support to demonstrate improved balance to allow for stair navigation without reported fear of falling in home- IN PROGRESS (15 sec)  Pt will increase lumbar R rotation ROM to 100% without pain to promote normal trunk mobility to look behind her when backing up her car- IN PROGRESS (L 100%, R 75% p)  Pt will be able to  10# object from floor level without increase in LBP to allow for household cleaning.- IN PROGRESS (present but decreased intensity)    RECOMMENDATIONS    Continue 1x/week 2-3 weeks    Lucien Almeida, PT, DPT       2023       4:04 PM    If you have any questions/comments please contact us directly at [phone]. Thank you for allowing us to assist in the care of your patient.

## 2023-06-26 RX ORDER — BUPROPION HYDROCHLORIDE 150 MG/1
TABLET ORAL
Qty: 90 TABLET | Refills: 3 | Status: SHIPPED | OUTPATIENT
Start: 2023-06-26

## 2023-06-28 ENCOUNTER — OFFICE VISIT (OUTPATIENT)
Age: 68
End: 2023-06-28
Payer: MEDICARE

## 2023-06-28 ENCOUNTER — HOSPITAL ENCOUNTER (OUTPATIENT)
Facility: HOSPITAL | Age: 68
Setting detail: RECURRING SERIES
Discharge: HOME | End: 2023-07-01
Payer: COMMERCIAL

## 2023-06-28 VITALS
RESPIRATION RATE: 18 BRPM | BODY MASS INDEX: 28.88 KG/M2 | OXYGEN SATURATION: 96 % | SYSTOLIC BLOOD PRESSURE: 136 MMHG | HEIGHT: 67 IN | TEMPERATURE: 98.6 F | HEART RATE: 81 BPM | DIASTOLIC BLOOD PRESSURE: 68 MMHG | WEIGHT: 184 LBS

## 2023-06-28 DIAGNOSIS — E78.5 HYPERLIPIDEMIA, UNSPECIFIED HYPERLIPIDEMIA TYPE: ICD-10-CM

## 2023-06-28 DIAGNOSIS — E11.51 TYPE 2 DIABETES MELLITUS WITH PERIPHERAL VASCULAR DISEASE (HCC): Primary | ICD-10-CM

## 2023-06-28 PROBLEM — R49.0 MUSCLE TENSION DYSPHONIA: Status: ACTIVE | Noted: 2022-07-12

## 2023-06-28 LAB — HBA1C MFR BLD: 6.9 %

## 2023-06-28 PROCEDURE — PBSHW AMB POC HEMOGLOBIN A1C: Performed by: INTERNAL MEDICINE

## 2023-06-28 PROCEDURE — 2022F DILAT RTA XM EVC RTNOPTHY: CPT | Performed by: INTERNAL MEDICINE

## 2023-06-28 PROCEDURE — G8427 DOCREV CUR MEDS BY ELIG CLIN: HCPCS | Performed by: INTERNAL MEDICINE

## 2023-06-28 PROCEDURE — 3078F DIAST BP <80 MM HG: CPT | Performed by: INTERNAL MEDICINE

## 2023-06-28 PROCEDURE — 97110 THERAPEUTIC EXERCISES: CPT

## 2023-06-28 PROCEDURE — 1036F TOBACCO NON-USER: CPT | Performed by: INTERNAL MEDICINE

## 2023-06-28 PROCEDURE — 3075F SYST BP GE 130 - 139MM HG: CPT | Performed by: INTERNAL MEDICINE

## 2023-06-28 PROCEDURE — 99214 OFFICE O/P EST MOD 30 MIN: CPT | Performed by: INTERNAL MEDICINE

## 2023-06-28 PROCEDURE — 1123F ACP DISCUSS/DSCN MKR DOCD: CPT | Performed by: INTERNAL MEDICINE

## 2023-06-28 PROCEDURE — 97112 NEUROMUSCULAR REEDUCATION: CPT

## 2023-06-28 PROCEDURE — 3017F COLORECTAL CA SCREEN DOC REV: CPT | Performed by: INTERNAL MEDICINE

## 2023-06-28 PROCEDURE — G8399 PT W/DXA RESULTS DOCUMENT: HCPCS | Performed by: INTERNAL MEDICINE

## 2023-06-28 PROCEDURE — 1090F PRES/ABSN URINE INCON ASSESS: CPT | Performed by: INTERNAL MEDICINE

## 2023-06-28 PROCEDURE — 83036 HEMOGLOBIN GLYCOSYLATED A1C: CPT | Performed by: INTERNAL MEDICINE

## 2023-06-28 PROCEDURE — 3046F HEMOGLOBIN A1C LEVEL >9.0%: CPT | Performed by: INTERNAL MEDICINE

## 2023-06-28 PROCEDURE — G8419 CALC BMI OUT NRM PARAM NOF/U: HCPCS | Performed by: INTERNAL MEDICINE

## 2023-06-28 RX ORDER — TRAZODONE HYDROCHLORIDE 50 MG/1
50 TABLET ORAL NIGHTLY
COMMUNITY

## 2023-06-28 RX ORDER — PREGABALIN 50 MG/1
50 CAPSULE ORAL 2 TIMES DAILY
COMMUNITY
Start: 2023-05-02

## 2023-06-28 RX ORDER — EFINACONAZOLE 100 MG/ML
SOLUTION TOPICAL
COMMUNITY
Start: 2023-05-26

## 2023-06-28 SDOH — ECONOMIC STABILITY: FOOD INSECURITY: WITHIN THE PAST 12 MONTHS, YOU WORRIED THAT YOUR FOOD WOULD RUN OUT BEFORE YOU GOT MONEY TO BUY MORE.: NEVER TRUE

## 2023-06-28 SDOH — ECONOMIC STABILITY: FOOD INSECURITY: WITHIN THE PAST 12 MONTHS, THE FOOD YOU BOUGHT JUST DIDN'T LAST AND YOU DIDN'T HAVE MONEY TO GET MORE.: NEVER TRUE

## 2023-06-28 SDOH — ECONOMIC STABILITY: INCOME INSECURITY: HOW HARD IS IT FOR YOU TO PAY FOR THE VERY BASICS LIKE FOOD, HOUSING, MEDICAL CARE, AND HEATING?: NOT HARD AT ALL

## 2023-06-28 SDOH — ECONOMIC STABILITY: HOUSING INSECURITY
IN THE LAST 12 MONTHS, WAS THERE A TIME WHEN YOU DID NOT HAVE A STEADY PLACE TO SLEEP OR SLEPT IN A SHELTER (INCLUDING NOW)?: NO

## 2023-06-29 ENCOUNTER — APPOINTMENT (OUTPATIENT)
Facility: HOSPITAL | Age: 68
End: 2023-06-29
Payer: COMMERCIAL

## 2023-07-05 ENCOUNTER — HOSPITAL ENCOUNTER (OUTPATIENT)
Facility: HOSPITAL | Age: 68
Setting detail: RECURRING SERIES
Discharge: HOME | End: 2023-07-08
Payer: MEDICARE

## 2023-07-05 PROCEDURE — 97110 THERAPEUTIC EXERCISES: CPT

## 2023-07-05 NOTE — THERAPY DISCHARGE
Physical Therapy at [facility],   a part of [address] [place] [address], [facility] [address]  Phone: [phone]  Fax: [phone]  DISCHARGE SUMMARY  Patient Name: Arnaldo Lemon : 1955   Treatment/Medical Diagnosis: Radiculopathy, lumbar region [M54.16]   Referral Source: Dionte Draft     Date of Initial Visit: 23 Attended Visits: 14 Missed Visits: 0     SUMMARY OF TREATMENT    Pt has been seen for 14 PT visits to address R sided lumbar radiculopathy including patient education, therapeutic exercise, and neuromuscular re-education. Pt has made significant improvements in pain intensity, frequency, lumbar AROM, sleep quality, and functional mobility since initial evaluation. Pt demonstrates independence with HEP and pain management strategies and is agreeable to d/c at this time. CURRENT STATUS    Pt will be able to hold SLS 30 sec without UE support to demonstrate improved balance to allow for stair navigation without reported fear of falling in home- IN PROGRESS (26 sec R, 30 sec L)  Pt will increase lumbar R rotation ROM to 100% without pain to promote normal trunk mobility to look behind her when backing up her car- IN PROGRESS (ROM met: L 100%, R 100% p)  Pt will be able to  10# object from floor level without increase in LBP to allow for household cleaning.- MET    RECOMMENDATIONS    Discontinue therapy. Progressing towards or have reached established goals. Rah Jerry, PT, DPT       2023       4:10 PM    If you have any questions/comments please contact us directly at [phone]. Thank you for allowing us to assist in the care of your patient.

## 2023-07-05 NOTE — PROGRESS NOTES
PHYSICAL THERAPY - MEDICARE DAILY TREATMENT NOTE (updated 3/23)    Date: 2023        Patient Name:  Tasia Blackwell :  1955   Medical   Diagnosis:  Radiculopathy, lumbar region [M54.16] Treatment Diagnosis:  M54.41  LUMBAGO WITH SCIATICA, RIGHT SIDE    Referral Source:  Josiane Walton PA-C Insurance:   Payor: Joseluis Felix / Plan: MEDICARE PART A AND B / Product Type: *No Product type* /                   Patient  verified yes     Visit #   Current  / Total 14 90   Time   In / Out 405p 445p   Total Treatment Time 40   Total Timed Codes 40   1:1 Treatment Time 40      [facility] Totals Reminder:  bill using total billable   min of TIMED therapeutic procedures and modalities. 8-22 min = 1 unit; 23-37 min = 2 units; 38-52 min = 3 units; 53-67 min = 4 units; 68-82 min = 5 units        SUBJECTIVE    Pain Level (0-10 scale): 0/10    Any medication changes, allergies to medications, adverse drug reactions, diagnosis change, or new procedure performed?: [x] No    [] Yes (see summary sheet for update)  Medications: Verified on Patient Summary List    Subjective functional status/changes:     Has felt really good since last visit. Even drove to [place] and cleaned up her daughter's house without a problem. Agreeable to d/c today. OBJECTIVE    Therapeutic Procedures: Tx Min Billable or 1:1 Min (if diff from Tx Min) Procedure, Rationale, Specifics   40 40 [id number] Therapeutic Exercise (timed):  increase ROM, strength, coordination, balance, and proprioception to improve patient's ability to progress to PLOF and address remaining functional goals.  (see flow sheet as applicable)     Details if applicable:     40 40    Total Total     [x]  Patient Education billed concurrently with other procedures   [x] Review HEP    [] Progressed/Changed HEP, detail:    [] Other detail:       Other Objective/Functional Measures    B SLS 30 sec1-2 minor LOB     Pain Level at end of session (0-10 scale): \"I actually feel better than when I

## 2023-07-21 ENCOUNTER — HOSPITAL ENCOUNTER (OUTPATIENT)
Facility: HOSPITAL | Age: 68
Discharge: HOME | End: 2023-07-21
Attending: ORTHOPAEDIC SURGERY | Admitting: ORTHOPAEDIC SURGERY
Payer: MEDICARE

## 2023-07-21 VITALS — BODY MASS INDEX: 28.91 KG/M2 | HEIGHT: 67 IN | WEIGHT: 184.19 LBS

## 2023-07-21 DIAGNOSIS — M48.062 SPINAL STENOSIS OF LUMBAR REGION WITH NEUROGENIC CLAUDICATION: ICD-10-CM

## 2023-07-21 DIAGNOSIS — M54.16 RADICULOPATHY, LUMBAR REGION: ICD-10-CM

## 2023-07-21 LAB — ECHO BSA: 1.99 M2

## 2023-07-21 PROCEDURE — 77002 NEEDLE LOCALIZATION BY XRAY: CPT

## 2023-07-21 PROCEDURE — 6360000004 HC RX CONTRAST MEDICATION: Performed by: STUDENT IN AN ORGANIZED HEALTH CARE EDUCATION/TRAINING PROGRAM

## 2023-07-21 PROCEDURE — 2709999900 HC NON-CHARGEABLE SUPPLY

## 2023-07-21 PROCEDURE — 2500000003 HC RX 250 WO HCPCS: Performed by: STUDENT IN AN ORGANIZED HEALTH CARE EDUCATION/TRAINING PROGRAM

## 2023-07-21 PROCEDURE — 6360000002 HC RX W HCPCS: Performed by: STUDENT IN AN ORGANIZED HEALTH CARE EDUCATION/TRAINING PROGRAM

## 2023-07-21 RX ORDER — LIDOCAINE HYDROCHLORIDE 10 MG/ML
INJECTION, SOLUTION EPIDURAL; INFILTRATION; INTRACAUDAL; PERINEURAL PRN
Status: COMPLETED | OUTPATIENT
Start: 2023-07-21 | End: 2023-07-21

## 2023-07-21 RX ORDER — DEXAMETHASONE SODIUM PHOSPHATE 10 MG/ML
INJECTION, SOLUTION INTRAMUSCULAR; INTRAVENOUS PRN
Status: COMPLETED | OUTPATIENT
Start: 2023-07-21 | End: 2023-07-21

## 2023-07-21 RX ADMIN — IOPAMIDOL 5 ML: 408 INJECTION, SOLUTION INTRATHECAL at 11:09

## 2023-07-21 RX ADMIN — DEXAMETHASONE SODIUM PHOSPHATE 15 MG: 10 INJECTION, SOLUTION INTRAMUSCULAR; INTRAVENOUS at 10:59

## 2023-07-21 RX ADMIN — LIDOCAINE HYDROCHLORIDE 5 ML: 10 INJECTION, SOLUTION EPIDURAL; INFILTRATION; INTRACAUDAL; PERINEURAL at 10:57

## 2023-07-21 NOTE — PROGRESS NOTES
8:55 AM  Patient arrived. ID and allergies verified verbally with patient. Pt voices understanding of procedure to be performed. Consent obtained. Pt prepped for procedure. Pt denies contrast allergy. Patient denies taking any blood thinners. 10:30 AM  TRANSFER - OUT REPORT:    Verbal report given to Alberta garcia RN on Pradeep Wilks  being transferred to angio lab for ordered procedure       Report consisted of patient's Situation, Background, Assessment and   Recommendations(SBAR). Information from the following report(s) Nurse Handoff Report was reviewed with the receiving nurse. Lines:       Opportunity for questions and clarification was provided. Patient transported with:  Registered Nurse    11:13 AM  TRANSFER - IN REPORT:    Verbal report received from [facility] [address] [place] [address], RN on Pradeep Wilks  being received from angio lab for routine post-op      Report consisted of patient's Situation, Background, Assessment and   Recommendations(SBAR). Information from the following report(s) Nurse Handoff Report was reviewed with the receiving nurse. Opportunity for questions and clarification was provided. Assessment completed upon patient's arrival to unit and care assumed. 11:25 AM  Discharge instructions given to patient and family. Time given to ask questions and gain clarity. No questions at this time. Patient and family confirm understanding of instructions given. Patient given copy of discharge instructions to take home. 11:30 AM  Pt discharged via wheelchair with . Personal belongings with patient upon discharge.

## 2023-07-21 NOTE — PROGRESS NOTES
TRANSFER - OUT REPORT:    Verbal report given to JOSE(name) on Jimi Clause being transferred to [facility]) for routine progression of patient care       Report consisted of patient's Situation, Background, Assessment and   Recommendations(SBAR). Information from the following report(s) Nurse Handoff Report was reviewed with the receiving nurse. Opportunity for questions and clarification was provided.       Patient transported with:   [employer]

## 2023-07-21 NOTE — H&P
INTERVENTIONAL RADIOLOGY  Preoperative History and Physical      Patient:  Ginger Perez  :  1955  Age:  69 y.o. MRN:  [id number]  Today's Date:  2023      CC / HPI   Ginger Perez is a 69 y.o. female with a history of chronic LBP radiating to right leg who presents for epidural steroid injection.     PAST MEDICAL HISTORY  Past Medical History:   Diagnosis Date    Asthma     childhood    Atypical mole     Chronic pain     shoulders/knees    DDD (degenerative disc disease), cervical     Degenerative arthritis of right knee 2014    Dr. Mechelle Leyva    Ectopic atrial tachycardia ([address]) 2020    Gastric nodule     gastric nodules on endoscopy 3/26/12    GERD (gastroesophageal reflux disease)     Hearing loss     PUD (peptic ulcer disease)     Sleep apnea     uses cpap    SVT (supraventricular tachycardia) ([address])     Temporal arteritis ([address]) 16    Dr. Davis Post    Thoracic outlet syndrome     left, Dr. Randy Gold    Vitamin D deficiency     Vulvar high-grade squamous intraepithelial lesion (HGSIL)        PAST SURGICAL HISTORY  Past Surgical History:   Procedure Laterality Date    BREAST BIOPSY Bilateral , , ,    6-7 on R, 2 on L+all benign    BREAST BIOPSY      several breast biopsies (benign)    BUNIONECTOMY      bilateral    CARDIAC CATHETERIZATION      x2, most recent 2012 was normal     SECTION      x3    CHOLECYSTECTOMY      COLONOSCOPY N/A 2021    COLONOSCOPY, EGD   :- performed by Kati Quach MD at [facility] ENDOSCOPY    COLONOSCOPY      normal, f/u in 10 yrs    COLONOSCOPY N/A 2017    COLONOSCOPY performed by Kati Quach MD at [address] [place] [address], several D&C's    GI      cholecystectomy    GYN  2018    JONATHAN    KNEE ARTHROSCOPY Right     OTHER SURGICAL HISTORY  16    L temporal artery bx (Dr. Willie Pabon)    OTHER SURGICAL HISTORY      lipoma removal    ARELI AND BSO (CERVIX REMOVED)   protocol  Informed consent:  risks, benefits, and alternatives reviewed with the patient / family who agree to proceed  Code status:  [unfilled]      Reina Macias MD  [facility]

## 2023-07-27 LAB — ECHO BSA: 1.99 M2

## 2023-10-02 ENCOUNTER — OFFICE VISIT (OUTPATIENT)
Age: 68
End: 2023-10-02
Payer: MEDICARE

## 2023-10-02 VITALS
HEART RATE: 79 BPM | RESPIRATION RATE: 18 BRPM | SYSTOLIC BLOOD PRESSURE: 147 MMHG | TEMPERATURE: 97.6 F | DIASTOLIC BLOOD PRESSURE: 72 MMHG | OXYGEN SATURATION: 96 %

## 2023-10-02 DIAGNOSIS — M35.3 POLYMYALGIA RHEUMATICA (HCC): Primary | ICD-10-CM

## 2023-10-02 PROCEDURE — 99214 OFFICE O/P EST MOD 30 MIN: CPT | Performed by: PEDIATRICS

## 2023-10-02 ASSESSMENT — PATIENT HEALTH QUESTIONNAIRE - PHQ9
SUM OF ALL RESPONSES TO PHQ QUESTIONS 1-9: 2
2. FEELING DOWN, DEPRESSED OR HOPELESS: 1
SUM OF ALL RESPONSES TO PHQ9 QUESTIONS 1 & 2: 2
SUM OF ALL RESPONSES TO PHQ QUESTIONS 1-9: 2
1. LITTLE INTEREST OR PLEASURE IN DOING THINGS: 1

## 2023-10-02 NOTE — PROGRESS NOTES
Chief Complaint   Patient presents with    Joint Pain     1. Have you been to the ER, urgent care clinic since your last visit? Hospitalized since your last visit? No    2. Have you seen or consulted any other health care providers outside of the [address] [place] [address] since your last visit? Include any pap smears or colon screening.  Yes

## 2023-10-02 NOTE — PROGRESS NOTES
[address] Update    Date: 10/02/23    Shlomo Duque was routed to the mandated specialty pharmacy [facility]. Prior authorization has been approved through 2-. If you have any questions, please call us at [phone].

## 2023-10-02 NOTE — PROGRESS NOTES
RHEUMATOLOGY PROBLEM LIST AND CHIEF COMPLAINT  1. GCA/PMR - HAs, left temporal artery discomfort, sudden loss of vision, arthralgia of shoulder, TMJ, hands, feet, response to steroids     Therapy History:  Current DMARDs: Methotrexate (1/2017 - 4/2018, restarted 3/2019-current), Tawny Oakes (ordered 1/2023-current)  Prior DMARDs: Actemra (2/2018, stopped for 1 month; 3/2018-1/2023)  Covid-19 vaccination (+, Pfizer 12/21, 1/12, 9/21)     INTERVAL HISTORY  This is a 77 y.o.  female . Today, the patient complains of pain in the joints. Location: generalized   Severity: 7 on a scale of 0-10   Timing: intermittent   Duration: 5 months  Context/Associated signs and symptoms: The patient has been doing well from a GCA/PMR standpoint. She continues on Kevzara subQ 200 mg q2 weeks and methotrexate 15 mg PO weekly. She is no longer on steroids. She notes intermittent pain in her bilateral thumbs and pain in her MCPs. She also has pain in her knees, but attributes this to OA. She continues to have back pain and is scheduled for lumbar laminectomy on Nov 29.       RHEUMATOLOGY REVIEW OF SYSTEMS   Positives as per history  Negatives as follows:  Hernan Dupes:  Denies unexplained persistent fevers,  weight change  HEAD/EYES:   Denies  eye redness, blurry vision or sudden loss of vision, dry eyes , HA, temporal artery pain  ENT:    Denies oral/ nasal ulcers, recurrent sinus infections, dry mouth  RESPIRATORY:  No pleuritic pain , history of pleural effusions, hemoptysis  CARDIOVASCULAR:  Denies chest pain,  history of pericardial effusions  GASTRO:    Denies heartburn, esophageal dysmotility, abdominal pain, nausea, vomiting, diarrhea, blood  in the stool  HEMATOLOGIC:  No easy bruising, purpura, swollen lymph nodes  SKIN:    Denies alopecia, ulcers, nodules, sun sensitivity, unexplained persistent  rash   VASCULAR:   Denies cyanosis , raynaud phenomenon  NEUROLOGIC:  Denies specific muscle weakness, paresthesi as

## 2023-10-25 ENCOUNTER — HOSPITAL ENCOUNTER (OUTPATIENT)
Facility: HOSPITAL | Age: 68
Discharge: HOME | End: 2023-10-28
Attending: STUDENT IN AN ORGANIZED HEALTH CARE EDUCATION/TRAINING PROGRAM
Payer: MEDICARE

## 2023-10-25 VITALS — BODY MASS INDEX: 29.03 KG/M2 | WEIGHT: 185 LBS | HEIGHT: 67 IN

## 2023-10-25 DIAGNOSIS — Z12.31 VISIT FOR SCREENING MAMMOGRAM: ICD-10-CM

## 2023-10-25 PROCEDURE — 77063 BREAST TOMOSYNTHESIS BI: CPT

## 2023-11-03 ENCOUNTER — OFFICE VISIT (OUTPATIENT)
Age: 68
End: 2023-11-03
Payer: MEDICARE

## 2023-11-03 VITALS
DIASTOLIC BLOOD PRESSURE: 80 MMHG | BODY MASS INDEX: 29.82 KG/M2 | WEIGHT: 190 LBS | HEART RATE: 71 BPM | RESPIRATION RATE: 16 BRPM | OXYGEN SATURATION: 98 % | HEIGHT: 67 IN | SYSTOLIC BLOOD PRESSURE: 129 MMHG | TEMPERATURE: 97.9 F

## 2023-11-03 DIAGNOSIS — E78.5 HYPERLIPIDEMIA, UNSPECIFIED HYPERLIPIDEMIA TYPE: ICD-10-CM

## 2023-11-03 DIAGNOSIS — E11.21 TYPE 2 DIABETES MELLITUS WITH DIABETIC NEPHROPATHY, WITHOUT LONG-TERM CURRENT USE OF INSULIN (HCC): ICD-10-CM

## 2023-11-03 DIAGNOSIS — Z76.89 ENCOUNTER TO ESTABLISH CARE: ICD-10-CM

## 2023-11-03 DIAGNOSIS — E11.42 TYPE 2 DIABETES MELLITUS WITH DIABETIC POLYNEUROPATHY, WITHOUT LONG-TERM CURRENT USE OF INSULIN (HCC): ICD-10-CM

## 2023-11-03 DIAGNOSIS — E11.42 TYPE 2 DIABETES MELLITUS WITH DIABETIC POLYNEUROPATHY, WITHOUT LONG-TERM CURRENT USE OF INSULIN (HCC): Primary | ICD-10-CM

## 2023-11-03 DIAGNOSIS — M35.3 POLYMYALGIA RHEUMATICA (HCC): ICD-10-CM

## 2023-11-03 DIAGNOSIS — Z01.818 PRE-OP EVALUATION: ICD-10-CM

## 2023-11-03 DIAGNOSIS — I10 ESSENTIAL (PRIMARY) HYPERTENSION: ICD-10-CM

## 2023-11-03 LAB
INR PPP: 1 (ref 0.9–1.1)
PROTHROMBIN TIME: 10.2 SEC (ref 9–11.1)

## 2023-11-03 PROCEDURE — 99204 OFFICE O/P NEW MOD 45 MIN: CPT | Performed by: STUDENT IN AN ORGANIZED HEALTH CARE EDUCATION/TRAINING PROGRAM

## 2023-11-03 RX ORDER — HYDROCHLOROTHIAZIDE 25 MG/1
25 TABLET ORAL DAILY
Qty: 90 TABLET | Refills: 3 | Status: SHIPPED | OUTPATIENT
Start: 2023-11-03 | End: 2024-10-28

## 2023-11-03 SDOH — HEALTH STABILITY: PHYSICAL HEALTH: ON AVERAGE, HOW MANY DAYS PER WEEK DO YOU ENGAGE IN MODERATE TO STRENUOUS EXERCISE (LIKE A BRISK WALK)?: 0 DAYS

## 2023-11-03 ASSESSMENT — SOCIAL DETERMINANTS OF HEALTH (SDOH)
WITHIN THE LAST YEAR, HAVE YOU BEEN KICKED, HIT, SLAPPED, OR OTHERWISE PHYSICALLY HURT BY YOUR PARTNER OR EX-PARTNER?: NO
WITHIN THE LAST YEAR, HAVE YOU BEEN HUMILIATED OR EMOTIONALLY ABUSED IN OTHER WAYS BY YOUR PARTNER OR EX-PARTNER?: YES
WITHIN THE LAST YEAR, HAVE YOU BEEN AFRAID OF YOUR PARTNER OR EX-PARTNER?: PATIENT DECLINED
WITHIN THE LAST YEAR, HAVE TO BEEN RAPED OR FORCED TO HAVE ANY KIND OF SEXUAL ACTIVITY BY YOUR PARTNER OR EX-PARTNER?: NO

## 2023-11-04 LAB
ALBUMIN SERPL-MCNC: 4 G/DL (ref 3.5–5)
ALBUMIN/GLOB SERPL: 1.4 (ref 1.1–2.2)
ALP SERPL-CCNC: 46 U/L (ref 45–117)
ALT SERPL-CCNC: 25 U/L (ref 12–78)
ANION GAP SERPL CALC-SCNC: 6 MMOL/L (ref 5–15)
APPEARANCE UR: CLEAR
AST SERPL-CCNC: 17 U/L (ref 15–37)
BACTERIA URNS QL MICRO: NEGATIVE /HPF
BASOPHILS # BLD: 0 K/UL (ref 0–0.1)
BASOPHILS NFR BLD: 1 % (ref 0–1)
BILIRUB SERPL-MCNC: 0.2 MG/DL (ref 0.2–1)
BILIRUB UR QL: NEGATIVE
BUN SERPL-MCNC: 23 MG/DL (ref 6–20)
BUN/CREAT SERPL: 27 (ref 12–20)
CALCIUM SERPL-MCNC: 8.9 MG/DL (ref 8.5–10.1)
CHLORIDE SERPL-SCNC: 105 MMOL/L (ref 97–108)
CO2 SERPL-SCNC: 27 MMOL/L (ref 21–32)
COLOR UR: ABNORMAL
CREAT SERPL-MCNC: 0.86 MG/DL (ref 0.55–1.02)
DIFFERENTIAL METHOD BLD: ABNORMAL
EOSINOPHIL # BLD: 0.1 K/UL (ref 0–0.4)
EOSINOPHIL NFR BLD: 1 % (ref 0–7)
EPITH CASTS URNS QL MICRO: ABNORMAL /LPF
ERYTHROCYTE [DISTWIDTH] IN BLOOD BY AUTOMATED COUNT: 14.4 % (ref 11.5–14.5)
ERYTHROCYTE [DISTWIDTH] IN BLOOD BY AUTOMATED COUNT: 14.4 % (ref 11.5–14.5)
EST. AVERAGE GLUCOSE BLD GHB EST-MCNC: 131 MG/DL
GLOBULIN SER CALC-MCNC: 2.8 G/DL (ref 2–4)
GLUCOSE SERPL-MCNC: 83 MG/DL (ref 65–100)
GLUCOSE UR STRIP.AUTO-MCNC: >1000 MG/DL
HBA1C MFR BLD: 6.2 % (ref 4–5.6)
HCT VFR BLD AUTO: 40.1 % (ref 35–47)
HCT VFR BLD AUTO: 40.3 % (ref 35–47)
HGB BLD-MCNC: 13.2 G/DL (ref 11.5–16)
HGB BLD-MCNC: 13.3 G/DL (ref 11.5–16)
HGB UR QL STRIP: NEGATIVE
HYALINE CASTS URNS QL MICRO: ABNORMAL /LPF (ref 0–5)
IMM GRANULOCYTES # BLD AUTO: 0.1 K/UL (ref 0–0.04)
IMM GRANULOCYTES NFR BLD AUTO: 1 % (ref 0–0.5)
KETONES UR QL STRIP.AUTO: NEGATIVE MG/DL
LEUKOCYTE ESTERASE UR QL STRIP.AUTO: NEGATIVE
LYMPHOCYTES # BLD: 1.8 K/UL (ref 0.8–3.5)
LYMPHOCYTES NFR BLD: 39 % (ref 12–49)
MCH RBC QN AUTO: 31.7 PG (ref 26–34)
MCH RBC QN AUTO: 32 PG (ref 26–34)
MCHC RBC AUTO-ENTMCNC: 32.9 G/DL (ref 30–36.5)
MCHC RBC AUTO-ENTMCNC: 33 G/DL (ref 30–36.5)
MCV RBC AUTO: 96.4 FL (ref 80–99)
MCV RBC AUTO: 97.1 FL (ref 80–99)
MONOCYTES # BLD: 0.4 K/UL (ref 0–1)
MONOCYTES NFR BLD: 8 % (ref 5–13)
NEUTS SEG # BLD: 2.4 K/UL (ref 1.8–8)
NEUTS SEG NFR BLD: 50 % (ref 32–75)
NITRITE UR QL STRIP.AUTO: NEGATIVE
NRBC # BLD: 0 K/UL (ref 0–0.01)
NRBC # BLD: 0.02 K/UL (ref 0–0.01)
NRBC BLD-RTO: 0 PER 100 WBC
NRBC BLD-RTO: 0.4 PER 100 WBC
PH UR STRIP: 5 (ref 5–8)
PLATELET # BLD AUTO: 294 K/UL (ref 150–400)
PLATELET # BLD AUTO: 297 K/UL (ref 150–400)
PMV BLD AUTO: 10.3 FL (ref 8.9–12.9)
PMV BLD AUTO: 10.5 FL (ref 8.9–12.9)
POTASSIUM SERPL-SCNC: 4.2 MMOL/L (ref 3.5–5.1)
PROT SERPL-MCNC: 6.8 G/DL (ref 6.4–8.2)
PROT UR STRIP-MCNC: NEGATIVE MG/DL
RBC # BLD AUTO: 4.15 M/UL (ref 3.8–5.2)
RBC # BLD AUTO: 4.16 M/UL (ref 3.8–5.2)
RBC #/AREA URNS HPF: ABNORMAL /HPF (ref 0–5)
SODIUM SERPL-SCNC: 138 MMOL/L (ref 136–145)
SP GR UR REFRACTOMETRY: 1.02 (ref 1–1.03)
URINE CULTURE IF INDICATED: ABNORMAL
UROBILINOGEN UR QL STRIP.AUTO: 0.2 EU/DL (ref 0.2–1)
WBC # BLD AUTO: 4.7 K/UL (ref 3.6–11)
WBC # BLD AUTO: 4.7 K/UL (ref 3.6–11)
WBC URNS QL MICRO: ABNORMAL /HPF (ref 0–4)

## 2023-11-07 LAB
M TB IFN-G BLD-IMP: NEGATIVE
M TB IFN-G CD4+ T-CELLS BLD-ACNC: 0 IU/ML
M TBIFN-G CD4+ CD8+T-CELLS BLD-ACNC: 0 IU/ML
QUANTIFERON CRITERIA: NORMAL
QUANTIFERON MITOGEN VALUE: >10 IU/ML
QUANTIFERON NIL VALUE: 0 IU/ML

## 2023-11-09 ENCOUNTER — OFFICE VISIT (OUTPATIENT)
Age: 68
End: 2023-11-09
Payer: MEDICARE

## 2023-11-09 VITALS
DIASTOLIC BLOOD PRESSURE: 73 MMHG | TEMPERATURE: 97.3 F | WEIGHT: 190 LBS | OXYGEN SATURATION: 98 % | BODY MASS INDEX: 29.82 KG/M2 | RESPIRATION RATE: 16 BRPM | HEIGHT: 67 IN | HEART RATE: 81 BPM | SYSTOLIC BLOOD PRESSURE: 142 MMHG

## 2023-11-09 DIAGNOSIS — M48.061 SPINAL STENOSIS OF LUMBAR REGION, UNSPECIFIED WHETHER NEUROGENIC CLAUDICATION PRESENT: ICD-10-CM

## 2023-11-09 DIAGNOSIS — Z01.818 PRE-OP EXAM: Primary | ICD-10-CM

## 2023-11-09 PROCEDURE — 99213 OFFICE O/P EST LOW 20 MIN: CPT | Performed by: STUDENT IN AN ORGANIZED HEALTH CARE EDUCATION/TRAINING PROGRAM

## 2023-11-09 ASSESSMENT — ENCOUNTER SYMPTOMS
COUGH: 0
VOMITING: 0
SHORTNESS OF BREATH: 0
DIARRHEA: 0
NAUSEA: 0
BLOOD IN STOOL: 0
CONSTIPATION: 0
ABDOMINAL PAIN: 0
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
## 2023-11-21 NOTE — H&P
Lj Daniel (: 1955) is a 69 y.o. female, patient, here for evaluation of the following chief complaint(s):  Lower Back Pain (Follow up after ELROY x 2. C/O RT Foot weakness )        ASSESSMENT/PLAN:     Below is the assessment and plan developed based on review of pertinent history, physical exam, labs, studies, and medications. She has not gotten much relief with the injections. She still has severe right-sided lower back pain and right hip and leg pain. I am recommending a lumbar laminectomy at L3-4 and L4-5. She does have a mild degenerative anterolisthesis at L3-4 but it does not we will flexion-extension so I think we can avoid a fusion. Risk and benefits were discussed with her. Procedure: Lumbar laminectomy L3-4 L4-5        The risks and benefits were discussed at length with the patient and the patient has elected to proceed. Indications for surgery include failed conservative treatment. Alternative treatments, risks and the perioperative course were discussed with the patient. All questions were answered. The risks and benefits of the procedure were explained. Benefits include definitive diagnosis, relief of pain, elimination of deformity and improved function. Risks of surgery including bleeding, infection, weakness, numbness, CSF leak, failure to improve symptoms, exacerbation of medical co-morbidities and even death were discussed with the patient. 1. Spinal stenosis of lumbar region, unspecified whether neurogenic claudication present  -     XR LUMBAR SPINE (MIN 4 VIEWS); Future  2. Radiculopathy, lumbar region  -     XR LUMBAR SPINE (MIN 4 VIEWS); Future  3. Spinal stenosis of lumbar region with neurogenic claudication  -     XR LUMBAR SPINE (MIN 4 VIEWS); Future        No follow-ups on file. Update History & Physical    The patient's History and Physical of 2023 was reviewed with the patient and I examined the patient. There was no change.  The

## 2023-11-22 ENCOUNTER — HOSPITAL ENCOUNTER (OUTPATIENT)
Facility: HOSPITAL | Age: 68
Discharge: HOME | End: 2023-11-25
Payer: COMMERCIAL

## 2023-11-22 VITALS
WEIGHT: 182.1 LBS | BODY MASS INDEX: 28.58 KG/M2 | DIASTOLIC BLOOD PRESSURE: 78 MMHG | OXYGEN SATURATION: 99 % | HEIGHT: 67 IN | HEART RATE: 84 BPM | SYSTOLIC BLOOD PRESSURE: 145 MMHG | TEMPERATURE: 97.5 F

## 2023-11-22 LAB
ABO + RH BLD: NORMAL
BLOOD GROUP ANTIBODIES SERPL: NORMAL
SPECIMEN EXP DATE BLD: NORMAL

## 2023-11-22 PROCEDURE — 86850 RBC ANTIBODY SCREEN: CPT

## 2023-11-22 PROCEDURE — 36415 COLL VENOUS BLD VENIPUNCTURE: CPT

## 2023-11-22 PROCEDURE — 86900 BLOOD TYPING SEROLOGIC ABO: CPT

## 2023-11-22 PROCEDURE — 86901 BLOOD TYPING SEROLOGIC RH(D): CPT

## 2023-11-22 ASSESSMENT — PAIN DESCRIPTION - LOCATION: LOCATION: BACK;LEG

## 2023-11-22 ASSESSMENT — PAIN DESCRIPTION - PAIN TYPE: TYPE: CHRONIC PAIN

## 2023-11-22 ASSESSMENT — PAIN SCALES - GENERAL: PAINLEVEL_OUTOF10: 6

## 2023-11-22 ASSESSMENT — PAIN DESCRIPTION - ORIENTATION: ORIENTATION: LOWER
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
## 2023-11-22 NOTE — PERIOP NOTE
[id number] [address] INSTRUCTIONS  ORTHOPAEDIC    Surgery Date:   11/29/23    Your surgeon's office or [facility] staff will call you between 4 PM- 8 PM the day before surgery with your arrival time. If your surgery is on a Monday, you will receive a call the preceding Friday. If your surgeon's office has given you, your arrival time then go by that time. Please report to [facility] Patient Access/Admitting on the 1st floor. Bring your insurance card, photo identification, and any copayment (if applicable). If you are going home the same day of your surgery, you must have a responsible adult to drive you home. You need to have a responsible adult to stay with you the first 24 hours after surgery and you should not drive a car for 24 hours following your surgery. If you are being admitted to the hospital,please leave personal belongings/luggage in your car until you have an assigned hospital room number. Please wear comfortable clothes. Wear your glasses instead of contacts. We ask that all money, jewelry and valuables be left at home. Wear no make up, particularly mascara, the day of surgery. Do NOT drink alcohol or smoke 24 hours before surgery. STOP smoking for 14 days prior as it helps with breathing and healing after surgery. All body piercings, rings, and jewelry need to be removed and left at home. Do not wear any fingernail polish except for clear. Please wear your hair loose or down. Please no pony-tails, buns, or any metal hair accessories. You may wear deodorant. Do not shave any body area within 24 hours of your surgery. Please follow all instructions to avoid any potential surgical cancellation. Should your physical condition change, (i.e. fever, cold, flu, etc.) please notify your surgeon as soon as possible. It is important to be on time. If a situation occurs where you may be delayed, please call:  [phone] / [phone] on the day of surgery.   The Preadmission

## 2023-11-23 LAB
BACTERIA SPEC CULT: NORMAL
BACTERIA SPEC CULT: NORMAL
SERVICE CMNT-IMP: NORMAL

## 2023-11-29 ENCOUNTER — HOSPITAL ENCOUNTER (OUTPATIENT)
Facility: HOSPITAL | Age: 68
Discharge: HOME | End: 2023-12-01
Attending: ORTHOPAEDIC SURGERY | Admitting: ORTHOPAEDIC SURGERY
Payer: COMMERCIAL

## 2023-11-29 ENCOUNTER — ANESTHESIA (OUTPATIENT)
Facility: HOSPITAL | Age: 68
End: 2023-11-29
Payer: COMMERCIAL

## 2023-11-29 ENCOUNTER — ANESTHESIA EVENT (OUTPATIENT)
Facility: HOSPITAL | Age: 68
End: 2023-11-29
Payer: COMMERCIAL

## 2023-11-29 ENCOUNTER — APPOINTMENT (OUTPATIENT)
Facility: HOSPITAL | Age: 68
End: 2023-11-29
Attending: ORTHOPAEDIC SURGERY
Payer: COMMERCIAL

## 2023-11-29 DIAGNOSIS — Z98.890 S/P LUMBAR LAMINECTOMY: Primary | ICD-10-CM

## 2023-11-29 PROBLEM — M48.062 LUMBAR STENOSIS WITH NEUROGENIC CLAUDICATION: Status: ACTIVE | Noted: 2023-11-29

## 2023-11-29 LAB
GLUCOSE BLD STRIP.AUTO-MCNC: 130 MG/DL (ref 65–117)
GLUCOSE BLD STRIP.AUTO-MCNC: 143 MG/DL (ref 65–117)
GLUCOSE BLD STRIP.AUTO-MCNC: 149 MG/DL (ref 65–117)
GLUCOSE BLD STRIP.AUTO-MCNC: 171 MG/DL (ref 65–117)
GLUCOSE BLD STRIP.AUTO-MCNC: 193 MG/DL (ref 65–117)
SERVICE CMNT-IMP: ABNORMAL

## 2023-11-29 PROCEDURE — C1762 CONN TISS, HUMAN(INC FASCIA): HCPCS | Performed by: ORTHOPAEDIC SURGERY

## 2023-11-29 PROCEDURE — 2580000003 HC RX 258: Performed by: STUDENT IN AN ORGANIZED HEALTH CARE EDUCATION/TRAINING PROGRAM

## 2023-11-29 PROCEDURE — 3600000004 HC SURGERY LEVEL 4 BASE: Performed by: ORTHOPAEDIC SURGERY

## 2023-11-29 PROCEDURE — 3700000001 HC ADD 15 MINUTES (ANESTHESIA): Performed by: ORTHOPAEDIC SURGERY

## 2023-11-29 PROCEDURE — 2580000003 HC RX 258: Performed by: ANESTHESIOLOGY

## 2023-11-29 PROCEDURE — 63047 LAM FACETEC & FORAMOT LUMBAR: CPT | Performed by: ORTHOPAEDIC SURGERY

## 2023-11-29 PROCEDURE — 97116 GAIT TRAINING THERAPY: CPT

## 2023-11-29 PROCEDURE — 6360000002 HC RX W HCPCS: Performed by: ANESTHESIOLOGY

## 2023-11-29 PROCEDURE — 63048 LAM FACETEC &FORAMOT EA ADDL: CPT | Performed by: ORTHOPAEDIC SURGERY

## 2023-11-29 PROCEDURE — 6370000000 HC RX 637 (ALT 250 FOR IP): Performed by: STUDENT IN AN ORGANIZED HEALTH CARE EDUCATION/TRAINING PROGRAM

## 2023-11-29 PROCEDURE — 6360000002 HC RX W HCPCS: Performed by: NURSE ANESTHETIST, CERTIFIED REGISTERED

## 2023-11-29 PROCEDURE — 7100000000 HC PACU RECOVERY - FIRST 15 MIN: Performed by: ORTHOPAEDIC SURGERY

## 2023-11-29 PROCEDURE — 3600000014 HC SURGERY LEVEL 4 ADDTL 15MIN: Performed by: ORTHOPAEDIC SURGERY

## 2023-11-29 PROCEDURE — 7100000001 HC PACU RECOVERY - ADDTL 15 MIN: Performed by: ORTHOPAEDIC SURGERY

## 2023-11-29 PROCEDURE — 2709999900 HC NON-CHARGEABLE SUPPLY: Performed by: ORTHOPAEDIC SURGERY

## 2023-11-29 PROCEDURE — 97161 PT EVAL LOW COMPLEX 20 MIN: CPT

## 2023-11-29 PROCEDURE — 2500000003 HC RX 250 WO HCPCS: Performed by: ORTHOPAEDIC SURGERY

## 2023-11-29 PROCEDURE — 63047 LAM FACETEC & FORAMOT LUMBAR: CPT | Performed by: STUDENT IN AN ORGANIZED HEALTH CARE EDUCATION/TRAINING PROGRAM

## 2023-11-29 PROCEDURE — 2500000003 HC RX 250 WO HCPCS: Performed by: NURSE ANESTHETIST, CERTIFIED REGISTERED

## 2023-11-29 PROCEDURE — 3700000000 HC ANESTHESIA ATTENDED CARE: Performed by: ORTHOPAEDIC SURGERY

## 2023-11-29 PROCEDURE — 6360000002 HC RX W HCPCS: Performed by: STUDENT IN AN ORGANIZED HEALTH CARE EDUCATION/TRAINING PROGRAM

## 2023-11-29 PROCEDURE — 63048 LAM FACETEC &FORAMOT EA ADDL: CPT | Performed by: STUDENT IN AN ORGANIZED HEALTH CARE EDUCATION/TRAINING PROGRAM

## 2023-11-29 PROCEDURE — 82962 GLUCOSE BLOOD TEST: CPT

## 2023-11-29 PROCEDURE — 2700000000 HC OXYGEN THERAPY PER DAY

## 2023-11-29 PROCEDURE — 6370000000 HC RX 637 (ALT 250 FOR IP): Performed by: ORTHOPAEDIC SURGERY

## 2023-11-29 DEVICE — IMPLANT THIN HYDROPHILIC AMNIOTIC MEMEBRANE 4 X 4 CM: Type: IMPLANTABLE DEVICE | Site: BACK | Status: FUNCTIONAL

## 2023-11-29 RX ORDER — IPRATROPIUM BROMIDE AND ALBUTEROL SULFATE 2.5; .5 MG/3ML; MG/3ML
1 SOLUTION RESPIRATORY (INHALATION)
Status: DISCONTINUED | OUTPATIENT
Start: 2023-11-29 | End: 2023-11-29 | Stop reason: HOSPADM

## 2023-11-29 RX ORDER — HYDROMORPHONE HYDROCHLORIDE 1 MG/ML
0.5 INJECTION, SOLUTION INTRAMUSCULAR; INTRAVENOUS; SUBCUTANEOUS EVERY 5 MIN PRN
Status: COMPLETED | OUTPATIENT
Start: 2023-11-29 | End: 2023-11-29

## 2023-11-29 RX ORDER — INSULIN LISPRO 100 [IU]/ML
0-8 INJECTION, SOLUTION INTRAVENOUS; SUBCUTANEOUS
Status: DISCONTINUED | OUTPATIENT
Start: 2023-11-29 | End: 2023-12-01 | Stop reason: HOSPADM

## 2023-11-29 RX ORDER — BUPROPION HYDROCHLORIDE 150 MG/1
150 TABLET, EXTENDED RELEASE ORAL EVERY MORNING
Status: DISCONTINUED | OUTPATIENT
Start: 2023-11-29 | End: 2023-12-01 | Stop reason: HOSPADM

## 2023-11-29 RX ORDER — SENNA AND DOCUSATE SODIUM 50; 8.6 MG/1; MG/1
1 TABLET, FILM COATED ORAL 2 TIMES DAILY
Status: DISCONTINUED | OUTPATIENT
Start: 2023-11-29 | End: 2023-12-01 | Stop reason: HOSPADM

## 2023-11-29 RX ORDER — ONDANSETRON 2 MG/ML
4 INJECTION INTRAMUSCULAR; INTRAVENOUS
Status: DISCONTINUED | OUTPATIENT
Start: 2023-11-29 | End: 2023-11-29 | Stop reason: HOSPADM

## 2023-11-29 RX ORDER — SODIUM CHLORIDE 0.9 % (FLUSH) 0.9 %
5-40 SYRINGE (ML) INJECTION PRN
Status: DISCONTINUED | OUTPATIENT
Start: 2023-11-29 | End: 2023-11-29 | Stop reason: HOSPADM

## 2023-11-29 RX ORDER — POLYETHYLENE GLYCOL 3350 17 G/17G
17 POWDER, FOR SOLUTION ORAL DAILY
Status: DISCONTINUED | OUTPATIENT
Start: 2023-11-29 | End: 2023-12-01 | Stop reason: HOSPADM

## 2023-11-29 RX ORDER — ROCURONIUM BROMIDE 10 MG/ML
INJECTION, SOLUTION INTRAVENOUS PRN
Status: DISCONTINUED | OUTPATIENT
Start: 2023-11-29 | End: 2023-11-29 | Stop reason: SDUPTHER

## 2023-11-29 RX ORDER — MIDAZOLAM HYDROCHLORIDE 2 MG/2ML
2 INJECTION, SOLUTION INTRAMUSCULAR; INTRAVENOUS
Status: DISCONTINUED | OUTPATIENT
Start: 2023-11-29 | End: 2023-11-29 | Stop reason: HOSPADM

## 2023-11-29 RX ORDER — FAMOTIDINE 20 MG/1
20 TABLET, FILM COATED ORAL 2 TIMES DAILY
Status: DISCONTINUED | OUTPATIENT
Start: 2023-11-29 | End: 2023-12-01 | Stop reason: HOSPADM

## 2023-11-29 RX ORDER — PROCHLORPERAZINE EDISYLATE 5 MG/ML
5 INJECTION INTRAMUSCULAR; INTRAVENOUS
Status: DISCONTINUED | OUTPATIENT
Start: 2023-11-29 | End: 2023-11-29 | Stop reason: HOSPADM

## 2023-11-29 RX ORDER — SODIUM CHLORIDE 0.9 % (FLUSH) 0.9 %
5-40 SYRINGE (ML) INJECTION EVERY 12 HOURS SCHEDULED
Status: DISCONTINUED | OUTPATIENT
Start: 2023-11-29 | End: 2023-11-29 | Stop reason: HOSPADM

## 2023-11-29 RX ORDER — DULOXETIN HYDROCHLORIDE 60 MG/1
60 CAPSULE, DELAYED RELEASE ORAL DAILY
Status: DISCONTINUED | OUTPATIENT
Start: 2023-11-29 | End: 2023-12-01 | Stop reason: HOSPADM

## 2023-11-29 RX ORDER — EZETIMIBE 10 MG/1
10 TABLET ORAL DAILY
Status: DISCONTINUED | OUTPATIENT
Start: 2023-11-29 | End: 2023-12-01 | Stop reason: HOSPADM

## 2023-11-29 RX ORDER — CYCLOBENZAPRINE HCL 10 MG
10 TABLET ORAL EVERY 12 HOURS PRN
Status: DISCONTINUED | OUTPATIENT
Start: 2023-11-29 | End: 2023-12-01 | Stop reason: HOSPADM

## 2023-11-29 RX ORDER — SUCCINYLCHOLINE/SOD CL,ISO/PF 200MG/10ML
SYRINGE (ML) INTRAVENOUS PRN
Status: DISCONTINUED | OUTPATIENT
Start: 2023-11-29 | End: 2023-11-29 | Stop reason: SDUPTHER

## 2023-11-29 RX ORDER — DIPHENHYDRAMINE HYDROCHLORIDE 50 MG/ML
12.5 INJECTION INTRAMUSCULAR; INTRAVENOUS
Status: DISCONTINUED | OUTPATIENT
Start: 2023-11-29 | End: 2023-11-29 | Stop reason: HOSPADM

## 2023-11-29 RX ORDER — ACETAMINOPHEN 500 MG
1000 TABLET ORAL ONCE
Status: DISCONTINUED | OUTPATIENT
Start: 2023-11-29 | End: 2023-11-29 | Stop reason: HOSPADM

## 2023-11-29 RX ORDER — FENTANYL CITRATE 50 UG/ML
100 INJECTION, SOLUTION INTRAMUSCULAR; INTRAVENOUS
Status: DISCONTINUED | OUTPATIENT
Start: 2023-11-29 | End: 2023-11-29 | Stop reason: HOSPADM

## 2023-11-29 RX ORDER — SODIUM CHLORIDE, SODIUM LACTATE, POTASSIUM CHLORIDE, CALCIUM CHLORIDE 600; 310; 30; 20 MG/100ML; MG/100ML; MG/100ML; MG/100ML
INJECTION, SOLUTION INTRAVENOUS CONTINUOUS
Status: DISCONTINUED | OUTPATIENT
Start: 2023-11-29 | End: 2023-11-29 | Stop reason: HOSPADM

## 2023-11-29 RX ORDER — FENTANYL CITRATE 50 UG/ML
25 INJECTION, SOLUTION INTRAMUSCULAR; INTRAVENOUS EVERY 5 MIN PRN
Status: DISCONTINUED | OUTPATIENT
Start: 2023-11-29 | End: 2023-11-29 | Stop reason: HOSPADM

## 2023-11-29 RX ORDER — SODIUM CHLORIDE 9 MG/ML
INJECTION, SOLUTION INTRAVENOUS PRN
Status: DISCONTINUED | OUTPATIENT
Start: 2023-11-29 | End: 2023-12-01 | Stop reason: HOSPADM

## 2023-11-29 RX ORDER — DEXAMETHASONE SODIUM PHOSPHATE 4 MG/ML
INJECTION, SOLUTION INTRA-ARTICULAR; INTRALESIONAL; INTRAMUSCULAR; INTRAVENOUS; SOFT TISSUE PRN
Status: DISCONTINUED | OUTPATIENT
Start: 2023-11-29 | End: 2023-11-29 | Stop reason: SDUPTHER

## 2023-11-29 RX ORDER — ACETAMINOPHEN 500 MG
1000 TABLET ORAL EVERY 6 HOURS
Status: DISCONTINUED | OUTPATIENT
Start: 2023-11-29 | End: 2023-12-01 | Stop reason: HOSPADM

## 2023-11-29 RX ORDER — FENTANYL CITRATE 50 UG/ML
INJECTION, SOLUTION INTRAMUSCULAR; INTRAVENOUS PRN
Status: DISCONTINUED | OUTPATIENT
Start: 2023-11-29 | End: 2023-11-29 | Stop reason: SDUPTHER

## 2023-11-29 RX ORDER — DIPHENHYDRAMINE HYDROCHLORIDE 50 MG/ML
25 INJECTION INTRAMUSCULAR; INTRAVENOUS EVERY 6 HOURS PRN
Status: DISCONTINUED | OUTPATIENT
Start: 2023-11-29 | End: 2023-12-01 | Stop reason: HOSPADM

## 2023-11-29 RX ORDER — SODIUM CHLORIDE 9 MG/ML
INJECTION, SOLUTION INTRAVENOUS PRN
Status: DISCONTINUED | OUTPATIENT
Start: 2023-11-29 | End: 2023-11-29 | Stop reason: HOSPADM

## 2023-11-29 RX ORDER — ONDANSETRON 2 MG/ML
INJECTION INTRAMUSCULAR; INTRAVENOUS PRN
Status: DISCONTINUED | OUTPATIENT
Start: 2023-11-29 | End: 2023-11-29 | Stop reason: SDUPTHER

## 2023-11-29 RX ORDER — SODIUM CHLORIDE 0.9 % (FLUSH) 0.9 %
5-40 SYRINGE (ML) INJECTION EVERY 12 HOURS SCHEDULED
Status: DISCONTINUED | OUTPATIENT
Start: 2023-11-29 | End: 2023-12-01 | Stop reason: HOSPADM

## 2023-11-29 RX ORDER — OXYCODONE HYDROCHLORIDE 5 MG/1
5 TABLET ORAL EVERY 4 HOURS PRN
Status: DISCONTINUED | OUTPATIENT
Start: 2023-11-29 | End: 2023-12-01 | Stop reason: HOSPADM

## 2023-11-29 RX ORDER — VALSARTAN 160 MG/1
160 TABLET ORAL DAILY
Status: DISCONTINUED | OUTPATIENT
Start: 2023-11-29 | End: 2023-12-01 | Stop reason: HOSPADM

## 2023-11-29 RX ORDER — OXYCODONE HYDROCHLORIDE 5 MG/1
10 TABLET ORAL EVERY 4 HOURS PRN
Status: DISCONTINUED | OUTPATIENT
Start: 2023-11-29 | End: 2023-12-01 | Stop reason: HOSPADM

## 2023-11-29 RX ORDER — PHENYLEPHRINE HCL IN 0.9% NACL 0.4MG/10ML
SYRINGE (ML) INTRAVENOUS PRN
Status: DISCONTINUED | OUTPATIENT
Start: 2023-11-29 | End: 2023-11-29 | Stop reason: SDUPTHER

## 2023-11-29 RX ORDER — LIDOCAINE HYDROCHLORIDE 20 MG/ML
INJECTION, SOLUTION EPIDURAL; INFILTRATION; INTRACAUDAL; PERINEURAL PRN
Status: DISCONTINUED | OUTPATIENT
Start: 2023-11-29 | End: 2023-11-29 | Stop reason: SDUPTHER

## 2023-11-29 RX ORDER — ONDANSETRON 4 MG/1
4 TABLET, ORALLY DISINTEGRATING ORAL EVERY 8 HOURS PRN
Status: DISCONTINUED | OUTPATIENT
Start: 2023-11-29 | End: 2023-12-01 | Stop reason: HOSPADM

## 2023-11-29 RX ORDER — HYDROCHLOROTHIAZIDE 25 MG/1
25 TABLET ORAL DAILY
Status: DISCONTINUED | OUTPATIENT
Start: 2023-11-29 | End: 2023-12-01 | Stop reason: HOSPADM

## 2023-11-29 RX ORDER — ONDANSETRON 2 MG/ML
4 INJECTION INTRAMUSCULAR; INTRAVENOUS EVERY 6 HOURS PRN
Status: DISCONTINUED | OUTPATIENT
Start: 2023-11-29 | End: 2023-12-01 | Stop reason: HOSPADM

## 2023-11-29 RX ORDER — SODIUM CHLORIDE 9 MG/ML
INJECTION, SOLUTION INTRAVENOUS CONTINUOUS
Status: DISCONTINUED | OUTPATIENT
Start: 2023-11-29 | End: 2023-12-01 | Stop reason: HOSPADM

## 2023-11-29 RX ORDER — LORAZEPAM 2 MG/ML
0.5 INJECTION INTRAMUSCULAR
Status: DISCONTINUED | OUTPATIENT
Start: 2023-11-29 | End: 2023-11-29 | Stop reason: HOSPADM

## 2023-11-29 RX ORDER — ESTRADIOL 1 MG/1
1 TABLET ORAL DAILY
Status: DISCONTINUED | OUTPATIENT
Start: 2023-11-29 | End: 2023-12-01 | Stop reason: HOSPADM

## 2023-11-29 RX ORDER — LIDOCAINE HYDROCHLORIDE 10 MG/ML
1 INJECTION, SOLUTION EPIDURAL; INFILTRATION; INTRACAUDAL; PERINEURAL
Status: DISCONTINUED | OUTPATIENT
Start: 2023-11-29 | End: 2023-11-29 | Stop reason: HOSPADM

## 2023-11-29 RX ORDER — HYDROMORPHONE HYDROCHLORIDE 1 MG/ML
0.5 INJECTION, SOLUTION INTRAMUSCULAR; INTRAVENOUS; SUBCUTANEOUS
Status: DISCONTINUED | OUTPATIENT
Start: 2023-11-29 | End: 2023-12-01 | Stop reason: HOSPADM

## 2023-11-29 RX ORDER — OXYCODONE HYDROCHLORIDE 5 MG/1
5 TABLET ORAL
Status: DISCONTINUED | OUTPATIENT
Start: 2023-11-29 | End: 2023-11-29 | Stop reason: HOSPADM

## 2023-11-29 RX ORDER — HYDRALAZINE HYDROCHLORIDE 20 MG/ML
10 INJECTION INTRAMUSCULAR; INTRAVENOUS
Status: DISCONTINUED | OUTPATIENT
Start: 2023-11-29 | End: 2023-11-29 | Stop reason: HOSPADM

## 2023-11-29 RX ORDER — PREGABALIN 100 MG/1
100 CAPSULE ORAL 2 TIMES DAILY
Status: DISCONTINUED | OUTPATIENT
Start: 2023-11-29 | End: 2023-12-01 | Stop reason: HOSPADM

## 2023-11-29 RX ORDER — VITAMIN B COMPLEX
1000 TABLET ORAL DAILY
Status: DISCONTINUED | OUTPATIENT
Start: 2023-11-29 | End: 2023-12-01 | Stop reason: HOSPADM

## 2023-11-29 RX ORDER — DEXTROSE MONOHYDRATE 100 MG/ML
INJECTION, SOLUTION INTRAVENOUS CONTINUOUS PRN
Status: DISCONTINUED | OUTPATIENT
Start: 2023-11-29 | End: 2023-12-01 | Stop reason: HOSPADM

## 2023-11-29 RX ORDER — PROPOFOL 10 MG/ML
INJECTION, EMULSION INTRAVENOUS CONTINUOUS PRN
Status: DISCONTINUED | OUTPATIENT
Start: 2023-11-29 | End: 2023-11-29 | Stop reason: SDUPTHER

## 2023-11-29 RX ORDER — SODIUM CHLORIDE 0.9 % (FLUSH) 0.9 %
5-40 SYRINGE (ML) INJECTION PRN
Status: DISCONTINUED | OUTPATIENT
Start: 2023-11-29 | End: 2023-12-01 | Stop reason: HOSPADM

## 2023-11-29 RX ORDER — INSULIN LISPRO 100 [IU]/ML
0-4 INJECTION, SOLUTION INTRAVENOUS; SUBCUTANEOUS NIGHTLY
Status: DISCONTINUED | OUTPATIENT
Start: 2023-11-29 | End: 2023-12-01 | Stop reason: HOSPADM

## 2023-11-29 RX ORDER — DIPHENHYDRAMINE HCL 25 MG
25 CAPSULE ORAL EVERY 6 HOURS PRN
Status: DISCONTINUED | OUTPATIENT
Start: 2023-11-29 | End: 2023-12-01 | Stop reason: HOSPADM

## 2023-11-29 RX ORDER — TRAZODONE HYDROCHLORIDE 50 MG/1
50 TABLET ORAL NIGHTLY
Status: DISCONTINUED | OUTPATIENT
Start: 2023-11-29 | End: 2023-12-01 | Stop reason: HOSPADM

## 2023-11-29 RX ORDER — PANTOPRAZOLE SODIUM 40 MG/1
40 TABLET, DELAYED RELEASE ORAL
Status: DISCONTINUED | OUTPATIENT
Start: 2023-11-30 | End: 2023-12-01 | Stop reason: HOSPADM

## 2023-11-29 RX ORDER — FOLIC ACID 1 MG/1
1 TABLET ORAL DAILY
Status: DISCONTINUED | OUTPATIENT
Start: 2023-11-29 | End: 2023-12-01 | Stop reason: HOSPADM

## 2023-11-29 RX ADMIN — Medication 80 MCG: at 09:50

## 2023-11-29 RX ADMIN — SODIUM CHLORIDE: 9 INJECTION, SOLUTION INTRAVENOUS at 16:27

## 2023-11-29 RX ADMIN — FENTANYL CITRATE 25 MCG: 50 INJECTION INTRAMUSCULAR; INTRAVENOUS at 10:46

## 2023-11-29 RX ADMIN — ONDANSETRON 4 MG: 2 INJECTION INTRAMUSCULAR; INTRAVENOUS at 09:24

## 2023-11-29 RX ADMIN — HYDROMORPHONE HYDROCHLORIDE 0.5 MG: 1 INJECTION, SOLUTION INTRAMUSCULAR; INTRAVENOUS; SUBCUTANEOUS at 09:19

## 2023-11-29 RX ADMIN — HYDROMORPHONE HYDROCHLORIDE 0.5 MG: 1 INJECTION, SOLUTION INTRAMUSCULAR; INTRAVENOUS; SUBCUTANEOUS at 10:22

## 2023-11-29 RX ADMIN — ACETAMINOPHEN 1000 MG: 500 TABLET ORAL at 18:44

## 2023-11-29 RX ADMIN — DEXAMETHASONE SODIUM PHOSPHATE 4 MG: 4 INJECTION INTRA-ARTICULAR; INTRALESIONAL; INTRAMUSCULAR; INTRAVENOUS; SOFT TISSUE at 09:24

## 2023-11-29 RX ADMIN — HYDROMORPHONE HYDROCHLORIDE 0.5 MG: 1 INJECTION, SOLUTION INTRAMUSCULAR; INTRAVENOUS; SUBCUTANEOUS at 11:06

## 2023-11-29 RX ADMIN — FENTANYL CITRATE 25 MCG: 50 INJECTION INTRAMUSCULAR; INTRAVENOUS at 10:57

## 2023-11-29 RX ADMIN — TRAZODONE HYDROCHLORIDE 50 MG: 50 TABLET ORAL at 21:39

## 2023-11-29 RX ADMIN — Medication 120 MCG: at 09:19

## 2023-11-29 RX ADMIN — PROPOFOL 150 MG: 10 INJECTION, EMULSION INTRAVENOUS at 09:06

## 2023-11-29 RX ADMIN — ACETAMINOPHEN 1000 MG: 500 TABLET ORAL at 13:15

## 2023-11-29 RX ADMIN — FENTANYL CITRATE 50 MCG: 50 INJECTION, SOLUTION INTRAMUSCULAR; INTRAVENOUS at 09:06

## 2023-11-29 RX ADMIN — ESTRADIOL 1 MG: 1 TABLET ORAL at 13:15

## 2023-11-29 RX ADMIN — FENTANYL CITRATE 50 MCG: 50 INJECTION INTRAMUSCULAR; INTRAVENOUS at 11:05

## 2023-11-29 RX ADMIN — ROCURONIUM BROMIDE 5 MG: 10 INJECTION, SOLUTION INTRAVENOUS at 09:06

## 2023-11-29 RX ADMIN — Medication 80 MCG: at 09:06

## 2023-11-29 RX ADMIN — Medication 80 MCG: at 10:01

## 2023-11-29 RX ADMIN — PROPOFOL 100 MCG/KG/MIN: 10 INJECTION, EMULSION INTRAVENOUS at 09:15

## 2023-11-29 RX ADMIN — OXYCODONE 5 MG: 5 TABLET ORAL at 18:57

## 2023-11-29 RX ADMIN — HYDROCHLOROTHIAZIDE 25 MG: 25 TABLET ORAL at 13:15

## 2023-11-29 RX ADMIN — SUGAMMADEX 200 MG: 100 INJECTION, SOLUTION INTRAVENOUS at 10:03

## 2023-11-29 RX ADMIN — ROCURONIUM BROMIDE 35 MG: 10 INJECTION, SOLUTION INTRAVENOUS at 09:13

## 2023-11-29 RX ADMIN — SENNOSIDES AND DOCUSATE SODIUM 1 TABLET: 8.6; 5 TABLET ORAL at 21:39

## 2023-11-29 RX ADMIN — CYCLOBENZAPRINE 10 MG: 10 TABLET, FILM COATED ORAL at 21:39

## 2023-11-29 RX ADMIN — SODIUM CHLORIDE, POTASSIUM CHLORIDE, SODIUM LACTATE AND CALCIUM CHLORIDE: 600; 310; 30; 20 INJECTION, SOLUTION INTRAVENOUS at 08:33

## 2023-11-29 RX ADMIN — EZETIMIBE 10 MG: 10 TABLET ORAL at 13:15

## 2023-11-29 RX ADMIN — LIDOCAINE HYDROCHLORIDE 60 MG: 20 INJECTION, SOLUTION EPIDURAL; INFILTRATION; INTRACAUDAL; PERINEURAL at 09:06

## 2023-11-29 RX ADMIN — SODIUM CHLORIDE: 9 INJECTION, SOLUTION INTRAVENOUS at 10:57

## 2023-11-29 RX ADMIN — Medication 1000 UNITS: at 13:15

## 2023-11-29 RX ADMIN — PREGABALIN 100 MG: 100 CAPSULE ORAL at 21:39

## 2023-11-29 RX ADMIN — SODIUM CHLORIDE: 9 INJECTION, SOLUTION INTRAVENOUS at 21:46

## 2023-11-29 RX ADMIN — HYDROMORPHONE HYDROCHLORIDE 0.5 MG: 1 INJECTION, SOLUTION INTRAMUSCULAR; INTRAVENOUS; SUBCUTANEOUS at 11:19

## 2023-11-29 RX ADMIN — Medication 120 MCG: at 09:27

## 2023-11-29 RX ADMIN — VALSARTAN 160 MG: 160 TABLET, FILM COATED ORAL at 13:15

## 2023-11-29 RX ADMIN — FENTANYL CITRATE 50 MCG: 50 INJECTION, SOLUTION INTRAMUSCULAR; INTRAVENOUS at 09:17

## 2023-11-29 RX ADMIN — Medication 1 MG: at 13:15

## 2023-11-29 RX ADMIN — VERAPAMIL HYDROCHLORIDE 180 MG: 180 TABLET, FILM COATED, EXTENDED RELEASE ORAL at 21:45

## 2023-11-29 RX ADMIN — WATER 2000 MG: 1 INJECTION INTRAMUSCULAR; INTRAVENOUS; SUBCUTANEOUS at 16:53

## 2023-11-29 RX ADMIN — Medication 140 MG: at 09:06

## 2023-11-29 RX ADMIN — FAMOTIDINE 20 MG: 20 TABLET, FILM COATED ORAL at 21:39

## 2023-11-29 ASSESSMENT — PAIN DESCRIPTION - DESCRIPTORS
DESCRIPTORS: BURNING
DESCRIPTORS: ACHING
DESCRIPTORS: ACHING;CRAMPING
DESCRIPTORS: ACHING

## 2023-11-29 ASSESSMENT — PAIN DESCRIPTION - LOCATION
LOCATION: BACK
LOCATION: BACK
LOCATION: INCISION
LOCATION: INCISION
LOCATION: BACK
LOCATION: BACK;INCISION
LOCATION: BACK
LOCATION: BACK

## 2023-11-29 ASSESSMENT — PAIN DESCRIPTION - ORIENTATION
ORIENTATION: LOWER
ORIENTATION: LOWER
ORIENTATION: POSTERIOR

## 2023-11-29 ASSESSMENT — PAIN SCALES - GENERAL
PAINLEVEL_OUTOF10: 8
PAINLEVEL_OUTOF10: 2
PAINLEVEL_OUTOF10: 3
PAINLEVEL_OUTOF10: 3
PAINLEVEL_OUTOF10: 0
PAINLEVEL_OUTOF10: 6
PAINLEVEL_OUTOF10: 5
PAINLEVEL_OUTOF10: 3
PAINLEVEL_OUTOF10: 5
PAINLEVEL_OUTOF10: 8
PAINLEVEL_OUTOF10: 5
PAINLEVEL_OUTOF10: 5
PAINLEVEL_OUTOF10: 8
PAINLEVEL_OUTOF10: 3
PAINLEVEL_OUTOF10: 5

## 2023-11-29 ASSESSMENT — PAIN - FUNCTIONAL ASSESSMENT
PAIN_FUNCTIONAL_ASSESSMENT: ACTIVITIES ARE NOT PREVENTED
PAIN_FUNCTIONAL_ASSESSMENT: 0-10
PAIN_FUNCTIONAL_ASSESSMENT: ACTIVITIES ARE NOT PREVENTED

## 2023-11-29 NOTE — OP NOTE
[address] [facility]  OPERATIVE REPORT    Name:  Юлия Yeboah  MR#:  [id number]  :  1955  ACCOUNT #:  [de-identified]  DATE OF SERVICE:  2023    PREOPERATIVE DIAGNOSIS:  Lumbar stenosis with neurogenic claudication. POSTOPERATIVE DIAGNOSIS:  Lumbar stenosis with neurogenic claudication. PROCEDURE PERFORMED:  Lumbar laminectomy L3-4 and L4-5 with partial facetectomies L3-4 and L4-5, and decompression bilaterally of L3, L4 and L5 nerve roots. SURGEON:  Hiren Root MD    ASSISTANT:  Janak Zhang PA-C    ANESTHESIA:  General    COMPLICATIONS:  No complications. SPECIMENS REMOVED:  No specimens noted. DRAINS:  No drains. IMPLANTS:  No instrumentation. ESTIMATED BLOOD LOSS:  Minimal    INDICATIONS:  This is a pleasant 49-year-old female with chronic back pain and bilateral leg pain. Neurogenic claudication, right-sided leg pain greater than left. She has failed conservative treatment. Stenosis at L3-4 and L4-5. She is for decompression. The patient understood the risks and benefits, elected to proceed. PROCEDURE:  The patient was identified, brought to the operative site, underwent general anesthesia without difficulty. Perioperative antibiotics were given to the patient prior to the skin incision. The patient was placed prone on the Alexx frame with all bony prominences well padded. Back was prepped and draped sterilely. After prepping and draping, time-out was performed. Midline incision was made directly over the L4-5 interspace. Dissection was carried down through subcutaneous fat to the thoracodorsal fascia. Subperiosteal dissection was performed exposing the inferior half of L3, all of L4 and superior portion of L5, confirmed on lateral fluoroscopy that appropriate levels had been identified. We then took down the interspinous ligament L3-4 and L4-5, removed the spinous process of L4.   Central laminectomy with undercutting the L4 lamina, carried down proximally into midline laminectomy with #3 Kerrison. This allowed access to lateral recesses, facet and the hypertrophic ligamentum causing lateral recess stenosis, particularly at L3-4 and L4-5. Partial facet resections were performed for decompression of the lateral recesses at L3-4 and L4-5 and the transversing nerve roots at those levels. We also undercut the superior articular processes for the L3-4 foramen bilaterally and then L4-5 foramen bilaterally. Afterwards, we successfully decompressed the L3, L4 and L5 nerve roots bilaterally. Hemostasis with bipolar cautery. FloSeal for hemostasis. VersaShield for adhesion protection. A #1 Stratafix on the fascial layer, 2-0 Stratafix on subcutaneous layer, and 3-0 Stratafix on skin. Pain solution injected. The patient returned to the PACU in stable condition. SAMRA Andrea was present for the entirety of the procedure and vital for the performance of the procedure. Taty Hernandez [facility] assisted with positioning, prepping, draping of the patient before the procedure and instrument manipulation/placement, spinal repositioning and navigation/robotics/flouroscopic operation during the procedure as well as wound closure, dressing application and brace application after the procedure.  Please note that no intern, resident, or other hospital staff was available to assist during the surgery     MD TERESITA [email or web address]  D:  11/29/2023 10:09  T:  11/29/2023 10:43  JOB #:  [id number]

## 2023-11-29 NOTE — DISCHARGE INSTRUCTIONS
[facility] Dr. Esther Verde  [phone]    After [address] [facility] [place]:  Discharge Instructions Lumbar Laminectomy Surgery     Patient Name: Berto Brown    Date of procedure: 11/29/23 Date of discharge: 12/1/2023    Procedure: Procedure(s):  L3-4, L4-5 LUMBAR LAMINECTOMY     Follow up appointments  -Follow up with Dr. Esther Verde in 2 weeks. Call [phone] to make an appointment as soon as you get home from the hospital.    [address]: _________________________   phone: ___________________  The agency will contact you to arrange dates/times for visits. Please call them if you do not hear from them within 24 hours after you are discharged  Physical therapy 3 times a week for 3 weeks  Nursing-initial assessment and as needed    When to call your Orthopaedic Surgeon:  -Signs of infection-if your incision is red; continues to have drainage; drainage has a foul odor or if you have a persistent fever over 101 degrees for 24 hours  -Nausea or vomiting, severe headache  -Loss of bowel or bladder function, inability to urinate  -Changes in sensation in your arms or legs (numbness, tingling, loss of color)  -Increased weakness-greater than before your surgery  -Severe pain or pain not relieved by medications  -Signs of a blood clot in your leg-calf pain, tenderness, redness, swelling of lower leg    When to call your Primary Care Physician:  -Concerns about medical conditions such as diabetes, high blood pressure, asthma, congestive heart failure  -Call if blood sugars are elevated, persistent headache or dizziness, coughing or congestion, constipation or diarrhea, burning with urination, abnormal heart rate    When to call 911 and go to the nearest emergency room:  -Acute onset of chest pain, shortness of breath, difficulty breathing    Activity  - You are going home a well person, be as active as possible. Your only exercise should be walking.   Start with short frequent walks and information below as a guide. Do not take more than 4 Grams of Tylenol in a 24 hour period. (There are 1000 milligrams in one Gram)                                                                                                                                                                                                                           Percocet contains 325 mg of Tylenol per tablet (do not take more than 12 tablets in 24 hours)  Lortab contains 500 mg of Tylenol per tablet (do not take more than 8 tablets in 24 hours)  Norco contains 325 mg of Tylenol per tablet (do not take more than 12 tablets in 24 hours). DO NOT:  -Do not give your medicine to others   -Do not take medicine unless it was prescribed for you   -Do not stop taking your medicine without talking to your healthcare provider   -Do not break, chew, crush, dissolve, or inject your medicine. If you cannot  swallow your medicine whole, talk to your healthcare provider.  -Do not drink alcohol while taking this medicine  -Do not take anti-inflammatory medications or aspirin unless instructed by your physician.

## 2023-11-29 NOTE — FLOWSHEET NOTE
11/29/23 8890   Family Communication    Relationship to Patient Son    Phone Number [phone]-[id number] [facility]   Family/Significant Other Update Called   Delivery Origin Nurse   Message Disposition Family present - message delivered   Update Given Yes   Family Communication   Family Update Message Procedure started

## 2023-11-29 NOTE — BRIEF OP NOTE
Brief Postoperative Note      Patient: Gustavo Ragland  YOB: 1955  MRN: [id number]    Date of Procedure: 11/29/2023    Pre-Op Diagnosis Codes:     * Spinal stenosis, lumbar region, with neurogenic claudication [M48.062]     * Spinal stenosis of lumbar region, unspecified whether neurogenic claudication present [M48.061]     * Radiculopathy, lumbar region [M54.16]    Post-Op Diagnosis: Same       Procedure(s):  L3-4, L4-5 LUMBAR LAMINECTOMY    Surgeon(s):  Homar Fuller MD    Assistant:  Physician Assistant: KAVITA Cannon    Anesthesia: General    Estimated Blood Loss (mL): less than 50     Complications: None    Specimens:   * No specimens in log *    Implants:  Implant Name Type Inv.  Item Serial No.  Lot No. LRB No. Used Action   IMPLANT THIN HYDROPHILIC AMNIOTIC MEMEBRANE 4 X 4 CM - [id number]  IMPLANT THIN HYDROPHILIC AMNIOTIC MEMEBRANE 4 X 4 CM [id number] [employer]- NA N/A 1 Implanted         Drains: * No LDAs found *    Findings: Stenosis       Electronically signed by KAVITA Cannon on 11/29/2023 at 10:11 AM

## 2023-11-29 NOTE — PROGRESS NOTES
Occupational Therapy   11/29/23    Orders received, chart review completed. Note patient POD #0 s/p lumbar laminectomy. OT will follow up tomorrow for evaluation. Recommend OOB to chair three times a day for meals, self-completion of ADLs as able and medically stable.      Thank you,   Tomas Carbajal, OTD, OTR/L

## 2023-11-29 NOTE — PLAN OF CARE
[id number]. [address] [facility],[address] Floor, Jeb KOENIG, Ninoska Villalta Hank S. Activity Measure for Post-Acute Care \"6-Clicks\" Basic Mobility Scores Predict Discharge Destination After Acute Care Hospitalization in Select Patient Groups: A Retrospective, Observational Study. Arch Rehabil Res Clin Transl. 2022 Jul 16;4(3):493257. doi: 10.1016/j.arrct. 3274.503931. PMID: 54146777; PMCID: YHF7036894. 4. Brice Ser, Lester W, Chari P. AM-PAC Short Forms Manual 4.0. Revised 2/2020.                                                                                                                                                                                                                               Pain Rating:    pain is at a level acceptable to the patient    Activity Tolerance:   Good and Fair     After treatment:   Patient left in no apparent distress in bed and Call bell within reach    COMMUNICATION/EDUCATION:   The patient's plan of care was discussed with: registered nurse and          Thank you for this referral.  Francesca De La Rosa, PT  Minutes: 37      Physical Therapy Evaluation Charge Determination   History Examination Presentation Decision-Making   HIGH Complexity :3+ comorbidities / personal factors will impact the outcome/ POC  HIGH Complexity : 4+ Standardized tests and measures addressing body structure, function, activity limitation and / or participation in recreation  LOW Complexity : Stable, uncomplicated  AM-PAC  MEDIUM   Based on the above components, the patient evaluation is determined to be of the following complexity level: Low

## 2023-11-29 NOTE — ANESTHESIA PRE PROCEDURE
Department of Anesthesiology  Preprocedure Note       Name:  Ronny Maria   Age:  77 y.o.  :  1955                                          MRN:  [id number]         Date:  2023      Surgeon: Sara Sahni):  Natalia Schmidt MD    Procedure: Procedure(s):  L3-4, L4-5 LUMBAR LAMINECTOMY    Medications prior to admission:   Prior to Admission medications    Medication Sig Start Date End Date Taking? Authorizing Provider   Multiple Vitamin (MULTIVITAMIN ADULT PO) Take by mouth    Nicho Raza MD   Omega-3 Fatty Acids (FISH OIL OMEGA-3 PO) Take by mouth    Nicho Raza MD   valsartan (DIOVAN) 160 MG tablet Take 1 tablet by mouth daily 11/15/23   Renetta Morgan, DO   verapamil (CALAN SR) 180 MG extended release tablet Take 1 tablet by mouth nightly 11/15/23   Renetta Morgan, DO   SITagliptin (JANUVIA) 100 MG tablet Take 1 tablet by mouth daily 11/15/23   Renetta Morgan, DO   hydroCHLOROthiazide (HYDRODIURIL) 25 MG tablet Take 1 tablet by mouth daily 11/3/23 10/28/24  Renetta Morgan, DO   pregabalin (LYRICA) 100 MG capsule Take 1 capsule by mouth 2 times daily for 90 days. Max Daily Amount: 200 mg 10/9/23 1/7/24  KAVITA Coleumab [facility]) 200 MG/1. 14ML SOSY injection Inject 1.14 mLs into the skin every 14 days  Patient taking differently: Inject 1.14 mLs into the skin every 14 days Every other friday 10/2/23   Kayleen Khanna MD   ibuprofen (ADVIL;MOTRIN) 400 MG tablet Take 1 tablet by mouth 2 times daily  Patient not taking: Reported on 2023    Nicho Raza MD   JUBLIA 10 % SOLN APPLY TO RIGHT GREAT TOENAIL DAILY AS DIRECTED 23   Nicho Raza MD   traZODone (DESYREL) 50 MG tablet Take 1 tablet by mouth nightly    Nicho Raza MD   empagliflozin (JARDIANCE) 10 MG tablet Take 1 tablet by mouth daily 06   Sid Jones MD   buPROPion (WELLBUTRIN XL) 150 MG extended release tablet Take 1 tablet by mouth every morning

## 2023-11-30 LAB
GLUCOSE BLD STRIP.AUTO-MCNC: 114 MG/DL (ref 65–117)
GLUCOSE BLD STRIP.AUTO-MCNC: 116 MG/DL (ref 65–117)
GLUCOSE BLD STRIP.AUTO-MCNC: 141 MG/DL (ref 65–117)
GLUCOSE BLD STRIP.AUTO-MCNC: 187 MG/DL (ref 65–117)
SERVICE CMNT-IMP: ABNORMAL
SERVICE CMNT-IMP: ABNORMAL
SERVICE CMNT-IMP: NORMAL
SERVICE CMNT-IMP: NORMAL

## 2023-11-30 PROCEDURE — 6360000002 HC RX W HCPCS: Performed by: STUDENT IN AN ORGANIZED HEALTH CARE EDUCATION/TRAINING PROGRAM

## 2023-11-30 PROCEDURE — 82962 GLUCOSE BLOOD TEST: CPT

## 2023-11-30 PROCEDURE — 99024 POSTOP FOLLOW-UP VISIT: CPT | Performed by: PHYSICIAN ASSISTANT

## 2023-11-30 PROCEDURE — 6360000002 HC RX W HCPCS: Performed by: PHYSICIAN ASSISTANT

## 2023-11-30 PROCEDURE — 97116 GAIT TRAINING THERAPY: CPT

## 2023-11-30 PROCEDURE — 97535 SELF CARE MNGMENT TRAINING: CPT

## 2023-11-30 PROCEDURE — 6370000000 HC RX 637 (ALT 250 FOR IP): Performed by: STUDENT IN AN ORGANIZED HEALTH CARE EDUCATION/TRAINING PROGRAM

## 2023-11-30 PROCEDURE — 97165 OT EVAL LOW COMPLEX 30 MIN: CPT

## 2023-11-30 PROCEDURE — 2580000003 HC RX 258: Performed by: STUDENT IN AN ORGANIZED HEALTH CARE EDUCATION/TRAINING PROGRAM

## 2023-11-30 PROCEDURE — 94760 N-INVAS EAR/PLS OXIMETRY 1: CPT

## 2023-11-30 RX ORDER — CYCLOBENZAPRINE HCL 10 MG
10 TABLET ORAL 2 TIMES DAILY PRN
Qty: 20 TABLET | Refills: 1 | Status: SHIPPED | OUTPATIENT
Start: 2023-11-30 | End: 2023-12-20

## 2023-11-30 RX ORDER — KETOROLAC TROMETHAMINE 30 MG/ML
15 INJECTION, SOLUTION INTRAMUSCULAR; INTRAVENOUS EVERY 6 HOURS
Status: COMPLETED | OUTPATIENT
Start: 2023-11-30 | End: 2023-12-01

## 2023-11-30 RX ORDER — KETOROLAC TROMETHAMINE 30 MG/ML
15 INJECTION, SOLUTION INTRAMUSCULAR; INTRAVENOUS EVERY 6 HOURS
Status: DISCONTINUED | OUTPATIENT
Start: 2023-11-30 | End: 2023-11-30

## 2023-11-30 RX ORDER — NALOXONE HYDROCHLORIDE 4 MG/.1ML
1 SPRAY NASAL PRN
Qty: 1 EACH | Refills: 0 | Status: SHIPPED | OUTPATIENT
Start: 2023-11-30

## 2023-11-30 RX ORDER — OXYCODONE HYDROCHLORIDE 5 MG/1
5-10 TABLET ORAL
Qty: 60 TABLET | Refills: 0 | Status: SHIPPED | OUTPATIENT
Start: 2023-11-30 | End: 2023-12-05

## 2023-11-30 RX ADMIN — DULOXETINE HYDROCHLORIDE 60 MG: 60 CAPSULE, DELAYED RELEASE ORAL at 08:54

## 2023-11-30 RX ADMIN — SODIUM CHLORIDE, PRESERVATIVE FREE 10 ML: 5 INJECTION INTRAVENOUS at 21:08

## 2023-11-30 RX ADMIN — PREGABALIN 100 MG: 100 CAPSULE ORAL at 08:50

## 2023-11-30 RX ADMIN — WATER 2000 MG: 1 INJECTION INTRAMUSCULAR; INTRAVENOUS; SUBCUTANEOUS at 00:12

## 2023-11-30 RX ADMIN — ACETAMINOPHEN 1000 MG: 500 TABLET ORAL at 06:08

## 2023-11-30 RX ADMIN — Medication 1000 UNITS: at 08:49

## 2023-11-30 RX ADMIN — Medication 1 MG: at 08:51

## 2023-11-30 RX ADMIN — ACETAMINOPHEN 1000 MG: 500 TABLET ORAL at 00:12

## 2023-11-30 RX ADMIN — FAMOTIDINE 20 MG: 20 TABLET, FILM COATED ORAL at 08:51

## 2023-11-30 RX ADMIN — ESTRADIOL 1 MG: 1 TABLET ORAL at 08:52

## 2023-11-30 RX ADMIN — EZETIMIBE 10 MG: 10 TABLET ORAL at 08:51

## 2023-11-30 RX ADMIN — BUPROPION HYDROCHLORIDE 150 MG: 150 TABLET, EXTENDED RELEASE ORAL at 08:50

## 2023-11-30 RX ADMIN — Medication 1 LOZENGE: at 15:00

## 2023-11-30 RX ADMIN — KETOROLAC TROMETHAMINE 15 MG: 30 INJECTION, SOLUTION INTRAMUSCULAR; INTRAVENOUS at 15:22

## 2023-11-30 RX ADMIN — TRAZODONE HYDROCHLORIDE 50 MG: 50 TABLET ORAL at 21:07

## 2023-11-30 RX ADMIN — PANTOPRAZOLE SODIUM 40 MG: 40 TABLET, DELAYED RELEASE ORAL at 06:08

## 2023-11-30 RX ADMIN — PREGABALIN 100 MG: 100 CAPSULE ORAL at 21:07

## 2023-11-30 RX ADMIN — CYCLOBENZAPRINE 10 MG: 10 TABLET, FILM COATED ORAL at 15:00

## 2023-11-30 RX ADMIN — SODIUM CHLORIDE, PRESERVATIVE FREE 10 ML: 5 INJECTION INTRAVENOUS at 08:56

## 2023-11-30 RX ADMIN — ACETAMINOPHEN 1000 MG: 500 TABLET ORAL at 14:47

## 2023-11-30 RX ADMIN — FAMOTIDINE 20 MG: 20 TABLET, FILM COATED ORAL at 21:07

## 2023-11-30 RX ADMIN — OXYCODONE 10 MG: 5 TABLET ORAL at 08:15

## 2023-11-30 RX ADMIN — KETOROLAC TROMETHAMINE 15 MG: 30 INJECTION, SOLUTION INTRAMUSCULAR; INTRAVENOUS at 21:07

## 2023-11-30 RX ADMIN — ACETAMINOPHEN 1000 MG: 500 TABLET ORAL at 21:07

## 2023-11-30 RX ADMIN — OXYCODONE 10 MG: 5 TABLET ORAL at 12:35

## 2023-11-30 RX ADMIN — SENNOSIDES AND DOCUSATE SODIUM 1 TABLET: 8.6; 5 TABLET ORAL at 21:07

## 2023-11-30 RX ADMIN — OXYCODONE 10 MG: 5 TABLET ORAL at 17:51

## 2023-11-30 RX ADMIN — OXYCODONE 5 MG: 5 TABLET ORAL at 01:17

## 2023-11-30 ASSESSMENT — PAIN DESCRIPTION - DESCRIPTORS
DESCRIPTORS: ACHING
DESCRIPTORS: ACHING
DESCRIPTORS: BURNING
DESCRIPTORS: SHARP
DESCRIPTORS: ACHING;DISCOMFORT
DESCRIPTORS: ACHING

## 2023-11-30 ASSESSMENT — PAIN SCALES - GENERAL
PAINLEVEL_OUTOF10: 8
PAINLEVEL_OUTOF10: 5
PAINLEVEL_OUTOF10: 4
PAINLEVEL_OUTOF10: 7
PAINLEVEL_OUTOF10: 8
PAINLEVEL_OUTOF10: 3
PAINLEVEL_OUTOF10: 6
PAINLEVEL_OUTOF10: 8

## 2023-11-30 ASSESSMENT — PAIN DESCRIPTION - LOCATION
LOCATION: INCISION;BACK
LOCATION: INCISION
LOCATION: INCISION
LOCATION: BACK
LOCATION: INCISION

## 2023-11-30 ASSESSMENT — PAIN DESCRIPTION - ORIENTATION
ORIENTATION: POSTERIOR
ORIENTATION: MID

## 2023-11-30 ASSESSMENT — PAIN DESCRIPTION - PAIN TYPE
TYPE: SURGICAL PAIN
TYPE: SURGICAL PAIN

## 2023-11-30 NOTE — PLAN OF CARE
Problem: Physical Therapy - Adult  Goal: By Discharge: Performs mobility at highest level of function for planned discharge setting. See evaluation for individualized goals. Description: FUNCTIONAL STATUS PRIOR TO ADMISSION: Patient was independent and active without use of DME.    HOME SUPPORT PRIOR TO ADMISSION: The patient lived alone with no local support. Physical Therapy Goals  Initiated 11/29/2023  1. Patient will move from supine to sit and sit to supine, scoot up and down, and roll side to side in bed with supervision/set-up within 4 day(s). 2.  Patient will perform sit to stand with supervision/set-up within 4 day(s). 3.  Patient will transfer from bed to chair and chair to bed with supervision/set-up using the least restrictive device within 4 day(s). 4.  Patient will ambulate with supervision/set-up for 300 feet with the least restrictive device within 4 day(s). 5.  Patient will ascend/descend 4 stairs with 1 handrail(s) with supervision/set-up within 4 day(s). 6. Patient will verbalize and demonstrate understanding of spinal precautions (No bending, lifting greater than 5 lbs, or twisting; log-roll technique; frequent repositioning as instructed) within 4 days. Outcome: Progressing     PHYSICAL THERAPY TREATMENT    Patient: Ivis Gabriel (89 y.o. female)  Date: 11/30/2023  Diagnosis: Spinal stenosis, lumbar region, with neurogenic claudication [M48.062]  Spinal stenosis of lumbar region, unspecified whether neurogenic claudication present [M48.061]  Radiculopathy, lumbar region [M54.16]  Lumbar stenosis with neurogenic claudication [M48.062] Lumbar stenosis with neurogenic claudication  Procedure(s) (LRB):  L3-4, L4-5 LUMBAR LAMINECTOMY (N/A) 1 Day Post-Op  Precautions:           Spinal Precautions: No Bending, No Lifting, No Twisting          ASSESSMENT:  Patient continues to benefit from skilled PT services and is progressing towards goals.  Pt tolerates gait training with good use of tense    2 Kicking, or legs drawn up  Activity  0 Lying quietly/normally, moves easily 1 Squirming, shifting, back and forth, tense  2 Arched, rigid or jerking  Cry  0 No cry (awake or asleep)  1 Moans or whimpers; occasional complaint   2 Crying steadily, screams or sobs, frequent complaints  Consolability  0 Content, relaxed   1 Reassured by touching, being talked to, distractible 2 Difficult to console or comfort    Pain Intervention(s):   patient medicated for pain prior to session    Activity Tolerance:   Good    After treatment:   Patient left in no apparent distress sitting up in chair and Call bell within reach      COMMUNICATION/EDUCATION:   The patient's plan of care was discussed with: registered nurse and            Sachi Maciel PT  Minutes: 25

## 2023-11-30 NOTE — CARE COORDINATION
Transition of Care Plan:    RUR: Observation   Prior Level of Functioning: Independent   Disposition: Home with Family Assistance and [address]; Accepted  Anjana Mujica  The pt reported that she plans to transition to her daughter's home for a week. Follow up appointments: PCP    DME needed: RW; Delivered\"   Transportation at discharge: Family   IM/IMM Medicare/ letter given: N/A   Caregiver Contact:  Katherine Asencio (Spouse)   [phone]   Discharge Caregiver contacted prior to discharge? N/A   Care Conference needed? No   Barriers to discharge: Medical      11/30/23 1240   Service Assessment   Patient Orientation Alert and Oriented   Cognition Alert   History Provided By Patient   Primary Sj Paz is: Legal Next of Kin   PCP Verified by CM Yes   Last Visit to PCP Within last 3 months   Prior Functional Level Independent in ADLs/IADLs   Current Functional Level Independent in ADLs/IADLs   Discharge Planning   Current Services Prior To Admission None   Potential Assistance Needed Durable Medical Equipment;Home Care   Potential DME Needed Douglas Delgado   DME Ordered? Walker   Potential Assistance Purchasing Medications No   Type of Home Care Services PT;OT   Services At/After Discharge   Transition of Care Consult (CM Consult) Home Health;Discharge Planning   Internal Home Health No   Reason Outside Agency Chosen Unable to staff case   Mode of Transport at Discharge Other (see comment)   Confirm Follow Up Transport Family   Condition of Participation: Discharge Planning   The Plan for Transition of Care is related to the following treatment goals: Home Health   The Patient and/or Patient Representative was provided with a Choice of Provider? Patient   The Patient and/Or Patient Representative agree with the Discharge Plan?  Yes   Freedom of Choice list was provided with basic dialogue that supports the patient's

## 2023-11-30 NOTE — PLAN OF CARE
Problem: Pain  Goal: Verbalizes/displays adequate comfort level or baseline comfort level  Outcome: Progressing  Flowsheets  Taken 11/30/2023 1235 by Toma De Santiago LPN  Verbalizes/displays adequate comfort level or baseline comfort level:   Assess pain using appropriate pain scale   Administer analgesics based on type and severity of pain and evaluate response  Taken 11/30/2023 0314 by Aixa Kuo RN  Verbalizes/displays adequate comfort level or baseline comfort level: Assess pain using appropriate pain scale

## 2023-11-30 NOTE — PROGRESS NOTES
Bedside and Verbal shift change report given to [address] (oncoming nurse) by ROLAND Abrams (offgoing nurse). Report included the following information Nurse Handoff Report, Index, ED Encounter Summary, ED SBAR, Adult Overview, Surgery Report, Intake/Output, MAR, Recent Results, Med Rec Status, and Neuro Assessment.

## 2023-11-30 NOTE — PLAN OF CARE
Problem: Pain  Goal: Verbalizes/displays adequate comfort level or baseline comfort level  Outcome: Progressing  Flowsheets  Taken 11/29/2023 1834  Verbalizes/displays adequate comfort level or baseline comfort level:   Assess pain using appropriate pain scale   Encourage patient to monitor pain and request assistance  Taken 11/29/2023 1315  Verbalizes/displays adequate comfort level or baseline comfort level: Assess pain using appropriate pain scale     Problem: Safety - Adult  Goal: Free from fall injury  Outcome: Progressing  Flowsheets (Taken 11/29/2023 1315)  Free From Fall Injury:   Instruct family/caregiver on patient safety   Based on caregiver fall risk screen, instruct family/caregiver to ask for assistance with transferring infant if caregiver noted to have fall risk factors
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
## 2023-11-30 NOTE — PLAN OF CARE
Problem: Physical Therapy - Adult  Goal: By Discharge: Performs mobility at highest level of function for planned discharge setting. See evaluation for individualized goals. Description: FUNCTIONAL STATUS PRIOR TO ADMISSION: Patient was independent and active without use of DME.    HOME SUPPORT PRIOR TO ADMISSION: The patient lived alone with no local support. Physical Therapy Goals  Initiated 11/29/2023  1. Patient will move from supine to sit and sit to supine, scoot up and down, and roll side to side in bed with supervision/set-up within 4 day(s). 2.  Patient will perform sit to stand with supervision/set-up within 4 day(s). 3.  Patient will transfer from bed to chair and chair to bed with supervision/set-up using the least restrictive device within 4 day(s). 4.  Patient will ambulate with supervision/set-up for 300 feet with the least restrictive device within 4 day(s). 5.  Patient will ascend/descend 4 stairs with 1 handrail(s) with supervision/set-up within 4 day(s). 6. Patient will verbalize and demonstrate understanding of spinal precautions (No bending, lifting greater than 5 lbs, or twisting; log-roll technique; frequent repositioning as instructed) within 4 days.    11/30/2023 1425 by Jesenia Andrade, PT  Outcome: Progressing     PHYSICAL THERAPY TREATMENT-AFTERNOON SESSION    Patient: Nany Garza (75 y.o. female)  Date: 11/30/2023  Diagnosis: Spinal stenosis, lumbar region, with neurogenic claudication [M48.062]  Spinal stenosis of lumbar region, unspecified whether neurogenic claudication present [M48.061]  Radiculopathy, lumbar region [M54.16]  Lumbar stenosis with neurogenic claudication [M48.062] Lumbar stenosis with neurogenic claudication  Procedure(s) (LRB):  L3-4, L4-5 LUMBAR LAMINECTOMY (N/A) 1 Day Post-Op  Precautions:           Spinal Precautions: No Bending, No Lifting, No Twisting          ASSESSMENT:  Patient continues to benefit from skilled PT services and is progressing

## 2023-11-30 NOTE — PLAN OF CARE
Problem: Pain  Goal: Verbalizes/displays adequate comfort level or baseline comfort level  11/29/2023 2327 by Xiomara Coleman RN  Outcome: Progressing  11/29/2023 2112 by Petros Russell LPN  Outcome: Progressing  Flowsheets  Taken 11/29/2023 2016 by Xiomara Coleman RN  Verbalizes/displays adequate comfort level or baseline comfort level: Assess pain using appropriate pain scale  Taken 11/29/2023 1834 by Petros Russell LPN  Verbalizes/displays adequate comfort level or baseline comfort level:   Assess pain using appropriate pain scale   Encourage patient to monitor pain and request assistance  Taken 11/29/2023 1315 by Petros Russell LPN  Verbalizes/displays adequate comfort level or baseline comfort level: Assess pain using appropriate pain scale     Problem: Safety - Adult  Goal: Free from fall injury  11/29/2023 2327 by Xiomara Coleman RN  Outcome: Progressing  11/29/2023 2112 by Petros Russell LPN  Outcome: Progressing  Flowsheets (Taken 11/29/2023 1315)  Free From Fall Injury:   Instruct family/caregiver on patient safety   Based on caregiver fall risk screen, instruct family/caregiver to ask for assistance with transferring infant if caregiver noted to have fall risk factors     Problem: Discharge Planning  Goal: Discharge to home or other facility with appropriate resources  Outcome: Progressing  Flowsheets (Taken 11/29/2023 2015)  Discharge to home or other facility with appropriate resources: Identify barriers to discharge with patient and caregiver     Problem: Physical Therapy - Adult  Goal: By Discharge: Performs mobility at highest level of function for planned discharge setting. See evaluation for individualized goals. Description: FUNCTIONAL STATUS PRIOR TO ADMISSION: Patient was independent and active without use of DME.    HOME SUPPORT PRIOR TO ADMISSION: The patient lived alone with no local support. Physical Therapy Goals  Initiated 11/29/2023  1.

## 2023-12-01 VITALS
SYSTOLIC BLOOD PRESSURE: 112 MMHG | TEMPERATURE: 98.2 F | HEIGHT: 67 IN | OXYGEN SATURATION: 100 % | BODY MASS INDEX: 28.25 KG/M2 | HEART RATE: 69 BPM | RESPIRATION RATE: 18 BRPM | DIASTOLIC BLOOD PRESSURE: 80 MMHG | WEIGHT: 180 LBS

## 2023-12-01 LAB
GLUCOSE BLD STRIP.AUTO-MCNC: 108 MG/DL (ref 65–117)
GLUCOSE BLD STRIP.AUTO-MCNC: 138 MG/DL (ref 65–117)
SERVICE CMNT-IMP: ABNORMAL
SERVICE CMNT-IMP: NORMAL

## 2023-12-01 PROCEDURE — 82962 GLUCOSE BLOOD TEST: CPT

## 2023-12-01 PROCEDURE — 94760 N-INVAS EAR/PLS OXIMETRY 1: CPT

## 2023-12-01 PROCEDURE — 6360000002 HC RX W HCPCS: Performed by: PHYSICIAN ASSISTANT

## 2023-12-01 PROCEDURE — 97530 THERAPEUTIC ACTIVITIES: CPT

## 2023-12-01 PROCEDURE — 6370000000 HC RX 637 (ALT 250 FOR IP): Performed by: STUDENT IN AN ORGANIZED HEALTH CARE EDUCATION/TRAINING PROGRAM

## 2023-12-01 PROCEDURE — 97116 GAIT TRAINING THERAPY: CPT

## 2023-12-01 PROCEDURE — 2580000003 HC RX 258: Performed by: STUDENT IN AN ORGANIZED HEALTH CARE EDUCATION/TRAINING PROGRAM

## 2023-12-01 RX ADMIN — POLYETHYLENE GLYCOL 3350 17 G: 17 POWDER, FOR SOLUTION ORAL at 08:53

## 2023-12-01 RX ADMIN — VALSARTAN 160 MG: 160 TABLET, FILM COATED ORAL at 08:54

## 2023-12-01 RX ADMIN — PREGABALIN 100 MG: 100 CAPSULE ORAL at 08:54

## 2023-12-01 RX ADMIN — SODIUM CHLORIDE, PRESERVATIVE FREE 10 ML: 5 INJECTION INTRAVENOUS at 08:59

## 2023-12-01 RX ADMIN — SENNOSIDES AND DOCUSATE SODIUM 1 TABLET: 8.6; 5 TABLET ORAL at 08:53

## 2023-12-01 RX ADMIN — ACETAMINOPHEN 1000 MG: 500 TABLET ORAL at 03:12

## 2023-12-01 RX ADMIN — HYDROCHLOROTHIAZIDE 25 MG: 25 TABLET ORAL at 08:54

## 2023-12-01 RX ADMIN — ACETAMINOPHEN 1000 MG: 500 TABLET ORAL at 07:40

## 2023-12-01 RX ADMIN — Medication 1000 UNITS: at 08:54

## 2023-12-01 RX ADMIN — ESTRADIOL 1 MG: 1 TABLET ORAL at 08:53

## 2023-12-01 RX ADMIN — PANTOPRAZOLE SODIUM 40 MG: 40 TABLET, DELAYED RELEASE ORAL at 07:22

## 2023-12-01 RX ADMIN — DULOXETINE HYDROCHLORIDE 60 MG: 60 CAPSULE, DELAYED RELEASE ORAL at 08:54

## 2023-12-01 RX ADMIN — BUPROPION HYDROCHLORIDE 150 MG: 150 TABLET, EXTENDED RELEASE ORAL at 08:54

## 2023-12-01 RX ADMIN — KETOROLAC TROMETHAMINE 15 MG: 30 INJECTION, SOLUTION INTRAMUSCULAR; INTRAVENOUS at 03:12

## 2023-12-01 RX ADMIN — OXYCODONE 5 MG: 5 TABLET ORAL at 13:18

## 2023-12-01 RX ADMIN — Medication 1 MG: at 08:54

## 2023-12-01 RX ADMIN — OXYCODONE 10 MG: 5 TABLET ORAL at 09:00

## 2023-12-01 RX ADMIN — FAMOTIDINE 20 MG: 20 TABLET, FILM COATED ORAL at 08:54

## 2023-12-01 RX ADMIN — EZETIMIBE 10 MG: 10 TABLET ORAL at 08:54

## 2023-12-01 ASSESSMENT — PAIN SCALES - GENERAL
PAINLEVEL_OUTOF10: 8
PAINLEVEL_OUTOF10: 5

## 2023-12-01 ASSESSMENT — PAIN DESCRIPTION - DESCRIPTORS
DESCRIPTORS: ACHING
DESCRIPTORS: ACHING

## 2023-12-01 ASSESSMENT — PAIN DESCRIPTION - LOCATION
LOCATION: BACK
LOCATION: BACK

## 2023-12-01 NOTE — PLAN OF CARE
Problem: Physical Therapy - Adult  Goal: By Discharge: Performs mobility at highest level of function for planned discharge setting. See evaluation for individualized goals. Description: FUNCTIONAL STATUS PRIOR TO ADMISSION: Patient was independent and active without use of DME.    HOME SUPPORT PRIOR TO ADMISSION: The patient lived alone with no local support. Physical Therapy Goals  Initiated 11/29/2023  1. Patient will move from supine to sit and sit to supine, scoot up and down, and roll side to side in bed with supervision/set-up within 4 day(s). 2.  Patient will perform sit to stand with supervision/set-up within 4 day(s). 3.  Patient will transfer from bed to chair and chair to bed with supervision/set-up using the least restrictive device within 4 day(s). 4.  Patient will ambulate with supervision/set-up for 300 feet with the least restrictive device within 4 day(s). 5.  Patient will ascend/descend 4 stairs with 1 handrail(s) with supervision/set-up within 4 day(s). 6. Patient will verbalize and demonstrate understanding of spinal precautions (No bending, lifting greater than 5 lbs, or twisting; log-roll technique; frequent repositioning as instructed) within 4 days. Outcome: Progressing    PHYSICAL THERAPY TREATMENT    Patient: Berto Brown (48 y.o. female)  Date: 12/1/2023  Diagnosis: Spinal stenosis, lumbar region, with neurogenic claudication [M48.062]  Spinal stenosis of lumbar region, unspecified whether neurogenic claudication present [M48.061]  Radiculopathy, lumbar region [M54.16]  Lumbar stenosis with neurogenic claudication [M48.062] Lumbar stenosis with neurogenic claudication  Procedure(s) (LRB):  L3-4, L4-5 LUMBAR LAMINECTOMY (N/A) 2 Days Post-Op  Precautions:           Spinal Precautions: No Bending, No Lifting, No Twisting          ASSESSMENT:  Patient continues to benefit from skilled PT services and is progressing towards goals.  Pt able to perform gait training with RW, sized Vaccine Information Statement(s) or the Emergency Use Authorization was given today. This has been reviewed, questions answered, and verbal consent given by Patient for injection(s) and administration of Influenza (Inactivated).        Patient tolerated without incident. See immunization grid for documentation.   new RW, for 300ft with good stability. Pt demos no difficulty with gait, occasional discomfort per pt. Pt able to perform dressing and ADLs with mild reminders on BLT precautions. Pt c/o light dizziness likely medication related. Will continue to follow to DC, cleared to DC from PT standpoint     PLAN:  Patient continues to benefit from skilled intervention to address the above impairments. Continue treatment per established plan of care. Recommendation for discharge: (in order for the patient to meet his/her long term goals): Therapy 2 days/week in the home    Other factors to consider for discharge: no additional factors    IF patient discharges home will need the following DME: rolling walker       SUBJECTIVE:   Patient stated, \"I am feeling much better. \"    OBJECTIVE DATA SUMMARY:   Critical Behavior:          Functional Mobility Training:  Bed Mobility:  Bed Mobility Training  Overall Level of Assistance: Supervision  Rolling: Supervision  Sit to Supine: Supervision  Transfers:  Transfer Training  Transfer Training: Yes  Overall Level of Assistance: Supervision;Modified independent  Sit to Stand: Supervision;Modified independent  Stand to Sit: Supervision  Stand Pivot Transfers: Supervision;Modified independent  Balance:  Balance  Sitting: Intact  Standing: Intact  Standing - Static: Good;Constant support  Standing - Dynamic: Constant support;Good   Ambulation/Gait Training:     Gait  Overall Level of Assistance: Supervision;Modified independent  Distance (ft): 300 Feet  Assistive Device: Walker, rolling  Base of Support: Widened  Speed/Awa: Slow;Shuffled  Step Length: Left shortened;Right shortened  Neuro Re-Education:                    Pain Ratin/10   Pain Intervention(s):   patient medicated for pain prior to session and pain is at a level acceptable to the patient    Activity Tolerance:   Good    After treatment:   Patient left in no apparent distress sitting up in chair and Call bell within

## 2023-12-01 NOTE — PROGRESS NOTES
education was provided to the patient, patient verbalized understanding. All questions were answered. Patient had no further questions. Went over AVS and AVS was given to patient.

## 2023-12-01 NOTE — PLAN OF CARE
Problem: Physical Therapy - Adult  Goal: By Discharge: Performs mobility at highest level of function for planned discharge setting. See evaluation for individualized goals. Description: FUNCTIONAL STATUS PRIOR TO ADMISSION: Patient was independent and active without use of DME.    HOME SUPPORT PRIOR TO ADMISSION: The patient lived alone with no local support. Physical Therapy Goals  Initiated 11/29/2023  1. Patient will move from supine to sit and sit to supine, scoot up and down, and roll side to side in bed with supervision/set-up within 4 day(s). 2.  Patient will perform sit to stand with supervision/set-up within 4 day(s). 3.  Patient will transfer from bed to chair and chair to bed with supervision/set-up using the least restrictive device within 4 day(s). 4.  Patient will ambulate with supervision/set-up for 300 feet with the least restrictive device within 4 day(s). 5.  Patient will ascend/descend 4 stairs with 1 handrail(s) with supervision/set-up within 4 day(s). 6. Patient will verbalize and demonstrate understanding of spinal precautions (No bending, lifting greater than 5 lbs, or twisting; log-roll technique; frequent repositioning as instructed) within 4 days.    12/1/2023 1041 by Francesca De La Rosa PT  Outcome: Progressing     Problem: Pain  Goal: Verbalizes/displays adequate comfort level or baseline comfort level  Outcome: [address] [facility] 114 Resolved Met     Problem: Safety - Adult  Goal: Free from fall injury  Outcome: [address] [facility] [address] Resolved Met     Problem: Discharge Planning  Goal: Discharge to home or other facility with appropriate resources  Outcome: [address] [facility] [address] Resolved Met     Problem: ABCDS Injury Assessment  Goal: Absence of physical injury  Outcome: [address] [facility] [address] Resolved Met     Problem: Chronic Conditions and Co-morbidities  Goal: Patient's chronic conditions and co-morbidity symptoms are monitored and maintained or improved  Outcome: [address] [facility] [address] Resolved Met

## 2024-01-08 RX ORDER — SARILUMAB 200 MG/1.14ML
200 INJECTION, SOLUTION SUBCUTANEOUS
Qty: 2 EACH | Refills: 3 | Status: ACTIVE | OUTPATIENT
Start: 2024-01-08

## 2024-01-08 NOTE — TELEPHONE ENCOUNTER
Last visit 10/2/23  Follow up 3/4/24  Lab Results   Component Value Date     11/03/2023    K 4.2 11/03/2023     11/03/2023    CO2 27 11/03/2023    BUN 23 (H) 11/03/2023    CREATININE 0.86 11/03/2023    GLUCOSE 83 11/03/2023    CALCIUM 8.9 11/03/2023    PROT 6.8 11/03/2023    LABALBU 4.0 11/03/2023    BILITOT 0.2 11/03/2023    ALKPHOS 46 11/03/2023    AST 17 11/03/2023    ALT 25 11/03/2023    LABGLOM >60 11/03/2023    GFRAA >60 09/30/2022    AGRATIO 1.5 05/05/2023    GLOB 2.8 11/03/2023     Lab Results   Component Value Date    WBC 4.7 11/03/2023    HGB 13.2 11/03/2023    HCT 40.1 11/03/2023    MCV 96.4 11/03/2023     11/03/2023

## 2024-01-11 ENCOUNTER — HOSPITAL ENCOUNTER (OUTPATIENT)
Facility: HOSPITAL | Age: 69
Setting detail: RECURRING SERIES
Discharge: HOME | End: 2024-01-14
Payer: MEDICARE

## 2024-01-11 PROCEDURE — 97110 THERAPEUTIC EXERCISES: CPT

## 2024-01-11 PROCEDURE — 97162 PT EVAL MOD COMPLEX 30 MIN: CPT

## 2024-01-11 NOTE — THERAPY EVALUATION
demonstrate R hip flexion of 4-/5 or better to ease maneuvering stairs.     Long Term Goals: To be accomplished in 24 treatments.  Patient will report worst pain no greater than 2/10 to increase QOL and tolerance for sleeping through the night.   Patient will demonstrate gross LE strength of 4+/5 or better to improve tolerance for community distance ambulation.   Patient will demonstrate B SLS for 15 seconds to reduce fall risk when maneuvering on uneven terrain.       Frequency / Duration: Patient to be seen 2 times per week for up to 24 treatments.    Patient/ Caregiver education and instruction: Diagnosis, prognosis, self care, activity modification, brace/ splint application, and exercises   [x]  Plan of care has been reviewed with PTA        Juan Luis Luevano, PT, DPT       1/11/2024       3:58 PM        ===================================================================  I certify that the above Therapy Services are being furnished while the patient is under my care. I agree with the treatment plan and certify that this therapy is necessary.    Physician's Signature:_________________________   DATE:_________   TIME:________                           Loreto Farley PA    ** Signature, Date and Time must be completed for valid certification **  Please sign and fax to [phone].  Thank you

## 2024-01-16 ENCOUNTER — HOSPITAL ENCOUNTER (OUTPATIENT)
Facility: HOSPITAL | Age: 69
Setting detail: RECURRING SERIES
Discharge: HOME | End: 2024-01-19
Payer: MEDICARE

## 2024-01-16 PROCEDURE — 97112 NEUROMUSCULAR REEDUCATION: CPT

## 2024-01-16 PROCEDURE — 97110 THERAPEUTIC EXERCISES: CPT

## 2024-01-16 NOTE — PROGRESS NOTES
and attain remaining goals.    Progress toward goals / Updated goals:  []  See Progress Note/Recertification    Independent with HEP at initial follow up visit       PLAN  Yes  Continue plan of care  Re-Cert Due: 4/8/24  [x]  Upgrade activities as tolerated  []  Discharge due to :  []  Other:      Juan Luis Luevano, PT , DPT      1/16/2024       4:25 PM

## 2024-01-18 ENCOUNTER — HOSPITAL ENCOUNTER (OUTPATIENT)
Facility: HOSPITAL | Age: 69
Setting detail: RECURRING SERIES
Discharge: HOME | End: 2024-01-21
Payer: MEDICARE

## 2024-01-18 PROCEDURE — 97110 THERAPEUTIC EXERCISES: CPT

## 2024-01-18 PROCEDURE — 97112 NEUROMUSCULAR REEDUCATION: CPT

## 2024-01-18 NOTE — PROGRESS NOTES
PHYSICAL THERAPY - DAILY TREATMENT NOTE (updated 3/23)      Date: 2024          Patient Name:  Claudine Barnard :  1955   Medical   Diagnosis:  Status post lumbar laminectomy [Z98.890] Treatment Diagnosis:  M54.59  OTHER LOWER BACK PAIN    Referral Source:  Loreto Farley PA Insurance:   Payor: MEDICARE / Plan: MEDICARE PART A AND B / Product Type: *No Product type* /                     Patient  verified yes     Visit #   Current  / Total 3 24   Time   In / Out 2:30 p 3:20 p   Total Treatment Time 50   Total Timed Codes 40         SUBJECTIVE    Pain Level (0-10 scale): 0    Any medication changes, allergies to medications, adverse drug reactions, diagnosis change, or new procedure performed?: [x] No    [] Yes (see summary sheet for update)  Medications: Verified on Patient Summary List    Subjective functional status/changes:     Patient reports that she is doing well and was a little sore after her last visit.     OBJECTIVE      Therapeutic Procedures:  Tx Min Billable or 1:1 Min (if diff from Tx Min) Procedure, Rationale, Specifics   30  24252 Therapeutic Exercise (timed):  increase ROM, strength, coordination, balance, and proprioception to improve patient's ability to progress to PLOF and address remaining functional goals. (see flow sheet as applicable)     Details if applicable:     10  41691 Neuromuscular Re-Education (timed):  improve balance, coordination, kinesthetic sense, posture, core stability and proprioception to improve patient's ability to develop conscious control of individual muscles and awareness of position of extremities in order to progress to PLOF and address remaining functional goals. (see flow sheet as applicable)     Details if applicable:                    40     Total Total         Modalities Rationale:     decrease pain and increase tissue extensibility to improve patient's ability to progress to PLOF and address remaining functional goals.       min [] Estim

## 2024-01-23 ENCOUNTER — HOSPITAL ENCOUNTER (OUTPATIENT)
Facility: HOSPITAL | Age: 69
Setting detail: RECURRING SERIES
Discharge: HOME | End: 2024-01-26
Payer: MEDICARE

## 2024-01-23 PROCEDURE — 97110 THERAPEUTIC EXERCISES: CPT

## 2024-01-23 PROCEDURE — 97112 NEUROMUSCULAR REEDUCATION: CPT

## 2024-01-23 NOTE — PROGRESS NOTES
PHYSICAL THERAPY - DAILY TREATMENT NOTE (updated 3/23)      Date: 2024          Patient Name:  Claudine Barnard :  1955   Medical   Diagnosis:  Status post lumbar laminectomy [Z98.890] Treatment Diagnosis:  M54.59  OTHER LOWER BACK PAIN    Referral Source:  Loreto Farley PA Insurance:   Payor: MEDICARE / Plan: MEDICARE PART A AND B / Product Type: *No Product type* /                     Patient  verified yes     Visit #   Current  / Total 4 24   Time   In / Out 1:30 p 2:20 p   Total Treatment Time 50   Total Timed Codes 40         SUBJECTIVE    Pain Level (0-10 scale): 0    Any medication changes, allergies to medications, adverse drug reactions, diagnosis change, or new procedure performed?: [x] No    [] Yes (see summary sheet for update)  Medications: Verified on Patient Summary List    Subjective functional status/changes:     Patient reports that she is stiff today through her knees.  She feels like next week she will be ready to do therapy without her brace, she does not often wear it at home.      OBJECTIVE      Therapeutic Procedures:  Tx Min Billable or 1:1 Min (if diff from Tx Min) Procedure, Rationale, Specifics   30  45274 Therapeutic Exercise (timed):  increase ROM, strength, coordination, balance, and proprioception to improve patient's ability to progress to PLOF and address remaining functional goals. (see flow sheet as applicable)     Details if applicable:     10  32598 Neuromuscular Re-Education (timed):  improve balance, coordination, kinesthetic sense, posture, core stability and proprioception to improve patient's ability to develop conscious control of individual muscles and awareness of position of extremities in order to progress to PLOF and address remaining functional goals. (see flow sheet as applicable)     Details if applicable:                    40     Total Total         Modalities Rationale:     decrease pain and increase tissue extensibility to improve patient's

## 2024-01-26 ENCOUNTER — HOSPITAL ENCOUNTER (OUTPATIENT)
Facility: HOSPITAL | Age: 69
Setting detail: RECURRING SERIES
Discharge: HOME | End: 2024-01-29
Payer: MEDICARE

## 2024-01-26 PROCEDURE — 97112 NEUROMUSCULAR REEDUCATION: CPT

## 2024-01-26 PROCEDURE — 97110 THERAPEUTIC EXERCISES: CPT

## 2024-01-26 NOTE — PROGRESS NOTES
PHYSICAL THERAPY - DAILY TREATMENT NOTE (updated 3/23)      Date: 2024          Patient Name:  Claudine Barnard :  1955   Medical   Diagnosis:  Status post lumbar laminectomy [Z98.890] Treatment Diagnosis:  M54.59  OTHER LOWER BACK PAIN    Referral Source:  Loreto Farley PA Insurance:   Payor: MEDICARE / Plan: MEDICARE PART A AND B / Product Type: *No Product type* /                     Patient  verified yes     Visit #   Current  / Total 5 24   Time   In / Out 10:00 am 10:50 am   Total Treatment Time 50   Total Timed Codes 40         SUBJECTIVE    Pain Level (0-10 scale): 0    Any medication changes, allergies to medications, adverse drug reactions, diagnosis change, or new procedure performed?: [x] No    [] Yes (see summary sheet for update)  Medications: Verified on Patient Summary List    Subjective functional status/changes:     Patient presenting today without back brace donned. Pt reporting she has not had to wear it for the past 2 days with no ill effects. Pt reporting continued progress at this time.     OBJECTIVE      Therapeutic Procedures:  Tx Min Billable or 1:1 Min (if diff from Tx Min) Procedure, Rationale, Specifics   30  75467 Therapeutic Exercise (timed):  increase ROM, strength, coordination, balance, and proprioception to improve patient's ability to progress to PLOF and address remaining functional goals. (see flow sheet as applicable)     Details if applicable:     10  14087 Neuromuscular Re-Education (timed):  improve balance, coordination, kinesthetic sense, posture, core stability and proprioception to improve patient's ability to develop conscious control of individual muscles and awareness of position of extremities in order to progress to PLOF and address remaining functional goals. (see flow sheet as applicable)     Details if applicable:                    40     Total Total         Modalities Rationale:     decrease pain and increase tissue extensibility to improve

## 2024-01-30 ENCOUNTER — HOSPITAL ENCOUNTER (OUTPATIENT)
Facility: HOSPITAL | Age: 69
Setting detail: RECURRING SERIES
Discharge: HOME | End: 2024-02-02
Payer: MEDICARE

## 2024-01-30 PROCEDURE — 97110 THERAPEUTIC EXERCISES: CPT

## 2024-01-30 PROCEDURE — 97112 NEUROMUSCULAR REEDUCATION: CPT

## 2024-01-30 NOTE — PROGRESS NOTES
PHYSICAL THERAPY - DAILY TREATMENT NOTE (updated 3/23)      Date: 2024          Patient Name:  Claudine Barnard :  1955   Medical   Diagnosis:  Status post lumbar laminectomy [Z98.890] Treatment Diagnosis:  M54.59  OTHER LOWER BACK PAIN    Referral Source:  Loreto Farley PA Insurance:   Payor: MEDICARE / Plan: MEDICARE PART A AND B / Product Type: *No Product type* /                     Patient  verified yes     Visit #   Current  / Total 6 24   Time   In / Out 3:45 pm 4:40 pm   Total Treatment Time 55   Total Timed Codes 45         SUBJECTIVE    Pain Level (0-10 scale): 0    Any medication changes, allergies to medications, adverse drug reactions, diagnosis change, or new procedure performed?: [x] No    [] Yes (see summary sheet for update)  Medications: Verified on Patient Summary List    Subjective functional status/changes:     Patient reporting continued progress at this time with no significant changes since her last visit.     OBJECTIVE      Therapeutic Procedures:  Tx Min Billable or 1:1 Min (if diff from Tx Min) Procedure, Rationale, Specifics   35  19002 Therapeutic Exercise (timed):  increase ROM, strength, coordination, balance, and proprioception to improve patient's ability to progress to PLOF and address remaining functional goals. (see flow sheet as applicable)     Details if applicable:     10  45964 Neuromuscular Re-Education (timed):  improve balance, coordination, kinesthetic sense, posture, core stability and proprioception to improve patient's ability to develop conscious control of individual muscles and awareness of position of extremities in order to progress to PLOF and address remaining functional goals. (see flow sheet as applicable)     Details if applicable:                    45     Total Total         Modalities Rationale:     decrease pain and increase tissue extensibility to improve patient's ability to progress to PLOF and address remaining functional goals.

## 2024-02-01 ENCOUNTER — HOSPITAL ENCOUNTER (OUTPATIENT)
Facility: HOSPITAL | Age: 69
Setting detail: RECURRING SERIES
Discharge: HOME | End: 2024-02-04
Payer: MEDICARE

## 2024-02-01 PROCEDURE — 97112 NEUROMUSCULAR REEDUCATION: CPT

## 2024-02-01 PROCEDURE — 97110 THERAPEUTIC EXERCISES: CPT

## 2024-02-01 NOTE — PROGRESS NOTES
40     Total Total         Modalities Rationale:     decrease pain and increase tissue extensibility to improve patient's ability to progress to PLOF and address remaining functional goals.       min [] Estim Unattended,             type/location:       []  w/ice    []  w/heat        min [] Estim Attended,             type/location:       []  w/ice   []  w/heat         []  w/US   []  TENS insruct            min []  Mechanical Traction,        type/lbs:        []  pro      []  sup           []  int       []  cont            []  before manual           []  after manual     min []  Ultrasound,         settings/location:     10 min  unbilled []  Ice     [x]  Heat            location/position: Low Back / Supine         min []  Vasopneumatic Device,      press/temp:   pre-treatment girth :    post-treatment girth :    measured at (landmark       location) :   If using vaso (only need to measure limb vaso being performed on)        min []  Other:        Skin assessment post-treatment (if applicable):    [x]  intact    []  redness- no adverse reaction                 []redness - adverse reaction:          [x]  Patient Education billed concurrently with other procedures   [x] Review HEP    [] Progressed/Changed HEP, detail:    [] Other detail:         Other Objective/Functional Measures  EO HTGT B 30 seconds, no LOB  EC HTI B 30 seconds, no LOB with mod upper body sway    Pain Level at end of session (0-10 scale): 0      Assessment   Patient likely ready to progress EO balance to full tandem next session, continued difficulty to EC and addition of head nods and turns.  Will do well with continued progression to tolerance.   Patient will continue to benefit from skilled PT / OT services to modify and progress therapeutic interventions, analyze and address functional mobility deficits, analyze and address ROM deficits, analyze and address strength deficits, analyze and address soft tissue restrictions, analyze

## 2024-02-06 ENCOUNTER — HOSPITAL ENCOUNTER (OUTPATIENT)
Facility: HOSPITAL | Age: 69
Setting detail: RECURRING SERIES
Discharge: HOME | End: 2024-02-09
Payer: MEDICARE

## 2024-02-06 PROCEDURE — 97110 THERAPEUTIC EXERCISES: CPT

## 2024-02-06 PROCEDURE — 97112 NEUROMUSCULAR REEDUCATION: CPT

## 2024-02-06 NOTE — PROGRESS NOTES
remaining functional goals.       min [] Estim Unattended,             type/location:       []  w/ice    []  w/heat        min [] Estim Attended,             type/location:       []  w/ice   []  w/heat         []  w/US   []  TENS insruct            min []  Mechanical Traction,        type/lbs:        []  pro      []  sup           []  int       []  cont            []  before manual           []  after manual     min []  Ultrasound,         settings/location:     10 min  unbilled []  Ice     [x]  Heat            location/position: Low Back / Supine         min []  Vasopneumatic Device,      press/temp:   pre-treatment girth :    post-treatment girth :    measured at (landmark       location) :   If using vaso (only need to measure limb vaso being performed on)        min []  Other:        Skin assessment post-treatment (if applicable):    [x]  intact    []  redness- no adverse reaction                 []redness - adverse reaction:          [x]  Patient Education billed concurrently with other procedures   [x] Review HEP    [] Progressed/Changed HEP, detail:    [] Other detail:         Other Objective/Functional Measures  Tandem B 30 seconds  HTI EC B 30 seconds     Pain Level at end of session (0-10 scale): 0      Assessment   Patient with positive relief from addition of gentle forward flexion stretch, HEP updated and progressed.   Patient will continue to benefit from skilled PT / OT services to modify and progress therapeutic interventions, analyze and address functional mobility deficits, analyze and address ROM deficits, analyze and address strength deficits, analyze and address soft tissue restrictions, analyze and cue for proper movement patterns, analyze and address imbalance/dizziness, and instruct in home and community integration to address functional deficits and attain remaining goals.    Progress toward goals / Updated goals:  []  See Progress Note/Recertification    Short Term Goals: To be accomplished

## 2024-02-08 ENCOUNTER — HOSPITAL ENCOUNTER (OUTPATIENT)
Facility: HOSPITAL | Age: 69
Setting detail: RECURRING SERIES
Discharge: HOME | End: 2024-02-11
Payer: MEDICARE

## 2024-02-08 PROCEDURE — 97110 THERAPEUTIC EXERCISES: CPT

## 2024-02-08 PROCEDURE — 97112 NEUROMUSCULAR REEDUCATION: CPT

## 2024-02-08 NOTE — PROGRESS NOTES
PHYSICAL THERAPY - DAILY TREATMENT NOTE (updated 3/23)      Date: 2024          Patient Name:  Claudine Barnard :  1955   Medical   Diagnosis:  Status post lumbar laminectomy [Z98.890] Treatment Diagnosis:  M54.59  OTHER LOWER BACK PAIN    Referral Source:  Loreto Farley PA Insurance:   Payor: MEDICARE / Plan: MEDICARE PART A AND B / Product Type: *No Product type* /                     Patient  verified yes     Visit #   Current  / Total 9 24   Time   In / Out 2:40 p 3:35 p   Total Treatment Time 55   Total Timed Codes 45         SUBJECTIVE    Pain Level (0-10 scale): 0    Any medication changes, allergies to medications, adverse drug reactions, diagnosis change, or new procedure performed?: [x] No    [] Yes (see summary sheet for update)  Medications: Verified on Patient Summary List    Subjective functional status/changes:     Patient reports continued progress at this time. Pt noting burning in both her feet \"feels like the nerves are waking up.\"    OBJECTIVE      Therapeutic Procedures:  Tx Min Billable or 1:1 Min (if diff from Tx Min) Procedure, Rationale, Specifics   35  46062 Therapeutic Exercise (timed):  increase ROM, strength, coordination, balance, and proprioception to improve patient's ability to progress to PLOF and address remaining functional goals. (see flow sheet as applicable)     Details if applicable:     10  66087 Neuromuscular Re-Education (timed):  improve balance, coordination, kinesthetic sense, posture, core stability and proprioception to improve patient's ability to develop conscious control of individual muscles and awareness of position of extremities in order to progress to PLOF and address remaining functional goals. (see flow sheet as applicable)     Details if applicable:                    45     Total Total     Modalities Rationale:     decrease pain and increase tissue extensibility to improve patient's ability to progress to PLOF and address remaining

## 2024-02-14 ENCOUNTER — HOSPITAL ENCOUNTER (OUTPATIENT)
Facility: HOSPITAL | Age: 69
Setting detail: RECURRING SERIES
Discharge: HOME | End: 2024-02-17
Payer: MEDICARE

## 2024-02-14 PROCEDURE — 97112 NEUROMUSCULAR REEDUCATION: CPT

## 2024-02-14 PROCEDURE — 97110 THERAPEUTIC EXERCISES: CPT

## 2024-02-14 NOTE — PROGRESS NOTES
Rationale:     decrease pain and increase tissue extensibility to improve patient's ability to progress to PLOF and address remaining functional goals.       min [] Estim Unattended,             type/location:       []  w/ice    []  w/heat        min [] Estim Attended,             type/location:       []  w/ice   []  w/heat         []  w/US   []  TENS insruct            min []  Mechanical Traction,        type/lbs:        []  pro      []  sup           []  int       []  cont            []  before manual           []  after manual     min []  Ultrasound,         settings/location:     10 min  unbilled []  Ice     [x]  Heat            location/position: Low Back / Supine         min []  Vasopneumatic Device,      press/temp:   pre-treatment girth :    post-treatment girth :    measured at (landmark       location) :   If using vaso (only need to measure limb vaso being performed on)        min []  Other:        Skin assessment post-treatment (if applicable):    [x]  intact    []  redness- no adverse reaction                 []redness - adverse reaction:          [x]  Patient Education billed concurrently with other procedures   [x] Review HEP    [] Progressed/Changed HEP, detail:    [] Other detail:         Other Objective/Functional Measures  Balance:     EO: tandem B 30 seconds   EC: 3/4 tandem B 30 seconds     L SLS 20 seconds, R 30 seconds                                                               MMT:                                                  R          L     Hip flexion                               4+        4+  Knee extension                       5           5  Knee flexion                            5           5  Dorsiflexion                             4+         4  Plantarflexion                          4+          4+     Objective/Functional Outcome Measure: lumbar  FOTO Score: 67%, previously 44%    Pain Level at end of session (0-10 scale): 0      Assessment   Patient on track for d/c next

## 2024-02-20 ENCOUNTER — HOSPITAL ENCOUNTER (OUTPATIENT)
Facility: HOSPITAL | Age: 69
Setting detail: RECURRING SERIES
Discharge: HOME | End: 2024-02-23
Payer: MEDICARE

## 2024-02-20 PROCEDURE — 97110 THERAPEUTIC EXERCISES: CPT

## 2024-02-20 PROCEDURE — 97112 NEUROMUSCULAR REEDUCATION: CPT

## 2024-02-20 NOTE — PROGRESS NOTES
Physical Therapy at [place],   a part of [facility]  [address], [address]  [place], [place] [address]  Phone: [phone]  Fax: [phone]  DISCHARGE SUMMARY  Patient Name: Claudine Barnard : 1955   Treatment/Medical Diagnosis: Status post lumbar laminectomy [Z98.890]   Referral Source: Loreto Farley PA     Date of Initial Visit: 24 Attended Visits: 11 Missed Visits: 0     SUMMARY OF TREATMENT  Therapeutic exercise, neuromuscular re-education, HEP    CURRENT STATUS  At time of discharge, patient has met 3/3 STG and 2/3 LTG with excellent potential to achieve outstanding goal with continued HEP.  She has improved her FOTO outcome measure for lumbar function from 44% at IE to 67% today indicating a significant improvement in function.  She has maximized therapeutic benefit at this time.     Short Term Goals: To be accomplished in 8 treatments.  Patient will be independent with initial HEP in order to transition to general wellness program. MET  Patient will no longer utilize lumbar corset with ADL's to increase independence with household chores. MET  Patient will demonstrate R hip flexion of 4-/5 or better to ease maneuvering stairs. MET     Long Term Goals: To be accomplished in 24 treatments.  Patient will report worst pain no greater than 2/10 to increase QOL and tolerance for sleeping through the night. MET  Patient will demonstrate gross LE strength of 4+/5 or better to improve tolerance for community distance ambulation. Progressing toward   Patient will demonstrate B SLS for 15 seconds to reduce fall risk when maneuvering on uneven terrain.  MET        RECOMMENDATIONS          Juan Luis Luevano, PT, DPT       2024       12:40 PM    If you have any questions/comments please contact us directly at [phone].   Thank you for allowing us to assist in the care of your patient.

## 2024-02-20 NOTE — PROGRESS NOTES
PHYSICAL THERAPY - DAILY TREATMENT NOTE (updated 3/23)      Date: 2024          Patient Name:  Claudine Barnard :  1955   Medical   Diagnosis:  Status post lumbar laminectomy [Z98.890] Treatment Diagnosis:  M54.59  OTHER LOWER BACK PAIN    Referral Source:  Loreto Farley PA Insurance:   Payor: MEDICARE / Plan: MEDICARE PART A AND B / Product Type: *No Product type* /                     Patient  verified yes     Visit #   Current  / Total 11 24   Time   In / Out 8:35 a 9:25 a   Total Treatment Time 50   Total Timed Codes 40         SUBJECTIVE    Pain Level (0-10 scale): 0    Any medication changes, allergies to medications, adverse drug reactions, diagnosis change, or new procedure performed?: [x] No    [] Yes (see summary sheet for update)  Medications: Verified on Patient Summary List    Subjective functional status/changes:     Patient reports that she feels ready for discharge today.  Her surgeon said she only needs to go back to see him as needed.      OBJECTIVE      Therapeutic Procedures:  Tx Min Billable or 1:1 Min (if diff from Tx Min) Procedure, Rationale, Specifics   30  97997 Therapeutic Exercise (timed):  increase ROM, strength, coordination, balance, and proprioception to improve patient's ability to progress to PLOF and address remaining functional goals. (see flow sheet as applicable)     Details if applicable:     10  91322 Neuromuscular Re-Education (timed):  improve balance, coordination, kinesthetic sense, posture, core stability and proprioception to improve patient's ability to develop conscious control of individual muscles and awareness of position of extremities in order to progress to PLOF and address remaining functional goals. (see flow sheet as applicable)     Details if applicable:                    40     Total Total     Modalities Rationale:     decrease pain and increase tissue extensibility to improve patient's ability to progress to PLOF and address remaining

## 2024-03-04 ENCOUNTER — OFFICE VISIT (OUTPATIENT)
Age: 69
End: 2024-03-04
Payer: MEDICARE

## 2024-03-04 VITALS
RESPIRATION RATE: 16 BRPM | WEIGHT: 192.2 LBS | OXYGEN SATURATION: 97 % | HEART RATE: 78 BPM | BODY MASS INDEX: 30.1 KG/M2 | TEMPERATURE: 98 F | SYSTOLIC BLOOD PRESSURE: 128 MMHG | DIASTOLIC BLOOD PRESSURE: 76 MMHG

## 2024-03-04 DIAGNOSIS — M35.3 POLYMYALGIA RHEUMATICA (HCC): Primary | ICD-10-CM

## 2024-03-04 DIAGNOSIS — M31.6 OTHER GIANT CELL ARTERITIS (HCC): ICD-10-CM

## 2024-03-04 PROCEDURE — 1036F TOBACCO NON-USER: CPT | Performed by: PEDIATRICS

## 2024-03-04 PROCEDURE — 3074F SYST BP LT 130 MM HG: CPT | Performed by: PEDIATRICS

## 2024-03-04 PROCEDURE — G8427 DOCREV CUR MEDS BY ELIG CLIN: HCPCS | Performed by: PEDIATRICS

## 2024-03-04 PROCEDURE — G8399 PT W/DXA RESULTS DOCUMENT: HCPCS | Performed by: PEDIATRICS

## 2024-03-04 PROCEDURE — 3017F COLORECTAL CA SCREEN DOC REV: CPT | Performed by: PEDIATRICS

## 2024-03-04 PROCEDURE — 1090F PRES/ABSN URINE INCON ASSESS: CPT | Performed by: PEDIATRICS

## 2024-03-04 PROCEDURE — G8484 FLU IMMUNIZE NO ADMIN: HCPCS | Performed by: PEDIATRICS

## 2024-03-04 PROCEDURE — 1123F ACP DISCUSS/DSCN MKR DOCD: CPT | Performed by: PEDIATRICS

## 2024-03-04 PROCEDURE — G8417 CALC BMI ABV UP PARAM F/U: HCPCS | Performed by: PEDIATRICS

## 2024-03-04 PROCEDURE — 99214 OFFICE O/P EST MOD 30 MIN: CPT | Performed by: PEDIATRICS

## 2024-03-04 PROCEDURE — 3078F DIAST BP <80 MM HG: CPT | Performed by: PEDIATRICS

## 2024-03-04 RX ORDER — SARILUMAB 200 MG/1.14ML
200 INJECTION, SOLUTION SUBCUTANEOUS
Qty: 2 EACH | Refills: 3 | Status: SHIPPED | OUTPATIENT
Start: 2024-03-04 | End: 2024-03-04 | Stop reason: SDUPTHER

## 2024-03-04 RX ORDER — METHOTREXATE 2.5 MG/1
15 TABLET ORAL WEEKLY
Qty: 72 TABLET | Refills: 1 | Status: SHIPPED | OUTPATIENT
Start: 2024-03-04 | End: 2024-06-02

## 2024-03-04 RX ORDER — SARILUMAB 200 MG/1.14ML
200 INJECTION, SOLUTION SUBCUTANEOUS
Qty: 2 EACH | Refills: 3 | Status: ACTIVE | OUTPATIENT
Start: 2024-03-04

## 2024-03-04 ASSESSMENT — PATIENT HEALTH QUESTIONNAIRE - PHQ9
SUM OF ALL RESPONSES TO PHQ QUESTIONS 1-9: 0
1. LITTLE INTEREST OR PLEASURE IN DOING THINGS: 0
SUM OF ALL RESPONSES TO PHQ9 QUESTIONS 1 & 2: 0
SUM OF ALL RESPONSES TO PHQ QUESTIONS 1-9: 0
2. FEELING DOWN, DEPRESSED OR HOPELESS: 0

## 2024-03-04 NOTE — PROGRESS NOTES
Chief Complaint   Patient presents with    Joint Pain   1. Have you been to the ER, urgent care clinic since your last visit?  Hospitalized since your last visit?Yes  [facility] for back surgery    2. Have you seen or consulted any other health care providers outside of the [facility] since your last visit?  Include any pap smears or colon screening. Yes

## 2024-03-04 NOTE — PROGRESS NOTES
RHEUMATOLOGY PROBLEM LIST AND CHIEF COMPLAINT  1. GCA/PMR - HAs, left temporal artery discomfort, sudden loss of vision, arthralgia of shoulder, TMJ, hands, feet, response to steroids     Therapy History:  Current DMARDs: Methotrexate (1/2017 - 4/2018, restarted 3/2019-current), Kevzara (ordered 1/2023-current)  Prior DMARDs: Actemra (2/2018, stopped for 1 month; 3/2018-1/2023)  Covid-19 vaccination (+, Pfizer 12/21, 1/12, 9/21)     INTERVAL HISTORY  This is a 68 y.o.  female .  Today, the patient complains of pain in the joints.  Location: generalized   Severity: 3 on a scale of 0-10   Timing: intermittent   Duration: 4 months  Context/Associated signs and symptoms: The patient continues on Kevzara subQ 200 mg q2 weeks and methotrexate 15 mg PO weekly. She has been doing well overall. She states she can tell when her next injection is due and does not feel ready to taper medication yet. She is s/p lumbar laminectomy on 11/29/23 and had to hold MTX and Kevzara after surgery. She had recurrence of joint symptoms while off medications, but this has improved since restarting medications.      RHEUMATOLOGY REVIEW OF SYSTEMS   Positives as per history  Negatives as follows:  CONSTITUTlONAL:  Denies unexplained persistent fevers,  weight change  HEAD/EYES:   Denies  eye redness, blurry vision or sudden loss of vision, dry eyes , HA, temporal artery pain  ENT:    Denies oral/ nasal ulcers, recurrent sinus infections, dry mouth  RESPIRATORY:  No pleuritic pain , history of pleural effusions, hemoptysis  CARDIOVASCULAR:  Denies chest pain,  history of pericardial effusions  GASTRO:    Denies heartburn, esophageal dysmotility, abdominal pain, nausea, vomiting, diarrhea, blood  in the stool  HEMATOLOGIC:  No easy bruising, purpura, swollen lymph nodes  SKIN:    Denies alopecia, ulcers, nodules, sun sensitivity, unexplained persistent  rash   VASCULAR:   Denies cyanosis , raynaud phenomenon  NEUROLOGIC:  Denies

## 2024-03-06 NOTE — PROGRESS NOTES
Chief Complaint   Patient presents with    New Patient     Isaac Stack is a pleasant 74year old woman who presents as a new patient for anemia.  She reports left shoulder pain Patient tolerated infusion well. Patient alert and oriented x3.   No distress noted.   Vital signs stable.   Patient denies any new or worsening pain.     Patient educated on signs and symptoms of reaction to medication.   Educated patient on possible side effects and treatment of medication.     Patient verbalized understanding.   Offered patient education an/or discharge material.  Patient declined.   Patient denies any needs.  All questions answered.

## 2024-03-11 ENCOUNTER — TELEPHONE (OUTPATIENT)
Age: 69
End: 2024-03-11

## 2024-03-11 NOTE — TELEPHONE ENCOUNTER
Rep from [facility] pharmacy with [employer] called because they have a  prescription for Diclofenac and they have to fill for 91 days per pharmacy. Rep requested call back and provided office number    P:[phone]    F:[phone]

## 2024-03-12 ENCOUNTER — TELEPHONE (OUTPATIENT)
Age: 69
End: 2024-03-12

## 2024-03-12 NOTE — TELEPHONE ENCOUNTER
Ruth called from [facility] and she called because she is requesting for  diclofenac for 90 days for her mail order. [phone].

## 2024-03-25 RX ORDER — VALSARTAN 160 MG/1
160 TABLET ORAL DAILY
Qty: 90 TABLET | Refills: 3 | Status: SHIPPED | OUTPATIENT
Start: 2024-03-25

## 2024-05-09 ENCOUNTER — OFFICE VISIT (OUTPATIENT)
Age: 69
End: 2024-05-09
Payer: MEDICARE

## 2024-05-09 VITALS
HEIGHT: 67 IN | WEIGHT: 189 LBS | OXYGEN SATURATION: 94 % | TEMPERATURE: 98.2 F | BODY MASS INDEX: 29.66 KG/M2 | DIASTOLIC BLOOD PRESSURE: 59 MMHG | SYSTOLIC BLOOD PRESSURE: 126 MMHG | HEART RATE: 84 BPM | RESPIRATION RATE: 16 BRPM

## 2024-05-09 DIAGNOSIS — L65.9 HAIR THINNING: Primary | ICD-10-CM

## 2024-05-09 DIAGNOSIS — E11.51 TYPE 2 DIABETES MELLITUS WITH PERIPHERAL VASCULAR DISEASE (HCC): ICD-10-CM

## 2024-05-09 DIAGNOSIS — M54.16 LUMBAR RADICULOPATHY: ICD-10-CM

## 2024-05-09 DIAGNOSIS — L65.9 HAIR THINNING: ICD-10-CM

## 2024-05-09 DIAGNOSIS — E11.42 TYPE 2 DIABETES MELLITUS WITH DIABETIC POLYNEUROPATHY, WITHOUT LONG-TERM CURRENT USE OF INSULIN (HCC): ICD-10-CM

## 2024-05-09 DIAGNOSIS — I10 ESSENTIAL (PRIMARY) HYPERTENSION: ICD-10-CM

## 2024-05-09 DIAGNOSIS — F33.1 MAJOR DEPRESSIVE DISORDER, RECURRENT, MODERATE (HCC): ICD-10-CM

## 2024-05-09 PROCEDURE — 99214 OFFICE O/P EST MOD 30 MIN: CPT | Performed by: STUDENT IN AN ORGANIZED HEALTH CARE EDUCATION/TRAINING PROGRAM

## 2024-05-09 PROCEDURE — 2022F DILAT RTA XM EVC RTNOPTHY: CPT | Performed by: STUDENT IN AN ORGANIZED HEALTH CARE EDUCATION/TRAINING PROGRAM

## 2024-05-09 PROCEDURE — G8399 PT W/DXA RESULTS DOCUMENT: HCPCS | Performed by: STUDENT IN AN ORGANIZED HEALTH CARE EDUCATION/TRAINING PROGRAM

## 2024-05-09 PROCEDURE — G8417 CALC BMI ABV UP PARAM F/U: HCPCS | Performed by: STUDENT IN AN ORGANIZED HEALTH CARE EDUCATION/TRAINING PROGRAM

## 2024-05-09 PROCEDURE — 3078F DIAST BP <80 MM HG: CPT | Performed by: STUDENT IN AN ORGANIZED HEALTH CARE EDUCATION/TRAINING PROGRAM

## 2024-05-09 PROCEDURE — 3017F COLORECTAL CA SCREEN DOC REV: CPT | Performed by: STUDENT IN AN ORGANIZED HEALTH CARE EDUCATION/TRAINING PROGRAM

## 2024-05-09 PROCEDURE — 1036F TOBACCO NON-USER: CPT | Performed by: STUDENT IN AN ORGANIZED HEALTH CARE EDUCATION/TRAINING PROGRAM

## 2024-05-09 PROCEDURE — G8427 DOCREV CUR MEDS BY ELIG CLIN: HCPCS | Performed by: STUDENT IN AN ORGANIZED HEALTH CARE EDUCATION/TRAINING PROGRAM

## 2024-05-09 PROCEDURE — 1090F PRES/ABSN URINE INCON ASSESS: CPT | Performed by: STUDENT IN AN ORGANIZED HEALTH CARE EDUCATION/TRAINING PROGRAM

## 2024-05-09 PROCEDURE — 3074F SYST BP LT 130 MM HG: CPT | Performed by: STUDENT IN AN ORGANIZED HEALTH CARE EDUCATION/TRAINING PROGRAM

## 2024-05-09 PROCEDURE — 3046F HEMOGLOBIN A1C LEVEL >9.0%: CPT | Performed by: STUDENT IN AN ORGANIZED HEALTH CARE EDUCATION/TRAINING PROGRAM

## 2024-05-09 PROCEDURE — 1123F ACP DISCUSS/DSCN MKR DOCD: CPT | Performed by: STUDENT IN AN ORGANIZED HEALTH CARE EDUCATION/TRAINING PROGRAM

## 2024-05-09 NOTE — PROGRESS NOTES
Claudine Barnard is a 68 y.o. year old female who presents today (05/09/24) for DM follow-up.     Assessment & Plan:   1. Hair thinning  -     TSH; Future  -     T4, Free; Future  -     Vitamin B12 & Folate; Future  -     Thyroid Antibodies; Future  2. Type 2 diabetes mellitus with diabetic polyneuropathy, without long-term current use of insulin (HCC)  Assessment & Plan:   Well-controlled, continue current medications. Update labs q6m  Orders:  -     Comprehensive Metabolic Panel; Future  -     CBC; Future  -     Hemoglobin A1C; Future  -     Lipid Panel; Future  -     Microalbumin / Creatinine Urine Ratio; Future  3. Essential (primary) hypertension  Assessment & Plan:   Well-controlled, continue current medications  4. Lumbar radiculopathy  Assessment & Plan:  S/p surgical intervention with Dr Molina. Doing well in this regard, has been walking and exercising more. Now able to walk without assistive device.       Return in about 6 months (around 11/9/2024) for AWV.    Subjective:   DMII  - Well-controlled  - BG has been stable per pt    PMR  - Stable, no increased pain  - Continues f/u with rheumatology    HTN  - Stable, no dizziness or pre-syncope    Spinal stenosis  - Improved s/p surgical intervention. No longer walking with cane. Has completed PT and feels well    Acute concerns:   - Reports fullness in neck, dry/itchy skin, and has noticed thinning of her hair as well as her eyebrows. She has also noticed that she feels there is a lump in her throat.   - She has family hx of hypothyroidism/Hashimoto's.        Review of Systems   All other systems reviewed and are negative.        PMHx:    Patient Active Problem List   Diagnosis    OA (osteoarthritis)    Type 2 diabetes mellitus with peripheral vascular disease (HCC)    Iron deficiency anemia due to chronic blood loss    History of pulmonary embolus (PE)    PUD (peptic ulcer disease)    Moderate episode of recurrent major depressive disorder (HCC)    Anxiety and

## 2024-05-09 NOTE — PROGRESS NOTES
Verified name and birth date for privacy precautions.   Chart reviewed in preparation for today's visit.     Chief Complaint   Patient presents with    Diabetes    Fatigue    Skin Problem     Itching all over     Alopecia     Hair loss on head and eyebrows     Dysphagia          Health Maintenance Due   Topic    Diabetic retinal exam     Diabetic Alb to Cr ratio (uACR) test     Diabetic foot exam     Annual Wellness Visit (Medicare)     Lipids          Wt Readings from Last 3 Encounters:   05/09/24 85.7 kg (189 lb)   03/04/24 87.2 kg (192 lb 3.2 oz)   02/16/24 79.4 kg (175 lb)     Temp Readings from Last 3 Encounters:   05/09/24 98.2 °F (36.8 °C) (Oral)   03/04/24 98 °F (36.7 °C) (Temporal)   12/01/23 98.2 °F (36.8 °C) (Oral)     BP Readings from Last 3 Encounters:   05/09/24 (!) 126/59   03/04/24 128/76   12/01/23 112/80     Pulse Readings from Last 3 Encounters:   05/09/24 84   03/04/24 78   12/01/23 69       Social Determinants of Health     Tobacco Use: Low Risk  (5/9/2024)    Patient History     Smoking Tobacco Use: Never     Smokeless Tobacco Use: Never     Passive Exposure: Not on file   Alcohol Use: Not on file   Financial Resource Strain: Low Risk  (6/28/2023)    Overall Financial Resource Strain (CARDIA)     Difficulty of Paying Living Expenses: Not hard at all   Food Insecurity: No Food Insecurity (11/30/2023)    Hunger Vital Sign     Worried About Running Out of Food in the Last Year: Never true     Ran Out of Food in the Last Year: Never true   Transportation Needs: No Transportation Needs (11/30/2023)    PRAPARE - Transportation     Lack of Transportation (Medical): No     Lack of Transportation (Non-Medical): No   Physical Activity: Unknown (11/3/2023)    Exercise Vital Sign     Days of Exercise per Week: 0 days     Minutes of Exercise per Session: Not on file   Stress: Not on file   Social Connections: Not on file   Intimate Partner Violence: At Risk (11/3/2023)    Humiliation, Afraid, Rape, and Kick

## 2024-05-10 LAB
ALBUMIN SERPL-MCNC: 4.2 G/DL (ref 3.5–5)
ALBUMIN/GLOB SERPL: 1.3 (ref 1.1–2.2)
ALP SERPL-CCNC: 66 U/L (ref 45–117)
ALT SERPL-CCNC: 29 U/L (ref 12–78)
ANION GAP SERPL CALC-SCNC: 8 MMOL/L (ref 5–15)
AST SERPL-CCNC: 18 U/L (ref 15–37)
BILIRUB SERPL-MCNC: 0.3 MG/DL (ref 0.2–1)
BUN SERPL-MCNC: 27 MG/DL (ref 6–20)
BUN/CREAT SERPL: 25 (ref 12–20)
CALCIUM SERPL-MCNC: 9.8 MG/DL (ref 8.5–10.1)
CHLORIDE SERPL-SCNC: 100 MMOL/L (ref 97–108)
CHOLEST SERPL-MCNC: 285 MG/DL
CO2 SERPL-SCNC: 30 MMOL/L (ref 21–32)
CREAT SERPL-MCNC: 1.08 MG/DL (ref 0.55–1.02)
CREAT UR-MCNC: 82.6 MG/DL
ERYTHROCYTE [DISTWIDTH] IN BLOOD BY AUTOMATED COUNT: 14.7 % (ref 11.5–14.5)
EST. AVERAGE GLUCOSE BLD GHB EST-MCNC: 126 MG/DL
FOLATE SERPL-MCNC: 44.6 NG/ML (ref 5–21)
GLOBULIN SER CALC-MCNC: 3.2 G/DL (ref 2–4)
GLUCOSE SERPL-MCNC: 111 MG/DL (ref 65–100)
HBA1C MFR BLD: 6 % (ref 4–5.6)
HCT VFR BLD AUTO: 43.8 % (ref 35–47)
HDLC SERPL-MCNC: 50 MG/DL
HDLC SERPL: 5.7 (ref 0–5)
HGB BLD-MCNC: 14.7 G/DL (ref 11.5–16)
LDLC SERPL CALC-MCNC: 158.6 MG/DL (ref 0–100)
MCH RBC QN AUTO: 32.1 PG (ref 26–34)
MCHC RBC AUTO-ENTMCNC: 33.6 G/DL (ref 30–36.5)
MCV RBC AUTO: 95.6 FL (ref 80–99)
MICROALBUMIN UR-MCNC: <0.5 MG/DL
MICROALBUMIN/CREAT UR-RTO: <6 MG/G (ref 0–30)
NRBC # BLD: 0 K/UL (ref 0–0.01)
NRBC BLD-RTO: 0 PER 100 WBC
PLATELET # BLD AUTO: 323 K/UL (ref 150–400)
PMV BLD AUTO: 10.4 FL (ref 8.9–12.9)
POTASSIUM SERPL-SCNC: 3.7 MMOL/L (ref 3.5–5.1)
PROT SERPL-MCNC: 7.4 G/DL (ref 6.4–8.2)
RBC # BLD AUTO: 4.58 M/UL (ref 3.8–5.2)
SODIUM SERPL-SCNC: 138 MMOL/L (ref 136–145)
SPECIMEN HOLD: NORMAL
T4 FREE SERPL-MCNC: 0.8 NG/DL (ref 0.8–1.5)
TRIGL SERPL-MCNC: 382 MG/DL
TSH SERPL DL<=0.05 MIU/L-ACNC: 2.1 UIU/ML (ref 0.36–3.74)
VIT B12 SERPL-MCNC: 518 PG/ML (ref 193–986)
VLDLC SERPL CALC-MCNC: 76.4 MG/DL
WBC # BLD AUTO: 4.9 K/UL (ref 3.6–11)

## 2024-05-10 NOTE — ASSESSMENT & PLAN NOTE
S/p surgical intervention with Dr Molina. Doing well in this regard, has been walking and exercising more. Now able to walk without assistive device.

## 2024-05-14 LAB
THYROGLOB AB SERPL-ACNC: <1 IU/ML (ref 0–0.9)
THYROPEROXIDASE AB SERPL-ACNC: <9 IU/ML (ref 0–34)

## 2024-05-28 ENCOUNTER — PATIENT MESSAGE (OUTPATIENT)
Age: 69
End: 2024-05-28

## 2024-05-29 RX ORDER — FOLIC ACID 1 MG/1
1 TABLET ORAL DAILY
Qty: 30 TABLET | Refills: 3 | Status: SHIPPED | OUTPATIENT
Start: 2024-05-29

## 2024-05-29 NOTE — TELEPHONE ENCOUNTER
From: Claudine Barnard  To: Dr. Ana Davis  Sent: 5/28/2024 10:40 PM EDT  Subject: Refill folic acid    Hi   Can you please send a refill into [facility] for folic acid    Thank you  Claudine Barnard

## 2024-05-29 NOTE — TELEPHONE ENCOUNTER
Last office visit 03/04/2024  Next office visit 5/5/2024  Lab Results   Component Value Date    WBC 4.9 05/09/2024    HGB 14.7 05/09/2024    HCT 43.8 05/09/2024    MCV 95.6 05/09/2024     05/09/2024     Lab Results   Component Value Date     05/09/2024    K 3.7 05/09/2024     05/09/2024    CO2 30 05/09/2024    BUN 27 (H) 05/09/2024    CREATININE 1.08 (H) 05/09/2024    GLUCOSE 111 (H) 05/09/2024    CALCIUM 9.8 05/09/2024    BILITOT 0.3 05/09/2024    ALKPHOS 66 05/09/2024    AST 18 05/09/2024    ALT 29 05/09/2024    LABGLOM 55 (L) 05/09/2024    GFRAA >60 09/30/2022    AGRATIO 1.5 05/05/2023    GLOB 3.2 05/09/2024

## 2024-06-12 ENCOUNTER — OFFICE VISIT (OUTPATIENT)
Age: 69
End: 2024-06-12
Payer: MEDICARE

## 2024-06-12 VITALS
HEIGHT: 67 IN | WEIGHT: 186.6 LBS | SYSTOLIC BLOOD PRESSURE: 130 MMHG | BODY MASS INDEX: 29.29 KG/M2 | OXYGEN SATURATION: 98 % | DIASTOLIC BLOOD PRESSURE: 78 MMHG | RESPIRATION RATE: 16 BRPM | TEMPERATURE: 97.3 F | HEART RATE: 77 BPM

## 2024-06-12 DIAGNOSIS — J06.9 VIRAL URI WITH COUGH: Primary | ICD-10-CM

## 2024-06-12 DIAGNOSIS — R06.2 WHEEZING: ICD-10-CM

## 2024-06-12 PROCEDURE — 1090F PRES/ABSN URINE INCON ASSESS: CPT | Performed by: NURSE PRACTITIONER

## 2024-06-12 PROCEDURE — 3017F COLORECTAL CA SCREEN DOC REV: CPT | Performed by: NURSE PRACTITIONER

## 2024-06-12 PROCEDURE — G8427 DOCREV CUR MEDS BY ELIG CLIN: HCPCS | Performed by: NURSE PRACTITIONER

## 2024-06-12 PROCEDURE — G8417 CALC BMI ABV UP PARAM F/U: HCPCS | Performed by: NURSE PRACTITIONER

## 2024-06-12 PROCEDURE — 1123F ACP DISCUSS/DSCN MKR DOCD: CPT | Performed by: NURSE PRACTITIONER

## 2024-06-12 PROCEDURE — 99213 OFFICE O/P EST LOW 20 MIN: CPT | Performed by: NURSE PRACTITIONER

## 2024-06-12 PROCEDURE — 3075F SYST BP GE 130 - 139MM HG: CPT | Performed by: NURSE PRACTITIONER

## 2024-06-12 PROCEDURE — 1036F TOBACCO NON-USER: CPT | Performed by: NURSE PRACTITIONER

## 2024-06-12 PROCEDURE — G8399 PT W/DXA RESULTS DOCUMENT: HCPCS | Performed by: NURSE PRACTITIONER

## 2024-06-12 PROCEDURE — 3078F DIAST BP <80 MM HG: CPT | Performed by: NURSE PRACTITIONER

## 2024-06-12 RX ORDER — ALBUTEROL SULFATE 90 UG/1
2 AEROSOL, METERED RESPIRATORY (INHALATION) 4 TIMES DAILY PRN
Qty: 18 G | Refills: 0 | Status: SHIPPED | OUTPATIENT
Start: 2024-06-12

## 2024-06-12 RX ORDER — METHYLPREDNISOLONE 4 MG/1
TABLET ORAL
Qty: 21 TABLET | Refills: 0 | Status: SHIPPED | OUTPATIENT
Start: 2024-06-12 | End: 2024-06-17

## 2024-06-12 ASSESSMENT — PATIENT HEALTH QUESTIONNAIRE - PHQ9
9. THOUGHTS THAT YOU WOULD BE BETTER OFF DEAD, OR OF HURTING YOURSELF: NOT AT ALL
10. IF YOU CHECKED OFF ANY PROBLEMS, HOW DIFFICULT HAVE THESE PROBLEMS MADE IT FOR YOU TO DO YOUR WORK, TAKE CARE OF THINGS AT HOME, OR GET ALONG WITH OTHER PEOPLE: NOT DIFFICULT AT ALL
2. FEELING DOWN, DEPRESSED OR HOPELESS: NOT AT ALL
5. POOR APPETITE OR OVEREATING: NOT AT ALL
6. FEELING BAD ABOUT YOURSELF - OR THAT YOU ARE A FAILURE OR HAVE LET YOURSELF OR YOUR FAMILY DOWN: NOT AT ALL
8. MOVING OR SPEAKING SO SLOWLY THAT OTHER PEOPLE COULD HAVE NOTICED. OR THE OPPOSITE, BEING SO FIGETY OR RESTLESS THAT YOU HAVE BEEN MOVING AROUND A LOT MORE THAN USUAL: NOT AT ALL
3. TROUBLE FALLING OR STAYING ASLEEP: NOT AT ALL
4. FEELING TIRED OR HAVING LITTLE ENERGY: NOT AT ALL
1. LITTLE INTEREST OR PLEASURE IN DOING THINGS: NOT AT ALL
SUM OF ALL RESPONSES TO PHQ QUESTIONS 1-9: 0
SUM OF ALL RESPONSES TO PHQ9 QUESTIONS 1 & 2: 0
SUM OF ALL RESPONSES TO PHQ QUESTIONS 1-9: 0

## 2024-06-12 ASSESSMENT — ENCOUNTER SYMPTOMS: COUGH: 1

## 2024-06-12 NOTE — PROGRESS NOTES
mychart message sent. a1c slightly higher than last time. Consider adding SGLT2 vs inc metformin.   Iron low - consider supplementation  appt requested to discuss Routine guidance.     Of note, Rui is going to be a big brother in October! Mom due Oct 24, and family is waiting to find out if boy or girl until delivery.     ED Diagnosis   1. Encounter for routine child health examination without abnormal findings        2. Need for vaccination  DTAP (INFANRIX)    PNEUMOCOCCAL 20 ([id number])    HIB (PEDVAXHIB)    CANCELED: HIB VACC, PRP-T, IM      3. Gross motor delay        4. Pectus excavatum        5. Intrinsic eczema             Well child check:   - Growth:  Appropriate    - Development:  Delayed gross motor, not yet walking  - Immunizations:  Consulted and given per orders  - Guidance: routine anticipatory guidance discussed   - Follow-up:  18 months of age for next well child check     Eczema, mild   - continue triamcinolone 0.1% ointment to affected areas twice daily while rough; prescription not sent today, but okay to send prescription if family requests  - Emollient (Aquaphor, Vaseline) encouraged 2-3x daily  - Try to avoid scented soaps, detergents, bath bombs, bubble baths, etc.   - There is lack of evidence to suggest dietary changes as a method of treatment for eczema   - Follow up if no improvement after 2 weeks of therapy, sooner if worsening   - Can consider dermatology referral in future if continued concerns    Delayed gross motor  - Continue working with PT       Orders Placed This Encounter    DTAP (INFANRIX)    PNEUMOCOCCAL 20 ([id number])    HIB (PEDVAXHIB)        Ron York MD

## 2024-06-12 NOTE — PROGRESS NOTES
Claudine Barnard (:  1955) is a 68 y.o. female,here for evaluation of the following chief complaint(s):  Blood Pressure Check, Congestion, Cough, Headache (For the pass 2 weeks ), Nasal Congestion, and Other (Right side on her face is numb and when turn her head makes her head and neck pain worse )    /78   Pulse 77   Temp 97.3 °F (36.3 °C) (Temporal)   Resp 16   Ht 1.702 m (5' 7\")   Wt 84.6 kg (186 lb 9.6 oz)   LMP 2006   SpO2 98%   BMI 29.23 kg/m²       SUBJECTIVE/OBJECTIVE:    HPI:Patient reports cough, congestion, headache, fatigue starting 2 weeks ago. She tested negative for Covid. Bp was running 160s/70-80. Cough has improved and now dry. No SOB or wheezing. Congestion is clear. She notes right frontal and maxillary sinus pain.    Review of Systems   HENT:  Positive for congestion.    Respiratory:  Positive for cough.    Neurological:  Positive for headaches.       Physical Exam  Constitutional:       Appearance: Normal appearance.   HENT:      Head: Normocephalic.      Right Ear: Ear canal and external ear normal.      Left Ear: Ear canal and external ear normal.      Ears:      Comments: Clear fluid behind TMs     Nose: Congestion present.      Mouth/Throat:      Mouth: Mucous membranes are moist.      Pharynx: Oropharynx is clear.   Cardiovascular:      Rate and Rhythm: Normal rate and regular rhythm.   Pulmonary:      Effort: Pulmonary effort is normal.      Comments: Very mild expiratory wheeze on right  Musculoskeletal:      Cervical back: Normal range of motion.   Skin:     General: Skin is warm and dry.   Neurological:      General: No focal deficit present.      Mental Status: She is alert and oriented to person, place, and time.   Psychiatric:         Mood and Affect: Mood normal.         Behavior: Behavior normal.          ASSESSMENT/PLAN:  1. Viral URI with cough  2. Wheezing    Medrol  Albuterol PRN  Follow-up if no improvement  --MARY JANE Rico - NP

## 2024-07-01 ENCOUNTER — PATIENT MESSAGE (OUTPATIENT)
Age: 69
End: 2024-07-01

## 2024-07-01 DIAGNOSIS — E11.51 TYPE 2 DIABETES MELLITUS WITH PERIPHERAL VASCULAR DISEASE (HCC): ICD-10-CM

## 2024-07-01 NOTE — TELEPHONE ENCOUNTER
From: Claudine Barnard  To: Dr. Castro Felix  Sent: 7/1/2024 11:56 AM EDT  Subject: Refills    Hello  Can you please send in refills for my metformin and jardiance? My blood sugars are doing well  Have a good summer  Claudine Barnard

## 2024-07-01 NOTE — TELEPHONE ENCOUNTER
Last Appointment with Dr. Felix:  5/9/2024   Future Appointments   Date Time Provider Department Center   7/31/2024  1:30 PM Xi Burks PA [facility] AMB   8/5/2024  2:00 PM Ana Davis MD [facility] AMB   11/11/2024  2:00 PM Castro Felix DO [facility] AMB

## 2024-07-08 DIAGNOSIS — M31.6 OTHER GIANT CELL ARTERITIS (HCC): ICD-10-CM

## 2024-07-08 DIAGNOSIS — M35.3 POLYMYALGIA RHEUMATICA (HCC): Primary | ICD-10-CM

## 2024-07-08 RX ORDER — SARILUMAB 200 MG/1.14ML
200 INJECTION, SOLUTION SUBCUTANEOUS
Qty: 2 EACH | Refills: 3 | Status: ACTIVE | OUTPATIENT
Start: 2024-07-08 | End: 2024-07-11 | Stop reason: SDUPTHER

## 2024-07-09 ENCOUNTER — TELEPHONE (OUTPATIENT)
Age: 69
End: 2024-07-09

## 2024-07-09 NOTE — TELEPHONE ENCOUNTER
Specialty by [facility] sent a refill for kevzara 200mg PFS  LAST VISIT:3/4/2024  NEXT VISIT:8/5/2024  LABS:5/9/2024

## 2024-07-11 RX ORDER — SARILUMAB 200 MG/1.14ML
200 INJECTION, SOLUTION SUBCUTANEOUS
Qty: 2 EACH | Refills: 3 | Status: ACTIVE | OUTPATIENT
Start: 2024-07-11

## 2024-07-11 NOTE — TELEPHONE ENCOUNTER
Last visit 03/04/24  Lab Results   Component Value Date     05/09/2024    K 3.7 05/09/2024     05/09/2024    CO2 30 05/09/2024    BUN 27 (H) 05/09/2024    CREATININE 1.08 (H) 05/09/2024    GLUCOSE 111 (H) 05/09/2024    CALCIUM 9.8 05/09/2024    BILITOT 0.3 05/09/2024    ALKPHOS 66 05/09/2024    AST 18 05/09/2024    ALT 29 05/09/2024    LABGLOM 55 (L) 05/09/2024    GFRAA >60 09/30/2022    AGRATIO 1.5 05/05/2023    GLOB 3.2 05/09/2024     Lab Results   Component Value Date    WBC 4.9 05/09/2024    HGB 14.7 05/09/2024    HCT 43.8 05/09/2024    MCV 95.6 05/09/2024     05/09/2024

## 2024-07-19 ENCOUNTER — TELEPHONE (OUTPATIENT)
Age: 69
End: 2024-07-19

## 2024-07-19 ENCOUNTER — E-VISIT (OUTPATIENT)
Age: 69
End: 2024-07-19
Payer: MEDICARE

## 2024-07-19 DIAGNOSIS — M50.30 DDD (DEGENERATIVE DISC DISEASE), CERVICAL: ICD-10-CM

## 2024-07-19 DIAGNOSIS — U07.1 COVID-19: Primary | ICD-10-CM

## 2024-07-19 PROCEDURE — 99422 OL DIG E/M SVC 11-20 MIN: CPT | Performed by: STUDENT IN AN ORGANIZED HEALTH CARE EDUCATION/TRAINING PROGRAM

## 2024-07-19 ASSESSMENT — LIFESTYLE VARIABLES: SMOKING_STATUS: NO, I HAVE NEVER SMOKED

## 2024-07-19 NOTE — TELEPHONE ENCOUNTER
Spoke with pt - advised her since she is positive for covid Dr Felix wants her to go to My Chart and send her an E-Visit. Pt verbalizes understanding.    Pt states symptoms - severe headache, cough, GI issues. No fever but she takes Tylenol and Ibuprofen daily for her arthritis. Will forward to MD.

## 2024-07-19 NOTE — PROGRESS NOTES
Claudinemirna Barnard (1955) initiated an asynchronous digital communication through [employer].    HPI: per patient questionnaire     Exam: not applicable    Diagnoses and all orders for this visit:    COVID-19  -     nirmatrelvir/ritonavir 300/100 (PAXLOVID) 20 x 150 MG & 10 x 100MG TBPK; Take 3 tablets (two 150 mg nirmatrelvir and one 100 mg ritonavir tablets) by mouth every 12 hours for 5 days.    DDD (degenerative disc disease), cervical          Time: EV2 - 11-20 minutes were spent on the digital evaluation and management of this patient.     Castro Felix DO

## 2024-07-19 NOTE — TELEPHONE ENCOUNTER
----- Message from Claudine Barnard sent at 7/19/2024  2:15 PM EDT -----  Regarding: Covid positive   Contact: [phone]  Covid positive    Hello  I have been coughing and had rhinorrhea and GI upset for three days  I tested positive for Covid today     Should I take paxlovid and if so can you send a prescription to the [facility] on [address]?      Thank you  Claudine Barnard

## 2024-07-24 NOTE — PATIENT INSTRUCTIONS
Thank you for visiting [facility]!      For future medication refills, we require updated lab results and an upcoming appointment to be in our system prior to refilling prescriptions.  Without these two things, your refill will be DENIED.  If you miss your upcoming appointment, your refill will also be DENIED.      We appreciate your understanding of this critical clinical aspect of our practice. -Dr. Davis & Cammy, NP

## 2024-08-05 ENCOUNTER — OFFICE VISIT (OUTPATIENT)
Age: 69
End: 2024-08-05
Payer: MEDICARE

## 2024-08-05 VITALS
OXYGEN SATURATION: 97 % | BODY MASS INDEX: 29.44 KG/M2 | WEIGHT: 188 LBS | HEART RATE: 82 BPM | RESPIRATION RATE: 16 BRPM | DIASTOLIC BLOOD PRESSURE: 78 MMHG | SYSTOLIC BLOOD PRESSURE: 145 MMHG | TEMPERATURE: 98.2 F

## 2024-08-05 DIAGNOSIS — M31.6 OTHER GIANT CELL ARTERITIS (HCC): ICD-10-CM

## 2024-08-05 DIAGNOSIS — M35.3 POLYMYALGIA RHEUMATICA (HCC): Primary | ICD-10-CM

## 2024-08-05 PROCEDURE — 99214 OFFICE O/P EST MOD 30 MIN: CPT | Performed by: PEDIATRICS

## 2024-08-05 RX ORDER — FOLIC ACID 1 MG/1
1 TABLET ORAL DAILY
Qty: 90 TABLET | Refills: 1 | Status: SHIPPED | OUTPATIENT
Start: 2024-08-05

## 2024-08-05 RX ORDER — SARILUMAB 200 MG/1.14ML
200 INJECTION, SOLUTION SUBCUTANEOUS
Qty: 2 EACH | Refills: 3 | Status: ACTIVE | OUTPATIENT
Start: 2024-08-05

## 2024-08-05 RX ORDER — PREDNISONE 5 MG/1
5 TABLET ORAL DAILY
Qty: 90 TABLET | Refills: 1 | Status: SHIPPED | OUTPATIENT
Start: 2024-08-05

## 2024-08-05 RX ORDER — METHOTREXATE 2.5 MG/1
15 TABLET ORAL WEEKLY
Qty: 72 TABLET | Refills: 1 | Status: SHIPPED | OUTPATIENT
Start: 2024-08-05 | End: 2024-11-03

## 2024-08-05 ASSESSMENT — PATIENT HEALTH QUESTIONNAIRE - PHQ9
7. TROUBLE CONCENTRATING ON THINGS, SUCH AS READING THE NEWSPAPER OR WATCHING TELEVISION: NOT AT ALL
SUM OF ALL RESPONSES TO PHQ QUESTIONS 1-9: 0
SUM OF ALL RESPONSES TO PHQ QUESTIONS 1-9: 0
9. THOUGHTS THAT YOU WOULD BE BETTER OFF DEAD, OR OF HURTING YOURSELF: NOT AT ALL
SUM OF ALL RESPONSES TO PHQ QUESTIONS 1-9: 0
3. TROUBLE FALLING OR STAYING ASLEEP: NOT AT ALL
SUM OF ALL RESPONSES TO PHQ9 QUESTIONS 1 & 2: 0
1. LITTLE INTEREST OR PLEASURE IN DOING THINGS: NOT AT ALL
4. FEELING TIRED OR HAVING LITTLE ENERGY: NOT AT ALL
2. FEELING DOWN, DEPRESSED OR HOPELESS: NOT AT ALL
8. MOVING OR SPEAKING SO SLOWLY THAT OTHER PEOPLE COULD HAVE NOTICED. OR THE OPPOSITE, BEING SO FIGETY OR RESTLESS THAT YOU HAVE BEEN MOVING AROUND A LOT MORE THAN USUAL: NOT AT ALL
SUM OF ALL RESPONSES TO PHQ QUESTIONS 1-9: 0
5. POOR APPETITE OR OVEREATING: NOT AT ALL
6. FEELING BAD ABOUT YOURSELF - OR THAT YOU ARE A FAILURE OR HAVE LET YOURSELF OR YOUR FAMILY DOWN: NOT AT ALL
10. IF YOU CHECKED OFF ANY PROBLEMS, HOW DIFFICULT HAVE THESE PROBLEMS MADE IT FOR YOU TO DO YOUR WORK, TAKE CARE OF THINGS AT HOME, OR GET ALONG WITH OTHER PEOPLE: NOT DIFFICULT AT ALL

## 2024-08-05 NOTE — PROGRESS NOTES
Chief Complaint   Patient presents with    Joint Pain     1. Have you been to the ER, urgent care clinic since your last visit?  Hospitalized since your last visit?No    2. Have you seen or consulted any other health care providers outside of the [facility] since your last visit?  Include any pap smears or colon screening. No    
by primary care provider.  - unchanged  3. Drug therapy monitoring for toxicity (Kevzara, Methotrexate) - CBC, BUN, Cr, AST, ALT and albumin every 4 months  4. Knee Osteoarthritis - Non pharmacologic treatment recommendations include enrollment in an exercise program; land-based or aqua-therapy.  This is more successful if the program is individualized to the patient's ability; it can be aerobic conditioning, strengthening or both. An evaluation by physical therapy to access the ability to perform the above is recommended.  Weight loss counseling was given; a nutrition evaluation is usefull for some patients  The use of thermal agents should also be reviewed by PT.  Patellar taping, rosy chi programs, psychosocial interventions and walking aids are other management options. Pharmacological treatment with tylenol, topical or oral NSAID's, cymbalta and tramadol has been shown to be beneficial.  We recommend a PPI with the oral NSAID.  Supplements such as glucosamine, chondroitin sulfate and topical capsaicin are not recommended for knee OA.  Intraarticular hyaluronate injections are beneficial in some patients.  Intraarticular steroid therapies are an option as long as no more then 3 per year are performed.  Opioids are reserved for severe knee OA.  Orthopedic consultation for knee arthroplasty is recommended for severe OA.  For those who are not candidates for arthroplasty due to comorbidity Chinese acupuncture and use of transcutaneous electrical stimulation may be an option.          PLAN  1. Kevzara subQ 200 mg q2 weeks  2. Methotrexate 15 mg PO weekly  3. Complete steroid taper  4. Topical diclofenac PRN for OA  5. Return in 4-5 months     Ana Davis MD  Adult and Pediatric Rheumatology   [facility]   [address] [place] [id number], Phone [phone], Fax [phone]      Visiting  of Pediatrics    Department of Pediatrics, [facility]

## 2024-09-23 DIAGNOSIS — I10 ESSENTIAL (PRIMARY) HYPERTENSION: ICD-10-CM

## 2024-09-23 DIAGNOSIS — E11.51 TYPE 2 DIABETES MELLITUS WITH PERIPHERAL VASCULAR DISEASE (HCC): ICD-10-CM

## 2024-09-23 RX ORDER — HYDROCHLOROTHIAZIDE 25 MG/1
25 TABLET ORAL DAILY
Qty: 90 TABLET | Refills: 0 | Status: SHIPPED | OUTPATIENT
Start: 2024-09-23 | End: 2024-09-25 | Stop reason: SDUPTHER

## 2024-09-25 DIAGNOSIS — I10 ESSENTIAL (PRIMARY) HYPERTENSION: ICD-10-CM

## 2024-09-25 RX ORDER — HYDROCHLOROTHIAZIDE 25 MG/1
25 TABLET ORAL DAILY
Qty: 90 TABLET | Refills: 0 | Status: SHIPPED | OUTPATIENT
Start: 2024-09-25 | End: 2024-12-24

## 2024-09-25 RX ORDER — HYDROCHLOROTHIAZIDE 25 MG/1
25 TABLET ORAL DAILY
Qty: 90 TABLET | Refills: 0 | OUTPATIENT
Start: 2024-09-25 | End: 2024-12-24

## 2024-10-24 ENCOUNTER — HOSPITAL ENCOUNTER (OUTPATIENT)
Facility: HOSPITAL | Age: 69
Discharge: HOME | End: 2024-10-27
Payer: MEDICARE

## 2024-10-24 VITALS — BODY MASS INDEX: 29.51 KG/M2 | WEIGHT: 188 LBS | HEIGHT: 67 IN

## 2024-10-24 DIAGNOSIS — Z12.31 VISIT FOR SCREENING MAMMOGRAM: ICD-10-CM

## 2024-10-24 PROCEDURE — 77063 BREAST TOMOSYNTHESIS BI: CPT

## 2024-11-10 SDOH — ECONOMIC STABILITY: INCOME INSECURITY: HOW HARD IS IT FOR YOU TO PAY FOR THE VERY BASICS LIKE FOOD, HOUSING, MEDICAL CARE, AND HEATING?: NOT HARD AT ALL

## 2024-11-10 SDOH — HEALTH STABILITY: PHYSICAL HEALTH: ON AVERAGE, HOW MANY DAYS PER WEEK DO YOU ENGAGE IN MODERATE TO STRENUOUS EXERCISE (LIKE A BRISK WALK)?: 3 DAYS

## 2024-11-10 SDOH — ECONOMIC STABILITY: FOOD INSECURITY: WITHIN THE PAST 12 MONTHS, YOU WORRIED THAT YOUR FOOD WOULD RUN OUT BEFORE YOU GOT MONEY TO BUY MORE.: NEVER TRUE

## 2024-11-10 SDOH — HEALTH STABILITY: PHYSICAL HEALTH: ON AVERAGE, HOW MANY MINUTES DO YOU ENGAGE IN EXERCISE AT THIS LEVEL?: 50 MIN

## 2024-11-10 SDOH — ECONOMIC STABILITY: TRANSPORTATION INSECURITY
IN THE PAST 12 MONTHS, HAS LACK OF TRANSPORTATION KEPT YOU FROM MEETINGS, WORK, OR FROM GETTING THINGS NEEDED FOR DAILY LIVING?: NO

## 2024-11-10 SDOH — ECONOMIC STABILITY: FOOD INSECURITY: WITHIN THE PAST 12 MONTHS, THE FOOD YOU BOUGHT JUST DIDN'T LAST AND YOU DIDN'T HAVE MONEY TO GET MORE.: NEVER TRUE

## 2024-11-10 ASSESSMENT — PATIENT HEALTH QUESTIONNAIRE - PHQ9
7. TROUBLE CONCENTRATING ON THINGS, SUCH AS READING THE NEWSPAPER OR WATCHING TELEVISION: NOT AT ALL
SUM OF ALL RESPONSES TO PHQ QUESTIONS 1-9: 11
6. FEELING BAD ABOUT YOURSELF - OR THAT YOU ARE A FAILURE OR HAVE LET YOURSELF OR YOUR FAMILY DOWN: MORE THAN HALF THE DAYS
1. LITTLE INTEREST OR PLEASURE IN DOING THINGS: SEVERAL DAYS
4. FEELING TIRED OR HAVING LITTLE ENERGY: NEARLY EVERY DAY
2. FEELING DOWN, DEPRESSED OR HOPELESS: SEVERAL DAYS
SUM OF ALL RESPONSES TO PHQ9 QUESTIONS 1 & 2: 2
8. MOVING OR SPEAKING SO SLOWLY THAT OTHER PEOPLE COULD HAVE NOTICED. OR THE OPPOSITE, BEING SO FIGETY OR RESTLESS THAT YOU HAVE BEEN MOVING AROUND A LOT MORE THAN USUAL: SEVERAL DAYS
10. IF YOU CHECKED OFF ANY PROBLEMS, HOW DIFFICULT HAVE THESE PROBLEMS MADE IT FOR YOU TO DO YOUR WORK, TAKE CARE OF THINGS AT HOME, OR GET ALONG WITH OTHER PEOPLE: SOMEWHAT DIFFICULT
3. TROUBLE FALLING OR STAYING ASLEEP: NEARLY EVERY DAY
5. POOR APPETITE OR OVEREATING: NOT AT ALL
9. THOUGHTS THAT YOU WOULD BE BETTER OFF DEAD, OR OF HURTING YOURSELF: NOT AT ALL
SUM OF ALL RESPONSES TO PHQ QUESTIONS 1-9: 11

## 2024-11-10 ASSESSMENT — LIFESTYLE VARIABLES
HOW MANY STANDARD DRINKS CONTAINING ALCOHOL DO YOU HAVE ON A TYPICAL DAY: PATIENT DOES NOT DRINK
HOW OFTEN DO YOU HAVE A DRINK CONTAINING ALCOHOL: NEVER
HOW MANY STANDARD DRINKS CONTAINING ALCOHOL DO YOU HAVE ON A TYPICAL DAY: 0
HOW OFTEN DO YOU HAVE A DRINK CONTAINING ALCOHOL: 1
HOW OFTEN DO YOU HAVE SIX OR MORE DRINKS ON ONE OCCASION: 1

## 2024-11-10 ASSESSMENT — COLUMBIA-SUICIDE SEVERITY RATING SCALE - C-SSRS
2. HAVE YOU ACTUALLY HAD ANY THOUGHTS OF KILLING YOURSELF?: NO
6. HAVE YOU EVER DONE ANYTHING, STARTED TO DO ANYTHING, OR PREPARED TO DO ANYTHING TO END YOUR LIFE?: NO
1. WITHIN THE PAST MONTH, HAVE YOU WISHED YOU WERE DEAD OR WISHED YOU COULD GO TO SLEEP AND NOT WAKE UP?: NO

## 2024-11-11 ENCOUNTER — OFFICE VISIT (OUTPATIENT)
Age: 69
End: 2024-11-11

## 2024-11-11 VITALS
WEIGHT: 189 LBS | OXYGEN SATURATION: 95 % | RESPIRATION RATE: 16 BRPM | BODY MASS INDEX: 30.37 KG/M2 | HEART RATE: 75 BPM | TEMPERATURE: 98.3 F | DIASTOLIC BLOOD PRESSURE: 81 MMHG | SYSTOLIC BLOOD PRESSURE: 126 MMHG | HEIGHT: 66 IN

## 2024-11-11 DIAGNOSIS — M35.3 POLYMYALGIA RHEUMATICA (HCC): ICD-10-CM

## 2024-11-11 DIAGNOSIS — M54.16 LUMBAR RADICULOPATHY: ICD-10-CM

## 2024-11-11 DIAGNOSIS — I10 ESSENTIAL (PRIMARY) HYPERTENSION: ICD-10-CM

## 2024-11-11 DIAGNOSIS — E11.51 TYPE 2 DIABETES MELLITUS WITH PERIPHERAL VASCULAR DISEASE (HCC): Primary | ICD-10-CM

## 2024-11-11 RX ORDER — TERBUTALINE SULFATE 2.5 MG/1
2.5 TABLET ORAL DAILY
COMMUNITY

## 2024-11-11 NOTE — PATIENT INSTRUCTIONS
device in the bathroom with you.   Where can you learn more?  Go to https://[email or web address].net/patientEd and enter G117 to learn more about \"Preventing Falls: Care Instructions.\"  Current as of: July 17, 2023  Content Version: 14.2  © 2024 Rev.   Care instructions adapted under license by [employer]. If you have questions about a medical condition or this instruction, always ask your healthcare professional. [employer] disclaims any warranty or liability for your use of this information.           Learning About Mindfulness for Stress  What are mindfulness and stress?     Stress is your body's response to a hard situation. Your body can have a physical, emotional, or mental response. A lot of things can cause stress. You may feel stress when you go on a job interview, take a test, or run a race. This kind of short-term stress is normal and even useful. It can help you if you need to work hard or react quickly.  Stress also can last a long time. Long-term stress is caused by stressful situations or events. Examples of long-term stress include long-term health problems, ongoing problems at work, and conflicts in your family. Long-term stress can harm your health.  Mindfulness is a focus only on things happening in the present moment. It's a process of purposefully paying attention to and being aware of your surroundings, your emotions, your thoughts, and how your body feels. You are aware of these things, but you aren't judging these experiences as \"good\" or \"bad.\" Mindfulness can help you learn to calm your mind and body to help you cope with illness, pain, and stress.  How does mindfulness help to relieve stress?  Mindfulness can help quiet your mind and relax your body. Studies show that it can help some people sleep better, feel less anxious, and bring their blood pressure down. And it's been shown to help some people live and cope better with certain health problems like heart

## 2024-11-11 NOTE — ASSESSMENT & PLAN NOTE
Chronic, at goal (stable), continue current treatment plan    Orders:    Comprehensive Metabolic Panel; Future    CBC; Future    Hemoglobin A1C; Future    Lipid Panel; Future    Microalbumin / Creatinine Urine Ratio; Future

## 2024-11-11 NOTE — ASSESSMENT & PLAN NOTE
Monitored by specialist- no acute findings meriting change in the plan    Orders:    CBC; Future    QuantiFERON In Tube ([facility] Default); Future
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
## 2024-11-21 NOTE — ASSESSMENT & PLAN NOTE
Monitored by specialist- no acute findings meriting change in the plan. S/p surgical intervention and symptoms have improved.

## 2024-11-22 PROBLEM — M17.11 OSTEOARTHRITIS OF RIGHT KNEE: Status: ACTIVE | Noted: 2024-11-22

## 2024-11-25 NOTE — PROGRESS NOTES
Chief Complaint   Patient presents with    Other     GCA    Joint Pain     knees, shoulders     1. Have you been to the ER, urgent care clinic since your last visit? Hospitalized since your last visit? No    2. Have you seen or consulted any other health care providers outside of the [address] [place] [address] since your last visit? Include any pap smears or colon screening.  Yes Where: Pulmonary 0 = swallows foods/liquids without difficulty

## 2024-12-01 DIAGNOSIS — I10 ESSENTIAL (PRIMARY) HYPERTENSION: ICD-10-CM

## 2024-12-02 RX ORDER — HYDROCHLOROTHIAZIDE 25 MG/1
25 TABLET ORAL DAILY
Qty: 90 TABLET | Refills: 3 | Status: SHIPPED | OUTPATIENT
Start: 2024-12-02

## 2024-12-02 RX ORDER — FOLIC ACID 1 MG/1
1000 TABLET ORAL DAILY
Qty: 90 TABLET | Refills: 1 | Status: SHIPPED | OUTPATIENT
Start: 2024-12-02

## 2024-12-02 NOTE — TELEPHONE ENCOUNTER
Last office visit 8/5/2024  Next office visit 12/9/2024  Lab Results   Component Value Date    WBC 4.9 05/09/2024    HGB 14.7 05/09/2024    HCT 43.8 05/09/2024    MCV 95.6 05/09/2024     05/09/2024     Lab Results   Component Value Date     05/09/2024    K 3.7 05/09/2024     05/09/2024    CO2 30 05/09/2024    BUN 27 (H) 05/09/2024    CREATININE 1.08 (H) 05/09/2024    GLUCOSE 111 (H) 05/09/2024    CALCIUM 9.8 05/09/2024    BILITOT 0.3 05/09/2024    ALKPHOS 66 05/09/2024    AST 18 05/09/2024    ALT 29 05/09/2024    LABGLOM 55 (L) 05/09/2024    GFRAA >60 09/30/2022    AGRATIO 1.5 05/05/2023    GLOB 3.2 05/09/2024

## 2024-12-02 NOTE — TELEPHONE ENCOUNTER
Pt last seen on 11/11/24. Due to return in 6 months.    Has appt on 2/5/25.    Rx last filled on 9/25/24 #90/0RF.    Will forward to MD for refill.

## 2024-12-05 DIAGNOSIS — E11.51 TYPE 2 DIABETES MELLITUS WITH PERIPHERAL VASCULAR DISEASE (HCC): ICD-10-CM

## 2024-12-05 DIAGNOSIS — M35.3 POLYMYALGIA RHEUMATICA (HCC): ICD-10-CM

## 2024-12-06 DIAGNOSIS — E11.51 TYPE 2 DIABETES MELLITUS WITH PERIPHERAL VASCULAR DISEASE (HCC): ICD-10-CM

## 2024-12-06 LAB
ALBUMIN SERPL-MCNC: 4.1 G/DL (ref 3.5–5)
ALBUMIN/GLOB SERPL: 1.3 (ref 1.1–2.2)
ALP SERPL-CCNC: 53 U/L (ref 45–117)
ALT SERPL-CCNC: 23 U/L (ref 12–78)
ANION GAP SERPL CALC-SCNC: 9 MMOL/L (ref 2–12)
AST SERPL-CCNC: 22 U/L (ref 15–37)
BILIRUB SERPL-MCNC: 0.3 MG/DL (ref 0.2–1)
BUN SERPL-MCNC: 26 MG/DL (ref 6–20)
BUN/CREAT SERPL: 22 (ref 12–20)
CALCIUM SERPL-MCNC: 9.7 MG/DL (ref 8.5–10.1)
CHLORIDE SERPL-SCNC: 99 MMOL/L (ref 97–108)
CHOLEST SERPL-MCNC: 335 MG/DL
CO2 SERPL-SCNC: 28 MMOL/L (ref 21–32)
CREAT SERPL-MCNC: 1.18 MG/DL (ref 0.55–1.02)
ERYTHROCYTE [DISTWIDTH] IN BLOOD BY AUTOMATED COUNT: 14.9 % (ref 11.5–14.5)
EST. AVERAGE GLUCOSE BLD GHB EST-MCNC: 134 MG/DL
GLOBULIN SER CALC-MCNC: 3.1 G/DL (ref 2–4)
GLUCOSE SERPL-MCNC: 122 MG/DL (ref 65–100)
HBA1C MFR BLD: 6.3 % (ref 4–5.6)
HCT VFR BLD AUTO: 41.4 % (ref 35–47)
HDLC SERPL-MCNC: 51 MG/DL
HDLC SERPL: 6.6 (ref 0–5)
HGB BLD-MCNC: 13.6 G/DL (ref 11.5–16)
LDLC SERPL CALC-MCNC: 210.8 MG/DL (ref 0–100)
MCH RBC QN AUTO: 31.8 PG (ref 26–34)
MCHC RBC AUTO-ENTMCNC: 32.9 G/DL (ref 30–36.5)
MCV RBC AUTO: 96.7 FL (ref 80–99)
NRBC # BLD: 0 K/UL (ref 0–0.01)
NRBC BLD-RTO: 0 PER 100 WBC
PLATELET # BLD AUTO: 327 K/UL (ref 150–400)
PMV BLD AUTO: 10 FL (ref 8.9–12.9)
POTASSIUM SERPL-SCNC: 4.2 MMOL/L (ref 3.5–5.1)
PROT SERPL-MCNC: 7.2 G/DL (ref 6.4–8.2)
RBC # BLD AUTO: 4.28 M/UL (ref 3.8–5.2)
SODIUM SERPL-SCNC: 136 MMOL/L (ref 136–145)
TRIGL SERPL-MCNC: 366 MG/DL
VLDLC SERPL CALC-MCNC: 73.2 MG/DL
WBC # BLD AUTO: 4.6 K/UL (ref 3.6–11)

## 2024-12-06 RX ORDER — VERAPAMIL HYDROCHLORIDE 180 MG/1
180 TABLET, EXTENDED RELEASE ORAL NIGHTLY
Qty: 90 TABLET | Refills: 1 | Status: SHIPPED | OUTPATIENT
Start: 2024-12-06

## 2024-12-06 NOTE — TELEPHONE ENCOUNTER
Rx sent to pharmacy as previously filled and verified by Verbal Order Read Back with provider.    NV 01/22/2025

## 2024-12-09 ENCOUNTER — OFFICE VISIT (OUTPATIENT)
Age: 69
End: 2024-12-09
Payer: COMMERCIAL

## 2024-12-09 VITALS
TEMPERATURE: 98.2 F | DIASTOLIC BLOOD PRESSURE: 88 MMHG | HEART RATE: 79 BPM | RESPIRATION RATE: 16 BRPM | OXYGEN SATURATION: 95 % | WEIGHT: 193 LBS | SYSTOLIC BLOOD PRESSURE: 153 MMHG | BODY MASS INDEX: 32.12 KG/M2

## 2024-12-09 DIAGNOSIS — M35.3 POLYMYALGIA RHEUMATICA (HCC): Primary | ICD-10-CM

## 2024-12-09 LAB
GAMMA INTERFERON BACKGROUND BLD IA-ACNC: 0.15 IU/ML
M TB IFN-G BLD-IMP: NEGATIVE
M TB IFN-G CD4+ BCKGRND COR BLD-ACNC: 0.05 IU/ML
M TB IFN-G CD4+CD8+ BCKGRND COR BLD-ACNC: 0.05 IU/ML
MITOGEN IGNF BCKGRD COR BLD-ACNC: >10 IU/ML
SERVICE CMNT-IMP: NORMAL

## 2024-12-09 PROCEDURE — 3017F COLORECTAL CA SCREEN DOC REV: CPT | Performed by: PEDIATRICS

## 2024-12-09 PROCEDURE — 3077F SYST BP >= 140 MM HG: CPT | Performed by: PEDIATRICS

## 2024-12-09 PROCEDURE — 3079F DIAST BP 80-89 MM HG: CPT | Performed by: PEDIATRICS

## 2024-12-09 PROCEDURE — G8427 DOCREV CUR MEDS BY ELIG CLIN: HCPCS | Performed by: PEDIATRICS

## 2024-12-09 PROCEDURE — G8482 FLU IMMUNIZE ORDER/ADMIN: HCPCS | Performed by: PEDIATRICS

## 2024-12-09 PROCEDURE — 1036F TOBACCO NON-USER: CPT | Performed by: PEDIATRICS

## 2024-12-09 PROCEDURE — G8417 CALC BMI ABV UP PARAM F/U: HCPCS | Performed by: PEDIATRICS

## 2024-12-09 PROCEDURE — 1090F PRES/ABSN URINE INCON ASSESS: CPT | Performed by: PEDIATRICS

## 2024-12-09 PROCEDURE — 1123F ACP DISCUSS/DSCN MKR DOCD: CPT | Performed by: PEDIATRICS

## 2024-12-09 PROCEDURE — 99215 OFFICE O/P EST HI 40 MIN: CPT | Performed by: PEDIATRICS

## 2024-12-09 PROCEDURE — G8399 PT W/DXA RESULTS DOCUMENT: HCPCS | Performed by: PEDIATRICS

## 2024-12-09 RX ORDER — FOLIC ACID 1 MG/1
1000 TABLET ORAL DAILY
Qty: 90 TABLET | Refills: 1 | Status: SHIPPED | OUTPATIENT
Start: 2024-12-09

## 2024-12-09 RX ORDER — METHOTREXATE 2.5 MG/1
15 TABLET ORAL WEEKLY
Qty: 24 TABLET | Refills: 3 | Status: SHIPPED | OUTPATIENT
Start: 2024-12-09 | End: 2024-12-09

## 2024-12-09 RX ORDER — FOLIC ACID 1 MG/1
1000 TABLET ORAL DAILY
Qty: 90 TABLET | Refills: 1 | Status: SHIPPED | OUTPATIENT
Start: 2024-12-09 | End: 2024-12-09

## 2024-12-09 RX ORDER — SARILUMAB 200 MG/1.14ML
200 INJECTION, SOLUTION SUBCUTANEOUS
Qty: 2 EACH | Refills: 6 | Status: ACTIVE | OUTPATIENT
Start: 2024-12-09

## 2024-12-09 RX ORDER — SARILUMAB 200 MG/1.14ML
200 INJECTION, SOLUTION SUBCUTANEOUS
Qty: 4 EACH | Refills: 0 | Status: SHIPPED | COMMUNITY
Start: 2024-12-09

## 2024-12-09 RX ORDER — METHOTREXATE 2.5 MG/1
15 TABLET ORAL WEEKLY
Qty: 72 TABLET | Refills: 1 | Status: SHIPPED | OUTPATIENT
Start: 2024-12-09 | End: 2025-03-09

## 2024-12-09 ASSESSMENT — PATIENT HEALTH QUESTIONNAIRE - PHQ9
SUM OF ALL RESPONSES TO PHQ QUESTIONS 1-9: 0
SUM OF ALL RESPONSES TO PHQ QUESTIONS 1-9: 0
2. FEELING DOWN, DEPRESSED OR HOPELESS: NOT AT ALL
SUM OF ALL RESPONSES TO PHQ QUESTIONS 1-9: 0
SUM OF ALL RESPONSES TO PHQ QUESTIONS 1-9: 0
1. LITTLE INTEREST OR PLEASURE IN DOING THINGS: NOT AT ALL
SUM OF ALL RESPONSES TO PHQ9 QUESTIONS 1 & 2: 0

## 2024-12-09 NOTE — PROGRESS NOTES
RHEUMATOLOGY PROBLEM LIST AND CHIEF COMPLAINT  1. GCA/PMR - HAs, left temporal artery discomfort, sudden loss of vision, arthralgia of shoulder, TMJ, hands, feet, response to steroids     Therapy History:  Current DMARDs: Methotrexate (1/2017 - 4/2018, restarted 3/2019-current), Kevzara (ordered 1/2023-current)  Prior DMARDs: Actemra (2/2018, stopped for 1 month; 3/2018-1/2023)  Covid-19 vaccination (+, Pfizer 12/21, 1/12, 9/21)     INTERVAL HISTORY  This is a 69 y.o.  female .  Today, the patient complains of pain in the joints.  Location: generalized   Severity: 6 on a scale of 0-10   Timing: intermittent   Duration: 4 months  Context/Associated signs and symptoms: The patient continues on Kevzara subQ 200 mg q2 weeks and methotrexate 15 mg PO weekly. She has been grieving due to the loss of her mom and brother within a few months and flared due to the stress. She was on prednisone due to the flare, but has tapered off. From a PMR standpoint she has recently been doing well.      RHEUMATOLOGY REVIEW OF SYSTEMS   Positives as per history  Negatives as follows:  CONSTITUTlONAL:  Denies unexplained persistent fevers,  weight change  HEAD/EYES:   Denies  eye redness, blurry vision or sudden loss of vision, dry eyes , HA, temporal artery pain  ENT:    Denies oral/ nasal ulcers, recurrent sinus infections, dry mouth  RESPIRATORY:  No pleuritic pain , history of pleural effusions, hemoptysis  CARDIOVASCULAR:  Denies chest pain,  history of pericardial effusions  GASTRO:    Denies heartburn, esophageal dysmotility, abdominal pain, nausea, vomiting, diarrhea, blood  in the stool  HEMATOLOGIC:  No easy bruising, purpura, swollen lymph nodes  SKIN:    Denies alopecia, ulcers, nodules, sun sensitivity, unexplained persistent  rash   VASCULAR:   Denies cyanosis , raynaud phenomenon  NEUROLOGIC:  Denies specific muscle weakness, paresthesi as   PSYCHIATRIC:  No sleep disturbance  / snoring, depression, anxiety  MSK:

## 2024-12-09 NOTE — PROGRESS NOTES
Chief Complaint   Patient presents with    Joint Pain     1. Have you been to the ER, urgent care clinic since your last visit?  Hospitalized since your last visit?No    2. Have you seen or consulted any other health care providers outside of the [facility] since your last visit?  Include any pap smears or colon screening. No

## 2025-01-02 ENCOUNTER — TELEPHONE (OUTPATIENT)
Age: 70
End: 2025-01-02

## 2025-01-02 NOTE — TELEPHONE ENCOUNTER
Ruth called from [facility] pharmacy, and she said they received a script for methotrexate  and they are trying to fill it, but the pt has already  diclofenac from the local pharmacy and they is potential DDI.  So she is asking for you to call her back about that . [phone].

## 2025-01-03 ENCOUNTER — TELEPHONE (OUTPATIENT)
Age: 70
End: 2025-01-03

## 2025-01-08 NOTE — TELEPHONE ENCOUNTER
Last visit 12/09/24  Lab Results   Component Value Date     12/05/2024    K 4.2 12/05/2024    CL 99 12/05/2024    CO2 28 12/05/2024    BUN 26 (H) 12/05/2024    CREATININE 1.18 (H) 12/05/2024    GLUCOSE 122 (H) 12/05/2024    CALCIUM 9.7 12/05/2024    BILITOT 0.3 12/05/2024    ALKPHOS 53 12/05/2024    AST 22 12/05/2024    ALT 23 12/05/2024    LABGLOM 49 (L) 12/05/2024    GFRAA >60 09/30/2022    AGRATIO 1.5 05/05/2023    GLOB 3.1 12/05/2024     Lab Results   Component Value Date    WBC 4.6 12/05/2024    HGB 13.6 12/05/2024    HCT 41.4 12/05/2024    MCV 96.7 12/05/2024     12/05/2024

## 2025-01-09 RX ORDER — PREDNISONE 5 MG/1
5 TABLET ORAL DAILY
Qty: 90 TABLET | Refills: 1 | Status: SHIPPED | OUTPATIENT
Start: 2025-01-09

## 2025-01-15 NOTE — TELEPHONE ENCOUNTER
Last office visit 11/11/24  NEXT APPT  With Internal Medicine (Castro Felix DO)  01/22/2025 at 10:00 AM    Last fill on januvia 3/22/24 #90 with 3 additional refills

## 2025-01-22 ENCOUNTER — OFFICE VISIT (OUTPATIENT)
Age: 70
End: 2025-01-22
Payer: MEDICARE

## 2025-01-22 VITALS
HEART RATE: 77 BPM | DIASTOLIC BLOOD PRESSURE: 79 MMHG | OXYGEN SATURATION: 97 % | TEMPERATURE: 98.1 F | HEIGHT: 65 IN | RESPIRATION RATE: 16 BRPM | SYSTOLIC BLOOD PRESSURE: 129 MMHG | BODY MASS INDEX: 32.32 KG/M2 | WEIGHT: 194 LBS

## 2025-01-22 DIAGNOSIS — E11.51 TYPE 2 DIABETES MELLITUS WITH PERIPHERAL VASCULAR DISEASE (HCC): ICD-10-CM

## 2025-01-22 DIAGNOSIS — E78.5 HYPERLIPIDEMIA, UNSPECIFIED HYPERLIPIDEMIA TYPE: ICD-10-CM

## 2025-01-22 DIAGNOSIS — E13.65 OTHER SPECIFIED DIABETES MELLITUS WITH HYPERGLYCEMIA, WITHOUT LONG-TERM CURRENT USE OF INSULIN (HCC): ICD-10-CM

## 2025-01-22 DIAGNOSIS — E11.22 TYPE 2 DIABETES MELLITUS WITH STAGE 3A CHRONIC KIDNEY DISEASE, WITHOUT LONG-TERM CURRENT USE OF INSULIN (HCC): ICD-10-CM

## 2025-01-22 DIAGNOSIS — N18.31 TYPE 2 DIABETES MELLITUS WITH STAGE 3A CHRONIC KIDNEY DISEASE, WITHOUT LONG-TERM CURRENT USE OF INSULIN (HCC): ICD-10-CM

## 2025-01-22 DIAGNOSIS — Z86.79 HX OF SUPRAVENTRICULAR TACHYCARDIA: Primary | ICD-10-CM

## 2025-01-22 PROCEDURE — 99215 OFFICE O/P EST HI 40 MIN: CPT | Performed by: STUDENT IN AN ORGANIZED HEALTH CARE EDUCATION/TRAINING PROGRAM

## 2025-01-22 SDOH — ECONOMIC STABILITY: FOOD INSECURITY: WITHIN THE PAST 12 MONTHS, THE FOOD YOU BOUGHT JUST DIDN'T LAST AND YOU DIDN'T HAVE MONEY TO GET MORE.: NEVER TRUE

## 2025-01-22 SDOH — ECONOMIC STABILITY: FOOD INSECURITY: WITHIN THE PAST 12 MONTHS, YOU WORRIED THAT YOUR FOOD WOULD RUN OUT BEFORE YOU GOT MONEY TO BUY MORE.: NEVER TRUE

## 2025-01-22 ASSESSMENT — PATIENT HEALTH QUESTIONNAIRE - PHQ9
5. POOR APPETITE OR OVEREATING: NOT AT ALL
SUM OF ALL RESPONSES TO PHQ9 QUESTIONS 1 & 2: 1
1. LITTLE INTEREST OR PLEASURE IN DOING THINGS: NOT AT ALL
SUM OF ALL RESPONSES TO PHQ QUESTIONS 1-9: 1
4. FEELING TIRED OR HAVING LITTLE ENERGY: NOT AT ALL
10. IF YOU CHECKED OFF ANY PROBLEMS, HOW DIFFICULT HAVE THESE PROBLEMS MADE IT FOR YOU TO DO YOUR WORK, TAKE CARE OF THINGS AT HOME, OR GET ALONG WITH OTHER PEOPLE: NOT DIFFICULT AT ALL
2. FEELING DOWN, DEPRESSED OR HOPELESS: SEVERAL DAYS
SUM OF ALL RESPONSES TO PHQ QUESTIONS 1-9: 1
SUM OF ALL RESPONSES TO PHQ QUESTIONS 1-9: 1
8. MOVING OR SPEAKING SO SLOWLY THAT OTHER PEOPLE COULD HAVE NOTICED. OR THE OPPOSITE, BEING SO FIGETY OR RESTLESS THAT YOU HAVE BEEN MOVING AROUND A LOT MORE THAN USUAL: NOT AT ALL
7. TROUBLE CONCENTRATING ON THINGS, SUCH AS READING THE NEWSPAPER OR WATCHING TELEVISION: NOT AT ALL
6. FEELING BAD ABOUT YOURSELF - OR THAT YOU ARE A FAILURE OR HAVE LET YOURSELF OR YOUR FAMILY DOWN: NOT AT ALL
9. THOUGHTS THAT YOU WOULD BE BETTER OFF DEAD, OR OF HURTING YOURSELF: NOT AT ALL
3. TROUBLE FALLING OR STAYING ASLEEP: NOT AT ALL
SUM OF ALL RESPONSES TO PHQ QUESTIONS 1-9: 1

## 2025-01-22 NOTE — PROGRESS NOTES
Verified name and birth date for privacy precautions.   Chart reviewed in preparation for today's visit.     Chief Complaint   Patient presents with    Pre-op Exam     Right knee replacement 2/13/25          Health Maintenance Due   Topic    Diabetic retinal exam     Diabetic foot exam          Wt Readings from Last 3 Encounters:   01/22/25 88 kg (194 lb)   12/09/24 87.5 kg (193 lb)   11/21/24 85.7 kg (189 lb)     Temp Readings from Last 3 Encounters:   01/22/25 98.1 °F (36.7 °C)   12/09/24 98.2 °F (36.8 °C) (Oral)   11/11/24 98.3 °F (36.8 °C)     BP Readings from Last 3 Encounters:   01/22/25 129/79   12/09/24 (!) 153/88   11/11/24 126/81     Pulse Readings from Last 3 Encounters:   01/22/25 77   12/09/24 79   11/11/24 75       Social Determinants of Health     Tobacco Use: Low Risk  (1/22/2025)    Patient History     Smoking Tobacco Use: Never     Smokeless Tobacco Use: Never     Passive Exposure: Never   Alcohol Use: Not At Risk (11/10/2024)    AUDIT-C     Frequency of Alcohol Consumption: Never     Average Number of Drinks: Patient does not drink     Frequency of Binge Drinking: Never   Financial Resource Strain: Low Risk  (11/10/2024)    Overall Financial Resource Strain (CARDIA)     Difficulty of Paying Living Expenses: Not hard at all   Food Insecurity: No Food Insecurity (1/22/2025)    Hunger Vital Sign     Worried About Running Out of Food in the Last Year: Never true     Ran Out of Food in the Last Year: Never true   Transportation Needs: No Transportation Needs (1/22/2025)    PRAPARE - Transportation     Lack of Transportation (Medical): No     Lack of Transportation (Non-Medical): No   Physical Activity: Sufficiently Active (11/10/2024)    Exercise Vital Sign     Days of Exercise per Week: 3 days     Minutes of Exercise per Session: 50 min   Stress: Not on file   Social Connections: Not on file   Intimate Partner Violence: At Risk (11/3/2023)    Humiliation, Afraid, Rape, and Kick questionnaire     Fear

## 2025-01-22 NOTE — PROGRESS NOTES
Preoperative Evaluation:   Claudine Barnard is a 69 y.o. female (1955) who presents for preoperative evaluation.           Procedure/Surgery: Right TKA   Date of Procedure/Surgery: 02/13/2025  Surgeon: Dr Childers  [facility]  Primary Physician: Castro Felix DO     Reason for surgery: R knee osteoarthritis      Pre-Operative Risk Assessment Using 2014 ACC/AHA Guidelines   Emergent procedure: No  Active Cardiac Condition including decompensated HF, Arrhythmia, MI <3 weeks, severe valve disease: No  Risk Level of Procedure: Intermediate Risk (intraperitoneal, intrathoracic, HENT, orthopedic, or carotid endarterectomy, etc.)  Revised Cardiac Risk Index Risk factors: None    Measurement of Exercise Tolerance before Surgery is >4 METS (climbing > 1 flight of stairs without stopping, walking up hill > 1-2 blocks, scrubbing floors, moving furniture, golf, bowling, dancing or tennis, or running short distance): Yes      Hx MICHAEL: No  Hx respiratory disease or smoking: No  Latex Allergy: No  Recent use of: No recent use of aspirin (ASA), NSAIDS or steroids  Anesthesia Complications: None  History of abnormal bleeding : None      EKG: EKG FINDINGS - normal EKG, normal sinus rhythm, unchanged from previous tracings, RBBB (stable from piror study)  Labs: see attached        Medication Recommendations: NONE         Prior to Admission medications    Medication Sig Start Date End Date Taking? Authorizing Provider   SITagliptin (JANUVIA) 100 MG tablet Take 1 tablet by mouth daily 1/15/25  Yes Castro Felix DO   sarilumab (KEVZARA) 200 MG/1.14ML SOSY injection Inject 1.14 mLs into the skin every 14 days Every other friday 12/9/24  Yes Ana Davis MD   methotrexate (RHEUMATREX) 2.5 MG chemo tablet Take 6 tablets by mouth once a week  Patient taking differently: Take 6 tablets by mouth once a week TAKES ON SUNDAYS 12/9/24 3/9/25 Yes Ana Davis MD   folic acid (FOLVITE) 1 MG tablet Take 1

## 2025-01-30 ENCOUNTER — HOSPITAL ENCOUNTER (OUTPATIENT)
Facility: HOSPITAL | Age: 70
Discharge: HOME | End: 2025-01-30
Payer: MEDICARE

## 2025-01-30 ENCOUNTER — HOSPITAL ENCOUNTER (OUTPATIENT)
Facility: HOSPITAL | Age: 70
End: 2025-01-30
Payer: MEDICARE

## 2025-01-30 VITALS
HEIGHT: 67 IN | SYSTOLIC BLOOD PRESSURE: 154 MMHG | HEART RATE: 78 BPM | DIASTOLIC BLOOD PRESSURE: 69 MMHG | OXYGEN SATURATION: 99 % | WEIGHT: 194.8 LBS | BODY MASS INDEX: 30.57 KG/M2 | TEMPERATURE: 98.1 F

## 2025-01-30 DIAGNOSIS — E78.5 HYPERLIPIDEMIA, UNSPECIFIED HYPERLIPIDEMIA TYPE: ICD-10-CM

## 2025-01-30 LAB
ANION GAP SERPL CALC-SCNC: 10 MMOL/L (ref 2–12)
APPEARANCE UR: CLEAR
BACTERIA URNS QL MICRO: NEGATIVE /HPF
BASOPHILS # BLD: 0.04 K/UL (ref 0–0.1)
BASOPHILS NFR BLD: 1.1 % (ref 0–1)
BILIRUB UR QL: NEGATIVE
BUN SERPL-MCNC: 24 MG/DL (ref 6–20)
BUN/CREAT SERPL: 23 (ref 12–20)
CALCIUM SERPL-MCNC: 9.4 MG/DL (ref 8.5–10.1)
CHLORIDE SERPL-SCNC: 96 MMOL/L (ref 97–108)
CHOLEST SERPL-MCNC: 257 MG/DL
CO2 SERPL-SCNC: 28 MMOL/L (ref 21–32)
COLOR UR: ABNORMAL
CREAT SERPL-MCNC: 1.05 MG/DL (ref 0.55–1.02)
DIFFERENTIAL METHOD BLD: ABNORMAL
EOSINOPHIL # BLD: 0.06 K/UL (ref 0–0.4)
EOSINOPHIL NFR BLD: 1.6 % (ref 0–7)
EPITH CASTS URNS QL MICRO: ABNORMAL /LPF
ERYTHROCYTE [DISTWIDTH] IN BLOOD BY AUTOMATED COUNT: 14.6 % (ref 11.5–14.5)
EST. AVERAGE GLUCOSE BLD GHB EST-MCNC: 128 MG/DL
GLUCOSE SERPL-MCNC: 123 MG/DL (ref 65–100)
GLUCOSE UR STRIP.AUTO-MCNC: 500 MG/DL
HBA1C MFR BLD: 6.1 % (ref 4–5.6)
HCT VFR BLD AUTO: 40.2 % (ref 35–47)
HDLC SERPL-MCNC: 49 MG/DL
HDLC SERPL: 5.2 (ref 0–5)
HGB BLD-MCNC: 13.4 G/DL (ref 11.5–16)
HGB UR QL STRIP: NEGATIVE
HYALINE CASTS URNS QL MICRO: ABNORMAL /LPF (ref 0–5)
IMM GRANULOCYTES # BLD AUTO: 0.02 K/UL (ref 0–0.04)
IMM GRANULOCYTES NFR BLD AUTO: 0.5 % (ref 0–0.5)
INR PPP: 1 (ref 0.9–1.1)
KETONES UR QL STRIP.AUTO: NEGATIVE MG/DL
LDLC SERPL CALC-MCNC: 143 MG/DL (ref 0–100)
LEUKOCYTE ESTERASE UR QL STRIP.AUTO: NEGATIVE
LYMPHOCYTES # BLD: 1.76 K/UL (ref 0.8–3.5)
LYMPHOCYTES NFR BLD: 47.2 % (ref 12–49)
MCH RBC QN AUTO: 32.3 PG (ref 26–34)
MCHC RBC AUTO-ENTMCNC: 33.3 G/DL (ref 30–36.5)
MCV RBC AUTO: 96.9 FL (ref 80–99)
MONOCYTES # BLD: 0.42 K/UL (ref 0–1)
MONOCYTES NFR BLD: 11.3 % (ref 5–13)
NEUTS SEG # BLD: 1.43 K/UL (ref 1.8–8)
NEUTS SEG NFR BLD: 38.3 % (ref 32–75)
NITRITE UR QL STRIP.AUTO: NEGATIVE
NRBC # BLD: 0 K/UL (ref 0–0.01)
NRBC BLD-RTO: 0 PER 100 WBC
PH UR STRIP: 5 (ref 5–8)
PLATELET # BLD AUTO: 301 K/UL (ref 150–400)
PMV BLD AUTO: 10 FL (ref 8.9–12.9)
POTASSIUM SERPL-SCNC: 4 MMOL/L (ref 3.5–5.1)
PROT UR STRIP-MCNC: NEGATIVE MG/DL
PROTHROMBIN TIME: 10.6 SEC (ref 9.2–11.2)
RBC # BLD AUTO: 4.15 M/UL (ref 3.8–5.2)
RBC #/AREA URNS HPF: ABNORMAL /HPF (ref 0–5)
SODIUM SERPL-SCNC: 134 MMOL/L (ref 136–145)
SP GR UR REFRACTOMETRY: 1.01 (ref 1–1.03)
TRIGL SERPL-MCNC: 325 MG/DL
URINE CULTURE IF INDICATED: ABNORMAL
UROBILINOGEN UR QL STRIP.AUTO: 0.2 EU/DL (ref 0.2–1)
VLDLC SERPL CALC-MCNC: 65 MG/DL
WBC # BLD AUTO: 3.7 K/UL (ref 3.6–11)
WBC URNS QL MICRO: ABNORMAL /HPF (ref 0–4)

## 2025-01-30 PROCEDURE — 80048 BASIC METABOLIC PNL TOTAL CA: CPT

## 2025-01-30 PROCEDURE — 85610 PROTHROMBIN TIME: CPT

## 2025-01-30 PROCEDURE — 86901 BLOOD TYPING SEROLOGIC RH(D): CPT

## 2025-01-30 PROCEDURE — 86850 RBC ANTIBODY SCREEN: CPT

## 2025-01-30 PROCEDURE — 81001 URINALYSIS AUTO W/SCOPE: CPT

## 2025-01-30 PROCEDURE — APPNB30 APP NON BILLABLE TIME 0-30 MINS: Performed by: NURSE PRACTITIONER

## 2025-01-30 PROCEDURE — 36415 COLL VENOUS BLD VENIPUNCTURE: CPT

## 2025-01-30 PROCEDURE — 80061 LIPID PANEL: CPT

## 2025-01-30 PROCEDURE — 86900 BLOOD TYPING SEROLOGIC ABO: CPT

## 2025-01-30 PROCEDURE — 83036 HEMOGLOBIN GLYCOSYLATED A1C: CPT

## 2025-01-30 PROCEDURE — 85025 COMPLETE CBC W/AUTO DIFF WBC: CPT

## 2025-01-30 ASSESSMENT — PROMIS GLOBAL HEALTH SCALE
IN GENERAL, HOW WOULD YOU RATE YOUR SATISFACTION WITH YOUR SOCIAL ACTIVITIES AND RELATIONSHIPS [ON A SCALE OF 1 (POOR) TO 5 (EXCELLENT)]?: GOOD
SUM OF RESPONSES TO QUESTIONS 2, 4, 5, & 10: 13
TO WHAT EXTENT ARE YOU ABLE TO CARRY OUT YOUR EVERYDAY PHYSICAL ACTIVITIES SUCH AS WALKING, CLIMBING STAIRS, CARRYING GROCERIES, OR MOVING A CHAIR [ON A SCALE OF 1 (NOT AT ALL) TO 5 (COMPLETELY)]?: A LITTLE
WHO IS THE PERSON COMPLETING THE PROMIS V1.1 SURVEY?: SELF
SUM OF RESPONSES TO QUESTIONS 3, 6, 7, & 8: 12
IN GENERAL, WOULD YOU SAY YOUR QUALITY OF LIFE IS...[ON A SCALE OF 1 (POOR) TO 5 (EXCELLENT)]: GOOD
IN GENERAL, HOW WOULD YOU RATE YOUR PHYSICAL HEALTH [ON A SCALE OF 1 (POOR) TO 5 (EXCELLENT)]?: FAIR
IN GENERAL, PLEASE RATE HOW WELL YOU CARRY OUT YOUR USUAL SOCIAL ACTIVITIES (INCLUDES ACTIVITIES AT HOME, AT WORK, AND IN YOUR COMMUNITY, AND RESPONSIBILITIES AS A PARENT, CHILD, SPOUSE, EMPLOYEE, FRIEND, ETC) [ON A SCALE OF 1 (POOR) TO 5 (EXCELLENT)]?: FAIR
HOW IS THE PROMIS V1.1 BEING ADMINISTERED?: PAPER
IN THE PAST 7 DAYS, HOW WOULD YOU RATE YOUR PAIN ON AVERAGE [ON A SCALE FROM 0 (NO PAIN) TO 10 (WORST IMAGINABLE PAIN)]?: 4
IN GENERAL, HOW WOULD YOU RATE YOUR MENTAL HEALTH, INCLUDING YOUR MOOD AND YOUR ABILITY TO THINK [ON A SCALE OF 1 (POOR) TO 5 (EXCELLENT)]?: GOOD
IN THE PAST 7 DAYS, HOW OFTEN HAVE YOU BEEN BOTHERED BY EMOTIONAL PROBLEMS, SUCH AS FEELING ANXIOUS, DEPRESSED, OR IRRITABLE [ON A SCALE FROM 1 (NEVER) TO 5 (ALWAYS)]?: RARELY
IN GENERAL, WOULD YOU SAY YOUR HEALTH IS...[ON A SCALE OF 1 (POOR) TO 5 (EXCELLENT)]: FAIR
IN THE PAST 7 DAYS, HOW WOULD YOU RATE YOUR FATIGUE ON AVERAGE [ON A SCALE FROM 1 (NONE) TO 5 (VERY SEVERE)]?: MILD

## 2025-01-30 ASSESSMENT — KOOS JR
STRAIGHTENING KNEE FULLY: SEVERE
HOW SEVERE IS YOUR KNEE STIFFNESS AFTER FIRST WAKING IN MORNING: SEVERE
KOOS JR TOTAL INTERVAL SCORE: 47.487
BENDING TO THE FLOOR TO PICK UP OBJECT: MODERATE
STANDING UPRIGHT: MILD
GOING UP OR DOWN STAIRS: MODERATE
TWISING OR PIVOTING ON KNEE: SEVERE
RISING FROM SITTING: MODERATE

## 2025-01-30 ASSESSMENT — PAIN DESCRIPTION - DESCRIPTORS: DESCRIPTORS: ACHING;SHARP

## 2025-01-30 ASSESSMENT — PAIN DESCRIPTION - ORIENTATION: ORIENTATION: RIGHT

## 2025-01-30 ASSESSMENT — PAIN DESCRIPTION - LOCATION: LOCATION: KNEE

## 2025-01-30 ASSESSMENT — PAIN SCALES - GENERAL: PAINLEVEL_OUTOF10: 4

## 2025-01-30 NOTE — PERIOP NOTE
PAT Nurse Practitioner   Pre-Operative Chart Review/Assessment:-ORTHOPEDIC                Patient Name:  Claudine Barnard                                                           Age:   69 y.o.    :  1955     Today's Date:  2/3/2025     Date of PAT:   25      Date of Surgery:    25      Procedure(s):  Right Total Knee Arthroplasty     Anesthesia:   General w/ block     Surgeon:   Dr. Childers                       PLAN:      1)  PCP:  Castro Felix DO      2)  Cardiac Clearance:  Pt followed by Dr. Henriquez(S). LOV 23. Condition stable at that time, no changes to current regimen or pending tests. EKG and METs reviewed. No further cardiac evaluation requested. PAT EKG showed normal sinus rhythm and RBBB.         3)  Diabetic Treatment Consult:  Not indicated. A1c-6.1      4)  Sleep Apnea evaluation:   Hx of MICHAEL, per pt, resolved.       5) Treatment for MRSA/Staph Aureus:  Neg      6) Discharge Plan:  Home w/ spouse      7) Skin: Denies open wounds, cuts, sores, rashes or other areas of concern in PAT assessment.      8) Additional Concerns:  PONV(scope patch ordered for PTA), HTN, T2DM, PMR, hx of PE, dep/anx      9) Cardiac/Diabetic Medication Recommendations Prior to Surgery:  hold empagliflozin for 4 days PTS and hold HCTZ, metformin, sitagliptin and valsartan DOS      Additional Orders for Day of Surgery:  none      Records Scanned in Epic:   None              Vital Signs:        Vitals:    25 1143   BP: (!) 154/69   Pulse: 78   Temp: 98.1 °F (36.7 °C)   SpO2: 99%             Body mass index is 30.51 kg/m².       KNEE DISABILITY OSTEOARTHRITIS AND OUTCOME SCORE    Stiffness - The following question concerns the amount of joing stiffness you have experienced during the last week in your knee. Stiffness is a sensation of restriction or slowness in the ease with which you move your knee joint.  How severe is your knee stiffness after first wakening in the morning?: Severe    Pain - What amount  Guidelines, 2017). The  reference range is an Antigen minus Nil result of <0.35 IU/mL.  The specimen received for QuantiFERON testing was incubated by the  ordering institution. Specific procedures outlined in our Directory  of Services and in the package insert for the QuantiFERON Gold  (In Tube) test must be followed to enable for proper stimulation of  cells for the production of interferon gamma.  Chemiluminescence immunoassay methodology                  MARY JANE PATEL NP  Available via [employer]

## 2025-01-30 NOTE — PROGRESS NOTES
2025 patient attended in person class. all questions answered at this time.  to assist as  at home     KNEE DISABILITY OSTEOARTHRITIS AND OUTCOME SCORE    Stiffness - The following question concerns the amount of joing stiffness you have experienced during the last week in your knee. Stiffness is a sensation of restriction or slowness in the ease with which you move your knee joint.  How severe is your knee stiffness after first wakening in the morning?: 3        Pain - What amount of knee pain have you experienced the last week during the following activities?  Twisting/pivoting on your knee: 3  Straightening knee fully: 3  Going up or down stairs: 2  Standing upright: 1        Function - Please indicate the degree of difficulty you have experienced in the last week due to your knee.  Rising from sittin  Bending to floor/ an object: 2        Raw Score  Jr OLGA. Knee Survey Score: 16  KOOS JR Total Interval Score (0-100 Scale): 47.487      PROMIS Questions    In general, would you say your health is:: 2    In general, would you say your quality of life is:: 3    In general, how would you rate your physical health?: 2    In general, how would you rate your mental health, including your mood and your ability to think?: 3    To what extent are you able to carry out your everyday physical activities such as walking, climbing stairs, carrying groceries, or moving a chair?: 2    In the past 7 days how often have you been bothered by emotional problems such as feeling anxious, depressed or irritable?: 4    In the past 7 days how would you rate your fatigue on average?: 4    In the past 7 days how would you rate your pain on average?: 4    In general, please rate how well you carry out your usual social activities and roles. (This includes activities at home, at work and in your community, and responsibilities as a parent, child, spouse, employee, friend, etc.): 2    In general, how would you

## 2025-01-30 NOTE — PERIOP NOTE
[address] [address]   MAIN OR                                  [phone]    MAIN PRE OP             [phone]                                                                                AMBULATORY PRE OP          [phone]  PRE-ADMISSION TESTING    [phone]     Surgery Date:  2/13/25       Is surgery arrival time given by surgeon?  YES     If “NO”, [facility] staff will call you between 4 and 7pm the day before your surgery with your arrival time. (If your surgery is on a Monday, we will call you the Friday before.)    Call [phone] after 7pm Monday-Friday if you did not receive this call.    INSTRUCTIONS BEFORE YOUR SURGERY   When You  Arrive Arrive at [facility] Patient Access on 1st floor the day of your surgery.   Medications to   TAKE   Morning of Surgery MEDICATIONS TO TAKE THE MORNING OF SURGERY WITH A SIP OF WATER: WELLBUTRIN, CYMBALTA, OMEPRAZOLE, PREGABALIN, VERAPAMIL    You may take these medications, IF NEEDED, the morning of surgery: TYLENOL ( UP TO 4 HOURS PRIOR TO YOUR ARRIVAL TIME)  Ask your surgeon/prescribing doctor for instructions on taking or stopping these medications prior to surgery: ESTRADIOL, METHOTREXATE, KEVZARA   Medications to STOP  before surgery Non-Steroidal anti-inflammatory Drugs (NSAID's): for example, Diclofenac (Voltaren), Ibuprofen (Advil, Motrin), Naproxen (Aleve) 3 days  STOP all herbal supplements and vitamins(unless prescribed by your doctor), and fish oil for 7 days  Other: JARDIANCE ( STOP ON 2/10/25)  (Pain medications not listed above, including Tylenol may be taken up until 4 hours prior to arrival time)   Blood  Thinners If you take Aspirin, Eliquis, Plavix, Coumadin, or any blood-thinning or anti-blood clot medicine, talk to the doctor who prescribed the medications for pre-operative instructions.  If you take aspirin or aspirin containing products for pain, stop 7 days prior

## 2025-01-31 LAB
BACTERIA SPEC CULT: NORMAL
BACTERIA SPEC CULT: NORMAL
SERVICE CMNT-IMP: NORMAL

## 2025-01-31 RX ORDER — SCOPOLAMINE 1 MG/3D
1 PATCH, EXTENDED RELEASE TRANSDERMAL ONCE
Qty: 1 PATCH | Refills: 0 | Status: SHIPPED | OUTPATIENT
Start: 2025-01-31 | End: 2025-01-31

## 2025-02-01 LAB
ABO + RH BLD: NORMAL
BLOOD GROUP ANTIBODIES SERPL: NORMAL
SPECIMEN EXP DATE BLD: NORMAL

## 2025-02-03 PROBLEM — Z01.818 ENCOUNTER FOR PREADMISSION TESTING: Status: ACTIVE | Noted: 2025-02-03

## 2025-02-12 NOTE — H&P
[facility] PRE-OP HISTORY AND PHYSICAL    Subjective:     Patient is a 69 y.o. female presented with a history of right knee pain.  Onset of symptoms was gradual with unchanged course since that time. She is being admitted for surgical management of this condition.     Patient Active Problem List    Diagnosis Date Noted    Encounter for preadmission testing 02/03/2025    Osteoarthritis of right knee 11/22/2024    Lumbar stenosis with neurogenic claudication 11/29/2023    Lumbar radiculopathy 03/28/2023    Overweight 09/30/2022    Abnormal CBC 07/14/2022    Laryngopharyngeal reflux (LPR) 07/12/2022    Muscle tension dysphonia 07/12/2022    Iron deficiency anemia due to chronic blood loss 11/22/2021    High risk medication use 11/22/2021    Family history of gastric cancer 11/22/2021    Family history of breast cancer 11/22/2021    Current use of estrogen therapy 11/22/2021    Moderate episode of recurrent major depressive disorder (HCC) 10/26/2021    Fatigue, unspecified type 10/26/2021    Sleep apnea     Dyspnea 09/08/2021    OA (osteoarthritis)     GERD (gastroesophageal reflux disease)     DDD (degenerative disc disease), cervical     Thoracic outlet syndrome     Hyperlipidemia     Ectopic atrial tachycardia (HCC) 08/01/2020    SVT (supraventricular tachycardia) ([facility]) 03/13/2020    Anxiety and depression 12/13/2019    Chronic pain 12/13/2019    Atypical mole 12/13/2019    History of hysterectomy 12/13/2019    Irritable bowel syndrome with both constipation and diarrhea 12/13/2019    Polymyalgia rheumatica (HCC) 12/13/2019    Type 2 diabetes mellitus with peripheral vascular disease (HCC) 10/07/2019    Age-related nuclear cataract of both eyes 12/04/2017    Iron deficiency anemia 07/13/2017    Osteopenia 06/29/2016    Current chronic use of systemic steroids 06/29/2016    Temporal arteritis (HCC) 04/29/2016    Essential (primary) hypertension 10/05/2015    Fibrocystic disease of breast 06/29/2015    Degenerative    Lymph nodes:   Cervical, supraclavicular, and axillary nodes normal   Neurologic:   CNII-XII intact, normal strength, sensation and reflexes     throughout       Assessment:     Principal Problem:    Osteoarthritis of right knee  Resolved Problems:    * No resolved hospital problems. *      Plan:     The various methods of treatment have been discussed with the patient and family.   After consideration of risks, benefits and other options for treatment, the patient has consented to surgical interventions right total knee arthroplasty.

## 2025-02-13 ENCOUNTER — HOSPITAL ENCOUNTER (OUTPATIENT)
Facility: HOSPITAL | Age: 70
Setting detail: OBSERVATION
Discharge: HOME | End: 2025-02-16
Attending: ORTHOPAEDIC SURGERY | Admitting: ORTHOPAEDIC SURGERY
Payer: COMMERCIAL

## 2025-02-13 ENCOUNTER — ANESTHESIA (OUTPATIENT)
Facility: HOSPITAL | Age: 70
End: 2025-02-13
Payer: MEDICARE

## 2025-02-13 ENCOUNTER — ANESTHESIA EVENT (OUTPATIENT)
Facility: HOSPITAL | Age: 70
End: 2025-02-13
Payer: MEDICARE

## 2025-02-13 DIAGNOSIS — Z96.651 HISTORY OF TOTAL KNEE ARTHROPLASTY, RIGHT: ICD-10-CM

## 2025-02-13 DIAGNOSIS — M17.11 PRIMARY OSTEOARTHRITIS OF RIGHT KNEE: Primary | ICD-10-CM

## 2025-02-13 LAB
GLUCOSE BLD STRIP.AUTO-MCNC: 108 MG/DL (ref 65–117)
GLUCOSE BLD STRIP.AUTO-MCNC: 132 MG/DL (ref 65–117)
GLUCOSE BLD STRIP.AUTO-MCNC: 93 MG/DL (ref 65–117)
SERVICE CMNT-IMP: ABNORMAL
SERVICE CMNT-IMP: NORMAL
SERVICE CMNT-IMP: NORMAL

## 2025-02-13 PROCEDURE — 82962 GLUCOSE BLOOD TEST: CPT

## 2025-02-13 PROCEDURE — 2580000003 HC RX 258: Performed by: ANESTHESIOLOGY

## 2025-02-13 PROCEDURE — 97161 PT EVAL LOW COMPLEX 20 MIN: CPT

## 2025-02-13 PROCEDURE — G0378 HOSPITAL OBSERVATION PER HR: HCPCS

## 2025-02-13 PROCEDURE — 3600000005 HC SURGERY LEVEL 5 BASE: Performed by: ORTHOPAEDIC SURGERY

## 2025-02-13 PROCEDURE — 7100000000 HC PACU RECOVERY - FIRST 15 MIN: Performed by: ORTHOPAEDIC SURGERY

## 2025-02-13 PROCEDURE — APPNB60 APP NON BILLABLE TIME 46-60 MINS: Performed by: NURSE PRACTITIONER

## 2025-02-13 PROCEDURE — 2500000003 HC RX 250 WO HCPCS: Performed by: NURSE ANESTHETIST, CERTIFIED REGISTERED

## 2025-02-13 PROCEDURE — 2580000003 HC RX 258

## 2025-02-13 PROCEDURE — 2500000003 HC RX 250 WO HCPCS

## 2025-02-13 PROCEDURE — 3700000000 HC ANESTHESIA ATTENDED CARE: Performed by: ORTHOPAEDIC SURGERY

## 2025-02-13 PROCEDURE — 6360000002 HC RX W HCPCS: Performed by: ORTHOPAEDIC SURGERY

## 2025-02-13 PROCEDURE — 2709999900 HC NON-CHARGEABLE SUPPLY: Performed by: ORTHOPAEDIC SURGERY

## 2025-02-13 PROCEDURE — 3700000001 HC ADD 15 MINUTES (ANESTHESIA): Performed by: ORTHOPAEDIC SURGERY

## 2025-02-13 PROCEDURE — 2580000003 HC RX 258: Performed by: NURSE ANESTHETIST, CERTIFIED REGISTERED

## 2025-02-13 PROCEDURE — 6370000000 HC RX 637 (ALT 250 FOR IP)

## 2025-02-13 PROCEDURE — 3600000015 HC SURGERY LEVEL 5 ADDTL 15MIN: Performed by: ORTHOPAEDIC SURGERY

## 2025-02-13 PROCEDURE — 6360000002 HC RX W HCPCS: Performed by: ANESTHESIOLOGY

## 2025-02-13 PROCEDURE — 6360000002 HC RX W HCPCS: Performed by: NURSE ANESTHETIST, CERTIFIED REGISTERED

## 2025-02-13 PROCEDURE — 2500000003 HC RX 250 WO HCPCS: Performed by: ORTHOPAEDIC SURGERY

## 2025-02-13 PROCEDURE — 7100000001 HC PACU RECOVERY - ADDTL 15 MIN: Performed by: ORTHOPAEDIC SURGERY

## 2025-02-13 PROCEDURE — 6370000000 HC RX 637 (ALT 250 FOR IP): Performed by: ORTHOPAEDIC SURGERY

## 2025-02-13 PROCEDURE — 6360000002 HC RX W HCPCS

## 2025-02-13 PROCEDURE — 2580000003 HC RX 258: Performed by: ORTHOPAEDIC SURGERY

## 2025-02-13 PROCEDURE — 97530 THERAPEUTIC ACTIVITIES: CPT

## 2025-02-13 PROCEDURE — 64447 NJX AA&/STRD FEMORAL NRV IMG: CPT | Performed by: ANESTHESIOLOGY

## 2025-02-13 PROCEDURE — C1776 JOINT DEVICE (IMPLANTABLE): HCPCS | Performed by: ORTHOPAEDIC SURGERY

## 2025-02-13 PROCEDURE — C1713 ANCHOR/SCREW BN/BN,TIS/BN: HCPCS | Performed by: ORTHOPAEDIC SURGERY

## 2025-02-13 DEVICE — IMPLANTABLE DEVICE
Type: IMPLANTABLE DEVICE | Site: KNEE | Status: FUNCTIONAL
Brand: TRULIANT

## 2025-02-13 DEVICE — PATELLA
Type: IMPLANTABLE DEVICE | Site: KNEE | Status: FUNCTIONAL
Brand: TRULIANT, ACTIVIT-E

## 2025-02-13 DEVICE — CEMENT BNE MED VISC 80 GM GENTAMICIN PALACOS MV+G PRO: Type: IMPLANTABLE DEVICE | Site: KNEE | Status: FUNCTIONAL

## 2025-02-13 DEVICE — CRC TIBIAL INSERT
Type: IMPLANTABLE DEVICE | Site: KNEE | Status: FUNCTIONAL
Brand: TRULIANT, ACTIVIT-E

## 2025-02-13 DEVICE — COMPONENT TOT KNEE CAPPED K1 STD HEMI CEM: Type: IMPLANTABLE DEVICE | Status: FUNCTIONAL

## 2025-02-13 RX ORDER — HYDROCHLOROTHIAZIDE 25 MG/1
25 TABLET ORAL DAILY
Status: DISCONTINUED | OUTPATIENT
Start: 2025-02-14 | End: 2025-02-16 | Stop reason: HOSPADM

## 2025-02-13 RX ORDER — 0.9 % SODIUM CHLORIDE 0.9 %
500 INTRAVENOUS SOLUTION INTRAVENOUS PRN
Status: DISCONTINUED | OUTPATIENT
Start: 2025-02-13 | End: 2025-02-16 | Stop reason: HOSPADM

## 2025-02-13 RX ORDER — SODIUM CHLORIDE 0.9 % (FLUSH) 0.9 %
5-40 SYRINGE (ML) INJECTION PRN
Status: DISCONTINUED | OUTPATIENT
Start: 2025-02-13 | End: 2025-02-13 | Stop reason: HOSPADM

## 2025-02-13 RX ORDER — LIDOCAINE HYDROCHLORIDE 10 MG/ML
1 INJECTION, SOLUTION EPIDURAL; INFILTRATION; INTRACAUDAL; PERINEURAL
Status: DISCONTINUED | OUTPATIENT
Start: 2025-02-13 | End: 2025-02-13 | Stop reason: HOSPADM

## 2025-02-13 RX ORDER — ROPIVACAINE HYDROCHLORIDE 5 MG/ML
30 INJECTION, SOLUTION EPIDURAL; INFILTRATION; PERINEURAL ONCE
Status: DISCONTINUED | OUTPATIENT
Start: 2025-02-13 | End: 2025-02-13 | Stop reason: HOSPADM

## 2025-02-13 RX ORDER — SODIUM CHLORIDE 9 MG/ML
INJECTION, SOLUTION INTRAVENOUS PRN
Status: DISCONTINUED | OUTPATIENT
Start: 2025-02-13 | End: 2025-02-13 | Stop reason: HOSPADM

## 2025-02-13 RX ORDER — TRAZODONE HYDROCHLORIDE 100 MG/1
100 TABLET ORAL NIGHTLY
Status: DISCONTINUED | OUTPATIENT
Start: 2025-02-13 | End: 2025-02-16 | Stop reason: HOSPADM

## 2025-02-13 RX ORDER — DULOXETIN HYDROCHLORIDE 60 MG/1
60 CAPSULE, DELAYED RELEASE ORAL DAILY
Status: DISCONTINUED | OUTPATIENT
Start: 2025-02-14 | End: 2025-02-16 | Stop reason: HOSPADM

## 2025-02-13 RX ORDER — BUPROPION HYDROCHLORIDE 300 MG/1
300 TABLET ORAL EVERY MORNING
Status: DISCONTINUED | OUTPATIENT
Start: 2025-02-14 | End: 2025-02-16 | Stop reason: HOSPADM

## 2025-02-13 RX ORDER — HYDROMORPHONE HYDROCHLORIDE 1 MG/ML
0.5 INJECTION, SOLUTION INTRAMUSCULAR; INTRAVENOUS; SUBCUTANEOUS EVERY 5 MIN PRN
Status: COMPLETED | OUTPATIENT
Start: 2025-02-13 | End: 2025-02-13

## 2025-02-13 RX ORDER — OXYCODONE HYDROCHLORIDE 5 MG/1
5 TABLET ORAL EVERY 4 HOURS PRN
Status: DISCONTINUED | OUTPATIENT
Start: 2025-02-13 | End: 2025-02-16 | Stop reason: HOSPADM

## 2025-02-13 RX ORDER — SODIUM CHLORIDE 0.9 % (FLUSH) 0.9 %
5-40 SYRINGE (ML) INJECTION EVERY 12 HOURS SCHEDULED
Status: DISCONTINUED | OUTPATIENT
Start: 2025-02-13 | End: 2025-02-13 | Stop reason: HOSPADM

## 2025-02-13 RX ORDER — PANTOPRAZOLE SODIUM 40 MG/1
40 TABLET, DELAYED RELEASE ORAL
Status: DISCONTINUED | OUTPATIENT
Start: 2025-02-13 | End: 2025-02-16 | Stop reason: HOSPADM

## 2025-02-13 RX ORDER — ACETAMINOPHEN 500 MG
1000 TABLET ORAL ONCE
Status: DISCONTINUED | OUTPATIENT
Start: 2025-02-13 | End: 2025-02-13 | Stop reason: HOSPADM

## 2025-02-13 RX ORDER — PREGABALIN 75 MG/1
75 CAPSULE ORAL 2 TIMES DAILY
Status: DISCONTINUED | OUTPATIENT
Start: 2025-02-13 | End: 2025-02-16 | Stop reason: HOSPADM

## 2025-02-13 RX ORDER — ONDANSETRON 2 MG/ML
4 INJECTION INTRAMUSCULAR; INTRAVENOUS
Status: DISCONTINUED | OUTPATIENT
Start: 2025-02-13 | End: 2025-02-13 | Stop reason: HOSPADM

## 2025-02-13 RX ORDER — SODIUM CHLORIDE 9 MG/ML
INJECTION, SOLUTION INTRAVENOUS CONTINUOUS
Status: DISCONTINUED | OUTPATIENT
Start: 2025-02-13 | End: 2025-02-16 | Stop reason: HOSPADM

## 2025-02-13 RX ORDER — SODIUM CHLORIDE 9 MG/ML
INJECTION, SOLUTION INTRAVENOUS PRN
Status: DISCONTINUED | OUTPATIENT
Start: 2025-02-13 | End: 2025-02-16 | Stop reason: HOSPADM

## 2025-02-13 RX ORDER — ALBUTEROL SULFATE 90 UG/1
2 INHALANT RESPIRATORY (INHALATION) 4 TIMES DAILY PRN
Status: DISCONTINUED | OUTPATIENT
Start: 2025-02-13 | End: 2025-02-13

## 2025-02-13 RX ORDER — GINSENG 100 MG
CAPSULE ORAL PRN
Status: DISCONTINUED | OUTPATIENT
Start: 2025-02-13 | End: 2025-02-13 | Stop reason: HOSPADM

## 2025-02-13 RX ORDER — SODIUM CHLORIDE 0.9 % (FLUSH) 0.9 %
5-40 SYRINGE (ML) INJECTION PRN
Status: DISCONTINUED | OUTPATIENT
Start: 2025-02-13 | End: 2025-02-16 | Stop reason: HOSPADM

## 2025-02-13 RX ORDER — HYDROXYZINE HYDROCHLORIDE 10 MG/1
10 TABLET, FILM COATED ORAL EVERY 8 HOURS PRN
Status: DISCONTINUED | OUTPATIENT
Start: 2025-02-13 | End: 2025-02-16 | Stop reason: HOSPADM

## 2025-02-13 RX ORDER — DEXMEDETOMIDINE HYDROCHLORIDE 100 UG/ML
INJECTION, SOLUTION INTRAVENOUS
Status: DISCONTINUED | OUTPATIENT
Start: 2025-02-13 | End: 2025-02-13 | Stop reason: SDUPTHER

## 2025-02-13 RX ORDER — PROCHLORPERAZINE EDISYLATE 5 MG/ML
5 INJECTION INTRAMUSCULAR; INTRAVENOUS
Status: DISCONTINUED | OUTPATIENT
Start: 2025-02-13 | End: 2025-02-13 | Stop reason: HOSPADM

## 2025-02-13 RX ORDER — VERAPAMIL HYDROCHLORIDE 180 MG/1
180 TABLET, FILM COATED, EXTENDED RELEASE ORAL DAILY
Status: DISCONTINUED | OUTPATIENT
Start: 2025-02-14 | End: 2025-02-16 | Stop reason: HOSPADM

## 2025-02-13 RX ORDER — VALSARTAN 160 MG/1
160 TABLET ORAL DAILY
Status: DISCONTINUED | OUTPATIENT
Start: 2025-02-14 | End: 2025-02-16 | Stop reason: HOSPADM

## 2025-02-13 RX ORDER — OXYCODONE HYDROCHLORIDE 5 MG/1
5-10 TABLET ORAL EVERY 6 HOURS PRN
Qty: 40 TABLET | Refills: 0 | Status: SHIPPED | OUTPATIENT
Start: 2025-02-13 | End: 2025-02-18

## 2025-02-13 RX ORDER — FAMOTIDINE 20 MG/1
20 TABLET, FILM COATED ORAL 2 TIMES DAILY
Status: DISCONTINUED | OUTPATIENT
Start: 2025-02-13 | End: 2025-02-16 | Stop reason: HOSPADM

## 2025-02-13 RX ORDER — ACETAMINOPHEN 325 MG/1
650 TABLET ORAL EVERY 6 HOURS
Status: DISCONTINUED | OUTPATIENT
Start: 2025-02-13 | End: 2025-02-16 | Stop reason: HOSPADM

## 2025-02-13 RX ORDER — OXYCODONE HYDROCHLORIDE 5 MG/1
5 TABLET ORAL
Status: DISCONTINUED | OUTPATIENT
Start: 2025-02-13 | End: 2025-02-13 | Stop reason: HOSPADM

## 2025-02-13 RX ORDER — ONDANSETRON 4 MG/1
4 TABLET, ORALLY DISINTEGRATING ORAL EVERY 8 HOURS PRN
Status: DISCONTINUED | OUTPATIENT
Start: 2025-02-13 | End: 2025-02-16 | Stop reason: HOSPADM

## 2025-02-13 RX ORDER — POLYETHYLENE GLYCOL 3350 17 G/17G
17 POWDER, FOR SOLUTION ORAL DAILY
Status: DISCONTINUED | OUTPATIENT
Start: 2025-02-13 | End: 2025-02-16 | Stop reason: HOSPADM

## 2025-02-13 RX ORDER — ROPIVACAINE HYDROCHLORIDE 5 MG/ML
INJECTION, SOLUTION EPIDURAL; INFILTRATION; PERINEURAL
Status: COMPLETED | OUTPATIENT
Start: 2025-02-13 | End: 2025-02-13

## 2025-02-13 RX ORDER — NALOXONE HYDROCHLORIDE 0.4 MG/ML
INJECTION, SOLUTION INTRAMUSCULAR; INTRAVENOUS; SUBCUTANEOUS PRN
Status: DISCONTINUED | OUTPATIENT
Start: 2025-02-13 | End: 2025-02-13 | Stop reason: HOSPADM

## 2025-02-13 RX ORDER — ONDANSETRON 2 MG/ML
4 INJECTION INTRAMUSCULAR; INTRAVENOUS EVERY 6 HOURS PRN
Status: DISCONTINUED | OUTPATIENT
Start: 2025-02-13 | End: 2025-02-16 | Stop reason: HOSPADM

## 2025-02-13 RX ORDER — WARFARIN SODIUM 2.5 MG/1
TABLET ORAL
Qty: 60 TABLET | Refills: 3 | Status: SHIPPED | OUTPATIENT
Start: 2025-02-13

## 2025-02-13 RX ORDER — SODIUM CHLORIDE 0.9 % (FLUSH) 0.9 %
5-40 SYRINGE (ML) INJECTION EVERY 12 HOURS SCHEDULED
Status: DISCONTINUED | OUTPATIENT
Start: 2025-02-13 | End: 2025-02-16 | Stop reason: HOSPADM

## 2025-02-13 RX ORDER — OXYCODONE HYDROCHLORIDE 5 MG/1
10 TABLET ORAL EVERY 4 HOURS PRN
Status: DISCONTINUED | OUTPATIENT
Start: 2025-02-13 | End: 2025-02-16 | Stop reason: HOSPADM

## 2025-02-13 RX ORDER — FENTANYL CITRATE 50 UG/ML
25 INJECTION, SOLUTION INTRAMUSCULAR; INTRAVENOUS EVERY 5 MIN PRN
Status: DISCONTINUED | OUTPATIENT
Start: 2025-02-13 | End: 2025-02-13 | Stop reason: HOSPADM

## 2025-02-13 RX ORDER — BISACODYL 10 MG
10 SUPPOSITORY, RECTAL RECTAL DAILY PRN
Status: DISCONTINUED | OUTPATIENT
Start: 2025-02-13 | End: 2025-02-16 | Stop reason: HOSPADM

## 2025-02-13 RX ORDER — ESTRADIOL 1 MG/1
1 TABLET ORAL DAILY
Status: DISCONTINUED | OUTPATIENT
Start: 2025-02-14 | End: 2025-02-16 | Stop reason: HOSPADM

## 2025-02-13 RX ORDER — WARFARIN SODIUM 5 MG/1
5 TABLET ORAL
Status: COMPLETED | OUTPATIENT
Start: 2025-02-13 | End: 2025-02-13

## 2025-02-13 RX ORDER — GINSENG 100 MG
CAPSULE ORAL 3 TIMES DAILY
Status: DISCONTINUED | OUTPATIENT
Start: 2025-02-13 | End: 2025-02-15

## 2025-02-13 RX ORDER — FENTANYL CITRATE 50 UG/ML
100 INJECTION, SOLUTION INTRAMUSCULAR; INTRAVENOUS
Status: DISCONTINUED | OUTPATIENT
Start: 2025-02-13 | End: 2025-02-13 | Stop reason: HOSPADM

## 2025-02-13 RX ORDER — ALOGLIPTIN 12.5 MG/1
12.5 TABLET, FILM COATED ORAL DAILY
Status: DISCONTINUED | OUTPATIENT
Start: 2025-02-14 | End: 2025-02-16 | Stop reason: HOSPADM

## 2025-02-13 RX ORDER — SENNA AND DOCUSATE SODIUM 50; 8.6 MG/1; MG/1
1 TABLET, FILM COATED ORAL 2 TIMES DAILY
Status: DISCONTINUED | OUTPATIENT
Start: 2025-02-13 | End: 2025-02-16 | Stop reason: HOSPADM

## 2025-02-13 RX ORDER — MIDAZOLAM HYDROCHLORIDE 2 MG/2ML
2 INJECTION, SOLUTION INTRAMUSCULAR; INTRAVENOUS PRN
Status: DISCONTINUED | OUTPATIENT
Start: 2025-02-13 | End: 2025-02-13 | Stop reason: HOSPADM

## 2025-02-13 RX ORDER — TRANEXAMIC ACID 100 MG/ML
INJECTION, SOLUTION INTRAVENOUS PRN
Status: DISCONTINUED | OUTPATIENT
Start: 2025-02-13 | End: 2025-02-13 | Stop reason: HOSPADM

## 2025-02-13 RX ORDER — HYDRALAZINE HYDROCHLORIDE 20 MG/ML
10 INJECTION INTRAMUSCULAR; INTRAVENOUS ONCE
Status: DISCONTINUED | OUTPATIENT
Start: 2025-02-13 | End: 2025-02-13 | Stop reason: HOSPADM

## 2025-02-13 RX ORDER — ALBUTEROL SULFATE 0.83 MG/ML
2.5 SOLUTION RESPIRATORY (INHALATION) EVERY 4 HOURS PRN
Status: DISCONTINUED | OUTPATIENT
Start: 2025-02-13 | End: 2025-02-16 | Stop reason: HOSPADM

## 2025-02-13 RX ORDER — ONDANSETRON 2 MG/ML
INJECTION INTRAMUSCULAR; INTRAVENOUS
Status: DISCONTINUED | OUTPATIENT
Start: 2025-02-13 | End: 2025-02-13 | Stop reason: SDUPTHER

## 2025-02-13 RX ORDER — SODIUM CHLORIDE, SODIUM LACTATE, POTASSIUM CHLORIDE, CALCIUM CHLORIDE 600; 310; 30; 20 MG/100ML; MG/100ML; MG/100ML; MG/100ML
INJECTION, SOLUTION INTRAVENOUS CONTINUOUS
Status: DISCONTINUED | OUTPATIENT
Start: 2025-02-13 | End: 2025-02-13 | Stop reason: HOSPADM

## 2025-02-13 RX ADMIN — PANTOPRAZOLE SODIUM 40 MG: 40 TABLET, DELAYED RELEASE ORAL at 17:23

## 2025-02-13 RX ADMIN — FAMOTIDINE 20 MG: 20 TABLET, FILM COATED ORAL at 21:40

## 2025-02-13 RX ADMIN — ACETAMINOPHEN 650 MG: 325 TABLET ORAL at 21:39

## 2025-02-13 RX ADMIN — DEXMEDETOMIDINE 5 MCG: 100 INJECTION, SOLUTION, CONCENTRATE INTRAVENOUS at 09:10

## 2025-02-13 RX ADMIN — SODIUM CHLORIDE: 9 INJECTION, SOLUTION INTRAVENOUS at 19:28

## 2025-02-13 RX ADMIN — MEPIVACAINE HYDROCHLORIDE 52.5 MG: 15 INJECTION, SOLUTION EPIDURAL; INFILTRATION at 09:13

## 2025-02-13 RX ADMIN — PROPOFOL 20 MG: 10 INJECTION, EMULSION INTRAVENOUS at 10:20

## 2025-02-13 RX ADMIN — WATER 2000 MG: 1 INJECTION INTRAMUSCULAR; INTRAVENOUS; SUBCUTANEOUS at 09:15

## 2025-02-13 RX ADMIN — PROPOFOL 30 MG: 10 INJECTION, EMULSION INTRAVENOUS at 10:16

## 2025-02-13 RX ADMIN — PROPOFOL 40 MG: 10 INJECTION, EMULSION INTRAVENOUS at 09:13

## 2025-02-13 RX ADMIN — TRAZODONE HYDROCHLORIDE 100 MG: 100 TABLET ORAL at 21:40

## 2025-02-13 RX ADMIN — WARFARIN SODIUM 5 MG: 5 TABLET ORAL at 17:57

## 2025-02-13 RX ADMIN — METFORMIN HYDROCHLORIDE 1000 MG: 500 TABLET ORAL at 17:23

## 2025-02-13 RX ADMIN — ONDANSETRON 4 MG: 2 INJECTION INTRAMUSCULAR; INTRAVENOUS at 10:42

## 2025-02-13 RX ADMIN — FENTANYL CITRATE 25 MCG: 50 INJECTION INTRAMUSCULAR; INTRAVENOUS at 12:42

## 2025-02-13 RX ADMIN — SODIUM CHLORIDE: 9 INJECTION, SOLUTION INTRAVENOUS at 11:48

## 2025-02-13 RX ADMIN — PREGABALIN 75 MG: 75 CAPSULE ORAL at 21:40

## 2025-02-13 RX ADMIN — DEXMEDETOMIDINE 10 MCG: 100 INJECTION, SOLUTION, CONCENTRATE INTRAVENOUS at 08:57

## 2025-02-13 RX ADMIN — SENNOSIDES AND DOCUSATE SODIUM 1 TABLET: 50; 8.6 TABLET ORAL at 21:40

## 2025-02-13 RX ADMIN — WATER 2000 MG: 1 INJECTION INTRAMUSCULAR; INTRAVENOUS; SUBCUTANEOUS at 18:00

## 2025-02-13 RX ADMIN — DEXMEDETOMIDINE 5 MCG: 100 INJECTION, SOLUTION, CONCENTRATE INTRAVENOUS at 09:13

## 2025-02-13 RX ADMIN — OXYCODONE 5 MG: 5 TABLET ORAL at 21:40

## 2025-02-13 RX ADMIN — HYDROMORPHONE HYDROCHLORIDE 0.5 MG: 1 INJECTION, SOLUTION INTRAMUSCULAR; INTRAVENOUS; SUBCUTANEOUS at 23:19

## 2025-02-13 RX ADMIN — ROPIVACAINE HYDROCHLORIDE 30 ML: 5 INJECTION, SOLUTION EPIDURAL; INFILTRATION; PERINEURAL at 08:45

## 2025-02-13 RX ADMIN — HYDROMORPHONE HYDROCHLORIDE 0.25 MG: 1 INJECTION, SOLUTION INTRAMUSCULAR; INTRAVENOUS; SUBCUTANEOUS at 11:48

## 2025-02-13 RX ADMIN — FENTANYL CITRATE 25 MCG: 50 INJECTION INTRAMUSCULAR; INTRAVENOUS at 13:02

## 2025-02-13 RX ADMIN — HYDROMORPHONE HYDROCHLORIDE 0.5 MG: 1 INJECTION, SOLUTION INTRAMUSCULAR; INTRAVENOUS; SUBCUTANEOUS at 12:43

## 2025-02-13 RX ADMIN — HYDROMORPHONE HYDROCHLORIDE 0.5 MG: 1 INJECTION, SOLUTION INTRAMUSCULAR; INTRAVENOUS; SUBCUTANEOUS at 10:47

## 2025-02-13 RX ADMIN — HYDROMORPHONE HYDROCHLORIDE 0.5 MG: 1 INJECTION, SOLUTION INTRAMUSCULAR; INTRAVENOUS; SUBCUTANEOUS at 13:46

## 2025-02-13 RX ADMIN — PROPOFOL 50 MCG/KG/MIN: 10 INJECTION, EMULSION INTRAVENOUS at 09:14

## 2025-02-13 RX ADMIN — PHENYLEPHRINE HYDROCHLORIDE 30 MCG/MIN: 10 INJECTION INTRAVENOUS at 09:26

## 2025-02-13 RX ADMIN — SODIUM CHLORIDE, POTASSIUM CHLORIDE, SODIUM LACTATE AND CALCIUM CHLORIDE: 600; 310; 30; 20 INJECTION, SOLUTION INTRAVENOUS at 08:45

## 2025-02-13 RX ADMIN — HYDROMORPHONE HYDROCHLORIDE 0.5 MG: 1 INJECTION, SOLUTION INTRAMUSCULAR; INTRAVENOUS; SUBCUTANEOUS at 18:05

## 2025-02-13 RX ADMIN — SODIUM CHLORIDE: 9 INJECTION, SOLUTION INTRAVENOUS at 12:14

## 2025-02-13 RX ADMIN — OXYCODONE 10 MG: 5 TABLET ORAL at 15:10

## 2025-02-13 RX ADMIN — FENTANYL CITRATE 25 MCG: 50 INJECTION INTRAMUSCULAR; INTRAVENOUS at 13:55

## 2025-02-13 RX ADMIN — ACETAMINOPHEN 650 MG: 325 TABLET ORAL at 15:10

## 2025-02-13 RX ADMIN — PROPOFOL 10 MG: 10 INJECTION, EMULSION INTRAVENOUS at 10:41

## 2025-02-13 RX ADMIN — HYDROMORPHONE HYDROCHLORIDE 0.25 MG: 1 INJECTION, SOLUTION INTRAMUSCULAR; INTRAVENOUS; SUBCUTANEOUS at 11:34

## 2025-02-13 ASSESSMENT — PAIN DESCRIPTION - DESCRIPTORS
DESCRIPTORS: ACHING

## 2025-02-13 ASSESSMENT — PAIN DESCRIPTION - ORIENTATION
ORIENTATION: RIGHT

## 2025-02-13 ASSESSMENT — PAIN SCALES - GENERAL
PAINLEVEL_OUTOF10: 5
PAINLEVEL_OUTOF10: 7
PAINLEVEL_OUTOF10: 6
PAINLEVEL_OUTOF10: 8
PAINLEVEL_OUTOF10: 4
PAINLEVEL_OUTOF10: 8
PAINLEVEL_OUTOF10: 6
PAINLEVEL_OUTOF10: 7
PAINLEVEL_OUTOF10: 4
PAINLEVEL_OUTOF10: 6
PAINLEVEL_OUTOF10: 9
PAINLEVEL_OUTOF10: 6
PAINLEVEL_OUTOF10: 7

## 2025-02-13 ASSESSMENT — PAIN DESCRIPTION - PAIN TYPE
TYPE: SURGICAL PAIN

## 2025-02-13 ASSESSMENT — PAIN DESCRIPTION - LOCATION
LOCATION: KNEE

## 2025-02-13 NOTE — OP NOTE
[address]  [address]                            OPERATIVE REPORT      PATIENT NAME: ANTOINE CARTER                   : 1955  MED REC NO: [id number]                       ROOM: OR  ACCOUNT NO: [id number]                       ADMIT DATE: 2025  PROVIDER: Alvina Childers MD    DATE OF SERVICE:  2025    PREOPERATIVE DIAGNOSES:  End-stage osteoarthritis of the right knee with valgus deformity.    POSTOPERATIVE DIAGNOSES:  End-stage osteoarthritis of the right knee with valgus deformity.    PROCEDURES PERFORMED:  Imageless navigated total knee replacement of the right knee.    SURGEON:  Alvina Childers MD    ASSISTANT:  KAVITA Ceja.    Second assistant is Trevor Escalante.    ANESTHESIA:  Spinal anesthetic with MAC and 266 mg of Exparel.    ESTIMATED BLOOD LOSS:  Approximately 200.    SPECIMENS REMOVED:  Bone discarded.    COMPLICATIONS:  Negative.    IMPLANTS:  Exactech Truliant size 3.5 femur, size 3.5 tibia with 10 CRC poly.    INDICATIONS:  The patient is a 69-year-old whom I have taken care of before in the past.  She had developed worsening osteoarthritis particularly of her right knee.  She had a prior arthroscopy performed.  She had cortisone injections, as her progression of arthritis advanced.  She presented to the office several months ago and a repeat x-ray showed bone-on-bone arthritis of the right knee with a valgus deformity of the knee.  The patient is a retired intensivist, so we had an obviously and advanced conversation about treatment of osteoarthritis.  We talked about operative versus nonoperative management.  Understanding the risks and benefits of both, she was leaning more towards total knee replacement.  I thought this is appropriate and reasonable.  I talked to her about the surgical risks and benefits.  She understood these.  We talked about the risks of surgery such as but not limited to postoperative  lug nuts were drilled before removal of our trial implant.  We then sized the tibia. The tibia was appropriate for 3.5.  The correct amount of tibial rotation was deployed and the cutting block was held in place with smooth bone pegs.  We then performed the ream of IM canal and the broach of the IM canal.  We removed all of the trial components.  The bone stock was irrigated with Irrisept followed by pulse irrigation.  Antibiotic impregnated cement was mixed on the back table.  The bone stock was dried.  Full exposure was performed.  The tibia was cemented first followed by the femur.  Any excess cement was   removed.  We placed a 9 mm trial in place and compressed the knee.  We then cemented the patella.  The rest of the Exparel was then injected to the anterior compartment of the knee.  After curing of the cement, the knee was placed through a range of motion.  It was well balanced, but there was just a little bit of plain extension and she was in a good amount of 0 extension.  She was terminally extended.  We bumped this up to a size 10.  Fortunately, this corrected the mild varus valgus play and did not jeopardize our extension.  The 10 was the correct size.  We hyperflexed the knee and dropped our tourniquet.  Any bleeding was controlled with electrocautery.  The tray was then irrigated.  The final size 10 CRC poly was inserted and locked into the tray.  The knee was reduced.  Full range of motion of the knee was identified and the patella was tracking well.  The joint was irrigated with Irrisept followed by pulse irrigation.  Any bleeding was controlled with electrocautery.  We then placed a 1 g of topical TXA plain into the joint.  #2 Vicryl was used to close the proximal portion of the quad and the distal portion was closed with Stratafix. 2-0 Vicryl was placed in the subcutaneous tissue and a running 4-0 Monocryl was placed in the epidermis.  Steri-Strips, Adaptic, and bacitracin ointment were applied over

## 2025-02-13 NOTE — ANESTHESIA POSTPROCEDURE EVALUATION
Post-Anesthesia Evaluation and Assessment    Patient: Claudine Barnard MRN: [id number]  SSN: [id number]    YOB: 1955  Age: 69 y.o.  Sex: female      I have evaluated the patient and they are stable and ready for discharge from the PACU.     Cardiovascular Function/Vital Signs  Visit Vitals  BP (!) 115/56   Pulse 85   Temp 98.4 °F (36.9 °C) (Oral)   Resp 10   SpO2 99%       Patient is status post General anesthesia for Procedure(s) with comments:  RIGHT KNEE TOTAL ARTHROPLASTY - RIGHT TOTAL KNEE ARTHROPLASTY.    Nausea/Vomiting: None    Postoperative hydration reviewed and adequate.    Pain:  Managed    Neurological Status:   At baseline    Mental Status, Level of Consciousness: Alert and  oriented to person, place, and time    Pulmonary Status:   Adequate oxygenation and airway patent    Complications related to anesthesia: None    Post-anesthesia assessment completed. No concerns    Signed By: Viet Dunlap MD     February 13, 2025

## 2025-02-13 NOTE — PERIOP NOTE
TRANSFER - OUT REPORT:    Verbal report given to Bianca(name) on Claudine Barnard  being transferred to 574(unit) for routine post-op       Report consisted of patient’s Situation, Background, Assessment and   Recommendations(SBAR).     Time Pre op antibiotic given:0915  Anesthesia Stop time: 1206    Information from the following report(s) SBAR, OR Summary, Intake/Output, MAR, Accordion, and Recent Results was reviewed with the receiving nurse.    Opportunity for questions and clarification was provided.     Is the patient on 02? Yes       L/Min 2       Other     Is the patient on a monitor? No    Is the nurse transporting with the patient? No    Surgical Waiting Area notified of patient's transfer from PACU? Yes    Cane and clothes, glasses to floor with pt

## 2025-02-13 NOTE — ANESTHESIA PROCEDURE NOTES
Spinal Block    Patient location during procedure: OR  End time: 2/13/2025 9:13 AM  Reason for block: primary anesthetic  Staffing  Performed: anesthesiologist   Anesthesiologist: Viet Dunlap MD  Performed by: Viet Dunlap MD  Authorized by: Viet Dunlap MD    Spinal Block  Patient position: sitting  Prep: Betadine  Patient monitoring: cardiac monitor, continuous pulse ox, frequent blood pressure checks and oxygen  Approach: midline  Location: L4/L5  Provider prep: mask and sterile gloves  Needle  Needle type: pencil-tip   Needle gauge: 25 G  Needle length: 4 in  Assessment  Sensory level: T4  Events: None  Swirl obtained: Yes  CSF: clear  Attempts: 3+  Hemodynamics: stable  Preanesthetic Checklist  Completed: patient identified, IV checked, site marked, risks and benefits discussed, surgical/procedural consents, equipment checked, pre-op evaluation, timeout performed, anesthesia consent given, oxygen available, monitors applied/VS acknowledged and fire risk safety assessment completed and verbalized

## 2025-02-13 NOTE — PROGRESS NOTES
Pharmacist Note - Warfarin Dosing  Consult provided for this 69 y.o. female to manage warfarin for DVT ppx following RIGHT KNEE TOTAL ARTHROPLASTY       INR Goal: Other 1.5-2    Therapy Day: 1    Preop Dose: Childers- none    Drugs that may increase INR: Metronidazole  Drugs that may decrease INR: None  Other current anticoagulants/ drugs that may increase bleeding risk: None  Risk factors: Age > 65  Daily INR ordered: YES    No results for input(s): \"HGB\", \"INR\" in the last 72 hours.    Date               INR                 Dose  2/13  --  5 mg    Assessment/ Plan:  Will order warfarin 5 mg PO x 1 dose. PT/INR tomorrow morning.    Pharmacy will continue to monitor daily and adjust therapy as indicated.

## 2025-02-13 NOTE — ANESTHESIA PRE PROCEDURE
Department of Anesthesiology  Preprocedure Note       Name:  Claudine Barnard   Age:  69 y.o.  :  1955                                          MRN:  [id number]         Date:  2025      Surgeon: Surgeon(s):  Alvina Childers MD    Procedure: Procedure(s):  RIGHT KNEE TOTAL ARTHROPLASTY    Medications prior to admission:   Prior to Admission medications    Medication Sig Start Date End Date Taking? Authorizing Provider   pregabalin (LYRICA) 75 MG capsule Take 1 capsule by mouth 2 times daily for 180 days. Max Daily Amount: 150 mg 25  Xi Burks PA   SITagliptin (JANUVIA) 100 MG tablet Take 1 tablet by mouth daily 1/15/25   Castro Felix DO   sarilumab (KEVZARA) 200 MG/1.14ML SOSY injection Inject 1.14 mLs into the skin every 14 days Every other 24   Ana Davis MD   methotrexate (RHEUMATREX) 2.5 MG chemo tablet Take 6 tablets by mouth once a week  Patient taking differently: Take 6 tablets by mouth once a week TAKES ON SUNDAYS 12/9/24 3/9/25  Ana Davis MD   folic acid (FOLVITE) 1 MG tablet Take 1 tablet by mouth daily 24   Ana Davis MD   verapamil (CALAN SR) 180 MG extended release tablet Take 1 tablet by mouth nightly  Patient taking differently: Take 1 tablet by mouth daily 24   Castro Felix DO   empagliflozin (JARDIANCE) 10 MG tablet Take 1 tablet by mouth daily 24   Castro Felix DO   hydroCHLOROthiazide (HYDRODIURIL) 25 MG tablet TAKE 1 TABLET BY MOUTH ONCE A DAY 24   Castro Felix DO   diclofenac (VOLTAREN) 75 MG EC tablet Take 1 tablet by mouth 2 times daily 24   Alvina Childers MD   metFORMIN (GLUCOPHAGE) 500 MG tablet Take 2 tablets by mouth 2 times daily (with meals) 7/3/24 6/28/25  Castro Felix DO   albuterol sulfate HFA (VENTOLIN HFA) 108 (90 Base) MCG/ACT inhaler Inhale 2 puffs into the lungs 4 times daily as needed for Wheezing  Patient not taking: Reported on 2025   Meng Ca, APRN -

## 2025-02-13 NOTE — PLAN OF CARE
Problem: Physical Therapy - Adult  Goal: By Discharge: Performs mobility at highest level of function for planned discharge setting.  See evaluation for individualized goals.  Description: FUNCTIONAL STATUS PRIOR TO ADMISSION: Patient was independent and active without use of DME- used cane occasionally.    HOME SUPPORT PRIOR TO ADMISSION: The patient lived with spouse but did not require assistance.    Physical Therapy Goals  Initiated 2/13/2025  1.  Patient will move from supine to sit and sit to supine, scoot up and down, and roll side to side in bed with supervision/set-up within 4 day(s).    2.  Patient will perform sit to stand with supervision/set-up within 4 day(s).  3.  Patient will transfer from bed to chair and chair to bed with supervision/set-up using the least restrictive device within 4 day(s).  4.  Patient will ambulate with supervision/set-up for 200 feet with the least restrictive device within 4 day(s).   5.  Patient will ascend/descend 4 stairs with one handrail(s) with contact guard assist within 4 day(s).  6. Patient will perform tka home exercise program per protocol with modified independence within 4 days.  7. Patient will demonstrate AROM 0-90 degrees in operative joint within 4 days.     Outcome: Progressing       PHYSICAL THERAPY EVALUATION    Patient: Claudine Barnard (69 y.o. female)  Date: 2/13/2025  Primary Diagnosis: Osteoarthritis of right knee [M17.11]  Primary osteoarthritis of right knee [M17.11]  Procedure(s) (LRB):  RIGHT KNEE TOTAL ARTHROPLASTY (Right) Day of Surgery   Precautions:                        ASSESSMENT :   DEFICITS/IMPAIRMENTS:   Based on the objective data described below, the patient presents with  impairment in functional mobility, activity tolerance and balance s/p right tka  PLOF: Independent with ADLs and IADLs. Patient lives with spouse in a 2 story home with 12 steps with rail to second floor bed & bath and 5 steps with rail to enter.    Patient's mobility was  reach    COMMUNICATION/EDUCATION:   The patient's plan of care was discussed with: registered nurse    Patient Education  Education Given To: Patient  Education Provided: Role of Therapy;Plan of Care;Home Exercise Program;Transfer Training;Mobility Training  Education Method: Verbal;Demonstration  Barriers to Learning: None  Education Outcome: Continued education needed    Thank you for this referral.  Anmol Mayen, PT  Minutes: 26      Physical Therapy Evaluation Charge Determination   History Examination Presentation Decision-Making   LOW Complexity : Zero comorbidities / personal factors that will impact the outcome / POC LOW Complexity : 1-2 Standardized tests and measures addressing body structure, function, activity limitation and / or participation in recreation  LOW Complexity : Stable, uncomplicated  AM-PAC  LOW    Based on the above components, the patient evaluation is determined to be of the following complexity level: Low

## 2025-02-13 NOTE — PROGRESS NOTES
Ortho NP Note    POD# 0  s/p RIGHT KNEE TOTAL ARTHROPLASTY     Pt assisted from stretcher to ortho bed. Awake and alert, in NAD.   Reports postop knee pain improved from earlier, rating 4/10 and requesting pain medication.   No nausea. Tolerating clears.   Postop plan reviewed - No complaints/concerns.    VSS Afebrile.    Visit Vitals  /66   Pulse 85   Temp 97.9 °F (36.6 °C) (Oral)   Resp 10   SpO2 99%       Voiding status: due to void          Labs    Lab Results   Component Value Date/Time    HGB 13.4 01/30/2025 11:36 AM      Lab Results   Component Value Date/Time    INR 1.0 01/30/2025 11:36 AM      Lab Results   Component Value Date/Time     01/30/2025 11:36 AM    K 4.0 01/30/2025 11:36 AM    CL 96 01/30/2025 11:36 AM    CO2 28 01/30/2025 11:36 AM    BUN 24 01/30/2025 11:36 AM     Recent Glucose Results:   Glucose   Date Value Ref Range Status   01/30/2025 123 (H) 65 - 100 mg/dL Final   12/05/2024 122 (H) 65 - 100 mg/dL Final   05/09/2024 111 (H) 65 - 100 mg/dL Final           There is no height or weight on file to calculate BMI. : A BMI > 30 is classified as obesity and > 40 is classified as morbid obesity.       Ace wrap dressing c.d.i  Cryotherapy in place over incision  Calves soft and supple; No pain with passive stretch  Sensation and motor intact. +PF/DF/EHL intact 5/5  SCDs for mechanical DVT proph while in bed     PLAN:  1) PT BID, OT - WBAT  2) Coumadin for DVT Prophylaxis. Encouraged early mobilization, bed exercises, and SCD use.  3) Pain control - scheduled tylenol, and prn  oxycodone  .  4) Post op care - Continue bowel regimen, encouraged IS. Straight cath per protocol. Remove ace wrap on POD1. Daily dry dressing changes.    5) Readniess for discharge:     [x] Vital Signs stable    [] Hgb stable    [] + Voiding    [x] Wound intact, drainage minimal    [x] Tolerating PO intake     [] Cleared by PT (OT if applicable)     [] Stair training completed (if applicable)    [] Independent /  Contact Guard Assist (household distance)     [] Bed mobility     [] Car transfers     [] ADL’s    [x] Adequate pain control on oral medication alone     Plans to return home with HH & 's support.     MARY JANE Ahmadi - NP

## 2025-02-13 NOTE — CARE COORDINATION
Home Access Stairs to enter with rails   Bathroom Equipment Shower chair   Home Equipment Cane;Walker - Rolling   Occupation Retired   Discharge Planning   Type of Residence House   Potential Assistance Needed Home Care   DME Ordered? No   Potential Assistance Purchasing Medications No   Type of Home Care Services PT   Patient expects to be discharged to: House   Services At/After Discharge   Transition of Care Consult (CM Consult) Discharge Planning;Home Health   Services At/After Discharge Home Health   Confirm Follow Up Transport Family   Condition of Participation: Discharge Planning   The Plan for Transition of Care is related to the following treatment goals: HH   The Patient and/or Patient Representative was provided with a Choice of Provider? Patient   The Patient and/Or Patient Representative agree with the Discharge Plan? Yes   Freedom of Choice list was provided with basic dialogue that supports the patient's individualized plan of care/goals, treatment preferences, and shares the quality data associated with the providers?  Yes

## 2025-02-13 NOTE — ANESTHESIA PROCEDURE NOTES
Peripheral Block    Patient location during procedure: pre-op  Reason for block: post-op pain management and at surgeon's request  Start time: 2/13/2025 8:40 AM  End time: 2/13/2025 8:45 AM  Staffing  Performed: anesthesiologist   Anesthesiologist: Viet Dunlap MD  Performed by: Viet Dunlap MD  Authorized by: Viet Dunlap MD    Preanesthetic Checklist  Completed: patient identified, IV checked, site marked, risks and benefits discussed, surgical/procedural consents, equipment checked, pre-op evaluation, timeout performed, anesthesia consent given, oxygen available, monitors applied/VS acknowledged and fire risk safety assessment completed and verbalized  Peripheral Block   Patient position: supine  Prep: ChloraPrep  Provider prep: mask and sterile gloves  Patient monitoring: cardiac monitor, continuous pulse ox, frequent blood pressure checks, IV access and oxygen  Block type: Saphenous  Laterality: right  Injection technique: single-shot  Guidance: ultrasound guided    Needle   Needle type: insulated echogenic nerve stimulator needle   Needle gauge: 21 G  Needle localization: anatomical landmarks and ultrasound guidance  Needle length: 10 cm  Assessment   Injection assessment: negative aspiration for heme, no paresthesia on injection, local visualized surrounding nerve on ultrasound and no intravascular symptoms  Paresthesia pain: none  Slow fractionated injection: yes  Hemodynamics: stable  Outcomes: uncomplicated and patient tolerated procedure well    Medications Administered  ropivacaine (NAROPIN) injection 0.5% - Perineural   30 mL - 2/13/2025 8:45:00 AM

## 2025-02-13 NOTE — BRIEF OP NOTE
Brief Postoperative Note      Patient: Claudine Barnard  YOB: 1955  MRN: [id number]    Date of Procedure: 2/13/2025    Pre-Op Diagnosis Codes:      * Osteoarthritis of right knee [M17.11]    Post-Op Diagnosis: Same       Procedure(s):  RIGHT KNEE TOTAL ARTHROPLASTY    Surgeon(s):  Trevor Escalante DO Wolfe, Shannon, MD    Assistant:  Physician Assistant: Danna Gonzalez PA-C    Anesthesia: Spinal with mac     Estimated Blood Loss (mL): 200     Complications: None    Specimens:   Bone discarded    Implants:  Implant Name Type Inv. Item Serial No.  Lot No. LRB No. Used Action   CEMENT BNE MED VISC 80 GM GENTAMICIN PALACOS MV+G PRO - SNA  CEMENT BNE MED VISC 80 GM GENTAMICIN PALACOS MV+G PRO NA [employer] [id number] Right 1 Implanted   COMPONENT FEM SZ 3.5 RT CRUC RET HILDA TRULIANT - [id number]  COMPONENT FEM SZ 3.5 RT CRUC RET HILDA TRULIANT [id number] [employer] NA Right 1 Implanted   INSERT TIB SZ 35F 35T HILDA FIT TRULIANT - [id number]  INSERT TIB SZ 35F 35T HILDA FIT TRULIANT [id number] [employer] NA Right 1 Implanted   INSERT PATELLAR CAROLYN 35 MM ACTIVIT-E 3 PEG HILDA STRL TRULIANT - [id number]  INSERT PATELLAR CAROLYN 35 MM ACTIVIT-E 3 PEG HILDA STRL TRULIANT [id number] [employer] NA Right 1 Implanted   INSERT TIB THK 10 MM SZ 3.5 ACTIVIT-E KNEE CR CONSTRND STRL - [id number]  INSERT TIB THK 10 MM SZ 3.5 ACTIVIT-E KNEE CR CONSTRND STRL [id number] [employer] NA Right 1 Implanted             Electronically signed by Alvina Childers MD on 2/13/2025 at 11:48 AM

## 2025-02-13 NOTE — PROGRESS NOTES
I rounded Pre-Op and checked in with the patient prior to procedure.     The Interdisciplinary team is aware of  availability and were encouraged to request Spiritual Health support as needed.      The  on-call can be reached at [phone]).     Rev. Erik Luz MDiv  Chaplain Resident

## 2025-02-13 NOTE — DISCHARGE INSTRUCTIONS
TOTAL KNEE REPLACEMENT POST-OPERATIVE INSTRUCTIONS  MD Danna Frazier PA      Contact information:    [facility] [place]: [phone]   [facility]: [phone]    Elsa Marte - Dr. Childers’s medical assistant      FOLLOW-UP VISIT   Please call [phone] to make your first post-operative visit as soon as possible. This should be scheduled with Dr. Childers, or his PA, Danna Gonzalez, 7-10 days after your date of surgery.     MEDICATIONS/PAIN MANAGEMENT     Blood Clot (DVT/PE) Prevention   You were sent a prescription for Coumadin (warfarin) to prevent blood clots unless we discussed otherwise. Take this at the same time every day, once a day, for four weeks. Do NOT take aspirin, ibuprofen, or other anti-inflammatory medications while you are taking Coumadin as this can increase your risk of bleeding.     Pain Management   Your pain medication was sent 1-3 days before your surgery date, please take it as directed. Decrease dose and stop taking as soon as pain becomes tolerable.   You should apply ice to the knee a few times a day, or more if helpful for 20 minutes at a time to help with pain and swelling. Do not place ice directly onto the skin. Place it on top of a towel or blanket.   You should elevate the leg above the level of your heart multiple times a day to decrease pain and swelling   You may take Tylenol (acetaminophen) 650 (two 325mg tablets) every 6 hours unless otherwise directed by your doctor, in addition to oxycodone (Roxicodone) if you are having increased pain. Do NOT add Tylenol if you are taking Percocet as this already includes Tylenol.   You may not operate a vehicle, heavy machinery or appliances, or sign legal papers, for 24 hours while taking narcotic pain medications.     Constipation   Pain medication may cause constipation. You may use over the counter medications as needed   Stool softeners (Senokot-S or Colace) to prevent  ice, and activity modification   Signs of infection: red incision, continuous drainage from the incision, malodorous drainage, persistent fever greater than 101.1 degrees Fahrenheit   Signs of a blood clot in your leg: calf pain, tenderness, redness, and or swelling to the lower leg     When to call 911 or go the nearest Emergency room:   Acute onset of chest pain, shortness of breath, difficulty breathing     When to call your Primary care Physician:   Concerns about your medical conditions such as high blood pressure, asthma, congestive heart failure   Call if your blood sugars are elevated, have a persistent headache or dizziness, cough, congestion, constipation or diarrhea, burning with urination, abnormal slow or fast heart rate     HOME HEAT   Physical therapy - routine exercises, transfers, and gait training, two-three times a week for three weeks.     Home nursing - draw a pro-time every Wednesday for 4 weeks. Fax the results to Dr. Childers at [phone]. Call results to [phone] ext. [phone]

## 2025-02-14 LAB
ERYTHROCYTE [DISTWIDTH] IN BLOOD BY AUTOMATED COUNT: 14.4 % (ref 11.5–14.5)
GLUCOSE BLD STRIP.AUTO-MCNC: 133 MG/DL (ref 65–117)
GLUCOSE BLD STRIP.AUTO-MCNC: 159 MG/DL (ref 65–117)
GLUCOSE BLD STRIP.AUTO-MCNC: 185 MG/DL (ref 65–117)
HCT VFR BLD AUTO: 30.3 % (ref 35–47)
HGB BLD-MCNC: 9.9 G/DL (ref 11.5–16)
INR PPP: 1 (ref 0.9–1.1)
MCH RBC QN AUTO: 32.4 PG (ref 26–34)
MCHC RBC AUTO-ENTMCNC: 32.7 G/DL (ref 30–36.5)
MCV RBC AUTO: 99 FL (ref 80–99)
NRBC # BLD: 0 K/UL (ref 0–0.01)
NRBC BLD-RTO: 0 PER 100 WBC
PLATELET # BLD AUTO: 216 K/UL (ref 150–400)
PMV BLD AUTO: 10 FL (ref 8.9–12.9)
PROTHROMBIN TIME: 10.9 SEC (ref 9.2–11.2)
RBC # BLD AUTO: 3.06 M/UL (ref 3.8–5.2)
SERVICE CMNT-IMP: ABNORMAL
WBC # BLD AUTO: 4 K/UL (ref 3.6–11)

## 2025-02-14 PROCEDURE — 6370000000 HC RX 637 (ALT 250 FOR IP)

## 2025-02-14 PROCEDURE — 85610 PROTHROMBIN TIME: CPT

## 2025-02-14 PROCEDURE — 97530 THERAPEUTIC ACTIVITIES: CPT

## 2025-02-14 PROCEDURE — 97165 OT EVAL LOW COMPLEX 30 MIN: CPT

## 2025-02-14 PROCEDURE — 2500000003 HC RX 250 WO HCPCS

## 2025-02-14 PROCEDURE — G0378 HOSPITAL OBSERVATION PER HR: HCPCS

## 2025-02-14 PROCEDURE — 85027 COMPLETE CBC AUTOMATED: CPT

## 2025-02-14 PROCEDURE — 6360000002 HC RX W HCPCS

## 2025-02-14 PROCEDURE — 82962 GLUCOSE BLOOD TEST: CPT

## 2025-02-14 PROCEDURE — 97535 SELF CARE MNGMENT TRAINING: CPT

## 2025-02-14 RX ORDER — WARFARIN SODIUM 5 MG/1
5 TABLET ORAL
Status: COMPLETED | OUTPATIENT
Start: 2025-02-14 | End: 2025-02-14

## 2025-02-14 RX ADMIN — PANTOPRAZOLE SODIUM 40 MG: 40 TABLET, DELAYED RELEASE ORAL at 07:48

## 2025-02-14 RX ADMIN — OXYCODONE 10 MG: 5 TABLET ORAL at 21:14

## 2025-02-14 RX ADMIN — VALSARTAN 160 MG: 160 TABLET, FILM COATED ORAL at 09:26

## 2025-02-14 RX ADMIN — ACETAMINOPHEN 650 MG: 325 TABLET ORAL at 03:30

## 2025-02-14 RX ADMIN — HYDROCHLOROTHIAZIDE 25 MG: 25 TABLET ORAL at 09:26

## 2025-02-14 RX ADMIN — OXYCODONE 10 MG: 5 TABLET ORAL at 11:55

## 2025-02-14 RX ADMIN — METFORMIN HYDROCHLORIDE 1000 MG: 500 TABLET ORAL at 09:24

## 2025-02-14 RX ADMIN — ACETAMINOPHEN 650 MG: 325 TABLET ORAL at 09:25

## 2025-02-14 RX ADMIN — ACETAMINOPHEN 650 MG: 325 TABLET ORAL at 15:47

## 2025-02-14 RX ADMIN — METFORMIN HYDROCHLORIDE 1000 MG: 500 TABLET ORAL at 16:13

## 2025-02-14 RX ADMIN — FAMOTIDINE 20 MG: 20 TABLET, FILM COATED ORAL at 09:25

## 2025-02-14 RX ADMIN — PREGABALIN 75 MG: 75 CAPSULE ORAL at 21:14

## 2025-02-14 RX ADMIN — PREGABALIN 75 MG: 75 CAPSULE ORAL at 09:23

## 2025-02-14 RX ADMIN — OXYCODONE 10 MG: 5 TABLET ORAL at 07:49

## 2025-02-14 RX ADMIN — EMPAGLIFLOZIN 10 MG: 10 TABLET, FILM COATED ORAL at 09:24

## 2025-02-14 RX ADMIN — POLYETHYLENE GLYCOL 3350 17 G: 17 POWDER, FOR SOLUTION ORAL at 09:18

## 2025-02-14 RX ADMIN — VERAPAMIL HYDROCHLORIDE 180 MG: 180 TABLET, FILM COATED, EXTENDED RELEASE ORAL at 09:23

## 2025-02-14 RX ADMIN — PANTOPRAZOLE SODIUM 40 MG: 40 TABLET, DELAYED RELEASE ORAL at 15:47

## 2025-02-14 RX ADMIN — SODIUM CHLORIDE, PRESERVATIVE FREE 10 ML: 5 INJECTION INTRAVENOUS at 21:15

## 2025-02-14 RX ADMIN — ESTRADIOL 1 MG: 1 TABLET ORAL at 09:27

## 2025-02-14 RX ADMIN — WARFARIN SODIUM 5 MG: 5 TABLET ORAL at 11:55

## 2025-02-14 RX ADMIN — WATER 2000 MG: 1 INJECTION INTRAMUSCULAR; INTRAVENOUS; SUBCUTANEOUS at 00:54

## 2025-02-14 RX ADMIN — OXYCODONE 10 MG: 5 TABLET ORAL at 16:12

## 2025-02-14 RX ADMIN — DULOXETINE HYDROCHLORIDE 60 MG: 60 CAPSULE, DELAYED RELEASE ORAL at 09:25

## 2025-02-14 RX ADMIN — FAMOTIDINE 20 MG: 20 TABLET, FILM COATED ORAL at 21:14

## 2025-02-14 RX ADMIN — BACITRACIN: 500 OINTMENT TOPICAL at 11:58

## 2025-02-14 RX ADMIN — ACETAMINOPHEN 650 MG: 325 TABLET ORAL at 21:13

## 2025-02-14 RX ADMIN — BUPROPION HYDROCHLORIDE 300 MG: 300 TABLET, EXTENDED RELEASE ORAL at 09:23

## 2025-02-14 RX ADMIN — OXYCODONE 10 MG: 5 TABLET ORAL at 03:29

## 2025-02-14 RX ADMIN — TRAZODONE HYDROCHLORIDE 100 MG: 100 TABLET ORAL at 21:14

## 2025-02-14 RX ADMIN — SODIUM CHLORIDE, PRESERVATIVE FREE 10 ML: 5 INJECTION INTRAVENOUS at 09:28

## 2025-02-14 RX ADMIN — ALOGLIPTIN 12.5 MG: 12.5 TABLET, FILM COATED ORAL at 09:24

## 2025-02-14 RX ADMIN — SENNOSIDES AND DOCUSATE SODIUM 1 TABLET: 50; 8.6 TABLET ORAL at 09:26

## 2025-02-14 RX ADMIN — SENNOSIDES AND DOCUSATE SODIUM 1 TABLET: 50; 8.6 TABLET ORAL at 21:14

## 2025-02-14 ASSESSMENT — PAIN DESCRIPTION - ORIENTATION
ORIENTATION: RIGHT

## 2025-02-14 ASSESSMENT — PAIN SCALES - GENERAL
PAINLEVEL_OUTOF10: 8
PAINLEVEL_OUTOF10: 6
PAINLEVEL_OUTOF10: 5
PAINLEVEL_OUTOF10: 9
PAINLEVEL_OUTOF10: 6
PAINLEVEL_OUTOF10: 7
PAINLEVEL_OUTOF10: 9
PAINLEVEL_OUTOF10: 7
PAINLEVEL_OUTOF10: 8
PAINLEVEL_OUTOF10: 8

## 2025-02-14 ASSESSMENT — PAIN DESCRIPTION - LOCATION
LOCATION: KNEE

## 2025-02-14 ASSESSMENT — PAIN DESCRIPTION - DESCRIPTORS
DESCRIPTORS: ACHING
DESCRIPTORS: ACHING
DESCRIPTORS: SHARP;BURNING
DESCRIPTORS: ACHING

## 2025-02-14 ASSESSMENT — PAIN DESCRIPTION - ONSET: ONSET: ON-GOING

## 2025-02-14 ASSESSMENT — PAIN DESCRIPTION - FREQUENCY: FREQUENCY: CONTINUOUS

## 2025-02-14 ASSESSMENT — PAIN - FUNCTIONAL ASSESSMENT
PAIN_FUNCTIONAL_ASSESSMENT: ACTIVITIES ARE NOT PREVENTED

## 2025-02-14 ASSESSMENT — PAIN DESCRIPTION - PAIN TYPE: TYPE: ACUTE PAIN;SURGICAL PAIN

## 2025-02-14 NOTE — PLAN OF CARE
Problem: Chronic Conditions and Co-morbidities  Goal: Patient's chronic conditions and co-morbidity symptoms are monitored and maintained or improved  Outcome: Progressing     Problem: Safety - Adult  Goal: Free from fall injury  Outcome: Progressing     Problem: Pain  Goal: Verbalizes/displays adequate comfort level or baseline comfort level  Outcome: Progressing     Problem: Discharge Planning  Goal: Discharge to home or other facility with appropriate resources     Problem: Physical Therapy - Adult  Goal: By Discharge: Performs mobility at highest level of function for planned discharge setting.  See evaluation for individualized goals.  2/14/2025 1436 by Anmol Mayen, PT  Outcome: Progressing  2/14/2025 1349 by Anmol Mayen, PT  Outcome: Progressing     Problem: Occupational Therapy - Adult  Goal: By Discharge: Performs self-care activities at highest level of function for planned discharge setting.  See evaluation for individualized goals.  2/14/2025 1049 by Nisha Hughes, OT  Outcome: Progressing     Problem: ABCDS Injury Assessment  Goal: Absence of physical injury

## 2025-02-14 NOTE — PROGRESS NOTES
POD 1 Day Post-Op    Procedure:  Procedure(s) with comments:  RIGHT KNEE TOTAL ARTHROPLASTY - RIGHT TOTAL KNEE ARTHROPLASTY    Subjective:   Patient is a 69-year-old female seen this morning status post right knee arthroplasty.  She is up and awake.  She had some pain throughout the night, however states it is being well-managed.  Her leg still has some numbness and tingling.  She denies calf pain, fever, chills, shortness of breath.  Patient has no complaints today.  Patient has complaints of pain.     Objective:     Blood pressure (!) 143/73, pulse 85, temperature 97.7 °F (36.5 °C), resp. rate 18, last menstrual period 2006, SpO2 90%.  Temp (24hrs), Av.9 °F (36.6 °C), Min:97.5 °F (36.4 °C), Max:98.4 °F (36.9 °C)       Physical Exam:  Examination of the right knee reveals that the dressing is clean and intact. Sensation is intact to light touch. Brisk capillary refill.  Calf is soft, supple, nontender.  Moves all toes.  Palpable pedal pulse.  Neurovascular examination intact.    Labs:   Lab Results   Component Value Date/Time    HGB 9.9 2025 03:37 AM    INR 1.0 2025 03:37 AM         Assessment:     Principal Problem:    Osteoarthritis of right knee  Active Problems:    Primary osteoarthritis of right knee  Resolved Problems:    * No resolved hospital problems. *      Plan/Recommendations/Medical Decision Making:     Continue physical therapy  Ice and elevate  Begin discharge planning

## 2025-02-14 NOTE — PLAN OF CARE
Problem: Occupational Therapy - Adult  Goal: By Discharge: Performs self-care activities at highest level of function for planned discharge setting.  See evaluation for individualized goals.  Description: FUNCTIONAL STATUS PRIOR TO ADMISSION:  Patient was ambulatory without use of DME.       HOME SUPPORT: Patient lived with her  and was independent with ADL tasks. Pt plans to have support from multiple family members at [place].      Occupational Therapy Goals  Initiated 2/14/2025    1. Patient will perform lower body dressing with Modified [place] within 7 day(s).  2. Patient will perform upper body ADLS standing for 5 minutes without fatigue or LOB with Modified [place] within 7 days.  3. Patient will perform all aspects of toileting at Modified [place] within 7 days.  4. Patient will perform toilet transfers with Modified [place] using walker within 7 days.  5. Patient will utilize energy conservation techniques during functional activities without cues within 7 day(s).      Outcome: Progressing   OCCUPATIONAL THERAPY EVALUATION    Patient: Claudine Barnard (69 y.o. female)  Date: 2/14/2025  Primary Diagnosis: Osteoarthritis of right knee [M17.11]  Primary osteoarthritis of right knee [M17.11]  Procedure(s) (LRB):  RIGHT KNEE TOTAL ARTHROPLASTY (Right) 1 Day Post-Op     Precautions: Fall Risk                  ASSESSMENT :  The patient's performance of ADL/IADL tasks is limited at this time by impaired balance, activity tolerance, generalized weakness, and pain s/p admission for R TKA POD1.     Pt received semi-supine and amenable to participation in therapy session, though expressing significant pain in RLE (medicated ~8AM this morning). She required CGA-min A x1 and use of RW to complete bed mobility, functional transfers, and household mobility to/from the bathroom. OOB ADLs also required up to min A d/t limited LB reach and significant pain. Although pt endorsing mild lightheadedness with

## 2025-02-14 NOTE — PLAN OF CARE
Problem: Physical Therapy - Adult  Goal: By Discharge: Performs mobility at highest level of function for planned discharge setting.  See evaluation for individualized goals.  Description: FUNCTIONAL STATUS PRIOR TO ADMISSION: Patient was independent and active without use of DME- used cane occasionally.    HOME SUPPORT PRIOR TO ADMISSION: The patient lived with spouse but did not require assistance.    Physical Therapy Goals  Initiated 2/13/2025  1.  Patient will move from supine to sit and sit to supine, scoot up and down, and roll side to side in bed with supervision/set-up within 4 day(s).    2.  Patient will perform sit to stand with supervision/set-up within 4 day(s).  3.  Patient will transfer from bed to chair and chair to bed with supervision/set-up using the least restrictive device within 4 day(s).  4.  Patient will ambulate with supervision/set-up for 200 feet with the least restrictive device within 4 day(s).   5.  Patient will ascend/descend 4 stairs with one handrail(s) with contact guard assist within 4 day(s).  6. Patient will perform tka home exercise program per protocol with modified independence within 4 days.  7. Patient will demonstrate AROM 0-90 degrees in operative joint within 4 days.     2/14/2025 1436 by Anmol Mayen, PT  Outcome: Progressing  2/14/2025 1349 by Anmol Mayen, PT  Outcome: Progressing       PHYSICAL THERAPY TREATMENT-AFTERNOON SESSION    Patient: Claudine Barnard (69 y.o. female)  Date: 2/14/2025  Diagnosis: Osteoarthritis of right knee [M17.11]  Primary osteoarthritis of right knee [M17.11] Osteoarthritis of right knee  Procedure(s) (LRB):  RIGHT KNEE TOTAL ARTHROPLASTY (Right) 1 Day Post-Op  Precautions: Fall Risk                    ASSESSMENT:  Patient continues to benefit from skilled PT services and is slowly progressing towards goals. Patient is now ambulating 3 ft with contact guard assist using rolling walker. Patient needed min assist for sit to stand

## 2025-02-14 NOTE — PROGRESS NOTES
Physical Therapy 2/14/2025         Chart reviewed. Patient received in bed and refused am treatment stating that she was in too much pain after OT mobilization.  Will continue to follow    Anmol Mayen, PT

## 2025-02-14 NOTE — PLAN OF CARE
Problem: Physical Therapy - Adult  Goal: By Discharge: Performs mobility at highest level of function for planned discharge setting.  See evaluation for individualized goals.  Description: FUNCTIONAL STATUS PRIOR TO ADMISSION: Patient was independent and active without use of DME- used cane occasionally.    HOME SUPPORT PRIOR TO ADMISSION: The patient lived with spouse but did not require assistance.    Physical Therapy Goals  Initiated 2/13/2025  1.  Patient will move from supine to sit and sit to supine, scoot up and down, and roll side to side in bed with supervision/set-up within 4 day(s).    2.  Patient will perform sit to stand with supervision/set-up within 4 day(s).  3.  Patient will transfer from bed to chair and chair to bed with supervision/set-up using the least restrictive device within 4 day(s).  4.  Patient will ambulate with supervision/set-up for 200 feet with the least restrictive device within 4 day(s).   5.  Patient will ascend/descend 4 stairs with one handrail(s) with contact guard assist within 4 day(s).  6. Patient will perform tka home exercise program per protocol with modified independence within 4 days.  7. Patient will demonstrate AROM 0-90 degrees in operative joint within 4 days.     Outcome: Progressing       PHYSICAL THERAPY TREATMENT    Patient: Claudine Barnard (69 y.o. female)  Date: 2/14/2025  Diagnosis: Osteoarthritis of right knee [M17.11]  Primary osteoarthritis of right knee [M17.11] Osteoarthritis of right knee  Procedure(s) (LRB):  RIGHT KNEE TOTAL ARTHROPLASTY (Right) 1 Day Post-Op  Precautions: Fall Risk                      ASSESSMENT:  Patient continues to benefit from skilled PT services and is progressing towards goals. Patient demonstrated a more normalized step through gait pattern, improved upright posture. Patient performed stair climbing with encouragement and verbal cuing for sequencing. Patient hesitant with movement secondary to anxiety about pain, but progressing  well.          PLAN:  Patient continues to benefit from skilled intervention to address the above impairments.  Continue treatment per established plan of care.    Recommendations for staff mobility and toileting assistance:  Recommend that staff completes patient mobility with assist x1 using rolling walker.      Recommendation for discharge: (in order for the patient to meet his/her long term goals):   Intermittent physical therapy up to 2-3x/week in previous living setting    Other factors to consider for discharge: no additional factors    IF patient discharges home will need the following DME: rolling walker       SUBJECTIVE:   Patient stated, \"I didn't think I could do that.\"    OBJECTIVE DATA SUMMARY:   Critical Behavior:  Orientation  Overall Orientation Status: Within Normal Limits  Orientation Level: Oriented X4  Cognition  Overall Cognitive Status: WNL    Functional Mobility Training:  Bed Mobility:  Bed Mobility Training  Bed Mobility Training: Yes  Supine to Sit: Contact guard assistance  Scooting: Contact guard assistance  Transfers:  Transfer Training  Transfer Training: Yes  Sit to Stand: Contact guard assistance  Stand to Sit: Contact guard assistance  Stand Pivot Transfers: Contact guard assistance  Bed to Chair: Contact guard assistance  Toilet Transfer: Contact guard assistance  Balance:  Balance  Sitting: Intact  Sitting - Static: Good (unsupported)  Sitting - Dynamic: Good (unsupported)  Standing: Impaired;With support  Standing - Static: Good;Constant support  Standing - Dynamic: Good;Fair;Constant support   Ambulation/Gait Training:     Gait  Gait Training: Yes  Right Side Weight Bearing: As tolerated  Overall Level of Assistance: Contact guard assistance  Distance (ft): 75 Feet  Assistive Device: Walker, rolling  Interventions: Verbal cues  Rail Use: Both  Stairs - Level of Assistance: Contact guard assistance  Number of Stairs Trained: 4                       Pain Ratin/10   Pain

## 2025-02-14 NOTE — PLAN OF CARE
Problem: Pain  Goal: Verbalizes/displays adequate comfort level or baseline comfort level  Outcome: Progressing  Flowsheets  Taken 2/13/2025 1830 by Bainca Rodriguez RN  Verbalizes/displays adequate comfort level or baseline comfort level: Encourage patient to monitor pain and request assistance  Taken 2/13/2025 1720 by Bianca Rodriguez RN  Verbalizes/displays adequate comfort level or baseline comfort level: Encourage patient to monitor pain and request assistance  Taken 2/13/2025 1545 by Bianca Rodriguez RN  Verbalizes/displays adequate comfort level or baseline comfort level: Encourage patient to monitor pain and request assistance  Taken 2/13/2025 1445 by Bianca Rodriguez RN  Verbalizes/displays adequate comfort level or baseline comfort level: Encourage patient to monitor pain and request assistance     Problem: Safety - Adult  Goal: Free from fall injury  Outcome: Progressing  Flowsheets (Taken 2/13/2025 1515 by Bianca Rodriguez, RN)  Free From Fall Injury: Instruct family/caregiver on patient safety     Problem: Chronic Conditions and Co-morbidities  Goal: Patient's chronic conditions and co-morbidity symptoms are monitored and maintained or improved  Outcome: Progressing  Flowsheets (Taken 2/13/2025 1445 by Bianca Rodriugez, RN)  Care Plan - Patient's Chronic Conditions and Co-Morbidity Symptoms are Monitored and Maintained or Improved: Monitor and assess patient's chronic conditions and comorbid symptoms for stability, deterioration, or improvement

## 2025-02-14 NOTE — PROGRESS NOTES
Pharmacist Note - Warfarin Dosing  Consult provided for this 69 y.o. female to manage warfarin for Orthopedic Surgery (VTE prophylaxis) - following RIGHT KNEE TOTAL ARTHROPLASTY     INR Goal: 1.5-2.0    Therapy Day: 2    Drugs that may increase INR:None  Drugs that may decrease INR: None  Other current anticoagulants/ drugs that may increase bleeding risk: None  Risk factors: Age > 65  Daily INR ordered through: 2/23    Recent Labs     02/14/25  0337   HGB 9.9*   INR 1.0     Date               INR                 Dose  2/13  --  5 mg  2/14  1.0  5 mg             Assessment/ Plan:  Will order warfarin 5 mg PO x 1 dose.    Pharmacy will continue to monitor daily and adjust therapy as indicated.  Please contact the pharmacist at [phone] or [phone] for discharge recommendations if needed.

## 2025-02-15 LAB
GLUCOSE BLD STRIP.AUTO-MCNC: 136 MG/DL (ref 65–117)
GLUCOSE BLD STRIP.AUTO-MCNC: 161 MG/DL (ref 65–117)
GLUCOSE BLD STRIP.AUTO-MCNC: 176 MG/DL (ref 65–117)
INR PPP: 1.2 (ref 0.9–1.1)
PROTHROMBIN TIME: 12.9 SEC (ref 9.2–11.2)
SERVICE CMNT-IMP: ABNORMAL

## 2025-02-15 PROCEDURE — 97116 GAIT TRAINING THERAPY: CPT

## 2025-02-15 PROCEDURE — 97110 THERAPEUTIC EXERCISES: CPT

## 2025-02-15 PROCEDURE — G0378 HOSPITAL OBSERVATION PER HR: HCPCS

## 2025-02-15 PROCEDURE — 6370000000 HC RX 637 (ALT 250 FOR IP)

## 2025-02-15 PROCEDURE — 97530 THERAPEUTIC ACTIVITIES: CPT

## 2025-02-15 PROCEDURE — 2500000003 HC RX 250 WO HCPCS

## 2025-02-15 PROCEDURE — 82962 GLUCOSE BLOOD TEST: CPT

## 2025-02-15 PROCEDURE — 85610 PROTHROMBIN TIME: CPT

## 2025-02-15 RX ORDER — WARFARIN SODIUM 5 MG/1
5 TABLET ORAL
Status: COMPLETED | OUTPATIENT
Start: 2025-02-15 | End: 2025-02-15

## 2025-02-15 RX ADMIN — ONDANSETRON 4 MG: 4 TABLET, ORALLY DISINTEGRATING ORAL at 09:02

## 2025-02-15 RX ADMIN — METFORMIN HYDROCHLORIDE 1000 MG: 500 TABLET ORAL at 17:00

## 2025-02-15 RX ADMIN — PREGABALIN 75 MG: 75 CAPSULE ORAL at 21:23

## 2025-02-15 RX ADMIN — PANTOPRAZOLE SODIUM 40 MG: 40 TABLET, DELAYED RELEASE ORAL at 17:00

## 2025-02-15 RX ADMIN — OXYCODONE 5 MG: 5 TABLET ORAL at 02:33

## 2025-02-15 RX ADMIN — OXYCODONE 5 MG: 5 TABLET ORAL at 12:28

## 2025-02-15 RX ADMIN — VERAPAMIL HYDROCHLORIDE 180 MG: 180 TABLET, FILM COATED, EXTENDED RELEASE ORAL at 09:58

## 2025-02-15 RX ADMIN — FAMOTIDINE 20 MG: 20 TABLET, FILM COATED ORAL at 21:22

## 2025-02-15 RX ADMIN — BUPROPION HYDROCHLORIDE 300 MG: 300 TABLET, EXTENDED RELEASE ORAL at 09:57

## 2025-02-15 RX ADMIN — SENNOSIDES AND DOCUSATE SODIUM 1 TABLET: 50; 8.6 TABLET ORAL at 21:22

## 2025-02-15 RX ADMIN — DULOXETINE HYDROCHLORIDE 60 MG: 60 CAPSULE, DELAYED RELEASE ORAL at 09:57

## 2025-02-15 RX ADMIN — OXYCODONE 5 MG: 5 TABLET ORAL at 06:51

## 2025-02-15 RX ADMIN — EMPAGLIFLOZIN 10 MG: 10 TABLET, FILM COATED ORAL at 09:57

## 2025-02-15 RX ADMIN — VALSARTAN 160 MG: 160 TABLET, FILM COATED ORAL at 09:58

## 2025-02-15 RX ADMIN — METFORMIN HYDROCHLORIDE 1000 MG: 500 TABLET ORAL at 09:57

## 2025-02-15 RX ADMIN — HYDROCHLOROTHIAZIDE 25 MG: 25 TABLET ORAL at 09:57

## 2025-02-15 RX ADMIN — ACETAMINOPHEN 650 MG: 325 TABLET ORAL at 02:33

## 2025-02-15 RX ADMIN — SODIUM CHLORIDE, PRESERVATIVE FREE 10 ML: 5 INJECTION INTRAVENOUS at 21:24

## 2025-02-15 RX ADMIN — BACITRACIN: 500 OINTMENT TOPICAL at 12:28

## 2025-02-15 RX ADMIN — ESTRADIOL 1 MG: 1 TABLET ORAL at 09:57

## 2025-02-15 RX ADMIN — PREGABALIN 75 MG: 75 CAPSULE ORAL at 09:58

## 2025-02-15 RX ADMIN — ACETAMINOPHEN 650 MG: 325 TABLET ORAL at 15:15

## 2025-02-15 RX ADMIN — ALOGLIPTIN 12.5 MG: 12.5 TABLET, FILM COATED ORAL at 09:57

## 2025-02-15 RX ADMIN — ACETAMINOPHEN 650 MG: 325 TABLET ORAL at 21:22

## 2025-02-15 RX ADMIN — PANTOPRAZOLE SODIUM 40 MG: 40 TABLET, DELAYED RELEASE ORAL at 06:51

## 2025-02-15 RX ADMIN — FAMOTIDINE 20 MG: 20 TABLET, FILM COATED ORAL at 09:58

## 2025-02-15 RX ADMIN — ACETAMINOPHEN 650 MG: 325 TABLET ORAL at 09:57

## 2025-02-15 RX ADMIN — TRAZODONE HYDROCHLORIDE 100 MG: 100 TABLET ORAL at 21:22

## 2025-02-15 RX ADMIN — WARFARIN SODIUM 5 MG: 5 TABLET ORAL at 12:28

## 2025-02-15 ASSESSMENT — PAIN DESCRIPTION - LOCATION
LOCATION: KNEE

## 2025-02-15 ASSESSMENT — PAIN DESCRIPTION - ORIENTATION
ORIENTATION: RIGHT

## 2025-02-15 ASSESSMENT — PAIN SCALES - GENERAL
PAINLEVEL_OUTOF10: 4
PAINLEVEL_OUTOF10: 3
PAINLEVEL_OUTOF10: 5
PAINLEVEL_OUTOF10: 5
PAINLEVEL_OUTOF10: 0
PAINLEVEL_OUTOF10: 6
PAINLEVEL_OUTOF10: 4

## 2025-02-15 ASSESSMENT — PAIN DESCRIPTION - DESCRIPTORS
DESCRIPTORS: ACHING

## 2025-02-15 NOTE — PLAN OF CARE
Problem: Physical Therapy - Adult  Goal: By Discharge: Performs mobility at highest level of function for planned discharge setting.  See evaluation for individualized goals.  Description: FUNCTIONAL STATUS PRIOR TO ADMISSION: Patient was independent and active without use of DME- used cane occasionally.    HOME SUPPORT PRIOR TO ADMISSION: The patient lived with spouse but did not require assistance.    Physical Therapy Goals  Initiated 2/13/2025  1.  Patient will move from supine to sit and sit to supine, scoot up and down, and roll side to side in bed with supervision/set-up within 4 day(s).    2.  Patient will perform sit to stand with supervision/set-up within 4 day(s).  3.  Patient will transfer from bed to chair and chair to bed with supervision/set-up using the least restrictive device within 4 day(s).  4.  Patient will ambulate with supervision/set-up for 200 feet with the least restrictive device within 4 day(s).   5.  Patient will ascend/descend 4 stairs with one handrail(s) with contact guard assist within 4 day(s).  6. Patient will perform tka home exercise program per protocol with modified independence within 4 days.  7. Patient will demonstrate AROM 0-90 degrees in operative joint within 4 days.     2/15/2025 1454 by Sylvia Payton, PT  Outcome: Progressing  2/15/2025 1121 by Sylvia Payton, PT  Outcome: Progressing     PHYSICAL THERAPY TREATMENT    Patient: Claudine Barnard (69 y.o. female)  Date: 2/15/2025  Diagnosis: Osteoarthritis of right knee [M17.11]  Primary osteoarthritis of right knee [M17.11] Osteoarthritis of right knee  Procedure(s) (LRB):  RIGHT KNEE TOTAL ARTHROPLASTY (Right) 2 Days Post-Op  Precautions: Fall Risk                      ASSESSMENT:  Patient continues to benefit from skilled PT services and is slowly progressing towards goals. Pt seen this PM for BID session, with self-reported goal to increase amb distance and feel more confident with transfers. Upon sitting EOB, pt  support;Good  Standing - Dynamic: Constant support;Good       TherEx:  - Seated heel slides (pillow case under R foot) x 10 reps  - Quad sets x 10 reps  - Ankle pumps x 10 reps           Pain Rating:  - Reports pain improved this PM, not rated   Pain Intervention(s):   ice    Activity Tolerance:   Fair  and signs and symptoms of orthostatic hypotension    After treatment:   Patient left in no apparent distress sitting up in chair and Call bell within reach      COMMUNICATION/EDUCATION:   The patient's plan of care was discussed with: registered nurse    Patient Education  Education Given To: Patient  Education Provided: Role of Therapy;Plan of Care;Home Exercise Program;Transfer Training;Mobility Training  Education Method: Verbal;Demonstration  Barriers to Learning: None  Education Outcome: Verbalized understanding      Sylvia Payton PT  Minutes: 30

## 2025-02-15 NOTE — PROGRESS NOTES
POD 2 Days Post-Op    Procedure:  Procedure(s) with comments:  RIGHT KNEE TOTAL ARTHROPLASTY - RIGHT TOTAL KNEE ARTHROPLASTY    Subjective:   Patient is a 69-year-old female seen this morning.  She was frustrated with the hospital and staff overnight.  She states that her pain is better controlled this morning.  Is optimistic about therapy and potential discharge later this afternoon versus this evening.  Discussed postoperative plans in detail.  No chest pain, shortness of breath.    Objective:     Blood pressure (!) 156/76, pulse 91, temperature 98.2 °F (36.8 °C), temperature source Oral, resp. rate 16, last menstrual period 2006, SpO2 93%.  Temp (24hrs), Av.4 °F (36.9 °C), Min:98.2 °F (36.8 °C), Max:98.6 °F (37 °C)       Physical Exam:  Examination of the right knee reveals that the dressing is clean and intact. Sensation is intact to light touch. Brisk capillary refill.  Calf is soft, supple, nontender.  Moves all toes.  Palpable pedal pulse.  Neurovascular examination intact.    Labs:   Lab Results   Component Value Date/Time    HGB 9.9 2025 03:37 AM    INR 1.2 02/15/2025 05:00 AM         Assessment:     Principal Problem:    Osteoarthritis of right knee  Active Problems:    Primary osteoarthritis of right knee  Resolved Problems:    * No resolved hospital problems. *      Plan/Recommendations/Medical Decision Making:     Continue therapy  Multimodal pain control  Ice and elevation  Weight-bear as tolerated  DVT prophylaxis  Discharge later this afternoon    Trevor Escalante, DO  Orthopedic Surgery

## 2025-02-15 NOTE — PLAN OF CARE
Performs mobility at highest level of function for planned discharge setting.  See evaluation for individualized goals.  Description: FUNCTIONAL STATUS PRIOR TO ADMISSION: Patient was independent and active without use of DME- used cane occasionally.    HOME SUPPORT PRIOR TO ADMISSION: The patient lived with spouse but did not require assistance.    Physical Therapy Goals  Initiated 2/13/2025  1.  Patient will move from supine to sit and sit to supine, scoot up and down, and roll side to side in bed with supervision/set-up within 4 day(s).    2.  Patient will perform sit to stand with supervision/set-up within 4 day(s).  3.  Patient will transfer from bed to chair and chair to bed with supervision/set-up using the least restrictive device within 4 day(s).  4.  Patient will ambulate with supervision/set-up for 200 feet with the least restrictive device within 4 day(s).   5.  Patient will ascend/descend 4 stairs with one handrail(s) with contact guard assist within 4 day(s).  6. Patient will perform tka home exercise program per protocol with modified independence within 4 days.  7. Patient will demonstrate AROM 0-90 degrees in operative joint within 4 days.     2/14/2025 1436 by Anmol Mayen, PT  Outcome: Progressing  2/14/2025 1349 by Anmol Mayen, PT  Outcome: Progressing     Problem: ABCDS Injury Assessment  Goal: Absence of physical injury  Outcome: Progressing

## 2025-02-15 NOTE — PROGRESS NOTES
Pharmacist Note - Warfarin Dosing  Consult provided for this 69 y.o. female to manage warfarin for Orthopedic Surgery (VTE prophylaxis) - s/p RIGHT KNEE TOTAL ARTHROPLASTY     INR Goal: 1.5-2.0    Therapy Day: 3    Drugs that may increase INR:None  Drugs that may decrease INR: None  Other current anticoagulants/ drugs that may increase bleeding risk: None  Risk factors: Age > 65  Daily INR ordered through: 2/23    Recent Labs     02/14/25  0337 02/15/25  0500   HGB 9.9*  --    INR 1.0 1.2*     Date               INR                 Dose  2/13  --  5 mg  2/14  1.0  5 mg  02/15  1.2  5 mg             Assessment/ Plan:  Will order warfarin 5 mg PO x 1 dose.    Pharmacy will continue to monitor daily and adjust therapy as indicated.  Please contact the pharmacist at [phone] for discharge recommendations if needed.

## 2025-02-15 NOTE — PLAN OF CARE
Problem: Physical Therapy - Adult  Goal: By Discharge: Performs mobility at highest level of function for planned discharge setting.  See evaluation for individualized goals.  Description: FUNCTIONAL STATUS PRIOR TO ADMISSION: Patient was independent and active without use of DME- used cane occasionally.    HOME SUPPORT PRIOR TO ADMISSION: The patient lived with spouse but did not require assistance.    Physical Therapy Goals  Initiated 2/13/2025  1.  Patient will move from supine to sit and sit to supine, scoot up and down, and roll side to side in bed with supervision/set-up within 4 day(s).    2.  Patient will perform sit to stand with supervision/set-up within 4 day(s).  3.  Patient will transfer from bed to chair and chair to bed with supervision/set-up using the least restrictive device within 4 day(s).  4.  Patient will ambulate with supervision/set-up for 200 feet with the least restrictive device within 4 day(s).   5.  Patient will ascend/descend 4 stairs with one handrail(s) with contact guard assist within 4 day(s).  6. Patient will perform tka home exercise program per protocol with modified independence within 4 days.  7. Patient will demonstrate AROM 0-90 degrees in operative joint within 4 days.     Outcome: Progressing     PHYSICAL THERAPY TREATMENT    Patient: Claudine Barnard (69 y.o. female)  Date: 2/15/2025  Diagnosis: Osteoarthritis of right knee [M17.11]  Primary osteoarthritis of right knee [M17.11] Osteoarthritis of right knee  Procedure(s) (LRB):  RIGHT KNEE TOTAL ARTHROPLASTY (Right) 2 Days Post-Op  Precautions: Fall Risk                      ASSESSMENT:  Patient continues to benefit from skilled PT services and is progressing towards goals. Pt received in bed this AM, reporting increased R knee pain, but agreeable to participate with PT. Pt increasing ambulation distance today with use of RW and SBA. Able to ascend/descend 4 steps with SBA and use of HR; reports some dizziness afterwards  Shuffled;Slow  Step Length: Left shortened;Right shortened  Gait Abnormalities: Decreased step clearance;Shuffling gait;Step to gait;Antalgic  Rail Use: Both  Stairs - Level of Assistance: Stand by assistance  Number of Stairs Trained: 4                      Pain Rating:  - Pain not rated, ice applied at end of session     Activity Tolerance:   Good    After treatment:   Call bell within reach and seated EOB with RN supervisor, needs met      COMMUNICATION/EDUCATION:   The patient's plan of care was discussed with: registered nurse and physician    Patient Education  Education Given To: Patient  Education Provided: Role of Therapy;Plan of Care;Home Exercise Program;Transfer Training;Mobility Training  Education Method: Verbal;Demonstration  Barriers to Learning: None  Education Outcome: Verbalized understanding      Sylvia Payton, PT  Minutes: 30

## 2025-02-16 VITALS
TEMPERATURE: 98.1 F | RESPIRATION RATE: 17 BRPM | SYSTOLIC BLOOD PRESSURE: 174 MMHG | OXYGEN SATURATION: 95 % | HEART RATE: 100 BPM | DIASTOLIC BLOOD PRESSURE: 76 MMHG

## 2025-02-16 LAB
GLUCOSE BLD STRIP.AUTO-MCNC: 141 MG/DL (ref 65–117)
INR PPP: 1.6 (ref 0.9–1.1)
PROTHROMBIN TIME: 16.3 SEC (ref 9.2–11.2)
SERVICE CMNT-IMP: ABNORMAL

## 2025-02-16 PROCEDURE — 97530 THERAPEUTIC ACTIVITIES: CPT

## 2025-02-16 PROCEDURE — G0378 HOSPITAL OBSERVATION PER HR: HCPCS

## 2025-02-16 PROCEDURE — 85610 PROTHROMBIN TIME: CPT

## 2025-02-16 PROCEDURE — 82962 GLUCOSE BLOOD TEST: CPT

## 2025-02-16 PROCEDURE — 6370000000 HC RX 637 (ALT 250 FOR IP)

## 2025-02-16 RX ADMIN — HYDROCHLOROTHIAZIDE 25 MG: 25 TABLET ORAL at 09:49

## 2025-02-16 RX ADMIN — ACETAMINOPHEN 650 MG: 325 TABLET ORAL at 09:49

## 2025-02-16 RX ADMIN — FAMOTIDINE 20 MG: 20 TABLET, FILM COATED ORAL at 09:49

## 2025-02-16 RX ADMIN — ESTRADIOL 1 MG: 1 TABLET ORAL at 09:49

## 2025-02-16 RX ADMIN — VALSARTAN 160 MG: 160 TABLET, FILM COATED ORAL at 09:49

## 2025-02-16 RX ADMIN — METFORMIN HYDROCHLORIDE 1000 MG: 500 TABLET ORAL at 09:49

## 2025-02-16 RX ADMIN — ACETAMINOPHEN 650 MG: 325 TABLET ORAL at 03:03

## 2025-02-16 RX ADMIN — PREGABALIN 75 MG: 75 CAPSULE ORAL at 09:49

## 2025-02-16 RX ADMIN — OXYCODONE 5 MG: 5 TABLET ORAL at 06:41

## 2025-02-16 RX ADMIN — PANTOPRAZOLE SODIUM 40 MG: 40 TABLET, DELAYED RELEASE ORAL at 06:41

## 2025-02-16 RX ADMIN — BUPROPION HYDROCHLORIDE 300 MG: 300 TABLET, EXTENDED RELEASE ORAL at 09:49

## 2025-02-16 RX ADMIN — EMPAGLIFLOZIN 10 MG: 10 TABLET, FILM COATED ORAL at 09:49

## 2025-02-16 RX ADMIN — DULOXETINE HYDROCHLORIDE 60 MG: 60 CAPSULE, DELAYED RELEASE ORAL at 09:49

## 2025-02-16 RX ADMIN — ALOGLIPTIN 12.5 MG: 12.5 TABLET, FILM COATED ORAL at 09:49

## 2025-02-16 RX ADMIN — OXYCODONE 5 MG: 5 TABLET ORAL at 11:12

## 2025-02-16 RX ADMIN — VERAPAMIL HYDROCHLORIDE 180 MG: 180 TABLET, FILM COATED, EXTENDED RELEASE ORAL at 09:49

## 2025-02-16 ASSESSMENT — PAIN DESCRIPTION - ORIENTATION
ORIENTATION: RIGHT

## 2025-02-16 ASSESSMENT — PAIN SCALES - GENERAL
PAINLEVEL_OUTOF10: 0
PAINLEVEL_OUTOF10: 3
PAINLEVEL_OUTOF10: 0
PAINLEVEL_OUTOF10: 5
PAINLEVEL_OUTOF10: 3
PAINLEVEL_OUTOF10: 0

## 2025-02-16 ASSESSMENT — PAIN DESCRIPTION - DESCRIPTORS
DESCRIPTORS: ACHING
DESCRIPTORS: ACHING

## 2025-02-16 ASSESSMENT — PAIN DESCRIPTION - LOCATION
LOCATION: KNEE

## 2025-02-16 ASSESSMENT — PAIN DESCRIPTION - PAIN TYPE: TYPE: SURGICAL PAIN;ACUTE PAIN

## 2025-02-16 NOTE — FLOWSHEET NOTE
02/16/25 1100   AVS Reviewed   AVS & discharge instructions reviewed with patient and/or representative? Yes   Reviewed instructions with Patient   Level of Understanding Questions answered;Verbalized understanding

## 2025-02-16 NOTE — PLAN OF CARE
Problem: Physical Therapy - Adult  Goal: By Discharge: Performs mobility at highest level of function for planned discharge setting.  See evaluation for individualized goals.  Description: FUNCTIONAL STATUS PRIOR TO ADMISSION: Patient was independent and active without use of DME- used cane occasionally.    HOME SUPPORT PRIOR TO ADMISSION: The patient lived with spouse but did not require assistance.    Physical Therapy Goals  Initiated 2/13/2025  1.  Patient will move from supine to sit and sit to supine, scoot up and down, and roll side to side in bed with supervision/set-up within 4 day(s).    2.  Patient will perform sit to stand with supervision/set-up within 4 day(s).  3.  Patient will transfer from bed to chair and chair to bed with supervision/set-up using the least restrictive device within 4 day(s).  4.  Patient will ambulate with supervision/set-up for 200 feet with the least restrictive device within 4 day(s).   5.  Patient will ascend/descend 4 stairs with one handrail(s) with contact guard assist within 4 day(s).  6. Patient will perform tka home exercise program per protocol with modified independence within 4 days.  7. Patient will demonstrate AROM 0-90 degrees in operative joint within 4 days.     Outcome: Adequate for Discharge       PHYSICAL THERAPY TREATMENT    Patient: Claudine Barnard (69 y.o. female)  Date: 2/16/2025  Diagnosis: Osteoarthritis of right knee [M17.11]  Primary osteoarthritis of right knee [M17.11] Osteoarthritis of right knee  Procedure(s) (LRB):  RIGHT KNEE TOTAL ARTHROPLASTY (Right) 3 Days Post-Op  Precautions: Fall Risk                      ASSESSMENT:  Patient continues to benefit from skilled PT services and is progressing towards goals. Patient vital signs stable during treatment. Patient demonstrated safe transfers, gait and stair climbing. Patient initially with step to gait pattern that progressed to step through pattern. Infection control, home safety, car transfers,

## 2025-02-16 NOTE — DISCHARGE SUMMARY
Orthopedic Discharge Summary       Patient ID:  Name:Claudine Barnard Date: 2025 8:08 AM   MRN#: [id number] :1955   Admission Date:2025 Age/Sex:69 y.o. female       Admit date: 2025    Discharge date:     Admitting Physician: Alvina Childers MD     Admission Diagnoses: Osteoarthritis of right knee [M17.11]  Primary osteoarthritis of right knee [M17.11]    Discharge Diagnoses: same    Discharged Condition: good    Consults: none    Procedures Performed: Procedure(s) (LRB):  RIGHT KNEE TOTAL ARTHROPLASTY (Right)    Hospital Course: Pt is a 69 y.o. female who  underwent Procedure(s) (LRB):  RIGHT KNEE TOTAL ARTHROPLASTY (Right) performed by Dr. Alvina Childers MD. Pt tolerated the procedure well and was transferred to the post anesthesia care unit in stable condition. After a brief stay in the PACU the pt was transfer to the medical surgical floor. During the patients hospital stay they received the benefit of DVT prophylaxis, pain control with both PO and IV pain medication, prophylactic antibiotics and daily therapy. Pt hospital stay was uncomplicated and on the date of discharge pt was tolerating a regular diet, was able to participate with physical therapy and had adequate pain control. Patient was discharged     Disposition: Discharge home today    Patient Instructions:   Continue DVT prophylaxis  Multimodal pain control  No submerging the wound  Weight-bear as tolerated, range of motion as tolerated  Has home health set up  Follow-up as discussed with Dr. Childers    Signed:  Trevor Escalante DO  2025  8:08 AM

## 2025-02-27 ENCOUNTER — TELEMEDICINE (OUTPATIENT)
Age: 70
End: 2025-02-27

## 2025-02-27 DIAGNOSIS — D62 ABLA (ACUTE BLOOD LOSS ANEMIA): ICD-10-CM

## 2025-02-27 DIAGNOSIS — I10 ESSENTIAL (PRIMARY) HYPERTENSION: ICD-10-CM

## 2025-02-27 DIAGNOSIS — R42 DIZZINESS: ICD-10-CM

## 2025-02-27 DIAGNOSIS — Z09 HOSPITAL DISCHARGE FOLLOW-UP: Primary | ICD-10-CM

## 2025-02-27 ASSESSMENT — PATIENT HEALTH QUESTIONNAIRE - PHQ9: DEPRESSION UNABLE TO ASSESS: PT REFUSES

## 2025-02-27 NOTE — PROGRESS NOTES
Post-Discharge Transitional Care  Follow Up      Claudine Barnard   YOB: 1955    Date of Office Visit:  2/27/2025  Date of Hospital Admission: 2/13/25  Date of Hospital Discharge: 2/16/25    ASSESSMENT/PLAN:   Hospital discharge follow-up  -     [place] DISCHARGE MEDS RECONCILED W/ CURRENT OUTPATIENT MED LIST  ABLA (acute blood loss anemia)  -   Recheck labs to ensure no ongoing blood loss   -   Hemoglobin and Hematocrit; Future  Dizziness         -  Suspect secondary to orthostasis vs opiate side effect. She will continue to monitor blood pressures with HH and taper off of pain medication as she is able. If symptoms persist despite this she will let me know and will have her come into the office for evaluation.  Essential (primary) hypertension       - Reports no orthostasis since being home from the hospital, so will continue current antihypertensives. Advised to continue to monitor BP particularly when having dizziness and if orthostatic she will let me know and we will temporarily hold her HCTZ until BP and symptoms improve.     RTC in 5 months as scheduled for regular followup or sooner PRN       Subjective:     Inpatient course: Discharge summary reviewed- see chart.    Interval history/Current status: admitted after surgery due to pain control and reports that she was found to be quite orthostatic the next day. She says that after discharge she has been getting dizzy periodically. Usually when working with PT or when she has been standing for a long period of time.     No medication changes with the exception of pain medication. Says she has been taking oxycodone and has been slowly weaning herself off of this.     Noted she did have a significant Hgb drop intraoperatively - pre-admission labs showed Hgb 13.4 which dropped to 9.9 after surgery. Has not had repeat Hgb since discharge but does note she has home health nursing coming to check her INR weekly.     Patient Active Problem List   Diagnosis

## 2025-03-04 NOTE — TELEPHONE ENCOUNTER
Pt last seen on 2/27/2025.    Has appt on 7/21/2025.    Rx last filled on:  3/25/24 Valsartan #90/3RF  12/6/24 Verapamil #90/1RF    Will forward to MD for refill.

## 2025-03-05 RX ORDER — VERAPAMIL HYDROCHLORIDE 180 MG/1
180 TABLET, FILM COATED, EXTENDED RELEASE ORAL DAILY
Qty: 90 TABLET | Refills: 3 | Status: SHIPPED | OUTPATIENT
Start: 2025-03-05 | End: 2026-02-28

## 2025-03-05 RX ORDER — VALSARTAN 160 MG/1
160 TABLET ORAL DAILY
Qty: 90 TABLET | Refills: 3 | Status: SHIPPED | OUTPATIENT
Start: 2025-03-05

## 2025-03-17 ENCOUNTER — HOSPITAL ENCOUNTER (OUTPATIENT)
Facility: HOSPITAL | Age: 70
Setting detail: RECURRING SERIES
Discharge: HOME | End: 2025-03-20
Payer: MEDICARE

## 2025-03-17 PROCEDURE — 97110 THERAPEUTIC EXERCISES: CPT | Performed by: PHYSICAL THERAPIST

## 2025-03-17 PROCEDURE — 97162 PT EVAL MOD COMPLEX 30 MIN: CPT | Performed by: PHYSICAL THERAPIST

## 2025-03-17 PROCEDURE — 97016 VASOPNEUMATIC DEVICE THERAPY: CPT | Performed by: PHYSICAL THERAPIST

## 2025-03-17 NOTE — THERAPY EVALUATION
Physical Therapy at [place],   a part of [address], [address]  [place] [address]  Phone: [phone]  Fax: [phone]       PHYSICAL THERAPY - MEDICARE EVALUATION/PLAN OF CARE NOTE (updated 3/23)      Date: 3/17/2025          Patient Name:  Claudine Barnard :  1955   Medical   Diagnosis:  History of total knee arthroplasty, right [Z96.651] Treatment Diagnosis:  M25.561  RIGHT KNEE PAIN    Referral Source:  Danna Gonzalez PA-C Provider #:  [id number]                Insurance: Payor: [employer] / Plan: [employer] / Product Type: *No Product type* /      Patient  verified yes     Visit #   Current  / Total 1 24   Time   In / Out 12:45 PM 1:55 PM   Total Treatment Time 70   Total Timed Codes 40   1:1 Treatment Time 40    [facility] Totals Reminder:  bill using total billable   min of TIMED therapeutic procedures and modalities.   8-22 min = 1 unit; 23-37 min = 2 units; 38-52 min = 3 units;  53-67 min = 4 units; 68-82 min = 5 units           SUBJECTIVE  Pain Level (0-10 scale): 2/10  []constant []intermittent []improving []worsening []no change since onset    Any medication changes, allergies to medications, adverse drug reactions, diagnosis change, or new procedure performed?: [x] No    [] Yes (see summary sheet for update)  Medications: Verified on Patient Summary List    Subjective functional status/changes:     R TKA    Start of Care: 3/17/2025  Onset Date: 2025  Current symptoms/Complaints: Pain, stiffness associated with surgery. BPPV-like dizziness that she acquired shortly after the surgery.  Mechanism of Injury: Surgery was performed by Dr. Childers  PLOF: Regular personal training sessions  Limitations to PLOF/Activity or Recreational Limitations: She was only performing upper body exercises pre-op  Work Hx: Retired physician  Living Situation: Lives in a two story home with   Mobility: Limited in her ability to perform

## 2025-03-20 ENCOUNTER — HOSPITAL ENCOUNTER (OUTPATIENT)
Facility: HOSPITAL | Age: 70
Setting detail: RECURRING SERIES
Discharge: HOME | End: 2025-03-23
Payer: MEDICARE

## 2025-03-20 PROCEDURE — 97110 THERAPEUTIC EXERCISES: CPT | Performed by: PHYSICAL THERAPIST

## 2025-03-20 PROCEDURE — 97016 VASOPNEUMATIC DEVICE THERAPY: CPT | Performed by: PHYSICAL THERAPIST

## 2025-03-20 NOTE — PROGRESS NOTES
PHYSICAL THERAPY - MEDICARE DAILY TREATMENT NOTE (updated 3/23)      Date: 3/20/2025          Patient Name:  Claudine Barnard :  1955   Medical   Diagnosis:  History of total knee arthroplasty, right [Z96.651] Treatment Diagnosis:  M25.561  RIGHT KNEE PAIN    Referral Source:  Danna Gonzalez PA-C Insurance:   Payor: MEDICARE / Plan: MEDICARE PART A AND B / Product Type: *No Product type* /                     Patient  verified yes     Visit #   Current  / Total 2 24   Time   In / Out 9:30 AM 10:25 AM   Total Treatment Time 55   Total Timed Codes 40   1:1 Treatment Time 40      [facility] Totals Reminder:  bill using total billable   min of TIMED therapeutic procedures and modalities.   8-22 min = 1 unit; 23-37 min = 2 units; 38-52 min = 3 units; 53-67 min = 4 units; 68-82 min = 5 units            SUBJECTIVE    Pain Level (0-10 scale): 0/10    Any medication changes, allergies to medications, adverse drug reactions, diagnosis change, or new procedure performed?: [x] No    [] Yes (see summary sheet for update)  Medications: Verified on Patient Summary List    Subjective functional status/changes:     Patient has no significant pain at the moment and she was able to walk around her home without the SPC yesterday.    OBJECTIVE      Therapeutic Procedures:  Tx Min Billable or 1:1 Min (if diff from Tx Min) Procedure, Rationale, Specifics   40   01585 Therapeutic Exercise (timed):  increase ROM, strength, coordination, balance, and proprioception to improve patient's ability to progress to PLOF and address remaining functional goals. (see flow sheet as applicable)     Details if applicable:     40       Total Total                Modalities Rationale:     decrease edema, decrease inflammation, and decrease pain to improve patient's ability to progress to PLOF and address remaining functional goals.          min [] Estim Unattended,             type/location:       []  w/ice    []  w/heat                        min []

## 2025-03-26 ENCOUNTER — HOSPITAL ENCOUNTER (OUTPATIENT)
Facility: HOSPITAL | Age: 70
Setting detail: RECURRING SERIES
Discharge: HOME | End: 2025-03-29
Payer: MEDICARE

## 2025-03-26 PROCEDURE — 97016 VASOPNEUMATIC DEVICE THERAPY: CPT | Performed by: PHYSICAL THERAPIST

## 2025-03-26 PROCEDURE — 97110 THERAPEUTIC EXERCISES: CPT | Performed by: PHYSICAL THERAPIST

## 2025-03-26 NOTE — PROGRESS NOTES
PHYSICAL THERAPY - MEDICARE DAILY TREATMENT NOTE (updated 3/23)      Date: 3/26/2025          Patient Name:  Claudine Barnard :  1955   Medical   Diagnosis:  History of total knee arthroplasty, right [Z96.651] Treatment Diagnosis:  M25.561  RIGHT KNEE PAIN    Referral Source:  Danna Gonzalez PA-C Insurance:   Payor: MEDICARE / Plan: MEDICARE PART A AND B / Product Type: *No Product type* /                     Patient  verified yes     Visit #   Current  / Total 3 24   Time   In / Out 9:25 AM 10:20 AM   Total Treatment Time 55   Total Timed Codes 40   1:1 Treatment Time 40      [facility] Totals Reminder:  bill using total billable   min of TIMED therapeutic procedures and modalities.   8-22 min = 1 unit; 23-37 min = 2 units; 38-52 min = 3 units; 53-67 min = 4 units; 68-82 min = 5 units            SUBJECTIVE    Pain Level (0-10 scale): 0/10    Any medication changes, allergies to medications, adverse drug reactions, diagnosis change, or new procedure performed?: [x] No    [] Yes (see summary sheet for update)  Medications: Verified on Patient Summary List    Subjective functional status/changes:     Patient saw her surgeon yesterday and he was very happy with the progress seen with her ROM.    OBJECTIVE      Therapeutic Procedures:  Tx Min Billable or 1:1 Min (if diff from Tx Min) Procedure, Rationale, Specifics   40   [id number] Therapeutic Exercise (timed):  increase ROM, strength, coordination, balance, and proprioception to improve patient's ability to progress to PLOF and address remaining functional goals. (see flow sheet as applicable)     Details if applicable:     40       Total Total                Modalities Rationale:     decrease edema, decrease inflammation, and decrease pain to improve patient's ability to progress to PLOF and address remaining functional goals.          min [] Estim Unattended,             type/location:       []  w/ice    []  w/heat                        min [] Estim Attended,

## 2025-03-28 ENCOUNTER — HOSPITAL ENCOUNTER (OUTPATIENT)
Facility: HOSPITAL | Age: 70
Setting detail: RECURRING SERIES
Discharge: HOME | End: 2025-03-31
Payer: MEDICARE

## 2025-03-28 PROCEDURE — 97110 THERAPEUTIC EXERCISES: CPT

## 2025-03-28 PROCEDURE — 97016 VASOPNEUMATIC DEVICE THERAPY: CPT

## 2025-03-28 NOTE — PROGRESS NOTES
PHYSICAL THERAPY - MEDICARE DAILY TREATMENT NOTE (updated 3/23)      Date: 3/28/2025          Patient Name:  Claudine Barnard :  1955   Medical   Diagnosis:  History of total knee arthroplasty, right [Z96.651] Treatment Diagnosis:  M25.561  RIGHT KNEE PAIN    Referral Source:  Danna Gonzalez PA-C Insurance:   Payor: MEDICARE / Plan: MEDICARE PART A AND B / Product Type: *No Product type* /                     Patient  verified yes     Visit #   Current  / Total 4 24   Time   In / Out 12:20 PM 1:15 PM   Total Treatment Time 55   Total Timed Codes 40   1:1 Treatment Time 40      [facility] Totals Reminder:  bill using total billable   min of TIMED therapeutic procedures and modalities.   8-22 min = 1 unit; 23-37 min = 2 units; 38-52 min = 3 units; 53-67 min = 4 units; 68-82 min = 5 units            SUBJECTIVE    Pain Level (0-10 scale): 0/10    Any medication changes, allergies to medications, adverse drug reactions, diagnosis change, or new procedure performed?: [x] No    [] Yes (see summary sheet for update)  Medications: Verified on Patient Summary List    Subjective functional status/changes:     Patient reporting that she is doing well, having some lateral knee pain but believes that is due to the bruising.     OBJECTIVE      Therapeutic Procedures:  Tx Min Billable or 1:1 Min (if diff from Tx Min) Procedure, Rationale, Specifics   40   90383 Therapeutic Exercise (timed):  increase ROM, strength, coordination, balance, and proprioception to improve patient's ability to progress to PLOF and address remaining functional goals. (see flow sheet as applicable)     Details if applicable:     40       Total Total                Modalities Rationale:     decrease edema, decrease inflammation, and decrease pain to improve patient's ability to progress to PLOF and address remaining functional goals.          min [] Estim Unattended,             type/location:       []  w/ice    []  w/heat                        min

## 2025-03-31 DIAGNOSIS — D62 ABLA (ACUTE BLOOD LOSS ANEMIA): ICD-10-CM

## 2025-04-02 ENCOUNTER — HOSPITAL ENCOUNTER (OUTPATIENT)
Facility: HOSPITAL | Age: 70
Setting detail: RECURRING SERIES
Discharge: HOME | End: 2025-04-05
Payer: MEDICARE

## 2025-04-02 PROCEDURE — 97110 THERAPEUTIC EXERCISES: CPT

## 2025-04-02 PROCEDURE — 97016 VASOPNEUMATIC DEVICE THERAPY: CPT

## 2025-04-02 NOTE — PROGRESS NOTES
PHYSICAL THERAPY - MEDICARE DAILY TREATMENT NOTE (updated 3/23)      Date: 2025          Patient Name:  Claudine Barnard :  1955   Medical   Diagnosis:  History of total knee arthroplasty, right [Z96.651] Treatment Diagnosis:  M25.561  RIGHT KNEE PAIN    Referral Source:  Danna Gonzalez PA-C Insurance:   Payor: MEDICARE / Plan: MEDICARE PART A AND B / Product Type: *No Product type* /                     Patient  verified yes     Visit #   Current  / Total 5 24   Time   In / Out 2:15 PM 3:15 PM   Total Treatment Time 60   Total Timed Codes 45   1:1 Treatment Time 45      [facility] Totals Reminder:  bill using total billable   min of TIMED therapeutic procedures and modalities.   8-22 min = 1 unit; 23-37 min = 2 units; 38-52 min = 3 units; 53-67 min = 4 units; 68-82 min = 5 units            SUBJECTIVE    Pain Level (0-10 scale): 0/10    Any medication changes, allergies to medications, adverse drug reactions, diagnosis change, or new procedure performed?: [x] No    [] Yes (see summary sheet for update)  Medications: Verified on Patient Summary List    Subjective functional status/changes:     Patient reporting that she was sore after her last visit and the soreness lasted about 1 day and then reduced.     OBJECTIVE      Therapeutic Procedures:  Tx Min Billable or 1:1 Min (if diff from Tx Min) Procedure, Rationale, Specifics   45   88637 Therapeutic Exercise (timed):  increase ROM, strength, coordination, balance, and proprioception to improve patient's ability to progress to PLOF and address remaining functional goals. (see flow sheet as applicable)     Details if applicable:     45       Total Total      Modalities Rationale:     decrease edema, decrease inflammation, and decrease pain to improve patient's ability to progress to PLOF and address remaining functional goals.          min [] Estim Unattended,             type/location:       []  w/ice    []  w/heat                        min [] Estim

## 2025-04-04 ENCOUNTER — HOSPITAL ENCOUNTER (OUTPATIENT)
Facility: HOSPITAL | Age: 70
Setting detail: RECURRING SERIES
Discharge: HOME | End: 2025-04-07
Payer: MEDICARE

## 2025-04-04 PROCEDURE — 97016 VASOPNEUMATIC DEVICE THERAPY: CPT

## 2025-04-04 PROCEDURE — 97110 THERAPEUTIC EXERCISES: CPT

## 2025-04-04 NOTE — PROGRESS NOTES
PHYSICAL THERAPY - MEDICARE DAILY TREATMENT NOTE (updated 3/23)      Date: 2025          Patient Name:  Claudine Barnard :  1955   Medical   Diagnosis:  History of total knee arthroplasty, right [Z96.651] Treatment Diagnosis:  M25.561  RIGHT KNEE PAIN    Referral Source:  Danna Gonzalez PA-C Insurance:   Payor: MEDICARE / Plan: MEDICARE PART A AND B / Product Type: *No Product type* /                     Patient  verified yes     Visit #   Current  / Total 5 24   Time   In / Out 12:17 PM 1:12 PM   Total Treatment Time 55   Total Timed Codes 45   1:1 Treatment Time 45      [facility] Totals Reminder:  bill using total billable   min of TIMED therapeutic procedures and modalities.   8-22 min = 1 unit; 23-37 min = 2 units; 38-52 min = 3 units; 53-67 min = 4 units; 68-82 min = 5 units            SUBJECTIVE    Pain Level (0-10 scale): 0/10    Any medication changes, allergies to medications, adverse drug reactions, diagnosis change, or new procedure performed?: [x] No    [] Yes (see summary sheet for update)  Medications: Verified on Patient Summary List    Subjective functional status/changes:     Patient reporting she had less soreness after her last visit has bean to drive herself without limitation.     OBJECTIVE      Therapeutic Procedures:  Tx Min Billable or 1:1 Min (if diff from Tx Min) Procedure, Rationale, Specifics   40  35443 Therapeutic Exercise (timed):  increase ROM, strength, coordination, balance, and proprioception to improve patient's ability to progress to PLOF and address remaining functional goals. (see flow sheet as applicable)     Details if applicable:     5  [id number] Self Care/Home Management (timed):  improve patient knowledge and understanding of home injury/symptom/pain management and joint protection strategies  to improve patient's ability to progress to PLOF and address remaining functional goals.  (see flow sheet as applicable)     Details if applicable:  scar desensitization

## 2025-04-09 ENCOUNTER — HOSPITAL ENCOUNTER (OUTPATIENT)
Facility: HOSPITAL | Age: 70
Setting detail: RECURRING SERIES
Discharge: HOME | End: 2025-04-12
Payer: MEDICARE

## 2025-04-09 PROCEDURE — 97110 THERAPEUTIC EXERCISES: CPT | Performed by: PHYSICAL THERAPIST

## 2025-04-09 PROCEDURE — 97016 VASOPNEUMATIC DEVICE THERAPY: CPT | Performed by: PHYSICAL THERAPIST

## 2025-04-09 NOTE — PROGRESS NOTES
PHYSICAL THERAPY - MEDICARE DAILY TREATMENT NOTE (updated 3/23)      Date: 2025          Patient Name:  Claudine Barnard :  1955   Medical   Diagnosis:  History of total knee arthroplasty, right [Z96.651] Treatment Diagnosis:  M25.561  RIGHT KNEE PAIN    Referral Source:  Danna Gonzalez PA-C Insurance:   Payor: MEDICARE / Plan: MEDICARE PART A AND B / Product Type: *No Product type* /                     Patient  verified yes     Visit #   Current  / Total 7 24   Time   In / Out 12:40 PM 1:35 PM   Total Treatment Time 55   Total Timed Codes 45   1:1 Treatment Time 45      [facility] Totals Reminder:  bill using total billable   min of TIMED therapeutic procedures and modalities.   8-22 min = 1 unit; 23-37 min = 2 units; 38-52 min = 3 units; 53-67 min = 4 units; 68-82 min = 5 units            SUBJECTIVE    Pain Level (0-10 scale): 0/10    Any medication changes, allergies to medications, adverse drug reactions, diagnosis change, or new procedure performed?: [x] No    [] Yes (see summary sheet for update)  Medications: Verified on Patient Summary List    Subjective functional status/changes:     Patient has not needed the SPC for over a week and is very happy with the progress she has made with walking.    OBJECTIVE      Therapeutic Procedures:  Tx Min Billable or 1:1 Min (if diff from Tx Min) Procedure, Rationale, Specifics   40  81440 Therapeutic Exercise (timed):  increase ROM, strength, coordination, balance, and proprioception to improve patient's ability to progress to PLOF and address remaining functional goals. (see flow sheet as applicable)     Details if applicable:       94186 Self Care/Home Management (timed):  improve patient knowledge and understanding of home injury/symptom/pain management and joint protection strategies  to improve patient's ability to progress to PLOF and address remaining functional goals.  (see flow sheet as applicable)     Details if applicable:  scar desensitization

## 2025-04-11 ENCOUNTER — HOSPITAL ENCOUNTER (OUTPATIENT)
Facility: HOSPITAL | Age: 70
Setting detail: RECURRING SERIES
Discharge: HOME | End: 2025-04-14
Payer: MEDICARE

## 2025-04-11 PROCEDURE — 97110 THERAPEUTIC EXERCISES: CPT

## 2025-04-11 PROCEDURE — 97016 VASOPNEUMATIC DEVICE THERAPY: CPT

## 2025-04-11 NOTE — PROGRESS NOTES
PHYSICAL THERAPY - MEDICARE DAILY TREATMENT NOTE (updated 3/23)      Date: 2025          Patient Name:  Claudine Barnard :  1955   Medical   Diagnosis:  History of total knee arthroplasty, right [Z96.651] Treatment Diagnosis:  M25.561  RIGHT KNEE PAIN    Referral Source:  Danna Gonzalez PA-C Insurance:   Payor: MEDICARE / Plan: MEDICARE PART A AND B / Product Type: *No Product type* /                     Patient  verified yes     Visit #   Current  / Total 8 24   Time   In / Out 12:05 PM 1:00 PM   Total Treatment Time 55   Total Timed Codes 45   1:1 Treatment Time 45      [facility] Totals Reminder:  bill using total billable   min of TIMED therapeutic procedures and modalities.   8-22 min = 1 unit; 23-37 min = 2 units; 38-52 min = 3 units; 53-67 min = 4 units; 68-82 min = 5 units            SUBJECTIVE    Pain Level (0-10 scale): 0/10    Any medication changes, allergies to medications, adverse drug reactions, diagnosis change, or new procedure performed?: [x] No    [] Yes (see summary sheet for update)  Medications: Verified on Patient Summary List    Subjective functional status/changes:     Patient reports she felt a little sore after her last visit but overall doing well today.    OBJECTIVE      Therapeutic Procedures:  Tx Min Billable or 1:1 Min (if diff from Tx Min) Procedure, Rationale, Specifics   40  48652 Therapeutic Exercise (timed):  increase ROM, strength, coordination, balance, and proprioception to improve patient's ability to progress to PLOF and address remaining functional goals. (see flow sheet as applicable)     Details if applicable:       60078 Self Care/Home Management (timed):  improve patient knowledge and understanding of home injury/symptom/pain management and joint protection strategies  to improve patient's ability to progress to PLOF and address remaining functional goals.  (see flow sheet as applicable)     Details if applicable:  scar desensitization          Details if

## 2025-04-16 ENCOUNTER — HOSPITAL ENCOUNTER (OUTPATIENT)
Facility: HOSPITAL | Age: 70
Setting detail: RECURRING SERIES
Discharge: HOME | End: 2025-04-19
Payer: MEDICARE

## 2025-04-16 PROCEDURE — 97110 THERAPEUTIC EXERCISES: CPT | Performed by: PHYSICAL THERAPIST

## 2025-04-16 PROCEDURE — 97016 VASOPNEUMATIC DEVICE THERAPY: CPT | Performed by: PHYSICAL THERAPIST

## 2025-04-16 NOTE — PROGRESS NOTES
Physical Therapy at [place],   a part of [facility]  [address], [address]  [place] [id number]  Phone: [phone]  Fax: [phone]  PHYSICAL THERAPY PROGRESS NOTE  Patient Name:  Claudine Barnard :  1955   Treatment/Medical Diagnosis: History of total knee arthroplasty, right [Z96.651]   Referral Source:  Danna Gonzalez PA-C     Date of Initial Visit:  25 Attended Visits:  9 Missed Visits:  0     SUMMARY OF TREATMENT/ASSESSMENT:  Therapeutic exercises and modalities to improve ROM and strength following a R TKA.    CURRENT STATUS/GOALS  The patient has shown excellent progress over the previous 4 weeks of PT with an achievement of full ROM and a resolution of pain. She is now driving and performing all ADL's independently. There are still some minor impairments in quad strength, particularly eccentric lowering, and the patient remains a strong candidate to continue working towards all long term goals.    Short Term Goals: To be accomplished in 12 treatments.  The patient will be independent with introductory HEP with no v.c. MET   The patient will demonstrate knee flexion AROM to 140 deg to allow for donning and doffing of all LE clothing and shoe wear without assistance needed  MET  The patient will demonstrate pain free knee extension A/PROM to improve gait mechanics when ambulating a minimum of 250 ft   MET  The patient will transfer sit to stand without UE assistance to improve functional mobility in home setting.   MET     Long Term Goals: To be accomplished in 24 treatments.  The patient will demonstrate 5/5 R LE strength to allow for ambulation up and down a minimum of 4 standard stairs  NOT MET  The patient will subjectively report the ability to ambulate on uneven surfaces without fear of falling x 500 ft   NOT MET  The patient will demonstrate independence with finalized HEP without v.c. needed to allow for all transfers, ambulation x

## 2025-04-16 NOTE — PROGRESS NOTES
PHYSICAL THERAPY - MEDICARE DAILY TREATMENT NOTE (updated 3/23)      Date: 2025          Patient Name:  Claudine Barnard :  1955   Medical   Diagnosis:  History of total knee arthroplasty, right [Z96.651] Treatment Diagnosis:  M25.561  RIGHT KNEE PAIN    Referral Source:  Danna Gonzalez PA-C Insurance:   Payor: MEDICARE / Plan: MEDICARE PART A AND B / Product Type: *No Product type* /                     Patient  verified yes     Visit #   Current  / Total 9 24   Time   In / Out 12:45 PM 1:40 PM   Total Treatment Time 55   Total Timed Codes 45   1:1 Treatment Time 45       [facility] Totals Reminder:  bill using total billable   min of TIMED therapeutic procedures and modalities.   8-22 min = 1 unit; 23-37 min = 2 units; 38-52 min = 3 units; 53-67 min = 4 units; 68-82 min = 5 units            SUBJECTIVE    Pain Level (0-10 scale): 0/10    Any medication changes, allergies to medications, adverse drug reactions, diagnosis change, or new procedure performed?: [x] No    [] Yes (see summary sheet for update)  Medications: Verified on Patient Summary List    Subjective functional status/changes:     Patient reports being able to drive for the past two days without limitations.    OBJECTIVE      Therapeutic Procedures:  Tx Min Billable or 1:1 Min (if diff from Tx Min) Procedure, Rationale, Specifics   45  42678 Therapeutic Exercise (timed):  increase ROM, strength, coordination, balance, and proprioception to improve patient's ability to progress to PLOF and address remaining functional goals. (see flow sheet as applicable)     Details if applicable:       19290 Self Care/Home Management (timed):  improve patient knowledge and understanding of home injury/symptom/pain management and joint protection strategies  to improve patient's ability to progress to PLOF and address remaining functional goals.  (see flow sheet as applicable)     Details if applicable:  scar desensitization          Details if applicable:

## 2025-04-18 ENCOUNTER — HOSPITAL ENCOUNTER (OUTPATIENT)
Facility: HOSPITAL | Age: 70
Setting detail: RECURRING SERIES
Discharge: HOME | End: 2025-04-21
Payer: MEDICARE

## 2025-04-18 PROCEDURE — 97110 THERAPEUTIC EXERCISES: CPT | Performed by: PHYSICAL THERAPIST

## 2025-04-18 PROCEDURE — 97016 VASOPNEUMATIC DEVICE THERAPY: CPT | Performed by: PHYSICAL THERAPIST

## 2025-04-18 NOTE — PROGRESS NOTES
PHYSICAL THERAPY - MEDICARE DAILY TREATMENT NOTE (updated 3/23)      Date: 2025          Patient Name:  Claudine Barnard :  1955   Medical   Diagnosis:  History of total knee arthroplasty, right [Z96.651] Treatment Diagnosis:  M25.561  RIGHT KNEE PAIN    Referral Source:  Danna Gonzalez PA-C Insurance:   Payor: MEDICARE / Plan: MEDICARE PART A AND B / Product Type: *No Product type* /                     Patient  verified yes     Visit #   Current  / Total 10 24   Time   In / Out 12:30 PM 1:25 PM   Total Treatment Time 55   Total Timed Codes 45   1:1 Treatment Time 45      [facility] Totals Reminder:  bill using total billable   min of TIMED therapeutic procedures and modalities.   8-22 min = 1 unit; 23-37 min = 2 units; 38-52 min = 3 units; 53-67 min = 4 units; 68-82 min = 5 units            SUBJECTIVE    Pain Level (0-10 scale): 0/10    Any medication changes, allergies to medications, adverse drug reactions, diagnosis change, or new procedure performed?: [x] No    [] Yes (see summary sheet for update)  Medications: Verified on Patient Summary List    Subjective functional status/changes:     Patient got a great f/u visit with Dr. Childers and he is allowing her to return to swimming.    OBJECTIVE      Therapeutic Procedures:  Tx Min Billable or 1:1 Min (if diff from Tx Min) Procedure, Rationale, Specifics   45  23778 Therapeutic Exercise (timed):  increase ROM, strength, coordination, balance, and proprioception to improve patient's ability to progress to PLOF and address remaining functional goals. (see flow sheet as applicable)     Details if applicable:       30282 Self Care/Home Management (timed):  improve patient knowledge and understanding of home injury/symptom/pain management and joint protection strategies  to improve patient's ability to progress to PLOF and address remaining functional goals.  (see flow sheet as applicable)     Details if applicable:  scar desensitization          Details if

## 2025-04-25 ENCOUNTER — HOSPITAL ENCOUNTER (OUTPATIENT)
Facility: HOSPITAL | Age: 70
Setting detail: RECURRING SERIES
Discharge: HOME | End: 2025-04-28
Payer: MEDICARE

## 2025-04-25 PROCEDURE — 97016 VASOPNEUMATIC DEVICE THERAPY: CPT

## 2025-04-25 PROCEDURE — 97112 NEUROMUSCULAR REEDUCATION: CPT

## 2025-04-25 PROCEDURE — 97110 THERAPEUTIC EXERCISES: CPT

## 2025-04-25 NOTE — PROGRESS NOTES
PHYSICAL THERAPY - MEDICARE DAILY TREATMENT NOTE (updated 3/23)      Date: 2025          Patient Name:  Claudine Barnard :  1955   Medical   Diagnosis:  History of total knee arthroplasty, right [Z96.651] Treatment Diagnosis:  M25.561  RIGHT KNEE PAIN    Referral Source:  Danna Gonzalez PA-C Insurance:   Payor: MEDICARE / Plan: MEDICARE PART A AND B / Product Type: *No Product type* /                     Patient  verified yes     Visit #   Current  / Total 11 24   Time   In / Out 10:50 AM 11:40 AM   Total Treatment Time 50   Total Timed Codes 40   1:1 Treatment Time 40      [facility] Totals Reminder:  bill using total billable   min of TIMED therapeutic procedures and modalities.   8-22 min = 1 unit; 23-37 min = 2 units; 38-52 min = 3 units; 53-67 min = 4 units; 68-82 min = 5 units            SUBJECTIVE    Pain Level (0-10 scale): 0/10    Any medication changes, allergies to medications, adverse drug reactions, diagnosis change, or new procedure performed?: [x] No    [] Yes (see summary sheet for update)  Medications: Verified on Patient Summary List    Subjective functional status/changes:     Patient reporting continued progress at this time. Pt inquiring about some pool exercises and balance exercises she can start at home.     OBJECTIVE      Therapeutic Procedures:  Tx Min Billable or 1:1 Min (if diff from Tx Min) Procedure, Rationale, Specifics   30  14566 Therapeutic Exercise (timed):  increase ROM, strength, coordination, balance, and proprioception to improve patient's ability to progress to PLOF and address remaining functional goals. (see flow sheet as applicable)     Details if applicable:     10  75028 Neuromuscular Re-Education (timed):  improve balance, coordination, kinesthetic sense, posture, core stability and proprioception to improve patient's ability to develop conscious control of individual muscles and awareness of position of extremities in order to progress to PLOF and address

## 2025-04-30 ENCOUNTER — HOSPITAL ENCOUNTER (OUTPATIENT)
Facility: HOSPITAL | Age: 70
Setting detail: RECURRING SERIES
Discharge: HOME | End: 2025-05-03
Payer: MEDICARE

## 2025-04-30 PROCEDURE — 97535 SELF CARE MNGMENT TRAINING: CPT

## 2025-04-30 PROCEDURE — 97110 THERAPEUTIC EXERCISES: CPT

## 2025-04-30 NOTE — PROGRESS NOTES
in 12 treatments. 3/4 MET  The patient will be independent with introductory HEP with no v.c.  - MET  The patient will demonstrate knee flexion AROM to 140 deg to allow for donning and doffing of all LE clothing and shoe wear without assistance needed - NOT MET  The patient will demonstrate pain free knee extension A/PROM to improve gait mechanics when ambulating a minimum of 250 ft - MET  The patient will transfer sit to stand without UE assistance to improve functional mobility in home setting. - MET     Long Term Goals: To be accomplished in 24 treatments. 4/4 MET  The patient will demonstrate 5/5 R LE strength to allow for ambulation up and down a minimum of 4 standard stairs - MET  The patient will subjectively report the ability to ambulate on uneven surfaces without fear of falling x 500 ft - MET  The patient will demonstrate independence with finalized HEP without v.c. needed to allow for all transfers, ambulation x 500 ft and all in home functional mobility - MET  The patient will negotiate 12 steps reciprocally without an increase in pain from baseline level or without reported fear of falling and without UE assistance on railing to improve safety in the home. - MET    PLAN  Yes  Continue plan of care  Re-Cert Due: 90 days  []  Upgrade activities as tolerated  [x]  Discharge due to: all goals met  []  Other:      Trevor Hunt, PTA       4/30/2025       2:14 PM

## 2025-05-09 ENCOUNTER — OFFICE VISIT (OUTPATIENT)
Age: 70
End: 2025-05-09
Payer: MEDICARE

## 2025-05-09 VITALS
DIASTOLIC BLOOD PRESSURE: 83 MMHG | SYSTOLIC BLOOD PRESSURE: 145 MMHG | OXYGEN SATURATION: 96 % | TEMPERATURE: 98 F | BODY MASS INDEX: 29.82 KG/M2 | WEIGHT: 190.4 LBS | RESPIRATION RATE: 16 BRPM | HEART RATE: 84 BPM

## 2025-05-09 DIAGNOSIS — Z51.81 MEDICATION MONITORING ENCOUNTER: ICD-10-CM

## 2025-05-09 DIAGNOSIS — Z01.89 ENCOUNTER FOR OTHER SPECIFIED SPECIAL EXAMINATIONS: ICD-10-CM

## 2025-05-09 DIAGNOSIS — M35.3 POLYMYALGIA RHEUMATICA: Primary | ICD-10-CM

## 2025-05-09 DIAGNOSIS — M31.6 OTHER GIANT CELL ARTERITIS (HCC): ICD-10-CM

## 2025-05-09 PROCEDURE — 99214 OFFICE O/P EST MOD 30 MIN: CPT | Performed by: NURSE PRACTITIONER

## 2025-05-09 PROCEDURE — 1125F AMNT PAIN NOTED PAIN PRSNT: CPT | Performed by: NURSE PRACTITIONER

## 2025-05-09 PROCEDURE — 1123F ACP DISCUSS/DSCN MKR DOCD: CPT | Performed by: NURSE PRACTITIONER

## 2025-05-09 PROCEDURE — 1159F MED LIST DOCD IN RCRD: CPT | Performed by: NURSE PRACTITIONER

## 2025-05-09 PROCEDURE — G8399 PT W/DXA RESULTS DOCUMENT: HCPCS | Performed by: NURSE PRACTITIONER

## 2025-05-09 PROCEDURE — 3079F DIAST BP 80-89 MM HG: CPT | Performed by: NURSE PRACTITIONER

## 2025-05-09 PROCEDURE — 1160F RVW MEDS BY RX/DR IN RCRD: CPT | Performed by: NURSE PRACTITIONER

## 2025-05-09 PROCEDURE — 3077F SYST BP >= 140 MM HG: CPT | Performed by: NURSE PRACTITIONER

## 2025-05-09 PROCEDURE — 1090F PRES/ABSN URINE INCON ASSESS: CPT | Performed by: NURSE PRACTITIONER

## 2025-05-09 PROCEDURE — 1036F TOBACCO NON-USER: CPT | Performed by: NURSE PRACTITIONER

## 2025-05-09 PROCEDURE — G8417 CALC BMI ABV UP PARAM F/U: HCPCS | Performed by: NURSE PRACTITIONER

## 2025-05-09 PROCEDURE — G8427 DOCREV CUR MEDS BY ELIG CLIN: HCPCS | Performed by: NURSE PRACTITIONER

## 2025-05-09 PROCEDURE — 3017F COLORECTAL CA SCREEN DOC REV: CPT | Performed by: NURSE PRACTITIONER

## 2025-05-09 ASSESSMENT — RHEUMATOLOGY NEW PATIENT QUESTIONNAIRE
EYE PAIN: N
LOSS OF VISION: N
UNEXPLAINED HEARING LOSS: N
DEPRESSION: Y
ABNORMAL URINE: N
BEHAVIORAL CHANGES: N
MUSCLE WEAKNESS: N
DIFFICULTY BREATHING LYING DOWN: N
EYE DRYNESS: N
SWOLLEN LEGS OR FEET: N
NIGHT SWEATS: N
SUN SENSITIVE (SUN ALLERGY): N
SWOLLEN OR TENDER GLANDS: N
JOINT SWELLING: N
FAINTING: N
MORNING STIFFNESS: Y
DOUBLE OR BLURRED VISION: N
SHORTNESS OF BREATH: N
VAGINAL DRYNESS: N
DIFFICULTY STAYING ASLEEP: Y
FEVER: N
HEARTBURN OR REFLUX: Y
PERSISTENT DIARRHEA: N
EXCESSIVE HAIR LOSS (MORE THAN YOUR NORM): N
SKIN TIGHTNESS: N
RASH OR ULCERS: N
BLOOD IN STOOLS: N
HOW WOULD YOU DESCRIBE YOUR STIFFNESS ON AVERAGE: MODERATE
LOSS OF CONSCIOUSNESS: N
UNEXPLAINED WEIGHT CHANGE: N
NAUSEA: N
STOMACH PAIN: Y
HOARSE VOICE: N
COUGH: N
COLOR CHANGES OF HANDS OR FEET IN THE COLD: N
NODULES/BUMPS: N
RASH: N
INCREASED SUSCEPTIBILITY TO INFECTION: N
MORNING STIFFNESS IN LOWER BACK: Y
VOMITING OF BLOOD OR COFFEE GROUND CONSISTENCY MATERIAL: N
DRYNESS OF MOUTH: Y
EASILY LOSING TEMPER: N
UNUSUAL BLEEDING: N
UNUSUAL FATIGUE: Y
ANXIETY: N
SKIN REDNESS: N
MEMORY LOSS: N
AGITATION: N
JAUNDICE: N
SORES IN MOUTH OR NOSE: Y
BLACK STOOLS: N
EASY BRUISING: N
NUMBNESS OR TINGLING IN HANDS OR FEET: Y
PAIN OR BURNING ON URINATION: N
DIFFICULTY SWALLOWING: N
UNUSUALLY RAPID OR SLOWED HEART RATE: N
DIFFICULTY FALLING ASLEEP: N
CHEST PAIN: N
HEADACHES: N
EYE REDNESS: N
JOINT PAIN: Y
ANEMIA: N
SEIZURES: N

## 2025-05-09 ASSESSMENT — ENCOUNTER SYMPTOMS
NAUSEA: 0
ABDOMINAL PAIN: 1
VOMITING: 0
SORE THROAT: 0
EYE REDNESS: 0
COUGH: 0
SHORTNESS OF BREATH: 0
BLOOD IN STOOL: 0
DIARRHEA: 1
TROUBLE SWALLOWING: 0
EYE PAIN: 0
COLOR CHANGE: 0
CONSTIPATION: 0

## 2025-05-09 NOTE — PROGRESS NOTES
Chief Complaint   Patient presents with    New Patient     1. Have you been to the ER, urgent care clinic since your last visit?  Hospitalized since your last visit?No    2. Have you seen or consulted any other health care providers outside of the [facility] since your last visit?  Include any pap smears or colon screening.  yes    
mouth 2 times daily for 180 days. Max Daily Amount: 150 mg 180 capsule 1    SITagliptin (JANUVIA) 100 MG tablet Take 1 tablet by mouth daily 90 tablet 3    sarilumab (KEVZARA) 200 MG/1.14ML SOSY injection Inject 1.14 mLs into the skin every 14 days Every other friday 2 each 6    folic acid (FOLVITE) 1 MG tablet Take 1 tablet by mouth daily (Patient not taking: Reported on 4/17/2025) 90 tablet 1    empagliflozin (JARDIANCE) 10 MG tablet Take 1 tablet by mouth daily 90 tablet 1    hydroCHLOROthiazide (HYDRODIURIL) 25 MG tablet TAKE 1 TABLET BY MOUTH ONCE A DAY 90 tablet 3    metFORMIN (GLUCOPHAGE) 500 MG tablet Take 2 tablets by mouth 2 times daily (with meals) 360 tablet 3    Multiple Vitamin (MULTIVITAMIN ADULT PO) Take by mouth (Patient not taking: Reported on 4/17/2025)      Omega-3 Fatty Acids (FISH OIL OMEGA-3 PO) Take by mouth (Patient not taking: Reported on 4/17/2025)      traZODone (DESYREL) 50 MG tablet Take 2 tablets by mouth nightly      buPROPion (WELLBUTRIN XL) 150 MG extended release tablet Take 1 tablet by mouth every morning (Patient taking differently: Take 2 tablets by mouth every morning) 90 tablet 3    acetaminophen (TYLENOL) 325 MG tablet Take by mouth every 4 hours as needed      Cholecalciferol 25 MCG (1000 UT) CHEW Take 1 capsule by mouth daily (Patient not taking: Reported on 4/17/2025)      DULoxetine (CYMBALTA) 60 MG extended release capsule Take 1 capsule by mouth every evening      estradiol (ESTRACE) 1 MG tablet Take 1 tablet by mouth daily      omeprazole (PRILOSEC OTC) 20 MG tablet Take by mouth nightly 1 tab in the morning and  tablets at night       No current facility-administered medications for this visit.     Allergies   Allergen Reactions    Linneus Flavoring Agent (Non-Screening) Hives     Linneus fruit not flavor    Morphine Other (See Comments)     Extreme epigastric pain    Statins Other (See Comments)     Pt stated muscle injuries.     Alendronate Nausea And Vomiting and Other

## 2025-05-09 NOTE — PATIENT INSTRUCTIONS
Thank you for visiting [facility]!      For future medication refills, we require updated lab results and an upcoming appointment to be in our system to refill prescriptions.  Without these two things, your refill will be DENIED.  If you miss your upcoming appointment, your refill will also be DENIED.      We appreciate your understanding of this critical clinical aspect of our practice. - ARNOL Chawla

## 2025-05-10 LAB
-: NORMAL
ALBUMIN SERPL-MCNC: 3.7 G/DL (ref 3.5–5)
ALBUMIN/GLOB SERPL: 1.1 (ref 1.1–2.2)
ALP SERPL-CCNC: 67 U/L (ref 45–117)
ALT SERPL-CCNC: 18 U/L (ref 12–78)
ANION GAP SERPL CALC-SCNC: 7 MMOL/L (ref 2–12)
AST SERPL-CCNC: 16 U/L (ref 15–37)
BASOPHILS # BLD: 0.03 K/UL (ref 0–0.1)
BASOPHILS NFR BLD: 0.7 % (ref 0–1)
BILIRUB SERPL-MCNC: 0.3 MG/DL (ref 0.2–1)
BUN SERPL-MCNC: 25 MG/DL (ref 6–20)
BUN/CREAT SERPL: 19 (ref 12–20)
CALCIUM SERPL-MCNC: 9.3 MG/DL (ref 8.5–10.1)
CHLORIDE SERPL-SCNC: 104 MMOL/L (ref 97–108)
CO2 SERPL-SCNC: 26 MMOL/L (ref 21–32)
CREAT SERPL-MCNC: 1.35 MG/DL (ref 0.55–1.02)
CRP SERPL-MCNC: 2.23 MG/DL (ref 0–0.3)
DIFFERENTIAL METHOD BLD: ABNORMAL
EOSINOPHIL # BLD: 0.05 K/UL (ref 0–0.4)
EOSINOPHIL NFR BLD: 1.1 % (ref 0–7)
ERYTHROCYTE [DISTWIDTH] IN BLOOD BY AUTOMATED COUNT: 14.6 % (ref 11.5–14.5)
ERYTHROCYTE [SEDIMENTATION RATE] IN BLOOD: 11 MM/HR (ref 0–30)
GLOBULIN SER CALC-MCNC: 3.5 G/DL (ref 2–4)
GLUCOSE SERPL-MCNC: 232 MG/DL (ref 65–100)
HAV IGM SER QL: NONREACTIVE
HBV CORE IGM SER QL: NONREACTIVE
HBV SURFACE AG SER QL: <0.1 INDEX
HBV SURFACE AG SER QL: NEGATIVE
HCT VFR BLD AUTO: 38.5 % (ref 35–47)
HCV AB SER IA-ACNC: 0.03 INDEX
HCV AB SERPL QL IA: NONREACTIVE
HGB BLD-MCNC: 12.9 G/DL (ref 11.5–16)
IMM GRANULOCYTES # BLD AUTO: 0.04 K/UL (ref 0–0.04)
IMM GRANULOCYTES NFR BLD AUTO: 0.9 % (ref 0–0.5)
LYMPHOCYTES # BLD: 1.54 K/UL (ref 0.8–3.5)
LYMPHOCYTES NFR BLD: 33.6 % (ref 12–49)
MCH RBC QN AUTO: 31.2 PG (ref 26–34)
MCHC RBC AUTO-ENTMCNC: 33.5 G/DL (ref 30–36.5)
MCV RBC AUTO: 93.2 FL (ref 80–99)
MONOCYTES # BLD: 0.45 K/UL (ref 0–1)
MONOCYTES NFR BLD: 9.8 % (ref 5–13)
NEUTS SEG # BLD: 2.47 K/UL (ref 1.8–8)
NEUTS SEG NFR BLD: 53.9 % (ref 32–75)
NRBC # BLD: 0 K/UL (ref 0–0.01)
NRBC BLD-RTO: 0 PER 100 WBC
PLATELET # BLD AUTO: 392 K/UL (ref 150–400)
PMV BLD AUTO: 10.9 FL (ref 8.9–12.9)
POTASSIUM SERPL-SCNC: 4.1 MMOL/L (ref 3.5–5.1)
PROT SERPL-MCNC: 7.2 G/DL (ref 6.4–8.2)
RBC # BLD AUTO: 4.13 M/UL (ref 3.8–5.2)
SODIUM SERPL-SCNC: 137 MMOL/L (ref 136–145)
WBC # BLD AUTO: 4.6 K/UL (ref 3.6–11)

## 2025-05-12 ENCOUNTER — RESULTS FOLLOW-UP (OUTPATIENT)
Age: 70
End: 2025-05-12

## 2025-05-12 DIAGNOSIS — M35.3 POLYMYALGIA RHEUMATICA: Primary | ICD-10-CM

## 2025-05-12 DIAGNOSIS — M31.6 OTHER GIANT CELL ARTERITIS (HCC): ICD-10-CM

## 2025-05-12 NOTE — RESULT ENCOUNTER NOTE
Spoke with patient HIPAA verified . Advised per Cammy Palomo NP to repeat her CRP in a month. Patient verbalized understanding and states will come to the office to get labs done

## 2025-05-15 DIAGNOSIS — E11.51 TYPE 2 DIABETES MELLITUS WITH PERIPHERAL VASCULAR DISEASE (HCC): ICD-10-CM

## 2025-05-15 RX ORDER — EMPAGLIFLOZIN 10 MG/1
10 TABLET, FILM COATED ORAL DAILY
Qty: 90 TABLET | Refills: 1 | Status: SHIPPED | OUTPATIENT
Start: 2025-05-15

## 2025-05-15 NOTE — TELEPHONE ENCOUNTER
Last office visit 2/27/25  Next Appt  With Internal Medicine (Castro Felix DO)  07/21/2025 at 2:30 PM    Last maritza on jardiance 12/06/24 #90 with 1 additional refill   
- - -

## 2025-05-27 ENCOUNTER — PATIENT MESSAGE (OUTPATIENT)
Age: 70
End: 2025-05-27

## 2025-05-27 DIAGNOSIS — Z98.890 STATUS POST LUMBAR LAMINECTOMY: ICD-10-CM

## 2025-05-27 DIAGNOSIS — M48.062 SPINAL STENOSIS OF LUMBAR REGION WITH NEUROGENIC CLAUDICATION: ICD-10-CM

## 2025-05-28 RX ORDER — DULOXETIN HYDROCHLORIDE 60 MG/1
60 CAPSULE, DELAYED RELEASE ORAL EVERY EVENING
Qty: 7 CAPSULE | Refills: 0 | Status: SHIPPED | OUTPATIENT
Start: 2025-05-28 | End: 2025-06-04

## 2025-05-28 RX ORDER — PREGABALIN 75 MG/1
75 CAPSULE ORAL 2 TIMES DAILY
Qty: 14 CAPSULE | Refills: 0 | Status: SHIPPED | OUTPATIENT
Start: 2025-05-28 | End: 2025-06-04

## 2025-06-02 RX ORDER — METHOTREXATE 2.5 MG/1
TABLET ORAL
Qty: 72 TABLET | Refills: 1 | Status: SHIPPED | OUTPATIENT
Start: 2025-06-02

## 2025-06-02 NOTE — TELEPHONE ENCOUNTER
Last office visit 05/09/2025  Next office visit 9/9/2025  Lab Results   Component Value Date    WBC 4.6 05/09/2025    HGB 12.9 05/09/2025    HCT 38.5 05/09/2025    MCV 93.2 05/09/2025     05/09/2025     Lab Results   Component Value Date     05/09/2025    K 4.1 05/09/2025     05/09/2025    CO2 26 05/09/2025    BUN 25 (H) 05/09/2025    CREATININE 1.35 (H) 05/09/2025    GLUCOSE 232 (H) 05/09/2025    CALCIUM 9.3 05/09/2025    BILITOT 0.3 05/09/2025    ALKPHOS 67 05/09/2025    AST 16 05/09/2025    ALT 18 05/09/2025    LABGLOM 42 (L) 05/09/2025    GFRAA >60 09/30/2022    AGRATIO 1.5 05/05/2023    GLOB 3.5 05/09/2025

## 2025-06-13 ENCOUNTER — TELEPHONE (OUTPATIENT)
Age: 70
End: 2025-06-13

## 2025-06-13 NOTE — TELEPHONE ENCOUNTER
Call patient and lvm to schedule lab appointment and to advise patient that lab orders were placed in the system

## 2025-06-17 NOTE — TELEPHONE ENCOUNTER
Last office visit 2/27/25  Next Appt  With Internal Medicine (Castro Felix DO)  07/21/2025 at 2:30 PM    Last fill on metformin 7/03/24 #360 with 3 additional refills

## 2025-07-21 ENCOUNTER — OFFICE VISIT (OUTPATIENT)
Age: 70
End: 2025-07-21
Payer: MEDICARE

## 2025-07-21 VITALS
HEART RATE: 86 BPM | OXYGEN SATURATION: 99 % | WEIGHT: 189.2 LBS | HEIGHT: 67 IN | TEMPERATURE: 97.8 F | RESPIRATION RATE: 18 BRPM | DIASTOLIC BLOOD PRESSURE: 77 MMHG | BODY MASS INDEX: 29.7 KG/M2 | SYSTOLIC BLOOD PRESSURE: 127 MMHG

## 2025-07-21 DIAGNOSIS — E78.5 HYPERLIPIDEMIA, UNSPECIFIED HYPERLIPIDEMIA TYPE: ICD-10-CM

## 2025-07-21 DIAGNOSIS — F32.A ANXIETY AND DEPRESSION: ICD-10-CM

## 2025-07-21 DIAGNOSIS — E11.51 TYPE 2 DIABETES MELLITUS WITH PERIPHERAL VASCULAR DISEASE (HCC): ICD-10-CM

## 2025-07-21 DIAGNOSIS — M35.3 POLYMYALGIA RHEUMATICA: ICD-10-CM

## 2025-07-21 DIAGNOSIS — F33.1 MAJOR DEPRESSIVE DISORDER, RECURRENT, MODERATE (HCC): ICD-10-CM

## 2025-07-21 DIAGNOSIS — M31.6 OTHER GIANT CELL ARTERITIS (HCC): ICD-10-CM

## 2025-07-21 DIAGNOSIS — E11.51 TYPE 2 DIABETES MELLITUS WITH PERIPHERAL VASCULAR DISEASE (HCC): Primary | ICD-10-CM

## 2025-07-21 DIAGNOSIS — N18.31 STAGE 3A CHRONIC KIDNEY DISEASE (HCC): ICD-10-CM

## 2025-07-21 DIAGNOSIS — F41.9 ANXIETY AND DEPRESSION: ICD-10-CM

## 2025-07-21 PROCEDURE — G8399 PT W/DXA RESULTS DOCUMENT: HCPCS | Performed by: STUDENT IN AN ORGANIZED HEALTH CARE EDUCATION/TRAINING PROGRAM

## 2025-07-21 PROCEDURE — 3078F DIAST BP <80 MM HG: CPT | Performed by: STUDENT IN AN ORGANIZED HEALTH CARE EDUCATION/TRAINING PROGRAM

## 2025-07-21 PROCEDURE — 99214 OFFICE O/P EST MOD 30 MIN: CPT | Performed by: STUDENT IN AN ORGANIZED HEALTH CARE EDUCATION/TRAINING PROGRAM

## 2025-07-21 PROCEDURE — 1090F PRES/ABSN URINE INCON ASSESS: CPT | Performed by: STUDENT IN AN ORGANIZED HEALTH CARE EDUCATION/TRAINING PROGRAM

## 2025-07-21 PROCEDURE — 3044F HG A1C LEVEL LT 7.0%: CPT | Performed by: STUDENT IN AN ORGANIZED HEALTH CARE EDUCATION/TRAINING PROGRAM

## 2025-07-21 PROCEDURE — G8427 DOCREV CUR MEDS BY ELIG CLIN: HCPCS | Performed by: STUDENT IN AN ORGANIZED HEALTH CARE EDUCATION/TRAINING PROGRAM

## 2025-07-21 PROCEDURE — G8417 CALC BMI ABV UP PARAM F/U: HCPCS | Performed by: STUDENT IN AN ORGANIZED HEALTH CARE EDUCATION/TRAINING PROGRAM

## 2025-07-21 PROCEDURE — 3074F SYST BP LT 130 MM HG: CPT | Performed by: STUDENT IN AN ORGANIZED HEALTH CARE EDUCATION/TRAINING PROGRAM

## 2025-07-21 PROCEDURE — 3017F COLORECTAL CA SCREEN DOC REV: CPT | Performed by: STUDENT IN AN ORGANIZED HEALTH CARE EDUCATION/TRAINING PROGRAM

## 2025-07-21 PROCEDURE — 1036F TOBACCO NON-USER: CPT | Performed by: STUDENT IN AN ORGANIZED HEALTH CARE EDUCATION/TRAINING PROGRAM

## 2025-07-21 PROCEDURE — 2022F DILAT RTA XM EVC RTNOPTHY: CPT | Performed by: STUDENT IN AN ORGANIZED HEALTH CARE EDUCATION/TRAINING PROGRAM

## 2025-07-21 PROCEDURE — 1123F ACP DISCUSS/DSCN MKR DOCD: CPT | Performed by: STUDENT IN AN ORGANIZED HEALTH CARE EDUCATION/TRAINING PROGRAM

## 2025-07-21 NOTE — ASSESSMENT & PLAN NOTE
Update labs today, if renal function progressively worsening will refer to nephrology. Update urine microalbumin today, if elevated plan to increase Jardiance to 25 mg daily

## 2025-07-21 NOTE — ASSESSMENT & PLAN NOTE
Previously on Zetia, though not on her active medication list today. Would have her continue this - update labs today and if LDL above goal will follow up with her on whether she is still taking this or not.

## 2025-07-21 NOTE — PROGRESS NOTES
Claudine Barnard is a 69 y.o. year old female who presents today (07/21/25) for follow up.    Assessment & Plan:   1. Type 2 diabetes mellitus with peripheral vascular disease (HCC)  Assessment & Plan:   Well-controlled, continue current medication. Update labs today  Orders:  -     Comprehensive Metabolic Panel; Future  -     CBC; Future  -     Hemoglobin A1C; Future  -     Albumin/Creatinine Ratio, Urine; Future  2. Major depressive disorder, recurrent, moderate (HCC)  3. Hyperlipidemia, unspecified hyperlipidemia type  Assessment & Plan:  Previously on Zetia, though not on her active medication list today. Would have her continue this - update labs today and if LDL above goal will follow up with her on whether she is still taking this or not.  Orders:  -     Lipid Panel; Future  4. Stage 3a chronic kidney disease (HCC)  Assessment & Plan:  Update labs today, if renal function progressively worsening will refer to nephrology. Update urine microalbumin today, if elevated plan to increase Jardiance to 25 mg daily  5. Polymyalgia rheumatica  Assessment & Plan:   Monitored by specialist- no acute findings meriting change in the plan  6. Anxiety and depression  Assessment & Plan:  Stable, well controlled with current medications.        Return in about 6 months (around 1/21/2026) for Annual Physical.    Subjective:   Pt here today for follow up of her chronic conditions:     Anemia  - Underwent R TKA earlier this year with related anemia due to blood loss. Was feeling dizzy/orthostatic after hospital discharge.   - Says she is feeling much better, Hgb back to normal on last labs with rheumatology    DMII  - Last A1c 6.1  - Current medications: Januvia, Jardiance, metformin  - Up to date with eye exam, done a few months ago    HTN  - Valsartan, verapamil    PMR  Hx of GCA  - Follows with rheumatology  - On MTX and Kevzara    Peripheral neuropathy  - Secondary to DM   - She takes Cymbalta and Lyrica for this    Depression  -

## 2025-07-22 LAB
ALBUMIN SERPL-MCNC: 4 G/DL (ref 3.5–5)
ALBUMIN/GLOB SERPL: 1.2 (ref 1.1–2.2)
ALP SERPL-CCNC: 47 U/L (ref 45–117)
ALT SERPL-CCNC: 22 U/L (ref 12–78)
ANION GAP SERPL CALC-SCNC: 9 MMOL/L (ref 2–12)
AST SERPL-CCNC: 20 U/L (ref 15–37)
BILIRUB SERPL-MCNC: 0.3 MG/DL (ref 0.2–1)
BUN SERPL-MCNC: 29 MG/DL (ref 6–20)
BUN/CREAT SERPL: 28 (ref 12–20)
CALCIUM SERPL-MCNC: 9.5 MG/DL (ref 8.5–10.1)
CHLORIDE SERPL-SCNC: 100 MMOL/L (ref 97–108)
CHOLEST SERPL-MCNC: 312 MG/DL
CO2 SERPL-SCNC: 25 MMOL/L (ref 21–32)
CREAT SERPL-MCNC: 1.02 MG/DL (ref 0.55–1.02)
CREAT UR-MCNC: 61.8 MG/DL
CRP SERPL-MCNC: 0.84 MG/DL (ref 0–0.3)
ERYTHROCYTE [DISTWIDTH] IN BLOOD BY AUTOMATED COUNT: 16.9 % (ref 11.5–14.5)
ERYTHROCYTE [SEDIMENTATION RATE] IN BLOOD: 7 MM/HR (ref 0–30)
EST. AVERAGE GLUCOSE BLD GHB EST-MCNC: 128 MG/DL
GLOBULIN SER CALC-MCNC: 3.4 G/DL (ref 2–4)
GLUCOSE SERPL-MCNC: 86 MG/DL (ref 65–100)
HBA1C MFR BLD: 6.1 % (ref 4–5.6)
HCT VFR BLD AUTO: 41.2 % (ref 35–47)
HDLC SERPL-MCNC: 55 MG/DL
HDLC SERPL: 5.7 (ref 0–5)
HGB BLD-MCNC: 13.3 G/DL (ref 11.5–16)
LDLC SERPL CALC-MCNC: 193.4 MG/DL (ref 0–100)
MCH RBC QN AUTO: 31 PG (ref 26–34)
MCHC RBC AUTO-ENTMCNC: 32.3 G/DL (ref 30–36.5)
MCV RBC AUTO: 96 FL (ref 80–99)
MICROALBUMIN UR-MCNC: 0.61 MG/DL
MICROALBUMIN/CREAT UR-RTO: 10 MG/G (ref 0–30)
NRBC # BLD: 0 K/UL (ref 0–0.01)
NRBC BLD-RTO: 0 PER 100 WBC
PLATELET # BLD AUTO: 388 K/UL (ref 150–400)
PMV BLD AUTO: 10.7 FL (ref 8.9–12.9)
POTASSIUM SERPL-SCNC: 4 MMOL/L (ref 3.5–5.1)
PROT SERPL-MCNC: 7.4 G/DL (ref 6.4–8.2)
RBC # BLD AUTO: 4.29 M/UL (ref 3.8–5.2)
SODIUM SERPL-SCNC: 134 MMOL/L (ref 136–145)
SPECIMEN HOLD: NORMAL
TRIGL SERPL-MCNC: 318 MG/DL
VLDLC SERPL CALC-MCNC: 63.6 MG/DL
WBC # BLD AUTO: 5 K/UL (ref 3.6–11)

## 2025-08-01 ENCOUNTER — E-VISIT (OUTPATIENT)
Age: 70
End: 2025-08-01

## 2025-08-01 DIAGNOSIS — L03.011 PARONYCHIA OF FINGER OF RIGHT HAND: Primary | ICD-10-CM

## 2025-08-01 DIAGNOSIS — E78.5 HYPERLIPIDEMIA, UNSPECIFIED HYPERLIPIDEMIA TYPE: Primary | ICD-10-CM

## 2025-08-01 RX ORDER — DOXYCYCLINE HYCLATE 100 MG
100 TABLET ORAL 2 TIMES DAILY
Qty: 14 TABLET | Refills: 0 | Status: SHIPPED | OUTPATIENT
Start: 2025-08-01 | End: 2025-08-08

## 2025-08-01 RX ORDER — EZETIMIBE 10 MG/1
10 TABLET ORAL DAILY
Qty: 90 TABLET | Refills: 3 | Status: SHIPPED | OUTPATIENT
Start: 2025-08-01

## 2025-08-01 NOTE — PROGRESS NOTES
Claudine Barnard (1955) initiated an asynchronous digital communication through [employer].    HPI: per patient questionnaire     Exam: not applicable    Diagnoses and all orders for this visit:    Paronychia of finger of right hand  -     doxycycline hyclate (VIBRA-TABS) 100 MG tablet; Take 1 tablet by mouth 2 times daily for 7 days        15 minutes were spent on the digital evaluation and management of this patient.    Castro Felix DO

## (undated) DEVICE — TRAP SURG QUAD PARABOLA SLOT DSGN SFTY SCRN TRAPEASE

## (undated) DEVICE — GLOVE SURG SZ 75 L12IN FNGR THK94MIL STD WHT LTX FREE

## (undated) DEVICE — CONTAINER SPEC 20 ML LID NEUT BUFF FORMALIN 10 % POLYPR STS

## (undated) DEVICE — 2108 SERIES SAGITTAL BLADE AGGRESSIVE  (25.0 X 1.19 X 85.0MM)

## (undated) DEVICE — SOLIDIFIER FLUID 3000 CC ABSORB

## (undated) DEVICE — SNARE ENDOSCP L240CM LOOP W27MM SHTH DIA2.4MM WRK CHN 2.8MM

## (undated) DEVICE — KENDALL RADIOLUCENT FOAM MONITORING ELECTRODE -RECTANGULAR SHAPE: Brand: KENDALL

## (undated) DEVICE — SOLUTION SURG PREP 26 CC PURPREP

## (undated) DEVICE — NEEDLE HYPO 18GA L1.5IN PNK S STL HUB POLYPR SHLD REG BVL

## (undated) DEVICE — SET ADMIN 16ML TBNG L100IN 2 Y INJ SITE IV PIGGY BK DISP

## (undated) DEVICE — PENCIL SMK EVAC 10 FT BLADE ELECTRD ROCKER FOR TELSCP

## (undated) DEVICE — DRESSING STERILE PETRO W3XL8IN N ADH OIL EMUL GZ CURAD

## (undated) DEVICE — Z DISCONTINUED USE 2751540 TUBING IRRIG L10IN DISP PMP ENDOGATOR

## (undated) DEVICE — BAG BELONG PT PERS CLEAR HANDL

## (undated) DEVICE — SOL IRR SOD CHL 0.9% TITAN XL CNTNR 3000ML

## (undated) DEVICE — CANN NASAL O2 CAPNOGRAPHY AD -- FILTERLINE

## (undated) DEVICE — SOLUTION IV 250ML 0.9% SOD CHL CLR INJ FLX BG CONT PRT CLSR

## (undated) DEVICE — SUTURE VICRYL COATED ABSORBABLE BRAIDED 2-0 TP1 54 IN COAT UD VICRYL + VCP880T

## (undated) DEVICE — QUILTED PREMIUM COMFORT UNDERPAD,EXTRA HEAVY: Brand: WINGS

## (undated) DEVICE — BAG SPEC BIOHZD LF 2MIL 6X10IN -- CONVERT TO ITEM 357326

## (undated) DEVICE — HANDPIECE SET WITH BONE CLEANING TIP AND SUCTION TUBE: Brand: INTERPULSE

## (undated) DEVICE — LAMINECTOMY-SMH: Brand: MEDLINE INDUSTRIES, INC.

## (undated) DEVICE — ADHESIVE LIQ 2OZ ADJUNCT FOR DSG MASTISOL

## (undated) DEVICE — RETRIEVER ENDOSCP L230CM DIA2.5MM NET W3XL5.5CM MIN WRK CHN

## (undated) DEVICE — GOWN,SIRUS,NONRNF,SETINSLV,2XL,18/CS: Brand: MEDLINE

## (undated) DEVICE — CATH IV AUTOGRD BC BLU 22GA 25 -- INSYTE

## (undated) DEVICE — SET EXTN TBNG L BOR 4 W STPCOCK ST 32IN PRIMING VOL 6ML

## (undated) DEVICE — INTENT OT USE PROVIDES A STERILE INTERFACE BETWEEN THE OPERATING ROOM SURGICAL LAMPS (NON-STERILE) AND THE SURGEON OR STAFF WORKING IN THE STERILE FIELD.: Brand: ASPEN® ALC PLUS LIGHT HANDLE COVER

## (undated) DEVICE — CONTAINER,SPECIMEN,3OZ,OR STRL: Brand: MEDLINE

## (undated) DEVICE — CONNECTOR TBNG AUX H2O JET DISP FOR OLY 160/180 SER

## (undated) DEVICE — CUSTOM CAST PD STR

## (undated) DEVICE — 4-PORT MANIFOLD: Brand: NEPTUNE 2

## (undated) DEVICE — 450 ML BOTTLE OF 0.05% CHLORHEXIDINE GLUCONATE IN 99.95% STERILE WATER FOR IRRIGATION, USP AND APPLICATOR.: Brand: IRRISEPT ANTIMICROBIAL WOUND LAVAGE

## (undated) DEVICE — SUTURE STRATAFIX SYMMETRIC PDS + SZ 1 L18IN ABSRB VLT L48MM SXPP1A400

## (undated) DEVICE — ENDO CARRY-ON PROCEDURE KIT INCLUDES ENZYMATIC SPONGE, GAUZE, BIOHAZARD LABEL, TRAY, LUBRICANT, DIRTY SCOPE LABEL, WATER LABEL, TRAY, DRAWSTRING PAD, AND DEFENDO 4-PIECE KIT.: Brand: ENDO CARRY-ON PROCEDURE KIT

## (undated) DEVICE — TOTAL JOINT - SMH: Brand: MEDLINE INDUSTRIES, INC.

## (undated) DEVICE — SOLUTION IRRIG 1000ML H2O PIC PLAS SHATTERPROOF CONTAINER

## (undated) DEVICE — GOWN,NON-REINFORCED,XXL: Brand: MEDLINE

## (undated) DEVICE — C-ARM: Brand: UNBRANDED

## (undated) DEVICE — DRAPE,EXTREMITY,89X128,STERILE: Brand: MEDLINE

## (undated) DEVICE — HYPODERMIC SAFETY NEEDLE: Brand: MAGELLAN

## (undated) DEVICE — 1200 GUARD II KIT W/5MM TUBE W/O VAC TUBE: Brand: GUARDIAN

## (undated) DEVICE — SYRINGE MED 20ML STD CLR PLAS LUERLOCK TIP N CTRL DISP

## (undated) DEVICE — COVER,MAYO STAND,STERILE: Brand: MEDLINE

## (undated) DEVICE — FORCEPS BX L240CM JAW DIA2.8MM L CAP W/ NDL MIC MESH TOOTH

## (undated) DEVICE — ZIMMER® STERILE DISPOSABLE TOURNIQUET CUFF WITH PLC, DUAL PORT, SINGLE BLADDER, 34 IN. (86 CM)

## (undated) DEVICE — PAD,ABDOMINAL,5"X9",ST,LF,25/BX: Brand: MEDLINE INDUSTRIES, INC.

## (undated) DEVICE — BONE WAX WHITE: Brand: BONE WAX WHITE

## (undated) DEVICE — CLIP HEMO ENDOSCP 235CM BX/20 -- RESOLUTION 360

## (undated) DEVICE — GLOVE ORANGE PI 8 1/2   MSG9085

## (undated) DEVICE — TUBING SUCT 12FR MAL ALUM SHFT FN CAP VENT UNIV CONN W/ OBT

## (undated) DEVICE — POSITIONER HD REST FOAM CMFRT TCH

## (undated) DEVICE — SPONGE GZ W4XL4IN COT 12 PLY TYP VII WVN C FLD DSGN STERILE

## (undated) DEVICE — PACK SURG PROC KNEE USER GPS

## (undated) DEVICE — BIPOLAR IRRIGATOR INTEGRATED TUBING AND BIPOLAR CORD SET, DISPOSABLE

## (undated) DEVICE — PADDING CAST W6INXL4YD ST COT RAYON MICROPLEATED HIGHLY

## (undated) DEVICE — SYRINGE 20ML LL S/C 50

## (undated) DEVICE — STRYKER PERFORMANCE SERIES SAGITTAL BLADE: Brand: STRYKER PERFORMANCE SERIES

## (undated) DEVICE — SUTURE VICRYL ABSORBABLE BRAIDED 2-0 CT 36 IN DA UD  VCP957H

## (undated) DEVICE — SUTURE STRATAFIX SPRL MCRYL + SZ 3-0 L24IN ABSRB UD PS-2 SXMP1B108

## (undated) DEVICE — STRIP,CLOSURE,WOUND,MEDI-STRIP,1/2X4: Brand: MEDLINE

## (undated) DEVICE — AIRLIFE™ U/CONNECT-IT OXYGEN TUBING 7 FEET (2.1 M) CRUSH-RESISTANT OXYGEN TUBING, VINYL TIPPED: Brand: AIRLIFE™

## (undated) DEVICE — SCRUBIN SCRUB BRUSH DRY STER: Brand: MEDLINE INDUSTRIES, INC.

## (undated) DEVICE — 3M™ STERI-DRAPE™ U-DRAPE 1015: Brand: STERI-DRAPE™

## (undated) DEVICE — GLOVE SURG SZ 8 L12IN FNGR THK79MIL GRN LTX FREE

## (undated) DEVICE — Device

## (undated) DEVICE — SUTURE MCRYL + SZ 4-0 L27IN ABSRB UD PS1 L24MM 3/8 CIR REV MCP935H

## (undated) DEVICE — BW-412T DISP COMBO CLEANING BRUSH: Brand: SINGLE USE COMBINATION CLEANING BRUSH

## (undated) DEVICE — KIT IV STRT W CHLORAPREP PD 1ML

## (undated) DEVICE — YANKAUER,FLEXIBLE HANDLE,REGLR CAPACITY: Brand: MEDLINE INDUSTRIES, INC.

## (undated) DEVICE — BANDAGE COMPR M W6INXL10YD WHT BGE VELC E MTRX HK AND LOOP

## (undated) DEVICE — REM POLYHESIVE ADULT PATIENT RETURN ELECTRODE: Brand: VALLEYLAB

## (undated) DEVICE — TUBING HYDR IRR --

## (undated) DEVICE — RECIPROCATING BLADE HEAVY DUTY LONG, OFFSET  (77.6 X 0.77 X 11.2MM)

## (undated) DEVICE — PADDING CAST W6INXL4YD NONSTERILE COT RAYON MICROPLEATED